# Patient Record
Sex: FEMALE | Race: WHITE | NOT HISPANIC OR LATINO | Employment: OTHER | URBAN - METROPOLITAN AREA
[De-identification: names, ages, dates, MRNs, and addresses within clinical notes are randomized per-mention and may not be internally consistent; named-entity substitution may affect disease eponyms.]

---

## 2023-09-05 ENCOUNTER — HOSPITAL ENCOUNTER (OUTPATIENT)
Dept: DATA CONVERSION | Facility: HOSPITAL | Age: 68
Discharge: HOME | End: 2023-09-05
Payer: MEDICARE

## 2023-09-05 DIAGNOSIS — E05.90 THYROTOXICOSIS, UNSPECIFIED WITHOUT THYROTOXIC CRISIS OR STORM: ICD-10-CM

## 2023-09-05 LAB — TSH SERPL DL<=0.05 MIU/L-ACNC: 3.14 MIU/L (ref 0.27–4.2)

## 2023-09-15 PROBLEM — H81.09 MÉNIÈRE'S DISEASE: Status: ACTIVE | Noted: 2023-09-15

## 2023-09-15 PROBLEM — M19.019 SHOULDER ARTHRITIS: Status: ACTIVE | Noted: 2023-09-15

## 2023-09-15 PROBLEM — I10 BENIGN ESSENTIAL HYPERTENSION: Status: ACTIVE | Noted: 2023-09-15

## 2023-09-15 PROBLEM — K29.60 REFLUX GASTRITIS: Status: ACTIVE | Noted: 2023-09-15

## 2023-09-15 PROBLEM — I48.92 PAROXYSMAL ATRIAL FLUTTER (MULTI): Status: ACTIVE | Noted: 2023-09-15

## 2023-09-15 PROBLEM — H92.09 OTALGIA: Status: ACTIVE | Noted: 2023-09-15

## 2023-09-15 PROBLEM — K57.92 DIVERTICULITIS: Status: ACTIVE | Noted: 2023-09-15

## 2023-09-15 PROBLEM — F32.9 MAJOR DEPRESSIVE DISORDER, SINGLE EPISODE, UNSPECIFIED: Status: ACTIVE | Noted: 2023-09-15

## 2023-09-15 PROBLEM — E78.5 DYSLIPIDEMIA: Status: ACTIVE | Noted: 2023-09-15

## 2023-09-15 PROBLEM — R20.0 NUMBNESS OF RIGHT FOOT: Status: ACTIVE | Noted: 2023-09-15

## 2023-09-15 PROBLEM — F31.9 BIPOLAR AFFECTIVE DISORDER (MULTI): Status: ACTIVE | Noted: 2023-09-15

## 2023-09-15 PROBLEM — H26.9 CATARACT: Status: ACTIVE | Noted: 2023-09-15

## 2023-09-15 PROBLEM — E66.8 OTHER OBESITY: Status: ACTIVE | Noted: 2023-09-15

## 2023-09-15 PROBLEM — I10 HYPERTENSIVE DISORDER: Status: ACTIVE | Noted: 2023-09-15

## 2023-09-15 PROBLEM — I48.0 PAROXYSMAL ATRIAL FIBRILLATION (MULTI): Status: ACTIVE | Noted: 2023-09-15

## 2023-09-15 PROBLEM — R73.01 IMPAIRED FASTING GLUCOSE: Status: ACTIVE | Noted: 2023-09-15

## 2023-09-15 PROBLEM — E66.89 OTHER OBESITY: Status: ACTIVE | Noted: 2023-09-15

## 2023-09-15 PROBLEM — G47.30 SLEEP APNEA: Status: ACTIVE | Noted: 2023-09-15

## 2023-09-15 PROBLEM — J45.909 ASTHMA (HHS-HCC): Status: ACTIVE | Noted: 2023-09-15

## 2023-09-15 PROBLEM — M81.0 AGE-RELATED OSTEOPOROSIS WITHOUT CURRENT PATHOLOGICAL FRACTURE: Status: ACTIVE | Noted: 2023-09-15

## 2023-09-15 PROBLEM — E55.9 VITAMIN D DEFICIENCY: Status: ACTIVE | Noted: 2023-09-15

## 2023-09-15 PROBLEM — N39.44 NOCTURNAL ENURESIS: Status: ACTIVE | Noted: 2023-09-15

## 2023-09-15 PROBLEM — E05.90 HYPERTHYROIDISM: Status: ACTIVE | Noted: 2023-09-15

## 2023-09-15 PROBLEM — M54.9 BACK PAIN WITH RADIATION: Status: ACTIVE | Noted: 2023-09-15

## 2023-09-15 PROBLEM — E78.5 HYPERLIPIDEMIA: Status: ACTIVE | Noted: 2023-09-15

## 2023-09-15 PROBLEM — R10.9 ABDOMINAL PAIN: Status: ACTIVE | Noted: 2023-09-15

## 2023-09-15 RX ORDER — METHIMAZOLE 5 MG/1
1 TABLET ORAL DAILY
COMMUNITY
Start: 2022-02-22 | End: 2023-11-16 | Stop reason: ALTCHOICE

## 2023-09-15 RX ORDER — ATORVASTATIN CALCIUM 10 MG/1
1 TABLET, FILM COATED ORAL DAILY
COMMUNITY
End: 2023-10-06 | Stop reason: DRUGHIGH

## 2023-09-15 RX ORDER — TRIAMTERENE/HYDROCHLOROTHIAZID 37.5-25 MG
1 TABLET ORAL EVERY MORNING
COMMUNITY
End: 2024-01-03 | Stop reason: SDUPTHER

## 2023-09-15 RX ORDER — OXCARBAZEPINE 150 MG/1
75 TABLET, FILM COATED ORAL EVERY OTHER DAY
COMMUNITY

## 2023-09-15 RX ORDER — ASPIRIN 325 MG
TABLET, DELAYED RELEASE (ENTERIC COATED) ORAL
COMMUNITY
End: 2023-10-06 | Stop reason: ALTCHOICE

## 2023-09-15 RX ORDER — HYDROCODONE BITARTRATE AND ACETAMINOPHEN 5; 325 MG/1; MG/1
1 TABLET ORAL
COMMUNITY
Start: 2023-04-18 | End: 2023-10-06 | Stop reason: ALTCHOICE

## 2023-09-15 RX ORDER — CHOLECALCIFEROL (VITAMIN D3) 25 MCG
2000 TABLET ORAL
COMMUNITY

## 2023-09-15 RX ORDER — OXCARBAZEPINE 300 MG/1
1 TABLET, FILM COATED ORAL DAILY
COMMUNITY
Start: 2016-04-28 | End: 2023-10-06 | Stop reason: ALTCHOICE

## 2023-09-15 RX ORDER — DOCUSATE SODIUM 100 MG/1
1 CAPSULE, LIQUID FILLED ORAL DAILY PRN
COMMUNITY

## 2023-09-15 RX ORDER — LOSARTAN POTASSIUM 25 MG/1
1 TABLET ORAL DAILY
COMMUNITY
Start: 2023-03-01 | End: 2023-10-06 | Stop reason: ALTCHOICE

## 2023-09-15 RX ORDER — METHIMAZOLE 10 MG/1
1 TABLET ORAL EVERY OTHER DAY
COMMUNITY
Start: 2021-06-29 | End: 2023-10-06 | Stop reason: ALTCHOICE

## 2023-10-06 ENCOUNTER — LAB (OUTPATIENT)
Dept: LAB | Facility: LAB | Age: 68
End: 2023-10-06
Payer: MEDICARE

## 2023-10-06 ENCOUNTER — OFFICE VISIT (OUTPATIENT)
Dept: PRIMARY CARE | Facility: CLINIC | Age: 68
End: 2023-10-06
Payer: MEDICARE

## 2023-10-06 VITALS
BODY MASS INDEX: 31.23 KG/M2 | WEIGHT: 199 LBS | OXYGEN SATURATION: 95 % | TEMPERATURE: 97.8 F | HEIGHT: 67 IN | DIASTOLIC BLOOD PRESSURE: 69 MMHG | SYSTOLIC BLOOD PRESSURE: 123 MMHG | HEART RATE: 74 BPM

## 2023-10-06 DIAGNOSIS — Z12.31 VISIT FOR SCREENING MAMMOGRAM: ICD-10-CM

## 2023-10-06 DIAGNOSIS — E78.2 MIXED HYPERLIPIDEMIA: ICD-10-CM

## 2023-10-06 DIAGNOSIS — I10 BENIGN ESSENTIAL HYPERTENSION: Primary | ICD-10-CM

## 2023-10-06 DIAGNOSIS — F31.70 BIPOLAR DISORDER IN FULL REMISSION, MOST RECENT EPISODE UNSPECIFIED TYPE (MULTI): ICD-10-CM

## 2023-10-06 DIAGNOSIS — Z11.59 ENCOUNTER FOR HEPATITIS C SCREENING TEST FOR LOW RISK PATIENT: ICD-10-CM

## 2023-10-06 DIAGNOSIS — E05.90 HYPERTHYROIDISM: ICD-10-CM

## 2023-10-06 DIAGNOSIS — Z12.39 BREAST SCREENING: ICD-10-CM

## 2023-10-06 DIAGNOSIS — E28.39 ESTROGEN DEFICIENCY: ICD-10-CM

## 2023-10-06 LAB
ALBUMIN SERPL-MCNC: 4.1 G/DL (ref 3.5–5)
ALP BLD-CCNC: 83 U/L (ref 35–125)
ALT SERPL-CCNC: 21 U/L (ref 5–40)
ANION GAP SERPL CALC-SCNC: 11 MMOL/L
AST SERPL-CCNC: 17 U/L (ref 5–40)
BILIRUB SERPL-MCNC: 0.4 MG/DL (ref 0.1–1.2)
BUN SERPL-MCNC: 15 MG/DL (ref 8–25)
CALCIUM SERPL-MCNC: 9.3 MG/DL (ref 8.5–10.4)
CHLORIDE SERPL-SCNC: 103 MMOL/L (ref 97–107)
CHOLEST SERPL-MCNC: 194 MG/DL (ref 133–200)
CHOLEST/HDLC SERPL: 3.3 {RATIO}
CO2 SERPL-SCNC: 27 MMOL/L (ref 24–31)
CREAT SERPL-MCNC: 0.8 MG/DL (ref 0.4–1.6)
ERYTHROCYTE [DISTWIDTH] IN BLOOD BY AUTOMATED COUNT: 12.6 % (ref 11.5–14.5)
GFR SERPL CREATININE-BSD FRML MDRD: 81 ML/MIN/1.73M*2
GLUCOSE SERPL-MCNC: 93 MG/DL (ref 65–99)
HCT VFR BLD AUTO: 44.2 % (ref 36–46)
HCV AB SER QL: NONREACTIVE
HDLC SERPL-MCNC: 58 MG/DL
HGB BLD-MCNC: 14.6 G/DL (ref 12–16)
LDLC SERPL CALC-MCNC: 88 MG/DL (ref 65–130)
MCH RBC QN AUTO: 30.4 PG (ref 26–34)
MCHC RBC AUTO-ENTMCNC: 33 G/DL (ref 32–36)
MCV RBC AUTO: 92 FL (ref 80–100)
NRBC BLD-RTO: 0 /100 WBCS (ref 0–0)
PLATELET # BLD AUTO: 224 X10*3/UL (ref 150–450)
PMV BLD AUTO: 12 FL (ref 7.5–11.5)
POTASSIUM SERPL-SCNC: 4.1 MMOL/L (ref 3.4–5.1)
PROT SERPL-MCNC: 6.8 G/DL (ref 5.9–7.9)
RBC # BLD AUTO: 4.81 X10*6/UL (ref 4–5.2)
SODIUM SERPL-SCNC: 141 MMOL/L (ref 133–145)
TRIGL SERPL-MCNC: 240 MG/DL (ref 40–150)
WBC # BLD AUTO: 5.5 X10*3/UL (ref 4.4–11.3)

## 2023-10-06 PROCEDURE — 86803 HEPATITIS C AB TEST: CPT

## 2023-10-06 PROCEDURE — 99214 OFFICE O/P EST MOD 30 MIN: CPT | Performed by: INTERNAL MEDICINE

## 2023-10-06 PROCEDURE — 1126F AMNT PAIN NOTED NONE PRSNT: CPT | Performed by: INTERNAL MEDICINE

## 2023-10-06 PROCEDURE — 1036F TOBACCO NON-USER: CPT | Performed by: INTERNAL MEDICINE

## 2023-10-06 PROCEDURE — 80053 COMPREHEN METABOLIC PANEL: CPT

## 2023-10-06 PROCEDURE — 1159F MED LIST DOCD IN RCRD: CPT | Performed by: INTERNAL MEDICINE

## 2023-10-06 PROCEDURE — 3078F DIAST BP <80 MM HG: CPT | Performed by: INTERNAL MEDICINE

## 2023-10-06 PROCEDURE — 3074F SYST BP LT 130 MM HG: CPT | Performed by: INTERNAL MEDICINE

## 2023-10-06 PROCEDURE — 85027 COMPLETE CBC AUTOMATED: CPT

## 2023-10-06 PROCEDURE — 36415 COLL VENOUS BLD VENIPUNCTURE: CPT

## 2023-10-06 PROCEDURE — 80061 LIPID PANEL: CPT

## 2023-10-06 RX ORDER — ATORVASTATIN CALCIUM 20 MG/1
20 TABLET, FILM COATED ORAL DAILY
Qty: 90 TABLET | Refills: 3 | Status: SHIPPED | OUTPATIENT
Start: 2023-10-06 | End: 2024-01-05

## 2023-10-06 RX ORDER — MV-MIN/FA/VIT K/LUTEIN/ZEAXANT 200MCG-5MG
1 CAPSULE ORAL DAILY
COMMUNITY

## 2023-10-06 ASSESSMENT — PATIENT HEALTH QUESTIONNAIRE - PHQ9
1. LITTLE INTEREST OR PLEASURE IN DOING THINGS: NOT AT ALL
2. FEELING DOWN, DEPRESSED OR HOPELESS: NOT AT ALL
SUM OF ALL RESPONSES TO PHQ9 QUESTIONS 1 AND 2: 0

## 2023-10-06 ASSESSMENT — ENCOUNTER SYMPTOMS
LOSS OF SENSATION IN FEET: 0
DEPRESSION: 0
SHORTNESS OF BREATH: 0
OCCASIONAL FEELINGS OF UNSTEADINESS: 0
PALPITATIONS: 0

## 2023-10-06 ASSESSMENT — PAIN SCALES - GENERAL: PAINLEVEL: 0-NO PAIN

## 2023-10-06 NOTE — PROGRESS NOTES
[facility]: [facility]  PROGRESS NOTE      Nevaeh Cuba is a 68 y.o. female that is presenting today for 6 mo Afib.    Assessment/Plan   Diagnoses and all orders for this visit:  Benign essential hypertension  Comments:  BP doing well with  generic Maxide  Mixed hyperlipidemia  Comments:   would recommend increasing to 20 mg Atorvastatin  Bipolar disorder in full remission, most recent episode unspecified type (CMS/HCC)  Comments:  Mood stable.  Breast screening  Hyperthyroidism  Comments:  Ddoing well with meds.  Subjective   HPI  Review of Systems   Respiratory:  Negative for shortness of breath.    Cardiovascular:  Negative for chest pain and palpitations.   All other systems reviewed and are negative.     Objective   There were no vitals filed for this visit.   There is no height or weight on file to calculate BMI.  Physical Exam  Constitutional:       General: She is not in acute distress.  HENT:      Head: Normocephalic and atraumatic.      Right Ear: Tympanic membrane normal.      Left Ear: Tympanic membrane normal.      Mouth/Throat:      Mouth: Mucous membranes are moist.      Pharynx: Oropharynx is clear.   Eyes:      Extraocular Movements: Extraocular movements intact.      Conjunctiva/sclera: Conjunctivae normal.      Pupils: Pupils are equal, round, and reactive to light.   Cardiovascular:      Rate and Rhythm: Normal rate and regular rhythm.   Pulmonary:      Breath sounds: Normal breath sounds.   Abdominal:      General: Bowel sounds are normal.      Palpations: Abdomen is soft. There is no mass.   Musculoskeletal:         General: Normal range of motion.      Cervical back: Neck supple. No tenderness.   Skin:     General: Skin is warm and dry.   Neurological:      General: No focal deficit present.      Mental Status: She is oriented to person, place, and time.     Diagnostic Results   Lab Results   Component Value Date    GLUCOSE 104 (H) 03/14/2023    CALCIUM 9.3  "03/14/2023     03/14/2023    K 4.0 03/14/2023    CO2 25 03/14/2023     03/14/2023    BUN 12 03/14/2023    CREATININE 0.8 03/14/2023     Lab Results   Component Value Date    ALT 25 03/14/2023    AST 19 03/14/2023    ALKPHOS 80 03/14/2023    BILITOT 0.6 03/14/2023     Lab Results   Component Value Date    WBC 6.0 10/31/2022    HGB 15.3 (H) 10/31/2022    HCT 45.0 (H) 10/31/2022    MCV 93.2 10/31/2022     10/31/2022     Lab Results   Component Value Date    CHOL 210 (H) 03/14/2023    CHOL 194 10/31/2022    CHOL 184 02/09/2022     Lab Results   Component Value Date    HDL 61 03/14/2023    HDL 57 10/31/2022    HDL 67 02/09/2022     Lab Results   Component Value Date    LDLCALC 117 03/14/2023    LDLCALC 107 10/31/2022    LDLCALC 96 02/09/2022     Lab Results   Component Value Date    TRIG 158 (H) 03/14/2023    TRIG 151 (H) 10/31/2022    TRIG 105 02/09/2022     No components found for: \"CHOLHDL\"  Lab Results   Component Value Date    HGBA1C 6.0 03/14/2023     Other labs not included in the list above were reviewed either before or during this encounter.    History    Past Medical History:   Diagnosis Date   • Other specified abnormal findings of blood chemistry     High serum cholesterol sulfate   • Personal history of malignant neoplasm, unspecified     History of malignant neoplasm   • Unspecified osteoarthritis, unspecified site 04/28/2016    Arthritis     Past Surgical History:   Procedure Laterality Date   • HYSTERECTOMY  04/28/2016    Hysterectomy   • OTHER SURGICAL HISTORY  04/28/2016    Musculoskeletal Procedures Injection Carpal Tunnel   • OTHER SURGICAL HISTORY  04/28/2016    Arthrodesis MCP Joint   • OTHER SURGICAL HISTORY  01/11/2021    Ankle surgery   • OTHER SURGICAL HISTORY  01/11/2021    Carpal tunnel surgery   • OTHER SURGICAL HISTORY  07/06/2022    Nasal septoplasty     Family History   Problem Relation Name Age of Onset   • Stroke Mother     • Other (cyst on breast) Mother     • Atrial " fibrillation Father     • Stroke Father     • Heart disease Father       Social History     Socioeconomic History   • Marital status:      Spouse name: Not on file   • Number of children: Not on file   • Years of education: Not on file   • Highest education level: Not on file   Occupational History   • Not on file   Tobacco Use   • Smoking status: Not on file   • Smokeless tobacco: Not on file   Substance and Sexual Activity   • Alcohol use: Not on file   • Drug use: Not on file   • Sexual activity: Not on file   Other Topics Concern   • Not on file   Social History Narrative   • Not on file     Social Determinants of Health     Financial Resource Strain: Not on file   Food Insecurity: Not on file   Transportation Needs: Not on file   Physical Activity: Not on file   Stress: Not on file   Social Connections: Not on file   Intimate Partner Violence: Not on file   Housing Stability: Not on file     Allergies   Allergen Reactions   • Latex Itching and Rash   • Diltiazem Hcl Itching     Rash face    • Methotrexate Unknown   • Metoprolol Unknown   • Pollen Extracts Other   • Adhesive Tape-Silicones Rash   • Folic Acid Rash   • Sulfamethoxazole-Trimethoprim Rash     Tinnitus      Current Outpatient Medications on File Prior to Visit   Medication Sig Dispense Refill   • atorvastatin (Lipitor) 10 mg tablet Take 1 tablet (10 mg) by mouth once daily. For 90     • cholecalciferol, vitamin D3, 250 mcg (10,000 unit) capsule Take by mouth. As directed     • docusate sodium (Colace) 100 mg capsule Take 1 capsule (100 mg) by mouth once daily as needed.     • fexofenadine HCl (ALLEGRA ORAL) Allegra     • methIMAzole (Tapazole) 5 mg tablet Take 1 tablet (5 mg) by mouth once daily. For 90 days     • OXcarbazepine (Trileptal) 150 mg tablet Take 1 tablet (150 mg) by mouth once daily. 1 tab even days, 1/2 tab odd days     • triamterene-hydrochlorothiazid (Maxzide-25) 37.5-25 mg tablet Take 1 tablet by mouth once daily in the  morning.     • [DISCONTINUED] cholecalciferol (Vitamin D-3) 1,250 mcg (50,000 unit) capsule Vitamin D3 CAPS   Refills: 0       Active     • [DISCONTINUED] HYDROcodone-acetaminophen (Norco) 5-325 mg tablet Take 1 tablet by mouth. EVERY 4 TO 6 HOURS AS NEEDED     • [DISCONTINUED] losartan (Cozaar) 25 mg tablet Take 1 tablet (25 mg) by mouth once daily. For 30 days     • [DISCONTINUED] methIMAzole (Tapazole) 10 mg tablet Take 1 tablet (10 mg) by mouth every other day.     • [DISCONTINUED] OXcarbazepine (Trileptal) 300 mg tablet Take 1 tablet (300 mg) by mouth once daily.       No current facility-administered medications on file prior to visit.     Immunization History   Administered Date(s) Administered   • Influenza, Unspecified 09/07/2010, 10/17/2011   • Influenza, injectable, MDCK, quadrivalent 10/23/2017, 10/30/2018   • Influenza, injectable, quadrivalent 09/23/2019, 09/11/2020   • Influenza, seasonal, injectable 09/24/2012, 10/17/2013, 10/23/2014, 10/26/2015, 11/17/2016   • Pfizer Gray Cap SARS-CoV-2 03/31/2022   • Pfizer Purple Cap SARS-CoV-2 03/09/2021, 04/06/2021, 10/06/2021   • Pneumococcal conjugate vaccine, 13-valent (PREVNAR 13) 07/11/2013   • Pneumococcal polysaccharide vaccine, 23-valent, age 2 years and older (PNEUMOVAX 23) 11/05/2020   • Tdap vaccine, age 7 year and older (BOOSTRIX) 09/30/2021   • Zoster vaccine, recombinant, adult (SHINGRIX) 10/15/2020, 09/30/2021   • Zoster, live 02/10/2012     Patient's medical history was reviewed and updated either before or during this encounter.    Rajeev Noriega MD

## 2023-10-06 NOTE — PATIENT INSTRUCTIONS
Assessment/Plan   Diagnoses and all orders for this visit:  Benign essential hypertension  Comments:  BP doing well with  generic Maxide  Mixed hyperlipidemia  Comments:   would recommend increasing to 20 mg Atorvastatin  Bipolar disorder in full remission, most recent episode unspecified type (CMS/[facility])  Comments:  Mood stable.  Breast screening  get Mammogram 1/3/2024, get bone density at same time [phone] to schedule call in  early December to schedule.  Hyperthyroidism  Comments:  Ddoing well with meds.

## 2023-11-15 PROBLEM — M81.0 OSTEOPOROSIS: Status: ACTIVE | Noted: 2023-09-15

## 2023-11-15 PROBLEM — Z86.69 HISTORY OF MENIERE'S SYNDROME: Status: ACTIVE | Noted: 2023-11-15

## 2023-11-15 PROBLEM — M54.16 LUMBAR RADICULOPATHY: Status: ACTIVE | Noted: 2018-01-26

## 2023-11-15 PROBLEM — I10 ESSENTIAL HYPERTENSION: Status: ACTIVE | Noted: 2023-11-15

## 2023-11-15 PROBLEM — H60.509 ACUTE OTITIS EXTERNA: Status: ACTIVE | Noted: 2023-11-15

## 2023-11-15 PROBLEM — J35.1 ENLARGED TONSILS: Status: ACTIVE | Noted: 2023-11-15

## 2023-11-15 PROBLEM — J01.90 ACUTE SINUSITIS: Status: ACTIVE | Noted: 2023-11-15

## 2023-11-15 PROBLEM — M19.019 INFLAMMATION OF JOINT OF SHOULDER REGION: Status: ACTIVE | Noted: 2023-11-15

## 2023-11-15 PROBLEM — J30.9 ALLERGIC RHINITIS: Status: ACTIVE | Noted: 2023-11-15
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
## 2023-11-15 PROBLEM — G47.33 OBSTRUCTIVE SLEEP APNEA SYNDROME IN ADULT: Status: ACTIVE | Noted: 2023-11-15

## 2023-11-15 PROBLEM — R21 RASH: Status: ACTIVE | Noted: 2023-11-15

## 2023-11-15 PROBLEM — H90.3 ASYMMETRICAL SENSORINEURAL HEARING LOSS: Status: ACTIVE | Noted: 2023-11-15

## 2023-11-15 PROBLEM — E28.39 DECREASED ESTROGEN LEVEL: Status: ACTIVE | Noted: 2023-11-15

## 2023-11-15 PROBLEM — J30.2 SEASONAL ALLERGIES: Status: ACTIVE | Noted: 2023-11-15

## 2023-11-15 PROBLEM — L29.9 ITCHING: Status: ACTIVE | Noted: 2023-11-15

## 2023-11-15 PROBLEM — Z51.89 ENCOUNTER FOR OTHER SPECIFIED AFTERCARE: Status: ACTIVE | Noted: 2023-04-26

## 2023-11-15 PROBLEM — Z85.9 HISTORY OF MALIGNANT NEOPLASM: Status: ACTIVE | Noted: 2023-11-15

## 2023-11-15 PROBLEM — S61.315A: Status: ACTIVE | Noted: 2023-02-07

## 2023-11-15 PROBLEM — J20.9 ACUTE BRONCHITIS: Status: ACTIVE | Noted: 2023-11-15

## 2023-11-15 PROBLEM — E78.00 PURE HYPERCHOLESTEROLEMIA: Status: ACTIVE | Noted: 2023-11-15

## 2023-11-16 ENCOUNTER — OFFICE VISIT (OUTPATIENT)
Dept: VASCULAR SURGERY | Facility: CLINIC | Age: 68
End: 2023-11-16
Payer: MEDICARE

## 2023-11-16 VITALS — DIASTOLIC BLOOD PRESSURE: 91 MMHG | HEART RATE: 75 BPM | SYSTOLIC BLOOD PRESSURE: 140 MMHG

## 2023-11-16 DIAGNOSIS — I83.12 VARICOSE VEINS OF BOTH LOWER EXTREMITIES WITH INFLAMMATION: ICD-10-CM

## 2023-11-16 DIAGNOSIS — I87.2 VENOUS INSUFFICIENCY: Primary | ICD-10-CM

## 2023-11-16 DIAGNOSIS — I83.11 VARICOSE VEINS OF BOTH LOWER EXTREMITIES WITH INFLAMMATION: ICD-10-CM

## 2023-11-16 DIAGNOSIS — R60.0 EDEMA OF BOTH LEGS: ICD-10-CM

## 2023-11-16 PROCEDURE — 99213 OFFICE O/P EST LOW 20 MIN: CPT | Performed by: NURSE PRACTITIONER

## 2023-11-16 PROCEDURE — 1036F TOBACCO NON-USER: CPT | Performed by: NURSE PRACTITIONER

## 2023-11-16 PROCEDURE — 3080F DIAST BP >= 90 MM HG: CPT | Performed by: NURSE PRACTITIONER

## 2023-11-16 PROCEDURE — 99213 OFFICE O/P EST LOW 20 MIN: CPT | Mod: 27,25 | Performed by: NURSE PRACTITIONER

## 2023-11-16 PROCEDURE — 1159F MED LIST DOCD IN RCRD: CPT | Performed by: NURSE PRACTITIONER

## 2023-11-16 PROCEDURE — 3077F SYST BP >= 140 MM HG: CPT | Performed by: NURSE PRACTITIONER

## 2023-11-16 PROCEDURE — 1126F AMNT PAIN NOTED NONE PRSNT: CPT | Performed by: NURSE PRACTITIONER

## 2023-11-16 RX ORDER — METHIMAZOLE 5 MG/1
5 TABLET ORAL 3 TIMES DAILY
COMMUNITY
End: 2024-01-05

## 2023-11-16 ASSESSMENT — PATIENT HEALTH QUESTIONNAIRE - PHQ9
2. FEELING DOWN, DEPRESSED OR HOPELESS: NOT AT ALL
1. LITTLE INTEREST OR PLEASURE IN DOING THINGS: NOT AT ALL
SUM OF ALL RESPONSES TO PHQ9 QUESTIONS 1 AND 2: 0

## 2023-11-16 ASSESSMENT — ENCOUNTER SYMPTOMS
OCCASIONAL FEELINGS OF UNSTEADINESS: 0
LOSS OF SENSATION IN FEET: 0
DEPRESSION: 0

## 2023-11-16 ASSESSMENT — PAIN SCALES - GENERAL: PAINLEVEL: 0-NO PAIN

## 2023-11-16 ASSESSMENT — LIFESTYLE VARIABLES
HOW OFTEN DO YOU HAVE A DRINK CONTAINING ALCOHOL: NEVER
HOW MANY STANDARD DRINKS CONTAINING ALCOHOL DO YOU HAVE ON A TYPICAL DAY: PATIENT DOES NOT DRINK
HOW OFTEN DO YOU HAVE SIX OR MORE DRINKS ON ONE OCCASION: NEVER
SKIP TO QUESTIONS 9-10: 1
AUDIT-C TOTAL SCORE: 0

## 2023-11-16 ASSESSMENT — COLUMBIA-SUICIDE SEVERITY RATING SCALE - C-SSRS
2. HAVE YOU ACTUALLY HAD ANY THOUGHTS OF KILLING YOURSELF?: NO
1. IN THE PAST MONTH, HAVE YOU WISHED YOU WERE DEAD OR WISHED YOU COULD GO TO SLEEP AND NOT WAKE UP?: NO
6. HAVE YOU EVER DONE ANYTHING, STARTED TO DO ANYTHING, OR PREPARED TO DO ANYTHING TO END YOUR LIFE?: NO

## 2023-11-16 NOTE — PROGRESS NOTES
Subjective   Patient ID: Nevaeh Cuba is a 68 y.o. female who presents for Varicose Veins (Veins become become red while walking. ).    HPI  This is a 68-year old female presents to our office for further evaluation of bilateral lower extremity edema, tenderness and varicosities.  Patient states she underwent work-up by Dr. Watson in September for her veins.  She underwent ultrasound bilateral lower extremities.  She has a history of tendon tear in her right lateral ankle 2018 which she had edema after.  She also was injected for spider veins right medial calf by Dr. Ross, she is unsure of date.  She is active and walks daily.  Endorses leg discomfort with long walks.  Denies claudication.  Denies ischemic rest pain.    Review of Systems  As noted in the HPI    Objective   Physical Exam  Constitutional:  Alert and oriented to person, place, date/time in no acute distress.  HEENT:  Atraumatic, normocephalic. PERRL. EOMI.  Nares patent.  Mucous membranes moist.  .   Neck:  Trachea midline.  Respiratory:  Clear to auscultation.  Cardiac:  Regular rate and rhythm.  No murmurs.  Cardiovascular: +2 nonpitting edema of the right leg, +1 nonpitting edema of the left leg  Pulse exam: Radial and femoral pulses palpable bilateral. Pedal pulses: Palpable patient.  Diffuse varicosities noted bilateral legs.  Spider veins present to ankles and behind the knees.,  Mildly tender upon palpation  Abdominal:  Soft, nontender, nondistended, bowel sounds present.  Musculoskeletal:  Moves extremities freely.  Dermatological: Clean and dry   Neurological: Alert and oriented to person, place, date/time  Psych:  Calm, cooperative    Assessment/Plan     Bilateral lower extremity edema  Venous insufficiency  Varicose veins    Patient with previous work-up by Dr. Watson, our office will request her records, will review further with Dr. Najera.  Plan to call the patient with results next week.  Compression stocking RX dispensed, wear during the day,  remove at night before bed  Elevation throughout the day  Walking program - 25 minutes, 3 times per week  NSAIDs as needed

## 2023-11-27 DIAGNOSIS — I87.2 VENOUS INSUFFICIENCY (CHRONIC) (PERIPHERAL): Primary | ICD-10-CM

## 2023-11-27 DIAGNOSIS — I83.813 VARICOSE VEINS OF BOTH LOWER EXTREMITIES WITH PAIN: ICD-10-CM

## 2023-12-07 ENCOUNTER — CLINICAL SUPPORT (OUTPATIENT)
Dept: VASCULAR MEDICINE | Facility: CLINIC | Age: 68
End: 2023-12-07
Payer: MEDICARE

## 2023-12-07 DIAGNOSIS — I87.2 VENOUS INSUFFICIENCY (CHRONIC) (PERIPHERAL): ICD-10-CM

## 2023-12-07 DIAGNOSIS — I83.813 VARICOSE VEINS OF BOTH LOWER EXTREMITIES WITH PAIN: ICD-10-CM

## 2023-12-07 PROCEDURE — 93970 EXTREMITY STUDY: CPT

## 2023-12-07 PROCEDURE — 93970 EXTREMITY STUDY: CPT | Performed by: INTERNAL MEDICINE

## 2024-01-03 ENCOUNTER — TELEPHONE (OUTPATIENT)
Dept: OTOLARYNGOLOGY | Facility: CLINIC | Age: 69
End: 2024-01-03
Payer: MEDICARE

## 2024-01-03 DIAGNOSIS — H81.09 MENIERE'S DISEASE, UNSPECIFIED LATERALITY: Primary | ICD-10-CM

## 2024-01-03 RX ORDER — TRIAMTERENE/HYDROCHLOROTHIAZID 37.5-25 MG
1 TABLET ORAL EVERY MORNING
Qty: 90 TABLET | Refills: 3 | Status: SHIPPED | OUTPATIENT
Start: 2024-01-03 | End: 2024-01-09 | Stop reason: SDUPTHER

## 2024-01-03 NOTE — TELEPHONE ENCOUNTER
Patient called requesting a refill on Triamterene hydrochlorothiazide to [facility] in her chart.

## 2024-01-04 ENCOUNTER — ANCILLARY PROCEDURE (OUTPATIENT)
Dept: RADIOLOGY | Facility: CLINIC | Age: 69
End: 2024-01-04
Payer: MEDICARE

## 2024-01-04 VITALS — BODY MASS INDEX: 30.61 KG/M2 | WEIGHT: 195 LBS | HEIGHT: 67 IN

## 2024-01-04 DIAGNOSIS — Z12.31 VISIT FOR SCREENING MAMMOGRAM: ICD-10-CM

## 2024-01-04 DIAGNOSIS — Z12.39 BREAST SCREENING: ICD-10-CM

## 2024-01-04 DIAGNOSIS — E28.39 ESTROGEN DEFICIENCY: ICD-10-CM

## 2024-01-04 PROCEDURE — 77067 SCR MAMMO BI INCL CAD: CPT

## 2024-01-04 PROCEDURE — 77085 DXA BONE DENSITY AXL VRT FX: CPT

## 2024-01-05 DIAGNOSIS — E78.2 MIXED HYPERLIPIDEMIA: ICD-10-CM

## 2024-01-05 DIAGNOSIS — E05.90 HYPERTHYROIDISM: Primary | ICD-10-CM

## 2024-01-05 RX ORDER — ATORVASTATIN CALCIUM 20 MG/1
20 TABLET, FILM COATED ORAL DAILY
Qty: 90 TABLET | Refills: 2 | Status: SHIPPED | OUTPATIENT
Start: 2024-01-05

## 2024-01-05 RX ORDER — METHIMAZOLE 5 MG/1
5 TABLET ORAL DAILY
Qty: 90 TABLET | Refills: 3 | Status: SHIPPED | OUTPATIENT
Start: 2024-01-05 | End: 2024-03-14 | Stop reason: SDUPTHER

## 2024-01-09 ENCOUNTER — TELEPHONE (OUTPATIENT)
Dept: OTOLARYNGOLOGY | Facility: CLINIC | Age: 69
End: 2024-01-09
Payer: MEDICARE

## 2024-01-09 DIAGNOSIS — H81.09 MENIERE'S DISEASE, UNSPECIFIED LATERALITY: ICD-10-CM

## 2024-01-09 RX ORDER — TRIAMTERENE/HYDROCHLOROTHIAZID 37.5-25 MG
1 TABLET ORAL EVERY MORNING
Qty: 90 TABLET | Refills: 3 | Status: SHIPPED | OUTPATIENT
Start: 2024-01-09 | End: 2025-01-03

## 2024-01-09 NOTE — TELEPHONE ENCOUNTER
Pt gave the wrong pharmacy for her triamterene-hydrochlorothiazide 37.5-25.  Can it be resent ? I put in the correct pharmacy.

## 2024-01-31 ENCOUNTER — OFFICE VISIT (OUTPATIENT)
Dept: VASCULAR SURGERY | Facility: CLINIC | Age: 69
End: 2024-01-31
Payer: MEDICARE

## 2024-01-31 VITALS
DIASTOLIC BLOOD PRESSURE: 77 MMHG | HEIGHT: 66 IN | SYSTOLIC BLOOD PRESSURE: 127 MMHG | WEIGHT: 200 LBS | HEART RATE: 83 BPM | OXYGEN SATURATION: 95 % | BODY MASS INDEX: 32.14 KG/M2

## 2024-01-31 DIAGNOSIS — R60.0 LEG EDEMA: ICD-10-CM

## 2024-01-31 DIAGNOSIS — I87.2 PERIPHERAL VENOUS INSUFFICIENCY: ICD-10-CM

## 2024-01-31 DIAGNOSIS — I83.893 SYMPTOMATIC VARICOSE VEINS OF BOTH LOWER EXTREMITIES: Primary | ICD-10-CM

## 2024-01-31 PROCEDURE — 99213 OFFICE O/P EST LOW 20 MIN: CPT | Performed by: NURSE PRACTITIONER

## 2024-01-31 PROCEDURE — 1159F MED LIST DOCD IN RCRD: CPT | Performed by: NURSE PRACTITIONER

## 2024-01-31 PROCEDURE — 3078F DIAST BP <80 MM HG: CPT | Performed by: NURSE PRACTITIONER

## 2024-01-31 PROCEDURE — 1126F AMNT PAIN NOTED NONE PRSNT: CPT | Performed by: NURSE PRACTITIONER

## 2024-01-31 PROCEDURE — 3074F SYST BP LT 130 MM HG: CPT | Performed by: NURSE PRACTITIONER

## 2024-01-31 PROCEDURE — 1160F RVW MEDS BY RX/DR IN RCRD: CPT | Performed by: NURSE PRACTITIONER

## 2024-01-31 PROCEDURE — 1036F TOBACCO NON-USER: CPT | Performed by: NURSE PRACTITIONER

## 2024-01-31 ASSESSMENT — PAIN SCALES - GENERAL: PAINLEVEL: 0-NO PAIN

## 2024-01-31 NOTE — PATIENT INSTRUCTIONS
Nevaeh,    It was really good to see you again!  Thank you for allowing me to participate in your care.    We discussed your symptomatic varicose veins, leg swelling.  At this point, I think it is wise to continue conservative management including leg elevation, stockings, daily moisturization of the legs.  Please follow-up in 6 months for a recheck.  Should your symptoms worsen, please call us back and schedule an appointment.

## 2024-01-31 NOTE — PROGRESS NOTES
"History Of Present Illness  Nevaeh Cuba is a 68 y.o. female presenting with symptomatic varicose veins bilateral lower extremities.  She notes that after walking her lower legs get \"bright red\" all the way to the ankles.  She also experiences occasional throbbing discomfort.  She has known lower extremity edema as well.  She was seen by Maria Luz schrader CNP in November 2023 and has been wearing gradient compression stockings since that time.  Patient has a previous history of right greater saphenous vein ablation and subsequent sclerotherapy remotely.  No history of DVT or SVT.     Past Medical History  She has a past medical history of History of atrial fibrillation, Hyperthyroidism (09/15/2023), Other specified abnormal findings of blood chemistry, Personal history of malignant neoplasm, unspecified, and Unspecified osteoarthritis, unspecified site (04/28/2016).    Surgical History  She has a past surgical history that includes Hysterectomy (04/28/2016); Other surgical history (04/28/2016); Other surgical history (04/28/2016); Other surgical history (01/11/2021); Other surgical history (01/11/2021); Other surgical history (07/06/2022); and Joint replacement.     Social History  She reports that she has never smoked. She has never used smokeless tobacco. She reports that she does not drink alcohol and does not use drugs.    Family History  Family History   Problem Relation Name Age of Onset    Stroke Mother      Other (cyst on breast) Mother      Atrial fibrillation Father      Stroke Father      Heart disease Father          Allergies  Latex; Diltiazem hcl; Latex, natural rubber; Methotrexate; Metoprolol; Pollen extracts; Adhesive tape-silicones; Folic acid; and Sulfamethoxazole-trimethoprim    Review of Systems    Negative except for what is listed above     Physical Exam      General: Pt is alert and oriented x 3. Pleasant, conversive  HEENT: Head is atraumatic, normocephalic. PERRL. No cervical bruits  Cardiac: " Normal S1-S2.  Regular rate and rhythm.  No murmurs.  Respiratory: Lungs clear to auscultation.  No adventitious sounds.  Abdomen: Soft, nondistended, nontender.  Bowel sounds x4 quadrants.  Pulse exam: Palpable pedal pulses bilaterally.  Extremities: 1+ pitting edema bilateral lower extremities.  3 to 4 mm moderate pressure varices scattered to bilateral lower extremities.  Right leg slightly worse than left leg.  Neuro: Moves all extremities spontaneously.  No focal deficits.  Psych: Appropriate affect.  Answers questions appropriately.        Last Recorded Vitals  /77 (BP Location: Left arm, Patient Position: Sitting, BP Cuff Size: Large adult)   Pulse 83   Wt 90.7 kg (200 lb)   SpO2 95%     Relevant Results    Current Outpatient Medications   Medication Instructions    atorvastatin (LIPITOR) 20 mg, oral, Daily    cholecalciferol, vitamin D3, 250 mcg (10,000 unit) capsule oral, As directed     docusate sodium (Colace) 100 mg capsule 1 capsule, oral, Daily PRN    fexofenadine HCl (ALLEGRA ORAL) Allegra    methIMAzole (TAPAZOLE) 5 mg, oral, Daily    mv-min-FA-vit K-lutein-zeaxant (PreserVision AREDS 2 Plus MV) 200 mcg-15 mcg- 5 mg-1 mg capsule oral    OXcarbazepine (TRILEPTAL) 75 mg, oral, Daily, 1/2 tab daily, weaning off of it    triamterene-hydrochlorothiazid (Maxzide-25) 37.5-25 mg tablet 1 tablet, oral, Every morning             Assessment/Plan   Symptomatic varicose veins bilateral lower extremities  Venous insufficiency  Leg edema    #1 Symptomatic varicose veins bilateral lower extremities-with a history of right greater saphenous vein ablation and follow-up sclerotherapy remotely with [facility].  She has recurrent varicose veins.  I did review the venous insufficiency duplex which showed some mild left greater saphenous vein reflux beginning in the proximal thigh.  It did not involve the saphenofemoral junction and therefore is not amenable to ablation.  I did offer the  patient injections with sclerotherapy.  However, she would like to continue with conservative management for the time being.  I advised her to continue leg elevation, wearing gradient compression stockings, leg moisturization on a daily basis.  Should her symptoms worsen or not improve, she should follow-up.  Follow-up in 6 months for repeat office visit with no testing.       Marlon Martins, APRN-CNP

## 2024-02-29 NOTE — PROGRESS NOTES
History of present illness:    Nevaeh Cuba is a 68 y.o. female is referred by by Dr Marino for possible cochlear implant for rehabilitation of progressive hearing loss. The patient is accompanied by her suster.   Reports history of Meniere's disease diagnosed at the age of 41 years of age. Notes her antihypertensive medication takes care of her Meniere's disease.    The approximate duration of the patient's hearing loss is since her forties when she was diagnosed with Meniere's disease. The patient currently is aided in both with a Bicros divide with limited benefit and appears to be motivated with realistic expectations.  They  deny ear pain, discharge from ear, tinnitus, dizziness and vertigo. They also deny a history of prior ear surgery, noise exposure, exposure to ototoxic drugs or agents, and family history of hearing loss.    The patient's current medications, active allergies and list of medical problems were reviewed in the EHR and confirmed electronically there are as follows;    Allergies:   Allergies   Allergen Reactions    Latex Itching and Rash    Diltiazem Hcl Itching     Rash face     Latex, Natural Rubber Itching    Methotrexate Unknown    Metoprolol Unknown    Pollen Extracts Other    Adhesive Tape-Silicones Rash    Folic Acid Rash    Sulfamethoxazole-Trimethoprim Rash     Tinnitus      Current list of medications:   Current Outpatient Medications   Medication Sig Dispense Refill    atorvastatin (Lipitor) 20 mg tablet Take 1 tablet (20 mg) by mouth once daily. 90 tablet 2    cholecalciferol, vitamin D3, 250 mcg (10,000 unit) capsule Take by mouth. As directed      docusate sodium (Colace) 100 mg capsule Take 1 capsule (100 mg) by mouth once daily as needed.      fexofenadine HCl (ALLEGRA ORAL) Allegra      methIMAzole (Tapazole) 5 mg tablet Take 1 tablet (5 mg) by mouth once daily. 90 tablet 3    mv-min-FA-vit K-lutein-zeaxant (PreserVision AREDS 2 Plus MV) 200 mcg-15 mcg- 5 mg-1 mg capsule Take by  mouth.      OXcarbazepine (Trileptal) 150 mg tablet Take 0.5 tablets (75 mg) by mouth once daily. 1/2 tab daily, weaning off of it      triamterene-hydrochlorothiazid (Maxzide-25) 37.5-25 mg tablet Take 1 tablet by mouth once daily in the morning. 90 tablet 3     No current facility-administered medications for this visit.     Problem list:  Patient Active Problem List   Diagnosis    Abdominal pain    Age-related osteoporosis without current pathological fracture    Shoulder arthritis    Sleep apnea    Asthma    Back pain with radiation    Benign essential hypertension    Bipolar affective disorder (CMS/HCC)    Cataract    Diverticulitis    Dyslipidemia    Hyperlipidemia    Hypertensive disorder    Hyperthyroidism    Impaired fasting glucose    Major depressive disorder, single episode, unspecified    Ménière's disease    Nocturnal enuresis    Numbness of right foot    Otalgia    Other obesity    Paroxysmal atrial fibrillation (CMS/HCC)    Paroxysmal atrial flutter (CMS/HCC)    Reflux gastritis    Vitamin D deficiency    Acute bronchitis    Acute otitis externa    Acute sinusitis    Allergic rhinitis    Asymmetrical sensorineural hearing loss    Decreased estrogen level    Diverticulitis of large intestine    Enlarged tonsils    History of malignant neoplasm    History of Meniere's syndrome    Itching    Lumbar radiculopathy    Obstructive sleep apnea syndrome in adult    Pure hypercholesterolemia    Rash    Seasonal allergies    Essential hypertension    Inflammation of joint of shoulder region    Contact with other sharp object(s), not elsewhere classified, initial encounter    Encounter for other specified aftercare    Osteoporosis    Laceration without foreign body of left ring finger with damage to nail, initial encounter     Medical history:  Past Medical History:   Diagnosis Date    History of atrial fibrillation     Hyperactive thyroid Dr.Burtch Rand [place]ham NSR no AC    Hyperthyroidism 09/15/2023     Other specified abnormal findings of blood chemistry     High serum cholesterol sulfate    Personal history of malignant neoplasm, unspecified     History of malignant neoplasm    Unspecified osteoarthritis, unspecified site 04/28/2016    Arthritis     Surgical history:  Past Surgical History:   Procedure Laterality Date    HYSTERECTOMY  04/28/2016    Hysterectomy    JOINT REPLACEMENT      rt ring finger    OTHER SURGICAL HISTORY  04/28/2016    Musculoskeletal Procedures Injection Carpal Tunnel    OTHER SURGICAL HISTORY  04/28/2016    Arthrodesis MCP Joint    OTHER SURGICAL HISTORY  01/11/2021    Ankle surgery    OTHER SURGICAL HISTORY  01/11/2021    Carpal tunnel surgery    OTHER SURGICAL HISTORY  07/06/2022    Nasal septoplasty     Family history:  Family History   Problem Relation Name Age of Onset    Stroke Mother      Other (cyst on breast) Mother      Atrial fibrillation Father      Stroke Father      Heart disease Father       Social history:  Social History     Tobacco Use    Smoking status: Never    Smokeless tobacco: Never   Substance Use Topics    Alcohol use: Never    Drug use: Never       Physical Examination:  CONSTITUTIONAL:  No acute distress  VOICE:  No hoarseness or other abnormality  RESPIRATION:  Breathing comfortably, no stridor  CV:  No clubbing/cyanosis/edema in hands  EYES:  EOM intact, sclera clear  NEURO:  Alert and oriented times 3, Cranial nerves II-XII grossly intact and symmetric bilaterally  HEAD AND FACE:  Symmetric facial features, no masses or lesions  RIGHT EAR:  Normal external ear and post auricular area, no visible lesions, external auditory canal patent, tympanic membrane intact, no retraction, no signs of mass, effusion, or infection within the middle ear  LEFT EAR: Normal external ear and post auricular area, no visible lesions, external auditory canal patent, tympanic membrane intact, no retraction, no signs of mass, effusion, or infection within the middle ear  NOSE:   Deferred due to Covid-19 pandemic.  ORAL CAVITY/OROPHARYNX/LIPS:  Deferred due to Covid-19 pandemic.  PHARYNGEAL WALLS: Deferred due to Covid-19 pandemic.  NECK/LYMPH:  No LAD, no thyroid masses, trachea midline  SKIN:  Neck and facial skin is without scar or injury  PSYCH:  Alert and oriented with appropriate mood and affect    Diagnostic testing:    Audiometry:  Audiometry testing done on 03/26/2023 reveals:  On the right: severe to profound Sensorineural hearing loss (SNHL) with poor Speech discrimination of 12%  On the left: normal hearing sensitivity. Speech discrimination is excellent.      I personally reviewed the available patient's external record and independently reviewed their audiometric testing and radiographic imaging through the appropriate viewing software as detailed in my note and agree with the detailed report.      Impression:  Asymmetric  Sensorineural hearing loss  Right sided Meniere's Disease, Inactive    Recommendation:  She s here mostly to discuss her hearing restoration options.. For her asymmetric hearing loss, she is currently using Bi-Cros hearing aids but seems not to be benefiting much from it. She will like to  explore the option of cochlear implantation. We discussed that as an option for single sided deafness. Pros and cons, risk and benefits were discussed. She needs a CI evaluation which we will schedule. Given the fact that she has asymmetric hearing loss and it has been years since she had an MRI, I will like to also obtain an MRI of the IAC with and without contrast for pre-surgical planning. She is currently managed medically for Meniere's with Diuretics. A virtual visit will be scheduled to review the testing. But we will like to proceed with a right sided cochlear implantation if she proves to be an audiometric candidate for it.          Scribe Attestation  By signing my name below, Susan VENTURA Scribe   attest that this documentation has been prepared under the  direction and in the presence of Conner Dwyer MD.

## 2024-03-01 ENCOUNTER — OFFICE VISIT (OUTPATIENT)
Dept: OTOLARYNGOLOGY | Facility: CLINIC | Age: 69
End: 2024-03-01
Payer: MEDICARE

## 2024-03-01 VITALS — HEIGHT: 66 IN | BODY MASS INDEX: 31.34 KG/M2 | WEIGHT: 195 LBS

## 2024-03-01 DIAGNOSIS — H90.3 ASNHL (ASYMMETRICAL SENSORINEURAL HEARING LOSS): ICD-10-CM

## 2024-03-01 DIAGNOSIS — H90.3 BILATERAL SENSORINEURAL HEARING LOSS: ICD-10-CM

## 2024-03-01 PROCEDURE — 1036F TOBACCO NON-USER: CPT | Performed by: OTOLARYNGOLOGY

## 2024-03-01 PROCEDURE — 1159F MED LIST DOCD IN RCRD: CPT | Performed by: OTOLARYNGOLOGY

## 2024-03-01 PROCEDURE — 1160F RVW MEDS BY RX/DR IN RCRD: CPT | Performed by: OTOLARYNGOLOGY

## 2024-03-01 PROCEDURE — 99204 OFFICE O/P NEW MOD 45 MIN: CPT | Performed by: OTOLARYNGOLOGY

## 2024-03-01 PROCEDURE — 1126F AMNT PAIN NOTED NONE PRSNT: CPT | Performed by: OTOLARYNGOLOGY

## 2024-03-01 ASSESSMENT — ENCOUNTER SYMPTOMS: DEPRESSION: 0

## 2024-03-05 ENCOUNTER — LAB (OUTPATIENT)
Dept: LAB | Facility: LAB | Age: 69
End: 2024-03-05
Payer: MEDICARE

## 2024-03-05 DIAGNOSIS — E05.90 THYROTOXICOSIS, UNSPECIFIED WITHOUT THYROTOXIC CRISIS OR STORM: Primary | ICD-10-CM

## 2024-03-05 DIAGNOSIS — H90.3 ASYMMETRICAL SENSORINEURAL HEARING LOSS: ICD-10-CM

## 2024-03-05 LAB — TSH SERPL DL<=0.05 MIU/L-ACNC: 3.48 MIU/L (ref 0.27–4.2)

## 2024-03-05 PROCEDURE — 84443 ASSAY THYROID STIM HORMONE: CPT

## 2024-03-05 PROCEDURE — 36415 COLL VENOUS BLD VENIPUNCTURE: CPT

## 2024-03-08 ENCOUNTER — HOSPITAL ENCOUNTER (OUTPATIENT)
Dept: RADIOLOGY | Facility: CLINIC | Age: 69
Discharge: HOME | End: 2024-03-08
Payer: MEDICARE

## 2024-03-08 DIAGNOSIS — H90.3 ASNHL (ASYMMETRICAL SENSORINEURAL HEARING LOSS): ICD-10-CM

## 2024-03-08 PROCEDURE — 2500000004 HC RX 250 GENERAL PHARMACY W/ HCPCS (ALT 636 FOR OP/ED): Performed by: OTOLARYNGOLOGY

## 2024-03-08 PROCEDURE — 70553 MRI BRAIN STEM W/O & W/DYE: CPT | Performed by: RADIOLOGY

## 2024-03-08 PROCEDURE — 70553 MRI BRAIN STEM W/O & W/DYE: CPT

## 2024-03-08 PROCEDURE — A9575 INJ GADOTERATE MEGLUMI 0.1ML: HCPCS | Performed by: OTOLARYNGOLOGY

## 2024-03-08 RX ORDER — GADOTERATE MEGLUMINE 376.9 MG/ML
18 INJECTION INTRAVENOUS
Status: COMPLETED | OUTPATIENT
Start: 2024-03-08 | End: 2024-03-08

## 2024-03-08 RX ADMIN — GADOTERATE MEGLUMINE 18 ML: 376.9 INJECTION INTRAVENOUS at 11:51

## 2024-03-14 ENCOUNTER — OFFICE VISIT (OUTPATIENT)
Dept: ENDOCRINOLOGY | Facility: CLINIC | Age: 69
End: 2024-03-14
Payer: MEDICARE

## 2024-03-14 VITALS
SYSTOLIC BLOOD PRESSURE: 133 MMHG | BODY MASS INDEX: 32.73 KG/M2 | DIASTOLIC BLOOD PRESSURE: 82 MMHG | HEART RATE: 75 BPM | WEIGHT: 202.8 LBS

## 2024-03-14 DIAGNOSIS — E05.90 HYPERTHYROIDISM: Primary | ICD-10-CM

## 2024-03-14 PROCEDURE — 1160F RVW MEDS BY RX/DR IN RCRD: CPT | Performed by: INTERNAL MEDICINE

## 2024-03-14 PROCEDURE — 3075F SYST BP GE 130 - 139MM HG: CPT | Performed by: INTERNAL MEDICINE

## 2024-03-14 PROCEDURE — 3079F DIAST BP 80-89 MM HG: CPT | Performed by: INTERNAL MEDICINE

## 2024-03-14 PROCEDURE — 99213 OFFICE O/P EST LOW 20 MIN: CPT | Performed by: INTERNAL MEDICINE

## 2024-03-14 PROCEDURE — 1126F AMNT PAIN NOTED NONE PRSNT: CPT | Performed by: INTERNAL MEDICINE

## 2024-03-14 PROCEDURE — 1159F MED LIST DOCD IN RCRD: CPT | Performed by: INTERNAL MEDICINE

## 2024-03-14 PROCEDURE — 1036F TOBACCO NON-USER: CPT | Performed by: INTERNAL MEDICINE

## 2024-03-14 RX ORDER — METHIMAZOLE 5 MG/1
5 TABLET ORAL DAILY
Qty: 90 TABLET | Refills: 3 | Status: SHIPPED | OUTPATIENT
Start: 2024-03-14 | End: 2024-03-29

## 2024-03-14 ASSESSMENT — ENCOUNTER SYMPTOMS
LOSS OF SENSATION IN FEET: 0
OCCASIONAL FEELINGS OF UNSTEADINESS: 0
DEPRESSION: 0

## 2024-03-14 ASSESSMENT — PATIENT HEALTH QUESTIONNAIRE - PHQ9
2. FEELING DOWN, DEPRESSED OR HOPELESS: NOT AT ALL
1. LITTLE INTEREST OR PLEASURE IN DOING THINGS: NOT AT ALL
SUM OF ALL RESPONSES TO PHQ9 QUESTIONS 1 & 2: 0

## 2024-03-14 ASSESSMENT — PAIN SCALES - GENERAL: PAINLEVEL: 0-NO PAIN

## 2024-03-14 NOTE — PROGRESS NOTES
HPI     69yo with asthma, OA, MIKAYLA, dyslipidemia, osteopenia, new onset aflutter (June 2021) presents in f/u. Pt had cardiac ablation and has been in sinus rhythm since july 2021.         Had labs june 24, 2021 with TSH-0.01, Ft4-3.4. + FH of hashimoto's disease in her mom and sister.            Dx'ed with Graves disease based on june 2021 labs and started methimazole 10mg the end of June 2021, came off sept 2022 and tsh was lowering 0.17 oct 2022 with sx. Back on methimazole 5mg and euthyroid, see labs below.          Since last visit:   Taking 5mg methimazole daily, TSH is normal, see below. Pt prefers to stay on this long term.   Pt is euthyroid, no obstructive sx. Some dry eye/gritty sensation at times still.          Current Outpatient Medications:     atorvastatin (Lipitor) 20 mg tablet, Take 1 tablet (20 mg) by mouth once daily., Disp: 90 tablet, Rfl: 2    cholecalciferol, vitamin D3, 250 mcg (10,000 unit) capsule, Take by mouth. As directed, Disp: , Rfl:     docusate sodium (Colace) 100 mg capsule, Take 1 capsule (100 mg) by mouth once daily as needed., Disp: , Rfl:     fexofenadine HCl (ALLEGRA ORAL), Allegra, Disp: , Rfl:     methIMAzole (Tapazole) 5 mg tablet, Take 1 tablet (5 mg) by mouth once daily., Disp: 90 tablet, Rfl: 3    mv-min-FA-vit K-lutein-zeaxant (PreserVision AREDS 2 Plus MV) 200 mcg-15 mcg- 5 mg-1 mg capsule, Take by mouth., Disp: , Rfl:     OXcarbazepine (Trileptal) 150 mg tablet, Take 0.5 tablets (75 mg) by mouth once daily. 1/2 tab daily, weaning off of it, Disp: , Rfl:     triamterene-hydrochlorothiazid (Maxzide-25) 37.5-25 mg tablet, Take 1 tablet by mouth once daily in the morning., Disp: 90 tablet, Rfl: 3      Allergies as of 03/14/2024 - Reviewed 03/14/2024   Allergen Reaction Noted    Latex Itching and Rash 09/15/2023    Diltiazem hcl Itching 09/15/2023    Latex, natural rubber Itching 11/16/2023    Methotrexate Unknown 09/15/2023    Metoprolol Unknown 09/15/2023    Pollen extracts  "Other 10/06/2023    Adhesive tape-silicones Rash 09/15/2023    Folic acid Rash 09/15/2023    Sulfamethoxazole-trimethoprim Rash 09/15/2023         Review of Systems   Cardiology: Lightheadedness-denies.  Chest pain-denies.  Leg edema-denies.  Palpitations-denies.  Respiratory: Cough-denies. Shortness of breath-denies.  Wheezing-denies.  Gastroenterology: Constipation-denies.  Diarrhea-denies.  Heartburn +.  Endocrinology: Cold intolerance-denies.  Heat intolerance-denies.  Sweats-denies.  Neurology: Headache-denies.  Tremor-denies.  Neuropathy in extremities-denies.  Psychology: Low energy-denies.  Irritability-denies.  Sleep disturbances-denies.      /82 (BP Location: Right arm)   Pulse 75   Wt 92 kg (202 lb 12.8 oz)   BMI 32.73 kg/m²       Labs:  Lab Results   Component Value Date    WBC 5.5 10/06/2023    NRBC 0.0 10/06/2023    RBC 4.81 10/06/2023    HGB 14.6 10/06/2023    HCT 44.2 10/06/2023     10/06/2023     Lab Results   Component Value Date    CALCIUM 9.3 10/06/2023    AST 17 10/06/2023    ALKPHOS 83 10/06/2023    BILITOT 0.4 10/06/2023    PROT 6.8 10/06/2023    ALBUMIN 4.1 10/06/2023    GLOB 2.9 03/14/2023    AGR 1.5 03/14/2023     10/06/2023    K 4.1 10/06/2023     10/06/2023    CO2 27 10/06/2023    ANIONGAP 11 10/06/2023    BUN 15 10/06/2023    CREATININE 0.80 10/06/2023    UREACREAUR 15.0 03/14/2023    GLUCOSE 93 10/06/2023    ALT 21 10/06/2023    EGFR 81 10/06/2023     Lab Results   Component Value Date    CHOL 194 10/06/2023    TRIG 240 (H) 10/06/2023    HDL 58.0 10/06/2023    LDLCALC 88 10/06/2023     No results found for: \"MICROALBCREA\"  Lab Results   Component Value Date    TSH 3.48 03/05/2024     No results found for: \"[id number]\"  Lab Results   Component Value Date    HGBA1C 6.0 03/14/2023         Physical Exam   General Appearance: pleasant, cooperative, no acute distress  HEENT: no chemosis, no proptosis, no lid lag, no lid retraction  Neck: no lymphadenopathy, no " thyromegaly, no dominant thyroid nodules  Heart: no murmurs, regular rate and rhythm, S1 and S2  Lungs: no wheezes, no rhonci, no rales  Extremities: no lower extremity swelling      Assessment/Plan   1. Hyperthyroidism  -gave 1 years refills  -labs in 6 months, labs in 1 year prior to visit (orders in system)    Follow Up:  [place] 1 year    -labs/tests/notes reviewed  -reviewed and counseled patient on medication monitoring and side effects

## 2024-03-28 ENCOUNTER — CLINICAL SUPPORT (OUTPATIENT)
Dept: AUDIOLOGY | Facility: CLINIC | Age: 69
End: 2024-03-28
Payer: MEDICARE

## 2024-03-28 DIAGNOSIS — H90.3 ASYMMETRICAL SENSORINEURAL HEARING LOSS: ICD-10-CM

## 2024-03-28 DIAGNOSIS — E05.90 HYPERTHYROIDISM: ICD-10-CM

## 2024-03-28 PROCEDURE — 92557 COMPREHENSIVE HEARING TEST: CPT | Performed by: SOCIAL WORKER

## 2024-03-28 PROCEDURE — 92626 EVAL AUD FUNCJ 1ST HOUR: CPT | Mod: 59 | Performed by: SOCIAL WORKER

## 2024-03-28 PROCEDURE — 92550 TYMPANOMETRY & REFLEX THRESH: CPT | Performed by: SOCIAL WORKER

## 2024-03-28 PROCEDURE — 92627 EVAL AUD FUNCJ EA ADDL 15: CPT | Mod: 59 | Performed by: SOCIAL WORKER

## 2024-03-28 NOTE — PROGRESS NOTES
Name:  Nevaeh Cuba  :  1955  Age:  68 y.o.  Date of Evaluation: 3/28/24    HISTORY  Nevaeh Cuba is seen today at the request of Dr. Dwyer for a bone anchored implant (LIZETH) evaluation.  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   Nevaeh Cuba denies ear pain, ear pressure, ear drainage, ear infections, ear surgeries, tinnitus, noise exposure, family history of hearing loss and dizziness/vertigo.    Has tried CROS II 13 with Audeo V30 13 device in the past but discontinued use due to lack of perceived benefit  Most recent audiogram 24    UNAIDED EVALUATION 3/28/24    OTOSCOPY  Otoscopic inspection revealed mild cerumen and visualization of the eardrum bilaterally.    IMMITTANCE  Normal tympanograms were obtained bilaterally, consistent with a normal moving eardrums and the likely absence of fluid.    Ipsilateral acoustic reflexes were tested and present  at 500Hz, 1000Hz, 2000Hz, and 4000Hz in the left ear and absent in the right ear.    AUDIOMETRIC TESTING  Pure tone audiometry conducted via insert headphones from 125 Hz - 8000 Hz with good reliability was consistent with:  Right ear: profound sensorineural hearing loss  Left ear: normal peripheral hearing at 125-8000 Hz  4 Frequency PTA for RIGHT ear: Air conduction = 86 dBHL, Bone conduction = 60 dBHL    SPEECH RECOGNITION TESTING (SRT)  SRT was in agreement with pure tone averages bilaterally ( Right: SAT at 80 dB HL, Left: 15 dB HL).    WORD RECOGNITION SCORE (WRS)  WRS was (0 %) in the right ear and (96 %) in the left ear using recorded ordered by difficulty NU6 word list.    AIDED TESTING   Functional gain testing was completed with the SharewaveA 6 Max sound processor on a headband programmed to Nevaeh Cuba hearing thresholds. Aided thresholds were obtained from 25 dB HL - 50 dB HL for the frequencies of 250-6000 Hz.     Testing completed at 45 degrees azimuth to the speaker and speech noise was present to  the left ear / better hearing ear at 45 degrees azimuth. Word recognition completed with recorded material at 50dB HL. Left ear was plugged and muffed during all aided testing.    BAHA aided = Baha6 Max on soundarc  Hearing aid aided = Julissa P90 NM    BAHA AIDED: CNC words at 50 dB HL= 56 %    BAHA AIDED: AzBIO sentences at 50 dB HL = 98 % quiet  BAHA AIDED: AzBIO sentences at 50 dB HL = 83 +10 SNR    Hearing aid AIDED: CNC words at 50 dB HL= 52 %    Hearing aid AIDED: AzBIO sentences at 50 dB HL =  94 %   Hearing aid AIDED: AzBIO sentences at 50 dB HL = 59 +10 SNR    IMPRESSION   Functional testing revealed excellent word and sentences recognition when noise is introduced to the better hearing ear. Testing completed with the BAHA coupled to the ear. This is a significant improvement as compared to unaided word and sentence recognition.   Realistic expectations of LIZETH were reviewed and signed  Discussed the advantages and limitations of the LIZETH system including the percutaneous, transcutaneous, softband/SoundArc, ADHear and provided the patient with  information from [employer] and [employer]. Discussed general care and maintenance of having a LIZETH system and the exhibits the ability to appropriately care for an abutment and implant location.  Device preferences were not finalized at this time    TREATMENT PLAN:  1. Continue medical follow-up with Dr. Dwyer for further consideration of a LIZETH.  2. Return after ENT surgical consultation for a LIZETH consultation/device selection.   3. Annual hearing assessments.  4. Contact clinic with questions or concerns at [phone].    Time: 1520-2616

## 2024-03-29 RX ORDER — METHIMAZOLE 5 MG/1
5 TABLET ORAL DAILY
Qty: 90 TABLET | Refills: 2 | Status: SHIPPED | OUTPATIENT
Start: 2024-03-29

## 2024-04-05 PROBLEM — R60.0 EDEMA OF BOTH LOWER EXTREMITIES: Status: ACTIVE | Noted: 2024-01-31

## 2024-04-05 PROBLEM — T75.89XA INJURY DUE TO EXPOSURE TO EXTERNAL CAUSE: Status: ACTIVE | Noted: 2023-02-07

## 2024-04-05 PROBLEM — I83.10 VARICOSE VEINS OF LOWER EXTREMITY WITH INFLAMMATION: Status: ACTIVE | Noted: 2024-01-31

## 2024-04-05 PROBLEM — S61.215A LACERATION OF LEFT RING FINGER: Status: ACTIVE | Noted: 2023-02-07

## 2024-04-05 PROBLEM — I87.2 PERIPHERAL VENOUS INSUFFICIENCY: Status: ACTIVE | Noted: 2024-01-31

## 2024-04-12 ENCOUNTER — OFFICE VISIT (OUTPATIENT)
Dept: PRIMARY CARE | Facility: CLINIC | Age: 69
End: 2024-04-12
Payer: MEDICARE

## 2024-04-12 ENCOUNTER — LAB (OUTPATIENT)
Dept: LAB | Facility: LAB | Age: 69
End: 2024-04-12
Payer: MEDICARE

## 2024-04-12 VITALS
TEMPERATURE: 97.8 F | DIASTOLIC BLOOD PRESSURE: 76 MMHG | BODY MASS INDEX: 31.98 KG/M2 | HEART RATE: 83 BPM | WEIGHT: 199 LBS | SYSTOLIC BLOOD PRESSURE: 131 MMHG | HEIGHT: 66 IN | OXYGEN SATURATION: 94 %

## 2024-04-12 DIAGNOSIS — Z00.00 ROUTINE GENERAL MEDICAL EXAMINATION AT HEALTH CARE FACILITY: Primary | ICD-10-CM

## 2024-04-12 DIAGNOSIS — R73.01 IMPAIRED FASTING GLUCOSE: ICD-10-CM

## 2024-04-12 DIAGNOSIS — I10 BENIGN ESSENTIAL HYPERTENSION: ICD-10-CM

## 2024-04-12 DIAGNOSIS — E05.90 HYPERTHYROIDISM: ICD-10-CM

## 2024-04-12 DIAGNOSIS — J45.20 MILD INTERMITTENT ASTHMA WITHOUT COMPLICATION (HHS-HCC): ICD-10-CM

## 2024-04-12 DIAGNOSIS — E55.9 VITAMIN D DEFICIENCY: ICD-10-CM

## 2024-04-12 DIAGNOSIS — E78.5 DYSLIPIDEMIA: ICD-10-CM

## 2024-04-12 DIAGNOSIS — F31.70 BIPOLAR DISORDER IN FULL REMISSION, MOST RECENT EPISODE UNSPECIFIED TYPE (MULTI): ICD-10-CM

## 2024-04-12 PROBLEM — I48.92 PAROXYSMAL ATRIAL FLUTTER (MULTI): Status: RESOLVED | Noted: 2023-09-15 | Resolved: 2024-04-12

## 2024-04-12 PROBLEM — I48.0 PAROXYSMAL ATRIAL FIBRILLATION (MULTI): Status: RESOLVED | Noted: 2023-09-15 | Resolved: 2024-04-12

## 2024-04-12 PROBLEM — F31.9 BIPOLAR AFFECTIVE DISORDER (MULTI): Status: RESOLVED | Noted: 2023-09-15 | Resolved: 2024-04-12

## 2024-04-12 LAB
25(OH)D3 SERPL-MCNC: 25 NG/ML (ref 31–100)
ALBUMIN SERPL-MCNC: 4.4 G/DL (ref 3.5–5)
ALP BLD-CCNC: 80 U/L (ref 35–125)
ALT SERPL-CCNC: 20 U/L (ref 5–40)
ANION GAP SERPL CALC-SCNC: 13 MMOL/L
AST SERPL-CCNC: 17 U/L (ref 5–40)
BASOPHILS # BLD AUTO: 0.11 X10*3/UL (ref 0–0.1)
BASOPHILS NFR BLD AUTO: 1.6 %
BILIRUB SERPL-MCNC: 0.5 MG/DL (ref 0.1–1.2)
BUN SERPL-MCNC: 15 MG/DL (ref 8–25)
CALCIUM SERPL-MCNC: 9.1 MG/DL (ref 8.5–10.4)
CHLORIDE SERPL-SCNC: 102 MMOL/L (ref 97–107)
CHOLEST SERPL-MCNC: 224 MG/DL (ref 133–200)
CHOLEST/HDLC SERPL: 3.9 {RATIO}
CO2 SERPL-SCNC: 26 MMOL/L (ref 24–31)
CREAT SERPL-MCNC: 1 MG/DL (ref 0.4–1.6)
EGFRCR SERPLBLD CKD-EPI 2021: 62 ML/MIN/1.73M*2
EOSINOPHIL # BLD AUTO: 0.34 X10*3/UL (ref 0–0.7)
EOSINOPHIL NFR BLD AUTO: 4.9 %
ERYTHROCYTE [DISTWIDTH] IN BLOOD BY AUTOMATED COUNT: 12.9 % (ref 11.5–14.5)
EST. AVERAGE GLUCOSE BLD GHB EST-MCNC: 120 MG/DL
GLUCOSE SERPL-MCNC: 95 MG/DL (ref 65–99)
HBA1C MFR BLD: 5.8 %
HCT VFR BLD AUTO: 45.7 % (ref 36–46)
HDLC SERPL-MCNC: 58 MG/DL
HGB BLD-MCNC: 15.3 G/DL (ref 12–16)
IMM GRANULOCYTES # BLD AUTO: 0.01 X10*3/UL (ref 0–0.7)
IMM GRANULOCYTES NFR BLD AUTO: 0.1 % (ref 0–0.9)
LDLC SERPL CALC-MCNC: 102 MG/DL (ref 65–130)
LYMPHOCYTES # BLD AUTO: 2.82 X10*3/UL (ref 1.2–4.8)
LYMPHOCYTES NFR BLD AUTO: 40.8 %
MCH RBC QN AUTO: 30.8 PG (ref 26–34)
MCHC RBC AUTO-ENTMCNC: 33.5 G/DL (ref 32–36)
MCV RBC AUTO: 92 FL (ref 80–100)
MONOCYTES # BLD AUTO: 0.77 X10*3/UL (ref 0.1–1)
MONOCYTES NFR BLD AUTO: 11.1 %
NEUTROPHILS # BLD AUTO: 2.86 X10*3/UL (ref 1.2–7.7)
NEUTROPHILS NFR BLD AUTO: 41.5 %
NRBC BLD-RTO: 0 /100 WBCS (ref 0–0)
PLATELET # BLD AUTO: 239 X10*3/UL (ref 150–450)
POTASSIUM SERPL-SCNC: 3.6 MMOL/L (ref 3.4–5.1)
PROT SERPL-MCNC: 6.9 G/DL (ref 5.9–7.9)
RBC # BLD AUTO: 4.97 X10*6/UL (ref 4–5.2)
SODIUM SERPL-SCNC: 141 MMOL/L (ref 133–145)
TRIGL SERPL-MCNC: 318 MG/DL (ref 40–150)
WBC # BLD AUTO: 6.9 X10*3/UL (ref 4.4–11.3)

## 2024-04-12 PROCEDURE — 1036F TOBACCO NON-USER: CPT | Performed by: INTERNAL MEDICINE

## 2024-04-12 PROCEDURE — 1160F RVW MEDS BY RX/DR IN RCRD: CPT | Performed by: INTERNAL MEDICINE

## 2024-04-12 PROCEDURE — 36415 COLL VENOUS BLD VENIPUNCTURE: CPT

## 2024-04-12 PROCEDURE — 99213 OFFICE O/P EST LOW 20 MIN: CPT | Performed by: INTERNAL MEDICINE

## 2024-04-12 PROCEDURE — 3078F DIAST BP <80 MM HG: CPT | Performed by: INTERNAL MEDICINE

## 2024-04-12 PROCEDURE — 1123F ACP DISCUSS/DSCN MKR DOCD: CPT | Performed by: INTERNAL MEDICINE

## 2024-04-12 PROCEDURE — 1126F AMNT PAIN NOTED NONE PRSNT: CPT | Performed by: INTERNAL MEDICINE

## 2024-04-12 PROCEDURE — 82306 VITAMIN D 25 HYDROXY: CPT

## 2024-04-12 PROCEDURE — 1158F ADVNC CARE PLAN TLK DOCD: CPT | Performed by: INTERNAL MEDICINE

## 2024-04-12 PROCEDURE — 80061 LIPID PANEL: CPT

## 2024-04-12 PROCEDURE — 99215 OFFICE O/P EST HI 40 MIN: CPT | Performed by: INTERNAL MEDICINE

## 2024-04-12 PROCEDURE — G0439 PPPS, SUBSEQ VISIT: HCPCS | Performed by: INTERNAL MEDICINE

## 2024-04-12 PROCEDURE — 3075F SYST BP GE 130 - 139MM HG: CPT | Performed by: INTERNAL MEDICINE

## 2024-04-12 PROCEDURE — 83036 HEMOGLOBIN GLYCOSYLATED A1C: CPT

## 2024-04-12 PROCEDURE — 80053 COMPREHEN METABOLIC PANEL: CPT

## 2024-04-12 PROCEDURE — 85025 COMPLETE CBC W/AUTO DIFF WBC: CPT

## 2024-04-12 PROCEDURE — 1159F MED LIST DOCD IN RCRD: CPT | Performed by: INTERNAL MEDICINE

## 2024-04-12 ASSESSMENT — PATIENT HEALTH QUESTIONNAIRE - PHQ9
1. LITTLE INTEREST OR PLEASURE IN DOING THINGS: NOT AT ALL
SUM OF ALL RESPONSES TO PHQ9 QUESTIONS 1 AND 2: 0
SUM OF ALL RESPONSES TO PHQ9 QUESTIONS 1 AND 2: 0
2. FEELING DOWN, DEPRESSED OR HOPELESS: NOT AT ALL
1. LITTLE INTEREST OR PLEASURE IN DOING THINGS: NOT AT ALL
2. FEELING DOWN, DEPRESSED OR HOPELESS: NOT AT ALL

## 2024-04-12 ASSESSMENT — ENCOUNTER SYMPTOMS
TREMORS: 0
PHOTOPHOBIA: 0
WEAKNESS: 0
COLOR CHANGE: 0
CHILLS: 0
FREQUENCY: 0
COUGH: 0
SHORTNESS OF BREATH: 0
ARTHRALGIAS: 0
HEADACHES: 0
FEVER: 0
PALPITATIONS: 0
DYSURIA: 0
ABDOMINAL PAIN: 0
DIARRHEA: 0
BRUISES/BLEEDS EASILY: 0

## 2024-04-12 ASSESSMENT — PAIN SCALES - GENERAL: PAINLEVEL: 0-NO PAIN

## 2024-04-12 NOTE — PATIENT INSTRUCTIONS
Cari Almodovar follow up October, labs 1 week prior      Diagnoses and all orders for this visit:  Routine general medical examination at health care facility  Comments:  Bring in Living Will,[email or web address] papers on follow up to scan in chart.  Bipolar disorder in full remission, most recent episode unspecified type (Multi)  Comments:  Stable.  Benign essential hypertension  Comments:  BP doing OK.  Hyperthyroidism  Comments:   3/24 TSH nl on treatment.  Impaired fasting glucose  Comments:  Low sugar diet.  Orders:  -     CBC and Auto Differential; Future  -     Hemoglobin A1C; Future  -     CBC and Auto Differential; Future  -     Hemoglobin A1C; Future  Mild intermittent asthma without complication ([facility])  Dyslipidemia  Comments:  On atorvastatin 20 mg recheck today.  Orders:  -     Comprehensive Metabolic Panel; Future  -     Lipid Panel; Future  -     Comprehensive Metabolic Panel; Future  -     Lipid Panel; Future  Vitamin D deficiency  -     Vitamin D 25-Hydroxy,Total (for eval of Vitamin D levels); Future  Other orders  -     Follow Up In Primary Care - Medicare Annual

## 2024-04-12 NOTE — PROGRESS NOTES
[facility]: [facility]  MEDICARE WELLNESS EXAM      Nevaeh Cuba is a 68 y.o. female that is presenting today for Annual Exam.    Assessment/Plan    Diagnoses and all orders for this visit:  Routine general medical examination at health care facility  Comments:  Bring in Living Will,[facility] health papers on follow up to scan in chart.  Bipolar disorder in full remission, most recent episode unspecified type (Multi)  Comments:  Stable.  Benign essential hypertension  Comments:  BP doing OK.  Hyperthyroidism  Comments:   3/24 TSH nl on treatment.  Impaired fasting glucose  Comments:  Low sugar diet.  Orders:  -     CBC and Auto Differential; Future  -     Hemoglobin A1C; Future  -     CBC and Auto Differential; Future  -     Hemoglobin A1C; Future  Mild intermittent asthma without complication ([facility]-HCC)  Dyslipidemia  Comments:  On atorvastatin 20 mg recheck today.  Orders:  -     Comprehensive Metabolic Panel; Future  -     Lipid Panel; Future  -     Comprehensive Metabolic Panel; Future  -     Lipid Panel; Future  Vitamin D deficiency  -     Vitamin D 25-Hydroxy,Total (for eval of Vitamin D levels); Future  Other orders  -     Follow Up In Primary Care - Medicare Annual    ADVANCED CARE PLANNING  Advanced Care Planning was discussed with patient:  The patient does not have an advanced care plan on file. The patient does not have an active surrogate decision-maker on file.  Encouraged the patient to confirm that Living Will and Healthcare Power of  (HCPoA) are accurate and up to date.  Encouraged the patient to confirm that our office be provided a copy of any documentation in the event that anything changes.    ACTIVITIES OF DAILY LIVING  Basic ADLs:  Bathing: Independent, Dressing: Independent, Toileting: Independent, Transferring: Independent, Continence: Independent, Feeding: Independent.    Instrumental ADLs:  Ability to use phone: Independent, Shopping: Independent, Cooking:  Independent, House-keeping: Independent, Laundry: Independent, Transportation: Independent, Medication Management: Independent, Finance Management: Independent.    Subjective   Wellness visit. Over all health status doing well. Diet reviewed, Mediterranean, low sugar diet suggested. No  active depression, or little interest in doing activites or hopelessness. Home safety reviewed, well light, no throw rugs,etc. No falls. Advanced directives filled out at home.  ADL, and instrumental ADL no limits doing well. Vision screen eye exam yearly, hearing   right hearing loss not cochlear?.  No cognitive decline observed. No opiod pain meds used.  hyperhtyoid,  PAF resolved after thyroid tx no AC. NSR. BAD . E/M IFBS, chol, labs rechecked.      Review of Systems   Constitutional:  Negative for chills and fever.   HENT:  Negative for congestion.    Eyes:  Negative for photophobia and visual disturbance.   Respiratory:  Negative for cough and shortness of breath.    Cardiovascular:  Negative for chest pain and palpitations.   Gastrointestinal:  Negative for abdominal pain and diarrhea.   Endocrine: Negative for cold intolerance and heat intolerance.   Genitourinary:  Negative for dysuria and frequency.   Musculoskeletal:  Negative for arthralgias.   Skin:  Negative for color change.   Neurological:  Negative for tremors, weakness and headaches.   Hematological:  Does not bruise/bleed easily.     Objective   Vitals:    04/12/24 1136   BP: 131/76   Pulse: 83   Temp: 36.6 °C (97.8 °F)   SpO2: 94%      Body mass index is 32.12 kg/m².  Physical Exam  Constitutional:       General: She is not in acute distress.     Appearance: She is not toxic-appearing.   HENT:      Head: Normocephalic and atraumatic.      Right Ear: Tympanic membrane and ear canal normal.      Left Ear: Tympanic membrane and ear canal normal.      Nose: Nose normal.      Mouth/Throat:      Pharynx: Oropharynx is clear.   Eyes:       "Extraocular Movements: Extraocular movements intact.      Pupils: Pupils are equal, round, and reactive to light.   Cardiovascular:      Rate and Rhythm: Normal rate and regular rhythm.      Heart sounds: Normal heart sounds. No murmur heard.  Pulmonary:      Breath sounds: Normal breath sounds.   Abdominal:      General: Bowel sounds are normal. There is no distension.      Palpations: Abdomen is soft. There is no mass.      Tenderness: There is no abdominal tenderness.   Musculoskeletal:         General: No swelling or tenderness.      Right lower leg: No edema.      Left lower leg: No edema.   Skin:     General: Skin is warm and dry.   Neurological:      General: No focal deficit present.      Mental Status: She is oriented to person, place, and time.      Sensory: No sensory deficit.      Motor: No weakness.      Deep Tendon Reflexes: Reflexes normal.       Diagnostic Results   Lab Results   Component Value Date    GLUCOSE 93 10/06/2023    CALCIUM 9.3 10/06/2023     10/06/2023    K 4.1 10/06/2023    CO2 27 10/06/2023     10/06/2023    BUN 15 10/06/2023    CREATININE 0.80 10/06/2023     Lab Results   Component Value Date    ALT 21 10/06/2023    AST 17 10/06/2023    ALKPHOS 83 10/06/2023    BILITOT 0.4 10/06/2023     Lab Results   Component Value Date    WBC 5.5 10/06/2023    HGB 14.6 10/06/2023    HCT 44.2 10/06/2023    MCV 92 10/06/2023     10/06/2023     Lab Results   Component Value Date    CHOL 194 10/06/2023    CHOL 210 (H) 03/14/2023    CHOL 194 10/31/2022     Lab Results   Component Value Date    HDL 58.0 10/06/2023    HDL 61 03/14/2023    HDL 57 10/31/2022     Lab Results   Component Value Date    LDLCALC 88 10/06/2023    LDLCALC 117 03/14/2023    LDLCALC 107 10/31/2022     Lab Results   Component Value Date    TRIG 240 (H) 10/06/2023    TRIG 158 (H) 03/14/2023    TRIG 151 (H) 10/31/2022     No components found for: \"CHOLHDL\"  Lab Results   Component Value Date    HGBA1C 6.0 03/14/2023 "     Other labs not included in the list above reviewed either before or during this encounter.    History   Past Medical History:   Diagnosis Date    Benign essential hypertension     Estrogen deficiency 11/15/2023    DEXA 1/24 back nl, hip neck T-2.1 recheck 1/26    History of atrial fibrillation     Hyperactive thyroid  Methimazole  NSR no AC    History of Meniere's syndrome 11/15/2023    Hyperthyroidism 09/15/2023    Other specified abnormal findings of blood chemistry     High serum cholesterol sulfate    Personal history of malignant neoplasm, unspecified     History of malignant neoplasm    Unspecified osteoarthritis, unspecified site 04/28/2016    Arthritis     Past Surgical History:   Procedure Laterality Date    HYSTERECTOMY  04/28/2016    Hysterectomy    JOINT REPLACEMENT      rt ring finger    JOINT REPLACEMENT Right 05/2022    ring finger    OTHER SURGICAL HISTORY  04/28/2016    Musculoskeletal Procedures Injection Carpal Tunnel    OTHER SURGICAL HISTORY  04/28/2016    Arthrodesis MCP Joint    OTHER SURGICAL HISTORY  01/11/2021    Ankle surgery    OTHER SURGICAL HISTORY  01/11/2021    Carpal tunnel surgery    OTHER SURGICAL HISTORY  07/06/2022    Nasal septoplasty    OTHER SURGICAL HISTORY  07/2021    heart shock catherization    SHOULDER SURGERY  08/2021    total left shoulder replacement    TRIGGER FINGER RELEASE Right 01/2024    middle finger     Family History   Problem Relation Name Age of Onset    Stroke Mother      Other (cyst on breast) Mother      Atrial fibrillation Father      Stroke Father      Heart disease Father       Social History     Socioeconomic History    Marital status:      Spouse name: Not on file    Number of children: Not on file    Years of education: Not on file    Highest education level: Not on file   Occupational History    Not on file   Tobacco Use    Smoking status: Never     Passive exposure: Never    Smokeless tobacco: Never   Vaping Use    Vaping  status: Never Used   Substance and Sexual Activity    Alcohol use: Never    Drug use: Never    Sexual activity: Defer   Other Topics Concern    Not on file   Social History Narrative    Not on file     Social Determinants of Health     Financial Resource Strain: Not on file   Food Insecurity: Not on file   Transportation Needs: Not on file   Physical Activity: Not on file   Stress: Not on file   Social Connections: Not on file   Intimate Partner Violence: Not on file   Housing Stability: Not on file     Allergies   Allergen Reactions    Latex Itching and Rash    Diltiazem Hcl Itching     Rash face     Latex, Natural Rubber Itching    Methotrexate Unknown    Metoprolol Unknown    Pollen Extracts Other    Adhesive Tape-Silicones Rash    Folic Acid Rash    Sulfamethoxazole-Trimethoprim Rash     Tinnitus      Current Outpatient Medications on File Prior to Visit   Medication Sig Dispense Refill    atorvastatin (Lipitor) 20 mg tablet Take 1 tablet (20 mg) by mouth once daily. 90 tablet 2    cholecalciferol (Vitamin D-3) 25 MCG (1000 UT) tablet Take 2 tablets (2,000 Units) by mouth. As directed      docusate sodium (Colace) 100 mg capsule Take 1 capsule (100 mg) by mouth once daily as needed.      fexofenadine HCl (ALLEGRA ORAL) Allegra      methIMAzole (Tapazole) 5 mg tablet Take 1 tablet (5 mg) by mouth once daily. 90 tablet 2    mv-min-FA-vit K-lutein-zeaxant (PreserVision AREDS 2 Plus MV) 200 mcg-15 mcg- 5 mg-1 mg capsule Take by mouth.      OXcarbazepine (Trileptal) 150 mg tablet Take 0.5 tablets (75 mg) by mouth once daily. 1/2 tab daily, weaning off of it      triamterene-hydrochlorothiazid (Maxzide-25) 37.5-25 mg tablet Take 1 tablet by mouth once daily in the morning. 90 tablet 3     No current facility-administered medications on file prior to visit.     Immunization History   Administered Date(s) Administered    Flu vaccine, quadrivalent, high-dose, preservative free, age 65y+ (FLUZONE) 09/14/2023    Influenza,  Seasonal, Quadrivalent, Adjuvanted 09/30/2021, 09/08/2022    Influenza, Unspecified 09/07/2010, 10/17/2011    Influenza, injectable, MDCK, quadrivalent 10/23/2017, 10/30/2018    Influenza, injectable, quadrivalent 09/23/2019, 09/11/2020    Influenza, seasonal, injectable 09/24/2012, 10/17/2013, 10/23/2014, 10/26/2015, 11/17/2016    Moderna COVID-19 vaccine, Fall 2023, 12 yeasrs and older (50mcg/0.5mL) 09/29/2023    Pfizer COVID-19 vaccine, bivalent, age 12 years and older (30 mcg/0.3 mL) 09/08/2022    Pfizer Gray Cap SARS-CoV-2 03/31/2022    Pfizer Purple Cap SARS-CoV-2 03/09/2021, 04/06/2021, 10/06/2021    Pneumococcal conjugate vaccine, 13-valent (PREVNAR 13) 07/11/2013    Pneumococcal conjugate vaccine, 20-valent (PREVNAR 20) 09/16/2023    Pneumococcal polysaccharide vaccine, 23-valent, age 2 years and older (PNEUMOVAX 23) 11/05/2020    RSV, 60 Years And Older (AREXVY) 09/16/2023    Tdap vaccine, age 7 year and older (BOOSTRIX, ADACEL) 09/30/2021    Zoster vaccine, recombinant, adult (SHINGRIX) 10/15/2020, 09/30/2021    Zoster, live 02/10/2012     Patient's medical history was reviewed and updated either before or during this encounter.     Rajeev Noriega MD

## 2024-04-23 ENCOUNTER — PRE-ADMISSION TESTING (OUTPATIENT)
Dept: PREADMISSION TESTING | Facility: HOSPITAL | Age: 69
End: 2024-04-23
Payer: MEDICARE

## 2024-04-23 ENCOUNTER — OFFICE VISIT (OUTPATIENT)
Dept: OTOLARYNGOLOGY | Facility: CLINIC | Age: 69
End: 2024-04-23
Payer: MEDICARE

## 2024-04-23 VITALS
WEIGHT: 200 LBS | SYSTOLIC BLOOD PRESSURE: 144 MMHG | HEIGHT: 66 IN | OXYGEN SATURATION: 98 % | HEART RATE: 87 BPM | TEMPERATURE: 98.2 F | DIASTOLIC BLOOD PRESSURE: 86 MMHG | RESPIRATION RATE: 16 BRPM | BODY MASS INDEX: 32.14 KG/M2

## 2024-04-23 VITALS — WEIGHT: 200 LBS | BODY MASS INDEX: 32.14 KG/M2 | HEIGHT: 66 IN

## 2024-04-23 DIAGNOSIS — H90.A21 SENSORINEURAL HEARING LOSS (SNHL) OF RIGHT EAR WITH RESTRICTED HEARING OF LEFT EAR: ICD-10-CM

## 2024-04-23 PROCEDURE — 1159F MED LIST DOCD IN RCRD: CPT | Performed by: OTOLARYNGOLOGY

## 2024-04-23 PROCEDURE — 1160F RVW MEDS BY RX/DR IN RCRD: CPT | Performed by: OTOLARYNGOLOGY

## 2024-04-23 PROCEDURE — 99203 OFFICE O/P NEW LOW 30 MIN: CPT | Performed by: NURSE PRACTITIONER

## 2024-04-23 PROCEDURE — 99213 OFFICE O/P EST LOW 20 MIN: CPT | Performed by: OTOLARYNGOLOGY

## 2024-04-23 PROCEDURE — 1036F TOBACCO NON-USER: CPT | Performed by: OTOLARYNGOLOGY

## 2024-04-23 ASSESSMENT — ENCOUNTER SYMPTOMS
MUSCULOSKELETAL NEGATIVE: 1
PSYCHIATRIC NEGATIVE: 1
GASTROINTESTINAL NEGATIVE: 1
CARDIOVASCULAR NEGATIVE: 1
RESPIRATORY NEGATIVE: 1
NEUROLOGICAL NEGATIVE: 1
CONSTITUTIONAL NEGATIVE: 1

## 2024-04-23 ASSESSMENT — DUKE ACTIVITY SCORE INDEX (DASI)
CAN YOU RUN A SHORT DISTANCE: YES
CAN YOU CLIMB A FLIGHT OF STAIRS OR WALK UP A HILL: YES
CAN YOU DO LIGHT WORK AROUND THE HOUSE LIKE DUSTING OR WASHING DISHES: YES
DASI METS SCORE: 9
CAN YOU DO MODERATE WORK AROUND THE HOUSE LIKE VACUUMING, SWEEPING FLOORS OR CARRYING GROCERIES: YES
CAN YOU PARTICIPATE IN MODERATE RECREATIONAL ACTIVITIES LIKE GOLF, BOWLING, DANCING, DOUBLES TENNIS OR THROWING A BASEBALL OR FOOTBALL: YES
CAN YOU PARTICIPATE IN STRENOUS SPORTS LIKE SWIMMING, SINGLES TENNIS, FOOTBALL, BASKETBALL, OR SKIING: NO
CAN YOU DO HEAVY WORK AROUND THE HOUSE LIKE SCRUBBING FLOORS OR LIFTING AND MOVING HEAVY FURNITURE: YES
CAN YOU DO YARD WORK LIKE RAKING LEAVES, WEEDING OR PUSHING A MOWER: YES
TOTAL_SCORE: 50.7
CAN YOU WALK A BLOCK OR TWO ON LEVEL GROUND: YES
CAN YOU TAKE CARE OF YOURSELF (EAT, DRESS, BATHE, OR USE TOILET): YES
CAN YOU WALK INDOORS, SUCH AS AROUND YOUR HOUSE: YES
CAN YOU HAVE SEXUAL RELATIONS: YES

## 2024-04-23 ASSESSMENT — CHADS2 SCORE
HYPERTENSION: YES
AGE GREATER THAN OR EQUAL TO 75: NO
CHADS2 SCORE: 1
PRIOR STROKE OR TIA OR THROMBOEMBOLISM: NO
DIABETES: NO
CHF: NO

## 2024-04-23 ASSESSMENT — PATIENT HEALTH QUESTIONNAIRE - PHQ9
SUM OF ALL RESPONSES TO PHQ9 QUESTIONS 1 AND 2: 0
1. LITTLE INTEREST OR PLEASURE IN DOING THINGS: NOT AT ALL
2. FEELING DOWN, DEPRESSED OR HOPELESS: NOT AT ALL

## 2024-04-23 NOTE — CPM/PAT H&P
CPM/PAT Evaluation       Name: Nevaeh Cuba (Nevaeh Cuba)  /Age: 1955/68 y.o.     In-Person       Chief Complaint: Encounter for cosmetic surgery     HPI  A 68-year-old female for encounter for cosmetic surgery.  Denies fever, chills, chest pain, shortness of breath, syncope, nausea, vomiting.  She is scheduled for bilateral mastopexy with axillary roll resection, panniculectomy, vaser liposuction with renuvion of abdomen, bilateral axilla, mons.    Past Medical History:   Diagnosis Date    Benign essential hypertension     Estrogen deficiency 11/15/2023    DEXA  back nl, hip neck T-2.1 recheck     History of atrial fibrillation     Hyperactive thyroid Dr.Burtch Rand [place]ham NSR no AC    History of Meniere's syndrome 11/15/2023    Hyperthyroidism 09/15/2023    Other specified abnormal findings of blood chemistry     High serum cholesterol sulfate    Personal history of malignant neoplasm, unspecified     History of malignant neoplasm    Unspecified osteoarthritis, unspecified site 2016    Arthritis       Past Surgical History:   Procedure Laterality Date    HYSTERECTOMY  2016    Hysterectomy    JOINT REPLACEMENT      rt ring finger    JOINT REPLACEMENT Right 2022    ring finger    OTHER SURGICAL HISTORY  2016    Musculoskeletal Procedures Injection Carpal Tunnel    OTHER SURGICAL HISTORY  2016    Arthrodesis MCP Joint    OTHER SURGICAL HISTORY  2021    Ankle surgery    OTHER SURGICAL HISTORY  2021    Carpal tunnel surgery    OTHER SURGICAL HISTORY  2022    Nasal septoplasty    OTHER SURGICAL HISTORY  2021    heart shock catherization    SHOULDER SURGERY  2021    total left shoulder replacement    TRIGGER FINGER RELEASE Right 2024    middle finger         Allergies   Allergen Reactions    Latex Itching and Rash    Methotrexate Itching     HEAD BURNING AND ITCHING    Metoprolol Rash    Diltiazem Hcl Itching     Rash face     Latex, Natural  Rubber Itching    Pollen Extracts Other     NASAL CONGESTION    Adhesive Tape-Silicones Rash    Folic Acid Rash    Sulfamethoxazole-Trimethoprim Rash     Tinnitus        Current Outpatient Medications   Medication Sig Dispense Refill    atorvastatin (Lipitor) 20 mg tablet Take 1 tablet (20 mg) by mouth once daily. 90 tablet 2    cholecalciferol (Vitamin D-3) 25 MCG (1000 UT) tablet Take 2 tablets (2,000 Units) by mouth. As directed      docusate sodium (Colace) 100 mg capsule Take 1 capsule (100 mg) by mouth once daily as needed.      fexofenadine HCl (ALLEGRA ORAL) Take 1 tablet by mouth once daily as needed (ALLERGIES).      methIMAzole (Tapazole) 5 mg tablet Take 1 tablet (5 mg) by mouth once daily. 90 tablet 2    mv-min-FA-vit K-lutein-zeaxant (PreserVision AREDS 2 Plus MV) 200 mcg-15 mcg- 5 mg-1 mg capsule Take 1 capsule by mouth once daily.      OXcarbazepine (Trileptal) 150 mg tablet Take 0.5 tablets (75 mg) by mouth every other day. 1/2 tab daily, weaning off of it      triamterene-hydrochlorothiazid (Maxzide-25) 37.5-25 mg tablet Take 1 tablet by mouth once daily in the morning. 90 tablet 3     No current facility-administered medications for this visit.          Review of Systems   Constitutional: Negative.    HENT: Negative.          Bilateral hearing loss   Respiratory: Negative.     Cardiovascular: Negative.    Gastrointestinal: Negative.    Genitourinary: Negative.    Musculoskeletal: Negative.    Skin: Negative.    Neurological: Negative.    Psychiatric/Behavioral: Negative.          Physical Exam  Vitals reviewed.   HENT:      Head: Normocephalic and atraumatic.      Mouth/Throat:      Mouth: Mucous membranes are moist.   Eyes:      Pupils: Pupils are equal, round, and reactive to light.      Comments: Glasses   Cardiovascular:      Rate and Rhythm: Normal rate and regular rhythm.   Pulmonary:      Effort: Pulmonary effort is normal.      Breath sounds: Normal breath sounds.   Abdominal:       "Palpations: Abdomen is soft.   Musculoskeletal:         General: Normal range of motion.      Cervical back: Normal range of motion.   Skin:     General: Skin is warm and dry.   Neurological:      Mental Status: She is alert and oriented to person, place, and time.   Psychiatric:         Mood and Affect: Mood normal.          PAT AIRWAY:   Airway:     Mallampati::  II    Neck ROM::  Full  normal        /86   Pulse 87   Temp 36.8 °C (98.2 °F) (Temporal)   Resp 16   Ht 1.676 m (5' 6\")   Wt 90.7 kg (200 lb)   SpO2 98%   BMI 32.28 kg/m²       Stop Bang Score 4     CHADS 2 score: 2.8%  DASI score: 50.7  METS score: 9  Revised cardiac risk index: 0.9%  ASA III  ARISCAT 1.6%  Labs done on 2024 CBC, CMP, hemoglobin A1c 5.8  TSH 3.48 on 3/5/2024    EK2024 - SCANNED INTO MEDIA TAB    Assessment and Plan:   Encounter for cosmetic surgery Plan: Bilateral mastopexy with axillary roll resection, panniculectomy, vaser liposuction with renuvion of abdomen, bilateral axilla, mons  History of svt/atrial fibrillation-Per record-h/o hyperactive thyroid follows with Dr. Johnston and Dr. Spears  Hyperthyroidism TSH 3.48 on 3/5/2024  Hypertension  Hyperlipidemia  Asthma  Depression   Varicose veins  Ménière's disease/hearing loss  Sleep apnea-no cpap-patient reports she does not have sleep apnea  BMI-32.28            "

## 2024-04-23 NOTE — PROGRESS NOTES
History of present illness:  Nevaeh Cuba is a 68 y.o. female presenting today for discussion of BAHA vs OSIA.  Initially presented for evaluation of asymmetrical sensorineural hearing loss secondary to longstanding Ménière's disease.  Given that she is on Medicare she would not qualify for cochlear implantation for asymmetric hearing loss and a Baha evaluation was conducted.  She was noted to be an excellent audiometric candidate given that her aided performance which is much improved and the aided condition with the processor applied the headband.  She is interested in having the transcutaneous option or ossea.  RECALL 03/01/2024  Nevaeh Cuba is a 68 y.o. female is referred by by Dr Marino for possible cochlear implant for rehabilitation of progressive hearing loss. The patient is accompanied by her suster.   Reports history of Meniere's disease diagnosed at the age of 41 years of age. Notes her antihypertensive medication takes care of her Meniere's disease.     The approximate duration of the patient's hearing loss is since her forties when she was diagnosed with Meniere's disease. The patient currently is aided in both with a Bicros divide with limited benefit and appears to be motivated with realistic expectations.  They  deny ear pain, discharge from ear, tinnitus, dizziness and vertigo. They also deny a history of prior ear surgery, noise exposure, exposure to ototoxic drugs or agents, and family history of hearing loss.     The patient's current medications, active allergies and list of medical problems were reviewed in the EHR and confirmed electronically there are as follows;  Allergies:   Allergies   Allergen Reactions    Latex Itching and Rash    Diltiazem Hcl Itching     Rash face     Latex, Natural Rubber Itching    Methotrexate Unknown    Metoprolol Unknown    Pollen Extracts Other    Adhesive Tape-Silicones Rash    Folic Acid Rash    Sulfamethoxazole-Trimethoprim Rash     Tinnitus      Current list  of medications:   Current Outpatient Medications   Medication Sig Dispense Refill    atorvastatin (Lipitor) 20 mg tablet Take 1 tablet (20 mg) by mouth once daily. 90 tablet 2    cholecalciferol (Vitamin D-3) 25 MCG (1000 UT) tablet Take 2 tablets (2,000 Units) by mouth. As directed      docusate sodium (Colace) 100 mg capsule Take 1 capsule (100 mg) by mouth once daily as needed.      fexofenadine HCl (ALLEGRA ORAL) Allegra      methIMAzole (Tapazole) 5 mg tablet Take 1 tablet (5 mg) by mouth once daily. 90 tablet 2    mv-min-FA-vit K-lutein-zeaxant (PreserVision AREDS 2 Plus MV) 200 mcg-15 mcg- 5 mg-1 mg capsule Take by mouth.      OXcarbazepine (Trileptal) 150 mg tablet Take 0.5 tablets (75 mg) by mouth once daily. 1/2 tab daily, weaning off of it      triamterene-hydrochlorothiazid (Maxzide-25) 37.5-25 mg tablet Take 1 tablet by mouth once daily in the morning. 90 tablet 3     No current facility-administered medications for this visit.     Problem list:  Patient Active Problem List   Diagnosis    Abdominal pain    Shoulder arthritis    Asthma ([facility])    Back pain with radiation    Benign essential hypertension    Cataract    Diverticulitis    Dyslipidemia    Hyperlipidemia    Hypertensive disorder    Hyperthyroidism    Impaired fasting glucose    Major depressive disorder, single episode, unspecified    Ménière's disease    Nocturnal enuresis    Numbness of foot    Pain of ear structure    Other obesity    Bile-induced gastritis    Vitamin D deficiency    Acute bronchitis    Acute otitis externa    Acute sinusitis    Allergic rhinitis    Sensorineural hearing loss (SNHL) of both ears    Estrogen deficiency    Diverticulitis of large intestine    Enlarged tonsils    History of malignant neoplasm    History of Meniere's syndrome    Itching    Lumbar radiculopathy    Pure hypercholesterolemia    Rash    Seasonal allergies    Essential hypertension    Inflammation of joint of shoulder region    Injury due to  exposure to external cause    Encounter for other specified aftercare    Laceration of left ring finger    Edema of both lower extremities    Peripheral venous insufficiency    Varicose veins of lower extremity with inflammation         Physical Examination:  CONSTITUTIONAL:  No acute distress  VOICE:  No hoarseness or other abnormality  RESPIRATION:  Breathing comfortably, no stridor  CV:  No clubbing/cyanosis/edema in hands  EYES:  EOM intact, sclera clear  NEURO:  Alert and oriented times 3, Cranial nerves II-XII grossly intact and symmetric bilaterally  HEAD AND FACE:  Symmetric facial features, no masses or lesions  RIGHT EAR:  Normal external ear and post auricular area, no visible lesions, external auditory canal patent, tympanic membrane intact, no retraction, no signs of mass, effusion, or infection within the middle ear  LEFT EAR: Normal external ear and post auricular area, no visible lesions, external auditory canal patent, tympanic membrane intact, no retraction, no signs of mass, effusion, or infection within the middle ear  NOSE:  Anterior rhinoscopy clear, no significant external deformity.  ORAL CAVITY/OROPHARYNX/LIPS:  normal lining, mobile tongue.  PHARYNGEAL WALLS: Moist mucosal lining, good palatal elevation  NECK/LYMPH:  No LAD, no thyroid masses, trachea midline  SKIN:  Neck and facial skin is without scar or injury  PSYCH:  Alert and oriented with appropriate mood and affect    Diagnostic testing:    Functional gain testing was completed with the Epocrates 6 Max sound processor on a headband programmed to [employer] hearing thresholds. Aided thresholds were obtained from 25 dB HL - 50 dB HL for the frequencies of 250-6000 Hz.      Testing completed at 45 degrees azimuth to the speaker and speech noise was present to the left ear / better hearing ear at 45 degrees azimuth. Word recognition completed with recorded material at 50dB HL. Left ear was plugged and muffed during all aided testing.     BAHA aided = Baha6 Max on soundarc  Hearing aid aided = Julissa P90 MS     BAHA AIDED: CNC words at 50 dB HL= 56 %    BAHA AIDED: AzBIO sentences at 50 dB HL = 98 % quiet  BAHA AIDED: AzBIO sentences at 50 dB HL = 83 +10 SNR      Impression:  Asymmetric  Sensorineural hearing loss  Right sided Meniere's Disease, Inactive    Recommendation:  I discussed the procedure in length with the patient.  We discussed the risk of hypoesthesia bleeding and infection.  Also reviewed the risk that might be encountered with the percutaneous devices such as infection.  She would like to have the OSIA and we will schedule her for a left OSIA implant.    I discussed with the patient the complexity of my medical decision making including the treatment and testing rational, indications of their elective procedure and possible adverse effects and/or complications. Based on the provided documentation and my professional assessment of this patient's [acute condition/acute exacerbation of their chronic condition/newly diagnosed condition/progression of their condition/complicated course of their medical condition], the complexity of evaluation and treatment is [low-moderate-high].    This note was created using speech recognition transcription software. Despite proofreading, several typographical errors might be present that might affect the meaning of the content. Please call with any questions.      Scribe Attestation  By signing my name below, ISusan Scribe   attest that this documentation has been prepared under the direction and in the presence of Conner Dwyer MD.

## 2024-04-23 NOTE — PREPROCEDURE INSTRUCTIONS
Why must I stop eating and drinking near surgery time?  With sedation, food or liquid in your stomach can enter your lungs causing serious complications  Increases nausea and vomiting    When do I need to stop eating and drinking before my surgery?   Do not eat or drink after midnight the night before your surgery/procedure.  You may have small sips of water to take your medication.      PAT DISCHARGE INSTRUCTIONS    Please call the Same Day Surgery (SDS) Department of the hospital where your procedure will be performed after 2:00 PM the day before your surgery. If you are scheduled on a Monday, or a Tuesday following a Monday holiday, you will need to call on the last business day prior to your surgery.      [place] [id number] [place] [id number] [phone]      Please let your surgeon know if:      You develop any open sores, shingles, burning or painful urination as these may increase your risk of an infection.   You no longer wish to have the surgery.   Any other personal circumstances change that may lead to the need to cancel or defer this surgery-such as being sick or getting admitted to any hospital within one week of your planned procedure.    Your contact details change, such as a change of address or phone number.    Starting now:     Please DO NOT drink alcohol or smoke for 24 hours before surgery. It is well known that quitting smoking can make a huge difference to your health and recovery from surgery. The longer you abstain from smoking, the better your chances of a healthy recovery. If you need help with quitting, call 1-176-QUIT-NOW to be connected to a trained counselor who will discuss the best methods to help you quit.     Before your surgery:    Please stop all supplements 7 days prior to surgery. Or as directed by your surgeon.   Please stop taking NSAID pain medicine such as Advil and Motrin 7 days before surgery.    If you develop any  fever, cough, cold, rashes, cuts, scratches, scrapes, urinary symptoms or infection anywhere on your body (including teeth and gums) prior to surgery, please call your surgeon’s office as soon as possible. This may require treatment to reduce the chance of cancellation on the day of surgery.    The day before your surgery:   DIET- Please follow the diet instructions at the top of your packet.   Get a good night’s rest.  Use the special soap for bathing if you have been instructed to use one.    Scheduled surgery times may change and you will be notified if this occurs - please check your personal voicemail for any updates.     On the morning of surgery:   Wear comfortable, loose fitting clothes which open in the front. Please do not wear moisturizers, creams, lotions, makeup or perfume.    Please bring with you to surgery:   Photo ID and insurance card   Current list of medicines and allergies   Pacemaker/ Defibrillator/Heart stent cards   CPAP machine and mask    Slings/ splints/ crutches   A copy of your complete advanced directive/DHPOA.    Please do NOT bring with you to surgery:   All jewelry and valuables should be left at home.   Prosthetic devices such as contact lenses, hearing aids, dentures, eyelash extensions, hairpins and body piercings must be removed prior to going in to the surgical suite.    After outpatient surgery:   A responsible adult MUST accompany you at the time of discharge and stay with you for 24 hours after your surgery. You may NOT drive yourself home after surgery.    Do not drive, operate machinery, make critical decisions or do activities that require co-ordination or balance until after a night’s sleep.   Do not drink alcoholic beverages for 24 hours.   Instructions for resuming your medications will be provided by your surgeon.    CALL YOUR DOCTOR AFTER SURGERY IF YOU HAVE:     Chills and/or a fever of 101° F or higher.    Redness, swelling, pus or drainage from your surgical wound  or a bad smell from the wound.    Lightheadedness, fainting or confusion.    Persistent vomiting (throwing up) and are not able to eat or drink for 12 hours.    Three or more loose, watery bowel movements in 24 hours (diarrhea).   Difficulty or pain while urinating( after non-urological surgery)    Pain and swelling in your legs, especially if it is only on one side.    Difficulty breathing or are breathing faster than normal.    Any new concerning symptoms.     Medication List            Accurate as of April 23, 2024  9:30 AM. Always use your most recent med list.                ALLEGRA ORAL  Medication Adjustments for Surgery: Stop 1 day before surgery     atorvastatin 20 mg tablet  Commonly known as: Lipitor  Take 1 tablet (20 mg) by mouth once daily.  Medication Adjustments for Surgery: Take morning of surgery with sip of water, no other fluids     cholecalciferol 25 MCG (1000 UT) tablet  Commonly known as: Vitamin D-3  Medication Adjustments for Surgery: Stop 7 days before surgery     docusate sodium 100 mg capsule  Commonly known as: Colace  Notes to patient: Take as needed.     methIMAzole 5 mg tablet  Commonly known as: Tapazole  Take 1 tablet (5 mg) by mouth once daily.  Medication Adjustments for Surgery: Take morning of surgery with sip of water, no other fluids     OXcarbazepine 150 mg tablet  Commonly known as: Trileptal  Medication Adjustments for Surgery: Take morning of surgery with sip of water, no other fluids     PreserVision AREDS 2 Plus  mcg-15 mcg- 5 mg-1 mg capsule  Generic drug: mv-min-FA-vit K-lutein-zeaxant  Medication Adjustments for Surgery: Stop 7 days before surgery     triamterene-hydrochlorothiazid 37.5-25 mg tablet  Commonly known as: Maxzide-25  Take 1 tablet by mouth once daily in the morning.  Medication Adjustments for Surgery: Take morning of surgery with sip of water, no other fluids

## 2024-04-24 ENCOUNTER — CLINICAL SUPPORT (OUTPATIENT)
Dept: AUDIOLOGY | Facility: CLINIC | Age: 69
End: 2024-04-24
Payer: MEDICARE

## 2024-04-24 DIAGNOSIS — H90.3 ASYMMETRICAL SENSORINEURAL HEARING LOSS: ICD-10-CM

## 2024-04-24 PROCEDURE — 92700 UNLISTED ORL SERVICE/PX: CPT | Performed by: SOCIAL WORKER

## 2024-04-24 NOTE — PROGRESS NOTES
Nevaeh Cuba was seen on 4/24/24 for a device selection and consultation  Most recent audiogram was 3/28/2024  She is being scheduled for an OSIA implant surgery with Dr. Dwyer for management of single sided deafness.    Consult:  The OSIA2 processor was demonstrated in office today.   The different wireless accessories were reviewed  Due to patient lifestyle, the mini microphone 2+ was selected  Order form was completed in office today  Several questions about batteries and post operative management were discussed today    Treatment Plan:  Return post operatively for programming and dispense appointment  Retest hearing annually to monitor hearing loss

## 2024-04-25 ENCOUNTER — APPOINTMENT (OUTPATIENT)
Dept: AUDIOLOGY | Facility: CLINIC | Age: 69
End: 2024-04-25
Payer: MEDICARE

## 2024-05-06 PROBLEM — Z41.1 ENCOUNTER FOR COSMETIC SURGERY: Status: ACTIVE | Noted: 2024-05-06

## 2024-05-07 ENCOUNTER — ANESTHESIA (OUTPATIENT)
Dept: OPERATING ROOM | Facility: HOSPITAL | Age: 69
End: 2024-05-07

## 2024-05-07 ENCOUNTER — HOSPITAL ENCOUNTER (OUTPATIENT)
Facility: HOSPITAL | Age: 69
LOS: 1 days | Discharge: HOME | End: 2024-05-08
Attending: SPECIALIST | Admitting: SPECIALIST

## 2024-05-07 ENCOUNTER — ANESTHESIA EVENT (OUTPATIENT)
Dept: OPERATING ROOM | Facility: HOSPITAL | Age: 69
End: 2024-05-07

## 2024-05-07 DIAGNOSIS — Z41.1 ENCOUNTER FOR COSMETIC SURGERY: Primary | ICD-10-CM

## 2024-05-07 PROCEDURE — 7100000001 HC RECOVERY ROOM TIME - INITIAL BASE CHARGE: Performed by: SPECIALIST

## 2024-05-07 PROCEDURE — 3700000001 HC GENERAL ANESTHESIA TIME - INITIAL BASE CHARGE: Performed by: SPECIALIST

## 2024-05-07 PROCEDURE — G0378 HOSPITAL OBSERVATION PER HR: HCPCS

## 2024-05-07 PROCEDURE — 7100000011 HC EXTENDED STAY RECOVERY HOURLY - NURSING UNIT

## 2024-05-07 PROCEDURE — 2500000004 HC RX 250 GENERAL PHARMACY W/ HCPCS (ALT 636 FOR OP/ED): Mod: JZ | Performed by: SPECIALIST

## 2024-05-07 PROCEDURE — 2500000001 HC RX 250 WO HCPCS SELF ADMINISTERED DRUGS (ALT 637 FOR MEDICARE OP): Performed by: SPECIALIST

## 2024-05-07 PROCEDURE — 3600000004 HC OR TIME - INITIAL BASE CHARGE - PROCEDURE LEVEL FOUR: Performed by: SPECIALIST

## 2024-05-07 PROCEDURE — 2500000004 HC RX 250 GENERAL PHARMACY W/ HCPCS (ALT 636 FOR OP/ED): Performed by: SPECIALIST

## 2024-05-07 PROCEDURE — 2500000004 HC RX 250 GENERAL PHARMACY W/ HCPCS (ALT 636 FOR OP/ED): Performed by: NURSE ANESTHETIST, CERTIFIED REGISTERED

## 2024-05-07 PROCEDURE — 3600000009 HC OR TIME - EACH INCREMENTAL 1 MINUTE - PROCEDURE LEVEL FOUR: Performed by: SPECIALIST

## 2024-05-07 PROCEDURE — 7100000002 HC RECOVERY ROOM TIME - EACH INCREMENTAL 1 MINUTE: Performed by: SPECIALIST

## 2024-05-07 PROCEDURE — 2500000005 HC RX 250 GENERAL PHARMACY W/O HCPCS: Performed by: NURSE ANESTHETIST, CERTIFIED REGISTERED

## 2024-05-07 PROCEDURE — 2720000007 HC OR 272 NO HCPCS: Performed by: SPECIALIST

## 2024-05-07 PROCEDURE — 3700000002 HC GENERAL ANESTHESIA TIME - EACH INCREMENTAL 1 MINUTE: Performed by: SPECIALIST

## 2024-05-07 PROCEDURE — 96372 THER/PROPH/DIAG INJ SC/IM: CPT | Performed by: SPECIALIST

## 2024-05-07 PROCEDURE — 2500000005 HC RX 250 GENERAL PHARMACY W/O HCPCS: Performed by: SPECIALIST

## 2024-05-07 RX ORDER — LABETALOL HYDROCHLORIDE 5 MG/ML
10 INJECTION, SOLUTION INTRAVENOUS ONCE AS NEEDED
Status: DISCONTINUED | OUTPATIENT
Start: 2024-05-07 | End: 2024-05-07 | Stop reason: HOSPADM

## 2024-05-07 RX ORDER — BUPIVACAINE HCL/EPINEPHRINE 0.25-.0005
VIAL (ML) INJECTION AS NEEDED
Status: DISCONTINUED | OUTPATIENT
Start: 2024-05-07 | End: 2024-05-07 | Stop reason: HOSPADM

## 2024-05-07 RX ORDER — ROCURONIUM BROMIDE 10 MG/ML
INJECTION, SOLUTION INTRAVENOUS AS NEEDED
Status: DISCONTINUED | OUTPATIENT
Start: 2024-05-07 | End: 2024-05-07

## 2024-05-07 RX ORDER — SODIUM CHLORIDE, SODIUM LACTATE, POTASSIUM CHLORIDE, CALCIUM CHLORIDE 600; 310; 30; 20 MG/100ML; MG/100ML; MG/100ML; MG/100ML
INJECTION, SOLUTION INTRAVENOUS CONTINUOUS PRN
Status: DISCONTINUED | OUTPATIENT
Start: 2024-05-07 | End: 2024-05-07

## 2024-05-07 RX ORDER — PHENYLEPHRINE HCL IN 0.9% NACL 0.4MG/10ML
SYRINGE (ML) INTRAVENOUS AS NEEDED
Status: DISCONTINUED | OUTPATIENT
Start: 2024-05-07 | End: 2024-05-07

## 2024-05-07 RX ORDER — PROPOFOL 10 MG/ML
INJECTION, EMULSION INTRAVENOUS AS NEEDED
Status: DISCONTINUED | OUTPATIENT
Start: 2024-05-07 | End: 2024-05-07

## 2024-05-07 RX ORDER — ALBUTEROL SULFATE 0.83 MG/ML
2.5 SOLUTION RESPIRATORY (INHALATION) ONCE AS NEEDED
Status: DISCONTINUED | OUTPATIENT
Start: 2024-05-07 | End: 2024-05-07 | Stop reason: HOSPADM

## 2024-05-07 RX ORDER — DEXTROSE, SODIUM CHLORIDE, SODIUM LACTATE, POTASSIUM CHLORIDE, AND CALCIUM CHLORIDE 5; .6; .31; .03; .02 G/100ML; G/100ML; G/100ML; G/100ML; G/100ML
100 INJECTION, SOLUTION INTRAVENOUS CONTINUOUS
Status: DISCONTINUED | OUTPATIENT
Start: 2024-05-07 | End: 2024-05-07 | Stop reason: ALTCHOICE

## 2024-05-07 RX ORDER — ACETAMINOPHEN 325 MG/1
650 TABLET ORAL ONCE
Status: COMPLETED | OUTPATIENT
Start: 2024-05-07 | End: 2024-05-07

## 2024-05-07 RX ORDER — TRANEXAMIC ACID 100 MG/ML
INJECTION, SOLUTION INTRAVENOUS AS NEEDED
Status: DISCONTINUED | OUTPATIENT
Start: 2024-05-07 | End: 2024-05-07

## 2024-05-07 RX ORDER — ENOXAPARIN SODIUM 100 MG/ML
40 INJECTION SUBCUTANEOUS EVERY 24 HOURS
Status: DISCONTINUED | OUTPATIENT
Start: 2024-05-07 | End: 2024-05-08 | Stop reason: HOSPADM

## 2024-05-07 RX ORDER — HYDROCODONE BITARTRATE AND ACETAMINOPHEN 5; 325 MG/1; MG/1
1 TABLET ORAL EVERY 4 HOURS PRN
Status: DISCONTINUED | OUTPATIENT
Start: 2024-05-07 | End: 2024-05-08 | Stop reason: HOSPADM

## 2024-05-07 RX ORDER — DIPHENHYDRAMINE HYDROCHLORIDE 50 MG/ML
12.5 INJECTION INTRAMUSCULAR; INTRAVENOUS ONCE AS NEEDED
Status: DISCONTINUED | OUTPATIENT
Start: 2024-05-07 | End: 2024-05-07 | Stop reason: HOSPADM

## 2024-05-07 RX ORDER — HYDRALAZINE HYDROCHLORIDE 20 MG/ML
10 INJECTION INTRAMUSCULAR; INTRAVENOUS EVERY 30 MIN PRN
Status: DISCONTINUED | OUTPATIENT
Start: 2024-05-07 | End: 2024-05-07 | Stop reason: HOSPADM

## 2024-05-07 RX ORDER — ONDANSETRON HYDROCHLORIDE 2 MG/ML
4 INJECTION, SOLUTION INTRAVENOUS ONCE AS NEEDED
Status: DISCONTINUED | OUTPATIENT
Start: 2024-05-07 | End: 2024-05-07 | Stop reason: HOSPADM

## 2024-05-07 RX ORDER — FENTANYL CITRATE 50 UG/ML
INJECTION, SOLUTION INTRAMUSCULAR; INTRAVENOUS AS NEEDED
Status: DISCONTINUED | OUTPATIENT
Start: 2024-05-07 | End: 2024-05-07

## 2024-05-07 RX ORDER — ONDANSETRON 4 MG/1
4 TABLET, ORALLY DISINTEGRATING ORAL EVERY 8 HOURS PRN
Status: DISCONTINUED | OUTPATIENT
Start: 2024-05-07 | End: 2024-05-08

## 2024-05-07 RX ORDER — KETOROLAC TROMETHAMINE 30 MG/ML
INJECTION, SOLUTION INTRAMUSCULAR; INTRAVENOUS AS NEEDED
Status: DISCONTINUED | OUTPATIENT
Start: 2024-05-07 | End: 2024-05-07

## 2024-05-07 RX ORDER — ONDANSETRON HYDROCHLORIDE 2 MG/ML
4 INJECTION, SOLUTION INTRAVENOUS EVERY 8 HOURS PRN
Status: DISCONTINUED | OUTPATIENT
Start: 2024-05-07 | End: 2024-05-08

## 2024-05-07 RX ORDER — NALOXONE HYDROCHLORIDE 0.4 MG/ML
0.2 INJECTION, SOLUTION INTRAMUSCULAR; INTRAVENOUS; SUBCUTANEOUS EVERY 5 MIN PRN
Status: DISCONTINUED | OUTPATIENT
Start: 2024-05-07 | End: 2024-05-08 | Stop reason: HOSPADM

## 2024-05-07 RX ORDER — BUPIVACAINE HYDROCHLORIDE 5 MG/ML
INJECTION, SOLUTION PERINEURAL AS NEEDED
Status: DISCONTINUED | OUTPATIENT
Start: 2024-05-07 | End: 2024-05-07 | Stop reason: HOSPADM

## 2024-05-07 RX ORDER — MIDAZOLAM HYDROCHLORIDE 1 MG/ML
INJECTION, SOLUTION INTRAMUSCULAR; INTRAVENOUS AS NEEDED
Status: DISCONTINUED | OUTPATIENT
Start: 2024-05-07 | End: 2024-05-07

## 2024-05-07 RX ORDER — ALBUTEROL SULFATE 0.83 MG/ML
2.5 SOLUTION RESPIRATORY (INHALATION) ONCE
Status: DISCONTINUED | OUTPATIENT
Start: 2024-05-07 | End: 2024-05-07 | Stop reason: HOSPADM

## 2024-05-07 RX ORDER — ACETAMINOPHEN 325 MG/1
650 TABLET ORAL EVERY 6 HOURS SCHEDULED
Status: DISCONTINUED | OUTPATIENT
Start: 2024-05-07 | End: 2024-05-08 | Stop reason: HOSPADM

## 2024-05-07 RX ORDER — LIDOCAINE HYDROCHLORIDE 10 MG/ML
INJECTION INFILTRATION; PERINEURAL AS NEEDED
Status: DISCONTINUED | OUTPATIENT
Start: 2024-05-07 | End: 2024-05-07

## 2024-05-07 RX ORDER — KETOROLAC TROMETHAMINE 30 MG/ML
15 INJECTION, SOLUTION INTRAMUSCULAR; INTRAVENOUS ONCE AS NEEDED
Status: DISCONTINUED | OUTPATIENT
Start: 2024-05-07 | End: 2024-05-07 | Stop reason: HOSPADM

## 2024-05-07 RX ORDER — ONDANSETRON HYDROCHLORIDE 2 MG/ML
INJECTION, SOLUTION INTRAVENOUS AS NEEDED
Status: DISCONTINUED | OUTPATIENT
Start: 2024-05-07 | End: 2024-05-07

## 2024-05-07 RX ORDER — ONDANSETRON 4 MG/1
4 TABLET, ORALLY DISINTEGRATING ORAL ONCE
Status: COMPLETED | OUTPATIENT
Start: 2024-05-07 | End: 2024-05-07

## 2024-05-07 RX ORDER — VANCOMYCIN HYDROCHLORIDE 1 G/20ML
1 INJECTION, POWDER, LYOPHILIZED, FOR SOLUTION INTRAVENOUS ONCE
Qty: 1 G | Refills: 0 | Status: DISCONTINUED | OUTPATIENT
Start: 2024-05-07 | End: 2024-05-08 | Stop reason: HOSPADM

## 2024-05-07 RX ORDER — SODIUM CHLORIDE, SODIUM LACTATE, POTASSIUM CHLORIDE, CALCIUM CHLORIDE 600; 310; 30; 20 MG/100ML; MG/100ML; MG/100ML; MG/100ML
100 INJECTION, SOLUTION INTRAVENOUS CONTINUOUS
Status: DISCONTINUED | OUTPATIENT
Start: 2024-05-07 | End: 2024-05-08 | Stop reason: HOSPADM

## 2024-05-07 RX ORDER — FAMOTIDINE 20 MG/1
20 TABLET, FILM COATED ORAL ONCE
Status: DISCONTINUED | OUTPATIENT
Start: 2024-05-07 | End: 2024-05-07 | Stop reason: HOSPADM

## 2024-05-07 RX ORDER — SENNOSIDES 8.6 MG/1
2 TABLET ORAL 2 TIMES DAILY
Status: DISCONTINUED | OUTPATIENT
Start: 2024-05-07 | End: 2024-05-08 | Stop reason: HOSPADM

## 2024-05-07 RX ORDER — LIDOCAINE HYDROCHLORIDE AND EPINEPHRINE 10; 10 MG/ML; UG/ML
INJECTION, SOLUTION INFILTRATION; PERINEURAL AS NEEDED
Status: DISCONTINUED | OUTPATIENT
Start: 2024-05-07 | End: 2024-05-07 | Stop reason: HOSPADM

## 2024-05-07 RX ORDER — GABAPENTIN 600 MG/1
600 TABLET ORAL ONCE
Status: COMPLETED | OUTPATIENT
Start: 2024-05-07 | End: 2024-05-07

## 2024-05-07 RX ORDER — OXYCODONE HCL 10 MG/1
10 TABLET, FILM COATED, EXTENDED RELEASE ORAL ONCE AS NEEDED
Status: DISCONTINUED | OUTPATIENT
Start: 2024-05-07 | End: 2024-05-08 | Stop reason: HOSPADM

## 2024-05-07 RX ORDER — METOCLOPRAMIDE 10 MG/1
10 TABLET ORAL ONCE
Status: DISCONTINUED | OUTPATIENT
Start: 2024-05-07 | End: 2024-05-07 | Stop reason: HOSPADM

## 2024-05-07 RX ORDER — DIPHENHYDRAMINE HYDROCHLORIDE 50 MG/ML
25 INJECTION INTRAMUSCULAR; INTRAVENOUS EVERY 4 HOURS PRN
Status: DISCONTINUED | OUTPATIENT
Start: 2024-05-07 | End: 2024-05-08 | Stop reason: HOSPADM

## 2024-05-07 RX ORDER — CELECOXIB 200 MG/1
400 CAPSULE ORAL ONCE
Status: COMPLETED | OUTPATIENT
Start: 2024-05-07 | End: 2024-05-07

## 2024-05-07 RX ORDER — CEFAZOLIN SODIUM 2 G/100ML
2 INJECTION, SOLUTION INTRAVENOUS ONCE
Status: COMPLETED | OUTPATIENT
Start: 2024-05-07 | End: 2024-05-07

## 2024-05-07 RX ADMIN — ACETAMINOPHEN 650 MG: 325 TABLET ORAL at 18:36

## 2024-05-07 RX ADMIN — MIDAZOLAM HYDROCHLORIDE 2 MG: 1 INJECTION, SOLUTION INTRAMUSCULAR; INTRAVENOUS at 07:46

## 2024-05-07 RX ADMIN — ENOXAPARIN SODIUM 40 MG: 40 INJECTION SUBCUTANEOUS at 18:36

## 2024-05-07 RX ADMIN — ROCURONIUM BROMIDE 10 MG: 10 INJECTION, SOLUTION INTRAVENOUS at 11:03

## 2024-05-07 RX ADMIN — SODIUM CHLORIDE, POTASSIUM CHLORIDE, SODIUM LACTATE AND CALCIUM CHLORIDE: 600; 310; 30; 20 INJECTION, SOLUTION INTRAVENOUS at 15:00

## 2024-05-07 RX ADMIN — FENTANYL CITRATE 25 MCG: 0.05 INJECTION, SOLUTION INTRAMUSCULAR; INTRAVENOUS at 10:28

## 2024-05-07 RX ADMIN — Medication 100 MCG: at 14:49

## 2024-05-07 RX ADMIN — PROPOFOL 150 MG: 10 INJECTION, EMULSION INTRAVENOUS at 07:51

## 2024-05-07 RX ADMIN — FENTANYL CITRATE 50 MCG: 0.05 INJECTION, SOLUTION INTRAMUSCULAR; INTRAVENOUS at 09:45

## 2024-05-07 RX ADMIN — ACETAMINOPHEN 650 MG: 325 TABLET ORAL at 06:33

## 2024-05-07 RX ADMIN — CELECOXIB 400 MG: 200 CAPSULE ORAL at 06:33

## 2024-05-07 RX ADMIN — LIDOCAINE HYDROCHLORIDE 5 ML: 10 INJECTION, SOLUTION INFILTRATION; PERINEURAL at 07:51

## 2024-05-07 RX ADMIN — ONDANSETRON HYDROCHLORIDE 4 MG: 2 INJECTION INTRAMUSCULAR; INTRAVENOUS at 15:51

## 2024-05-07 RX ADMIN — ROCURONIUM BROMIDE 20 MG: 10 INJECTION, SOLUTION INTRAVENOUS at 08:33

## 2024-05-07 RX ADMIN — KETOROLAC TROMETHAMINE 15 MG: 30 INJECTION, SOLUTION INTRAMUSCULAR at 15:52

## 2024-05-07 RX ADMIN — CEFAZOLIN SODIUM 2 G: 2 INJECTION, SOLUTION INTRAVENOUS at 07:46

## 2024-05-07 RX ADMIN — ACETAMINOPHEN 650 MG: 325 TABLET ORAL at 23:48

## 2024-05-07 RX ADMIN — TRANEXAMIC ACID 1000 MG: 100 INJECTION, SOLUTION INTRAVENOUS at 08:13

## 2024-05-07 RX ADMIN — GABAPENTIN 600 MG: 300 CAPSULE ORAL at 06:33

## 2024-05-07 RX ADMIN — VANCOMYCIN HYDROCHLORIDE 1 G: 1 INJECTION, POWDER, LYOPHILIZED, FOR SOLUTION INTRAVENOUS at 11:04

## 2024-05-07 RX ADMIN — ROCURONIUM BROMIDE 50 MG: 10 INJECTION, SOLUTION INTRAVENOUS at 07:51

## 2024-05-07 RX ADMIN — ROCURONIUM BROMIDE 10 MG: 10 INJECTION, SOLUTION INTRAVENOUS at 13:42

## 2024-05-07 RX ADMIN — FENTANYL CITRATE 25 MCG: 0.05 INJECTION, SOLUTION INTRAMUSCULAR; INTRAVENOUS at 10:21

## 2024-05-07 RX ADMIN — FENTANYL CITRATE 100 MCG: 0.05 INJECTION, SOLUTION INTRAMUSCULAR; INTRAVENOUS at 07:51

## 2024-05-07 RX ADMIN — ROCURONIUM BROMIDE 20 MG: 10 INJECTION, SOLUTION INTRAVENOUS at 12:41

## 2024-05-07 RX ADMIN — PROPOFOL 50 MG: 10 INJECTION, EMULSION INTRAVENOUS at 08:33

## 2024-05-07 RX ADMIN — DEXAMETHASONE SODIUM PHOSPHATE 8 MG: 4 INJECTION, SOLUTION INTRAMUSCULAR; INTRAVENOUS at 08:16

## 2024-05-07 RX ADMIN — SODIUM CHLORIDE, POTASSIUM CHLORIDE, SODIUM LACTATE AND CALCIUM CHLORIDE: 600; 310; 30; 20 INJECTION, SOLUTION INTRAVENOUS at 07:46

## 2024-05-07 RX ADMIN — SODIUM CHLORIDE, SODIUM LACTATE, POTASSIUM CHLORIDE, AND CALCIUM CHLORIDE 100 ML/HR: 600; 310; 30; 20 INJECTION, SOLUTION INTRAVENOUS at 18:32

## 2024-05-07 RX ADMIN — ROCURONIUM BROMIDE 20 MG: 10 INJECTION, SOLUTION INTRAVENOUS at 09:40

## 2024-05-07 RX ADMIN — SENNOSIDES 17.2 MG: 8.6 TABLET, FILM COATED ORAL at 20:37

## 2024-05-07 RX ADMIN — ROCURONIUM BROMIDE 10 MG: 10 INJECTION, SOLUTION INTRAVENOUS at 14:06

## 2024-05-07 RX ADMIN — SODIUM CHLORIDE, POTASSIUM CHLORIDE, SODIUM LACTATE AND CALCIUM CHLORIDE: 600; 310; 30; 20 INJECTION, SOLUTION INTRAVENOUS at 12:28

## 2024-05-07 RX ADMIN — HYDROCODONE BITARTRATE AND ACETAMINOPHEN 1 TABLET: 5; 325 TABLET ORAL at 21:08

## 2024-05-07 RX ADMIN — SUGAMMADEX 200 MG: 100 INJECTION, SOLUTION INTRAVENOUS at 15:50

## 2024-05-07 RX ADMIN — ONDANSETRON 4 MG: 4 TABLET, ORALLY DISINTEGRATING ORAL at 06:33

## 2024-05-07 SDOH — SOCIAL STABILITY: SOCIAL INSECURITY: ARE THERE ANY APPARENT SIGNS OF INJURIES/BEHAVIORS THAT COULD BE RELATED TO ABUSE/NEGLECT?: NO

## 2024-05-07 SDOH — SOCIAL STABILITY: SOCIAL INSECURITY: WERE YOU ABLE TO COMPLETE ALL THE BEHAVIORAL HEALTH SCREENINGS?: YES

## 2024-05-07 SDOH — HEALTH STABILITY: MENTAL HEALTH: CURRENT SMOKER: 0

## 2024-05-07 SDOH — SOCIAL STABILITY: SOCIAL INSECURITY: HAVE YOU HAD ANY THOUGHTS OF HARMING ANYONE ELSE?: NO

## 2024-05-07 SDOH — SOCIAL STABILITY: SOCIAL INSECURITY: DOES ANYONE TRY TO KEEP YOU FROM HAVING/CONTACTING OTHER FRIENDS OR DOING THINGS OUTSIDE YOUR HOME?: NO

## 2024-05-07 SDOH — SOCIAL STABILITY: SOCIAL INSECURITY: HAS ANYONE EVER THREATENED TO HURT YOUR FAMILY OR YOUR PETS?: NO

## 2024-05-07 SDOH — SOCIAL STABILITY: SOCIAL INSECURITY: DO YOU FEEL UNSAFE GOING BACK TO THE PLACE WHERE YOU ARE LIVING?: NO

## 2024-05-07 SDOH — SOCIAL STABILITY: SOCIAL INSECURITY: ABUSE: ADULT

## 2024-05-07 SDOH — SOCIAL STABILITY: SOCIAL INSECURITY: ARE YOU OR HAVE YOU BEEN THREATENED OR ABUSED PHYSICALLY, EMOTIONALLY, OR SEXUALLY BY ANYONE?: NO

## 2024-05-07 SDOH — SOCIAL STABILITY: SOCIAL INSECURITY: HAVE YOU HAD THOUGHTS OF HARMING ANYONE ELSE?: NO

## 2024-05-07 SDOH — SOCIAL STABILITY: SOCIAL INSECURITY: DO YOU FEEL ANYONE HAS EXPLOITED OR TAKEN ADVANTAGE OF YOU FINANCIALLY OR OF YOUR PERSONAL PROPERTY?: NO

## 2024-05-07 ASSESSMENT — COGNITIVE AND FUNCTIONAL STATUS - GENERAL
TOILETING: A LITTLE
CLIMB 3 TO 5 STEPS WITH RAILING: A LOT
DRESSING REGULAR UPPER BODY CLOTHING: A LITTLE
STANDING UP FROM CHAIR USING ARMS: A LITTLE
TURNING FROM BACK TO SIDE WHILE IN FLAT BAD: A LITTLE
MOBILITY SCORE: 17
DAILY ACTIVITIY SCORE: 18
WALKING IN HOSPITAL ROOM: A LITTLE
TOILETING: A LITTLE
TURNING FROM BACK TO SIDE WHILE IN FLAT BAD: A LITTLE
HELP NEEDED FOR BATHING: A LITTLE
DAILY ACTIVITIY SCORE: 18
MOVING FROM LYING ON BACK TO SITTING ON SIDE OF FLAT BED WITH BEDRAILS: A LITTLE
MOVING TO AND FROM BED TO CHAIR: A LITTLE
DRESSING REGULAR LOWER BODY CLOTHING: A LOT
MOVING TO AND FROM BED TO CHAIR: A LITTLE
MOVING FROM LYING ON BACK TO SITTING ON SIDE OF FLAT BED WITH BEDRAILS: A LITTLE
PERSONAL GROOMING: A LITTLE
PERSONAL GROOMING: A LITTLE
MOBILITY SCORE: 17
CLIMB 3 TO 5 STEPS WITH RAILING: A LOT
DRESSING REGULAR UPPER BODY CLOTHING: A LITTLE
WALKING IN HOSPITAL ROOM: A LITTLE
DRESSING REGULAR LOWER BODY CLOTHING: A LOT
STANDING UP FROM CHAIR USING ARMS: A LITTLE
HELP NEEDED FOR BATHING: A LITTLE
PATIENT BASELINE BEDBOUND: NO

## 2024-05-07 ASSESSMENT — ACTIVITIES OF DAILY LIVING (ADL)
GROOMING: NEEDS ASSISTANCE
ADEQUATE_TO_COMPLETE_ADL: YES
FEEDING YOURSELF: INDEPENDENT
PATIENT'S MEMORY ADEQUATE TO SAFELY COMPLETE DAILY ACTIVITIES?: YES
BATHING: NEEDS ASSISTANCE
WALKS IN HOME: INDEPENDENT
HEARING - RIGHT EAR: FUNCTIONAL
DRESSING YOURSELF: NEEDS ASSISTANCE
LACK_OF_TRANSPORTATION: NO
ASSISTIVE_DEVICE: EYEGLASSES
JUDGMENT_ADEQUATE_SAFELY_COMPLETE_DAILY_ACTIVITIES: YES
TOILETING: NEEDS ASSISTANCE
HEARING - LEFT EAR: FUNCTIONAL

## 2024-05-07 ASSESSMENT — PAIN SCALES - GENERAL
PAINLEVEL_OUTOF10: 2
PAINLEVEL_OUTOF10: 0 - NO PAIN
PAINLEVEL_OUTOF10: 3
PAINLEVEL_OUTOF10: 4
PAINLEVEL_OUTOF10: 3

## 2024-05-07 ASSESSMENT — LIFESTYLE VARIABLES
AUDIT-C TOTAL SCORE: 0
SKIP TO QUESTIONS 9-10: 1
HOW OFTEN DO YOU HAVE 6 OR MORE DRINKS ON ONE OCCASION: NEVER
AUDIT-C TOTAL SCORE: 0
HOW OFTEN DO YOU HAVE A DRINK CONTAINING ALCOHOL: NEVER
HOW MANY STANDARD DRINKS CONTAINING ALCOHOL DO YOU HAVE ON A TYPICAL DAY: PATIENT DOES NOT DRINK

## 2024-05-07 ASSESSMENT — PAIN DESCRIPTION - LOCATION
LOCATION: ABDOMEN
LOCATION: ABDOMEN

## 2024-05-07 ASSESSMENT — PATIENT HEALTH QUESTIONNAIRE - PHQ9
SUM OF ALL RESPONSES TO PHQ9 QUESTIONS 1 & 2: 0
1. LITTLE INTEREST OR PLEASURE IN DOING THINGS: NOT AT ALL
2. FEELING DOWN, DEPRESSED OR HOPELESS: NOT AT ALL

## 2024-05-07 ASSESSMENT — PAIN - FUNCTIONAL ASSESSMENT
PAIN_FUNCTIONAL_ASSESSMENT: 0-10
PAIN_FUNCTIONAL_ASSESSMENT: FLACC (FACE, LEGS, ACTIVITY, CRY, CONSOLABILITY)
PAIN_FUNCTIONAL_ASSESSMENT: 0-10

## 2024-05-07 ASSESSMENT — PAIN DESCRIPTION - DESCRIPTORS: DESCRIPTORS: TINGLING

## 2024-05-07 NOTE — CARE PLAN
The patient's goals for the shift include  manage pain, ambulate, rest.     The clinical goals for the shift include manage pain, ERAS protocol, ambulate, maintain safety, monitor labs and VS, promote rest    No barriers to meeting these goals.       Problem: Pain  Goal: My pain/discomfort is manageable  Outcome: Progressing     Problem: Safety  Goal: Patient will be injury free during hospitalization  Outcome: Progressing  Goal: I will remain free of falls  Outcome: Progressing     Problem: Daily Care  Goal: Daily care needs are met  Outcome: Progressing     Problem: Psychosocial Needs  Goal: Demonstrates ability to cope with hospitalization/illness  Outcome: Progressing  Goal: Collaborate with me, my family, and caregiver to identify my specific goals  Outcome: Progressing  Flowsheets (Taken 5/7/2024 1901)  Cultural Requests During Hospitalization: none  Spiritual Requests During Hospitalization: none     Problem: Discharge Barriers  Goal: My discharge needs are met  Outcome: Progressing     Problem: Fall/Injury  Goal: Not fall by end of shift  Outcome: Progressing  Goal: Be free from injury by end of the shift  Outcome: Progressing  Goal: Verbalize understanding of personal risk factors for fall in the hospital  Outcome: Progressing  Goal: Verbalize understanding of risk factor reduction measures to prevent injury from fall in the home  Outcome: Progressing  Goal: Use assistive devices by end of the shift  Outcome: Progressing  Goal: Pace activities to prevent fatigue by end of the shift  Outcome: Progressing     Problem: Pain  Goal: Takes deep breaths with improved pain control throughout the shift  Outcome: Progressing  Goal: Turns in bed with improved pain control throughout the shift  Outcome: Progressing  Goal: Walks with improved pain control throughout the shift  Outcome: Progressing  Goal: Performs ADL's with improved pain control throughout shift  Outcome: Progressing  Goal: Participates in PT with  improved pain control throughout the shift  Outcome: Progressing  Goal: Free from opioid side effects throughout the shift  Outcome: Progressing  Goal: Free from acute confusion related to pain meds throughout the shift  Outcome: Progressing

## 2024-05-07 NOTE — ANESTHESIA PREPROCEDURE EVALUATION
Patient: Nevaeh Cuba    Procedure Information       Date/Time: 05/07/24 0730    Procedures:       Mastopexy with Axillary Roll Resection (Bilateral)      Panniculectomy (Abdomen)      Vaser Liposuction with Renuvion of Abdomen, Axilla, Mons (Bilateral)    [facility]: [facility]    Surgeons: Dutch Mancera MD            Relevant Problems   Cardiac   (+) Benign essential hypertension   (+) Essential hypertension   (+) Hyperlipidemia   (+) Hypertensive disorder   (+) Pure hypercholesterolemia      Pulmonary   (+) Asthma ([facility])      Neuro   (+) Lumbar radiculopathy   (+) Major depressive disorder, single episode, unspecified      Endocrine   (+) Hyperthyroidism   (+) Other obesity      HEENT   (+) Acute sinusitis   (+) Seasonal allergies   (+) Sensorineural hearing loss (SNHL) of both ears   (+) Sensorineural hearing loss (SNHL) of right ear with restricted hearing of left ear      Skin   (+) Rash       Clinical information reviewed:   Tobacco  Allergies  Meds   Med Hx  Surg Hx  OB Status  Fam Hx  Soc   Hx        NPO Detail:  NPO/Void Status  Carbohydrate Drink Given Prior to Surgery? : N  Date of Last Liquid: 05/07/24  Time of Last Liquid: 0300  Date of Last Solid: 05/06/24  Time of Last Solid: 2000  Last Intake Type: Clear fluids  Time of Last Void: 0610         Physical Exam    Airway  Mallampati: II     Cardiovascular   Rhythm: regular  Rate: normal     Dental - normal exam     Pulmonary   Breath sounds clear to auscultation     Abdominal   Abdomen: soft             Anesthesia Plan    History of general anesthesia?: yes  History of complications of general anesthesia?: no    ASA 3     general     The patient is not a current smoker.    intravenous induction   Postoperative administration of opioids is intended.  Anesthetic plan and risks discussed with patient.    Plan discussed with CRNA, attending and CAA.

## 2024-05-07 NOTE — ANESTHESIA POSTPROCEDURE EVALUATION
Patient: Nevaeh Cuba    Procedure Summary       Date: 05/07/24 Room / Location: [facility] 05 / [facility]    Anesthesia Start: 0746 Anesthesia Stop: 1627    Procedures:       Mastopexy with Axillary Roll Resection (Bilateral)      Panniculectomy (Abdomen)      Vaser Liposuction with Renuvion of Abdomen, Axilla, Mons (Bilateral) Diagnosis:       Encounter for cosmetic surgery      (unacceptable cosmetic appearance)    Surgeons: Dutch Mancera MD Responsible Provider: Paul Ordaz DO    Anesthesia Type: general ASA Status: 3            Anesthesia Type: general    Vitals Value Taken Time   /98 05/07/24 1655   Temp 36.1 °C (97 °F) 05/07/24 1623   Pulse 84 05/07/24 1657   Resp 16 05/07/24 1657   SpO2 99 % 05/07/24 1657   Vitals shown include unfiled device data.    Anesthesia Post Evaluation    Patient location during evaluation: PACU  Patient participation: complete - patient participated  Level of consciousness: awake  Pain management: adequate  Multimodal analgesia pain management approach  Airway patency: patent  Cardiovascular status: acceptable and hemodynamically stable  Respiratory status: acceptable and spontaneous ventilation  Hydration status: euvolemic  Postoperative Nausea and Vomiting: none  Comments: No nausea or vomiting        No notable events documented.

## 2024-05-07 NOTE — OP NOTE
Mastopexy with Axillary Roll Resection (B), Panniculectomy, Vaser Liposuction with Renuvion of Abdomen, Axilla, Mons (B) Operative Note     Date: 2024  OR Location: [facility]    Name: Nevaeh Cuba : 1955, Age: 68 y.o., MRN: [id number], Sex: female    Diagnosis  Pre-op Diagnosis     * Encounter for cosmetic surgery [Z41.1] Post-op Diagnosis     * Encounter for cosmetic surgery [Z41.1]     Procedures  Panniculectomy    Mastopexy with Axillary Roll Resection  69251 - [place] MASTOPEXY    Vaser Liposuction with Renuvion of Abdomen, Axilla, Mons  78944 - [place] SUCTION ASSISTED LIPECTOMY TRUNK    Vaser and Renuvion abdomen flanks and mons axilla bilaterally  Bilateral David pattern mastopexy with cosmetic left reduction 120 g for symmetry  Bilateral axillary roll resection  Bilateral extended panniculectomy    Surgeons      * Dutch Mancera - Primary    Resident/Fellow/Other Assistant:  Surgeons and Role:  * No surgeons found with a matching role *    Procedure Summary  Anesthesia: General  ASA: III  Anesthesia Staff: Anesthesiologist: Paul Ordaz DO; Daniele Hawkins MD  CRNA: KEHINDE Mann-CRNA; SAHIL Simms  Estimated Blood Loss: 300mL  Intra-op Medications:   Administrations occurring from 0730 to 1630 on 24:   Medication Name Total Dose   lidocaine-epinephrine (Xylocaine W/EPI) 1 %-1:100,000 injection 10 mL   BUPivacaine-EPINEPHrine (Marcaine w/EPI) 0.25 %-1:200,000 injection 20 mL   BUPivacaine HCl (Marcaine) 0.5 % (5 mg/mL) injection 10 mL   BUPivacaine HCl (Marcaine) 0.5 % (5 mg/mL) 10 mL, EPINEPHrine (Adrenalin) 1 mg, tranexamic acid (Cyklokapron) 500 mg in lactated Ringer's 1,000 mL irrigation 1,000 mL   BUPivacaine HCl (Marcaine) 0.5 % (5 mg/mL) 10 mL, EPINEPHrine (Adrenalin) 1 mg, tranexamic acid (Cyklokapron) 500 mg in lactated Ringer's 1,000 mL irrigation 1,000 mL   ceFAZolin in dextrose (iso-os) (Ancef) IVPB 2 g 2 g              Anesthesia Record                Intraprocedure I/O Totals          Intake    Tranexamic Acid 0.00 mL    The total shown is the total volume documented since Anesthesia Start was filed.    LR infusion 3216.67 mL    vancomycin (Vancocin) 1,000 mg in sodium chloride 0.9% 250 mL .00 mL    Total Intake 3466.67 mL       Output    Urine 600 mL    Est. Blood Loss 100 mL    Total Output 700 mL       Net    Net Volume 2766.67 mL          Specimen: No specimens collected     Staff:   Circulator: Yrn Sánchez RN  Scrub Person: Bettina Freire RN         Drains and/or Catheters:   Closed/Suction Drain 1 Left LLQ Bulb (Active)       Closed/Suction Drain 3 Left LLQ Bulb (Active)       Closed/Suction Drain 2 Right RLQ Bulb (Active)       Closed/Suction Drain 4 Right RLQ Bulb (Active)       Urethral Catheter Non-latex 16 Fr. (Active)     IV fluid 3200 cc  Urine output 600 cc  Estimated blood loss 300 cc  Height 167.6 cm  Weight 90.7 kg  BMI 32.28  Renuvion 80% power 1.5 L/min all areas  Vaser 2 groove 40 V in zone 1 abdomen  Vaser 5 groove 70 V the all areas except zone 1 abdomen      Findings: Abdomen panniculectomy specimen weight 1326 g  Left breast reduction 120 g  Left axillary roll resection 50 g  Right axillary roll resection 24 g      Indications: Nevaeh Cuba is an 68 y.o. female who is having surgery for unacceptable cosmetic appearance.  The patient understood she has breast fold asymmetry and breast volume asymmetries or small corrective cosmetic left reduction and left would be indicated to harmonize the differences between her left and right breast.    The patient was seen in the preoperative area. The risks, benefits, complications, treatment options, non-operative alternatives, expected recovery and outcomes were discussed with the patient. The possibilities of reaction to medication, pulmonary aspiration, injury to surrounding structures, bleeding, recurrent infection, the need for additional procedures, failure to  diagnose a condition, and creating a complication requiring transfusion or operation were discussed with the patient. The patient concurred with the proposed plan, giving informed consent.  The site of surgery was properly noted/marked if necessary per policy. The patient has been actively warmed in preoperative area. Preoperative antibiotics have been ordered and given within 1 hours of incision. Venous thrombosis prophylaxis have been ordered including bilateral sequential compression devices      Procedure Details: The patient was identified in the holding area with her spouse present.  She was marked in the upright position circumferentially for flank, abdomen, mons, axilla Vaser and Renuvion.  The hip to hip incision was marked.  In the seated position she was marked for a Wise pattern breast lift noting that she does require a small reduction on the left.  The axillary roll resection sites were left for later marking intraoperatively.  Risks were reviewed.  She had a team huddle with Dr. Ordaz and team for procedural details.  She was then transported by Dr. Ordaz to the operating room and check-in was observed.  In the supine position Flowtron's were on and functioning.  General endotracheal anesthesia was established without difficulty.  Her face and eyes were protected.  A Baires catheter was placed from separate prep area.  With 5 assistants the surgeon and anesthesiologist she was logrolled prone onto awaiting chest rolls.  Axillary rolls were placed using 3 L bags of saline and pillowcases.  Her knees were flexed her face was protected her arms were extended and her arms were supported in her shoulders with axillary rolls.  She was prepped and draped in usual fashion in the prone position with Betasept from the gluteal fold to high thorax.  Timeout was observed for the entire procedure.  She was infiltrated with 6 cc of 1% lidocaine with epinephrine 1-100,000 mixed one-to-one with 0.5 Marcaine plain  vasoconstriction was awaited.    Surgical stab wounds were created in the sacral region, the mid thorax, and the lateral trochanteric region to be incorporated in the abdominoplasty extended panniculectomy incisions.  The stab sites were then tumesced identified right and left flank 350 cc per side using tumescent fluid.  Tumescent fluid throughout was 1000 cc LR containing 0.5% Marcaine plain 20 cc, 500 mg TXA, 1 amp 1-1000 adrenaline.  Vasoconstriction was awaited.  Using a 5 grooved probe in the flanks the 3 skin protection ports were placed and then Vaser energy was applied first on the right and then on the left.  The right flank was Vaser and with 5 groove 70 V 8 minutes from the back, 3 minutes from the flank, and 3 minutes from the sacral area skin protection port.  This was then Lipo aspirated and 600 cc was retrieved from the right flank of blood bloody fluid with 4.6 Ventex cannula.  Then Renuvion 80% power flow rate 1.5 L/min had 10 kJ administered.  Between between passes the tip was cleansed with toothbrush visual inspection with loupe magnification and irrigation with 22-gauge catheter.  The left flank was performed in a similar fashion 8 with Vaser first 8 minutes from the back, 3 minutes from the flank, 3 minutes from the sacrum at 70% V with a 5 grooved probe.  400 cc of Lipo aspirate was retrieved with a 4.6 Ventex cannula.  Pinch test, cannula test and visual inspection was utilized for assessing completeness of treatment.  Then Renuvion power 80% flow rate 1.5 L/min was administered a total of 10 kJ on the left flank.  The skin protection ports were removed.  The patient was rolled with sterile towel between passes to evacuate effluent fluid and residual gas.  Finally each site was lightly suctioned to retrieve any residual gas and fluid.  The skin incisions were closed with figure-of-eight 5-0 Monocryl and the sacrum and thorax.  The flank incisions were stapled and dressed with Tegaderm to be  reutilized and incorporated into the supine portion of the procedure.  The patient was logrolled supine onto an awaiting gurney with the same team.  She was placed back onto the operating room table.  Her knees were flexed on 2 pillows her heels were off the bed.  Her Baires and Flowtron were repositioned.  Her arms were extended on arm boards and secured with towels and tape.  Her face was protected.    She was prepped and draped in usual fashion from chin to high thighs with Betasept.  The balance of 20 cc of 1% lidocaine with epinephrine mixed one-to-one with 0.5 Marcaine plain was infiltrated into the lateral Wise pattern sites, the periumbilical region.  The midclavicular line apertures in the abdomen to be incorporated for the panniculectomy for use for Vaser Renuvion on the abdomen, and the mons area for Vaser and Renuvion and reutilization for drain exit sites.  Vasoconstriction was awaited.  Stab wounds were created with 15 blade knife and the mons abdomen and bilateral axilla.  The mons had tumescent 100 cc.  The abdomen had tumescent 1000 cc.  The left axilla had tumescent 100 cc.  The right axilla had tumescent 100 cc.  Vasoconstriction was awaited.  The mons had 70 V5 grooved treatment with Vaser for 1 minute.  This was Lipo aspirated 25 cc with a 4.6 Ventex cannula.Then Renuvion AP [id number] TP handpiece at 80% power flow rate 1.5 head 1 kJ administered to the mons onto the labia majora.  The left and right axilla were treated separately with 70 V energy 5 groove 3 minutes per side and aspirated 150 cc per side with 4.6 Ventex cannula.  Then each side was treated with 3 kJ per side of Renuvion power 80% flow rate 1.5 L/min.  As before care was taken to utilize the pinch test con, cannula test, and visual inspection.  The root Renuvion energy was carefully administered under direct vision and the tip was cleaned and intermittently with each pass with loupe magnification, toothbrush and irrigation 22-gauge  catheter.  Completed the Vaser Renuvion section of the procedure.    42 mm templates were placed on the nipple areolar complex on a slight stretch with the assistant.  The David pattern and nipple areolar complex were incised with 10 blade knife.  The skin domains to be incorporated into the closure were de-epithelialized left and right with curved Palomo scissors fine forceps.  Hemostasis was achieved using bipolar cautery, loupe magnification, and low current Bovie coagulation.  The skin flaps were developed for the Wise pattern on the right and then on the left with care taken to ligate small perforators with 3-0 Vicryl ties and tonsil clamps.  The circumferential breast dissection was carried out at the anatomic capsule of the breast level using Medina facelift retractors.  Once the skin flaps dissections were undertaken the right side was closed with a triple point 2 oh figure-of-eight Vicryl which was tied.  Surgical staples were used to coapt the skin edges.  With the left skin flaps elevated lateral specimen and apical specimen reduction was carried out removing a total of 120 g from the left breast.  The central pedicle was not dissected at all.  This specimen was weighed and discarded.  The triple point closure was with 2-0 Vicryl as before then surgical staples.  The patient was sat upright 80 degrees and paper templates were placed at the height of the breast in symmetric position.  Cookie cutter was used to dent the skin and an aperture was created with 10 blade knife.  A core of tissue was removed to mature the nipple areolar complex with figure-of-eight 3-0 Monocryl sutures at 12, 3, 9, and 8 o'clock positions intervening sutures of Monocryl buried 3 oh were placed to mature the nipple areolar complex.  All staples were sequentially removed as the deep closure of 3-0 Monocryl were placed.  Finally a running subcuticular 3-0 Monocryl completed both sides.  With the patient upright the axillary roll  access redundant skin not responsive to Vaser or Renuvion energy worse excised in a crescent with 10 blade and Bovie and closed with surgical staples then Vicryl's and subcuticular closure.  The small specimens were weighed and discarded.  She was cleansed of Betasept and blood and placed supine.  Care was turned to the abdomen the umbilical stalk was incised with 11 blade knife using single hooks at 12 and 6 o'clock positions.  The skin incisions were joined and then the stalk was dissected to the fascia with Metzenbaum scissors.  A red rubber catheter was sutured at the 12 o'clock position and 6 o'clock position with 3-0 nylon on a red rubber with the pointy portion towards the pubis.  This was later retrieved for the needle umbilical plasty.  The hip to hip incision was created with 10 blade knife and deepened with Bovie coagulation. Perforators were ligated with 3-0 Monocryl ties and tonsil clamps.  Once the umbilical stalk was reached the periumbilical vessels were ligated with Vicryl ties and tonsil clamps and the midline specimen was divided with 10 blade knife reflecting it to expose the fascia widely.  The cephalic dissection was continued to the costal margin in the central xiphoid.  This exposed the substantial diastases recti.  This was marked then plicated with 0 PDS sutures figure-of-eight and running 0 PDS sutures without strangulating the umbilicus stalk.  The 10 cm supraumbilical diastases was closed in this way.  The 13 cm diastases below the umbilicus was closed in this fashion.  A tap block using 20 cc of 0.25 Marcaine and 20-gauge spinal needle was placed without difficulty with good hemodynamics.  4 #10 DARLIN drains were brought out from the suprapubic stab sites and secured at the skin with 3-0 nylon.  The lateral drains were directed posteriorly around the flank region using a tonsil clamp to place the drain in the interstices of the Vaser dissection.  The central 2 drains were directed over the  mid rectus left and right.  The patient was sat upright 60 degrees and the skin amputation decision making was undertaken.  This was marked and incised with 10 blade knife.  The needle umbilical plasty was matured in a V shape with an apical small flap using a long Allis clamp to direct the red rubber catheter in good orientation.  This was retrieved through the aperture using [employer] retractors, headlight, and 2.5 loupe magnification and the long Allis clamp.  The needle umbilical plasty was matured at 12, 6, 3, and 9 o'clock position with 3 oh buried Monocryl sutures and then nylon and red rubber catheter retrieved.  Intervening sutures of 3-0 Monocryl were placed to mature the needle umbilical plasty followed by running subcuticular 3-0 Monocryl.  The hip to hip skin flap was advanced lateral to medial without dogears with staples.  Deep 0 Vicryl sutures were placed in Padmini's fascia from the flap to the inguinal region encompassing anterior bites of abdominal wall fascia without strangulating the drains and the sutures.  Once the deep layer Vicryl's were placed to bulb held suction.  Skin edge coaptation was undertaken with figure-of-eight 3-0 Monocryl and intermittent 3 oh buried Monocryl sutures all staples were removed and additional 3-0 Monocryl's were placed.  The skin was finally closed with running subcuticular 3-0 Monocryl.  She was cleansed of Betasept and blood and Exofin tapes were applied to the inframammary folds as well as hip to hip panniculectomy incisions.  Steritapes were applied to the nipple areolar complex which were pink and viable.  Sponge needle and instrument counts were correct on 2 occasions for each breast, and abdomen domain.  Skin specimens were discarded.  Blood loss was 300 cc.  Hemodynamics are stable.  She was placed onto an awaiting hospital bed in Ish's position with the surgical team.  She was awakened extubated and transported in satisfactory condition to  postanesthesia care unit.  The Baires catheter remain in situ for overnight observation and monitoring.    Complications:  None; patient tolerated the procedure well.    Disposition: PACU - hemodynamically stable.  Condition: stable         Additional Details: All specimens discarded      Dutch Mancera  Phone Number: [phone]

## 2024-05-07 NOTE — ANESTHESIA PROCEDURE NOTES
Airway  Date/Time: 5/7/2024 7:53 AM  Urgency: elective    Airway not difficult    Staffing  Performed: CRNA   Authorized by: Paul Ordaz DO    Performed by: KEHINDE Mann-JULIENNE  Patient location during procedure: OR    Indications and Patient Condition  Indications for airway management: anesthesia and airway protection  Spontaneous Ventilation: absent  Sedation level: deep  Preoxygenated: yes  Patient position: sniffing  MILS maintained throughout  Mask difficulty assessment: 1 - vent by mask  Planned trial extubation    Final Airway Details  Final airway type: endotracheal airway      Successful airway: ETT  Cuffed: yes   Successful intubation technique: direct laryngoscopy  Facilitating devices/methods: intubating stylet  Endotracheal tube insertion site: oral  Blade: Angel  Blade size: #3  ETT size (mm): 7.0  Cormack-Lehane Classification: grade I - full view of glottis  Placement verified by: chest auscultation, capnometry and palpation of cuff   Cuff volume (mL): 10  Measured from: teeth  ETT to teeth (cm): 20  Number of attempts at approach: 1  Ventilation between attempts: none  Number of other approaches attempted: 0    Additional Comments  No change to oral cavity/ dentition.

## 2024-05-08 VITALS
RESPIRATION RATE: 16 BRPM | HEIGHT: 66 IN | SYSTOLIC BLOOD PRESSURE: 136 MMHG | DIASTOLIC BLOOD PRESSURE: 77 MMHG | BODY MASS INDEX: 31.98 KG/M2 | HEART RATE: 88 BPM | TEMPERATURE: 99.1 F | WEIGHT: 199 LBS | OXYGEN SATURATION: 96 %

## 2024-05-08 PROCEDURE — 2500000004 HC RX 250 GENERAL PHARMACY W/ HCPCS (ALT 636 FOR OP/ED): Performed by: SPECIALIST

## 2024-05-08 PROCEDURE — 2500000001 HC RX 250 WO HCPCS SELF ADMINISTERED DRUGS (ALT 637 FOR MEDICARE OP): Performed by: SPECIALIST

## 2024-05-08 PROCEDURE — 7100000011 HC EXTENDED STAY RECOVERY HOURLY - NURSING UNIT

## 2024-05-08 RX ORDER — HYDROCODONE BITARTRATE AND ACETAMINOPHEN 5; 325 MG/1; MG/1
1 TABLET ORAL EVERY 4 HOURS PRN
Qty: 20 TABLET | Refills: 0 | Status: SHIPPED | OUTPATIENT
Start: 2024-05-08 | End: 2024-05-24 | Stop reason: HOSPADM

## 2024-05-08 RX ORDER — METHIMAZOLE 5 MG/1
5 TABLET ORAL DAILY
Status: CANCELLED | OUTPATIENT
Start: 2024-05-08

## 2024-05-08 RX ORDER — CEPHALEXIN 500 MG/1
500 CAPSULE ORAL
Status: DISCONTINUED | OUTPATIENT
Start: 2024-05-08 | End: 2024-05-08 | Stop reason: HOSPADM

## 2024-05-08 RX ORDER — CHOLECALCIFEROL (VITAMIN D3) 50 MCG
2000 TABLET ORAL DAILY
Status: CANCELLED | OUTPATIENT
Start: 2024-05-08

## 2024-05-08 RX ORDER — ONDANSETRON 4 MG/1
4 TABLET, ORALLY DISINTEGRATING ORAL EVERY 6 HOURS PRN
Status: DISCONTINUED | OUTPATIENT
Start: 2024-05-08 | End: 2024-05-08 | Stop reason: HOSPADM

## 2024-05-08 RX ORDER — DOCUSATE SODIUM 100 MG/1
100 CAPSULE, LIQUID FILLED ORAL DAILY PRN
Status: CANCELLED | OUTPATIENT
Start: 2024-05-08

## 2024-05-08 RX ORDER — ATORVASTATIN CALCIUM 20 MG/1
20 TABLET, FILM COATED ORAL DAILY
Status: CANCELLED | OUTPATIENT
Start: 2024-05-08

## 2024-05-08 RX ORDER — OXYCODONE AND ACETAMINOPHEN 5; 325 MG/1; MG/1
1 TABLET ORAL EVERY 6 HOURS PRN
Status: DISCONTINUED | OUTPATIENT
Start: 2024-05-08 | End: 2024-05-08 | Stop reason: HOSPADM

## 2024-05-08 RX ORDER — ENOXAPARIN SODIUM 100 MG/ML
40 INJECTION SUBCUTANEOUS EVERY 24 HOURS
Qty: 10 EACH | Refills: 0 | Status: ON HOLD | OUTPATIENT
Start: 2024-05-08 | End: 2024-05-24 | Stop reason: ALTCHOICE

## 2024-05-08 RX ORDER — OXCARBAZEPINE 150 MG/1
75 TABLET, FILM COATED ORAL EVERY OTHER DAY
Status: CANCELLED | OUTPATIENT
Start: 2024-05-08

## 2024-05-08 RX ORDER — TRIAMTERENE/HYDROCHLOROTHIAZID 37.5-25 MG
1 TABLET ORAL EVERY MORNING
Status: CANCELLED | OUTPATIENT
Start: 2024-05-08

## 2024-05-08 RX ORDER — CEPHALEXIN 500 MG/1
500 CAPSULE ORAL
Qty: 40 CAPSULE | Refills: 0 | Status: ON HOLD | OUTPATIENT
Start: 2024-05-08 | End: 2024-05-24 | Stop reason: ALTCHOICE

## 2024-05-08 RX ADMIN — SENNOSIDES 17.2 MG: 8.6 TABLET, FILM COATED ORAL at 08:29

## 2024-05-08 RX ADMIN — ACETAMINOPHEN 650 MG: 325 TABLET ORAL at 11:27

## 2024-05-08 RX ADMIN — ACETAMINOPHEN 650 MG: 325 TABLET ORAL at 06:29

## 2024-05-08 RX ADMIN — CEPHALEXIN 500 MG: 500 CAPSULE ORAL at 11:27

## 2024-05-08 RX ADMIN — SODIUM CHLORIDE, SODIUM LACTATE, POTASSIUM CHLORIDE, AND CALCIUM CHLORIDE 100 ML/HR: 600; 310; 30; 20 INJECTION, SOLUTION INTRAVENOUS at 03:53

## 2024-05-08 ASSESSMENT — COGNITIVE AND FUNCTIONAL STATUS - GENERAL
CLIMB 3 TO 5 STEPS WITH RAILING: A LOT
DRESSING REGULAR LOWER BODY CLOTHING: A LOT
MOVING TO AND FROM BED TO CHAIR: A LITTLE
STANDING UP FROM CHAIR USING ARMS: A LITTLE
DRESSING REGULAR UPPER BODY CLOTHING: A LITTLE
TURNING FROM BACK TO SIDE WHILE IN FLAT BAD: A LITTLE
DAILY ACTIVITIY SCORE: 18
MOBILITY SCORE: 17
MOVING FROM LYING ON BACK TO SITTING ON SIDE OF FLAT BED WITH BEDRAILS: A LITTLE
HELP NEEDED FOR BATHING: A LITTLE
PERSONAL GROOMING: A LITTLE
WALKING IN HOSPITAL ROOM: A LITTLE
TOILETING: A LITTLE

## 2024-05-08 ASSESSMENT — PAIN - FUNCTIONAL ASSESSMENT
PAIN_FUNCTIONAL_ASSESSMENT: 0-10
PAIN_FUNCTIONAL_ASSESSMENT: 0-10

## 2024-05-08 ASSESSMENT — PAIN SCALES - GENERAL
PAINLEVEL_OUTOF10: 0 - NO PAIN
PAINLEVEL_OUTOF10: 1
PAINLEVEL_OUTOF10: 3

## 2024-05-08 ASSESSMENT — PAIN DESCRIPTION - LOCATION: LOCATION: ABDOMEN

## 2024-05-08 NOTE — CARE PLAN
The patient's goals for the shift include  manage pain, ambulation, work with therapy   Problem: Pain  Goal: My pain/discomfort is manageable  Outcome: Progressing     Problem: Safety  Goal: Patient will be injury free during hospitalization  Outcome: Progressing  Goal: I will remain free of falls  Outcome: Progressing     Problem: Daily Care  Goal: Daily care needs are met  Outcome: Progressing     Problem: Psychosocial Needs  Goal: Demonstrates ability to cope with hospitalization/illness  Outcome: Progressing  Goal: Collaborate with me, my family, and caregiver to identify my specific goals  Outcome: Progressing     Problem: Discharge Barriers  Goal: My discharge needs are met  Outcome: Progressing     Problem: Fall/Injury  Goal: Not fall by end of shift  Outcome: Progressing  Goal: Be free from injury by end of the shift  Outcome: Progressing  Goal: Verbalize understanding of personal risk factors for fall in the hospital  Outcome: Progressing  Goal: Verbalize understanding of risk factor reduction measures to prevent injury from fall in the home  Outcome: Progressing  Goal: Use assistive devices by end of the shift  Outcome: Progressing  Goal: Pace activities to prevent fatigue by end of the shift  Outcome: Progressing       The clinical goals for the shift include manage pain, ambulate, monitor vitals, monitor I&O

## 2024-05-08 NOTE — NURSING NOTE
Patient assisted to a chair with one person assist. Patient tolerated well. Abdominal dressing reinforced with gauze. Small amount of drainage on left abdominal side and small active bleeding from drain 3 and 4, applied drain gauze. Call light within reach.

## 2024-05-08 NOTE — PROGRESS NOTES
05/08/24 1218   Discharge Planning   Living Arrangements Spouse/significant other   Support Systems Spouse/significant other   Assistance Needed independent   Type of Residence Private residence   Do you have animals or pets at home? No   Who is requesting discharge planning? Provider   Home or Post Acute Services None   Patient expects to be discharged to: Home no needs   Does the patient need discharge transport arranged? No   Patient Choice   Patient / Family choosing to utilize agency / facility established prior to hospitalization No     Home no needs.  ADOD:  today

## 2024-05-08 NOTE — DISCHARGE SUMMARY
Discharge Diagnosis  Encounter for cosmetic surgery    Issues Requiring Follow-Up  Patient will have DARLIN drain management as outpatient and understands after instruction by me and her nurse how to manage them.  She has managed her 's drains from his previous surgery with me and understands them.  All incisions were reviewed she will wear a sports bra.  She will refrain from using an abdominal binder until I directed her to use it.  Her discharge medications were reviewed she will resume all medications including Lovenox which she understands how to administer.    Test Results Pending At Discharge  Pending Labs       No current pending labs.            Hospital Course   Patient had uneventful hospital course night of surgery ambulated tolerated p.o.  Her Baires catheter was just charged this morning and she has been up to void and tolerated p.o.  She had instruction on drain management with her nurse aide and me.  Her  will pick her up later today follow-up by Friday with me.  She may have a massage in my practice if she contacts the office.    Pertinent Physical Exam At Time of Discharge  Physical Exam    Home Medications     Medication List      START taking these medications     cephalexin 500 mg capsule; Commonly known as: Keflex; Take 1 capsule   (500 mg) by mouth 4 times a day before meals.   enoxaparin 40 mg/0.4 mL syringe; Commonly known as: Lovenox; Inject 0.4   mL (40 mg) under the skin once every 24 hours.   HYDROcodone-acetaminophen 5-325 mg tablet; Commonly known as: Norco;   Take 1 tablet by mouth every 4 hours if needed for severe pain (7 - 10).     CONTINUE taking these medications     ALLEGRA ORAL   atorvastatin 20 mg tablet; Commonly known as: Lipitor; Take 1 tablet (20   mg) by mouth once daily.   cholecalciferol 25 MCG (1000 UT) tablet; Commonly known as: Vitamin D-3   docusate sodium 100 mg capsule; Commonly known as: Colace   methIMAzole 5 mg tablet; Commonly known as: Tapazole; Take  1 tablet (5   mg) by mouth once daily.   OXcarbazepine 150 mg tablet; Commonly known as: Trileptal   PreserVision AREDS 2 Plus  mcg-15 mcg- 5 mg-1 mg capsule; Generic   drug: mv-min-FA-vit K-lutein-zeaxant   triamterene-hydrochlorothiazid 37.5-25 mg tablet; Commonly known as:   Maxzide-25; Take 1 tablet by mouth once daily in the morning.       Outpatient Follow-Up  Future Appointments   Date Time Provider Department Center   6/21/2024 11:30 AM Conner Dwyer MD [id number] [facility]   7/3/2024 11:00 AM Matthew Marino MD [id number] [facility]   7/31/2024 11:20 AM Marlon Martins APRN-CNP [id number] [facility]   10/16/2024 11:30 AM KEHINDE Salgado-CNP [id number] [facility]   3/14/2025  1:00 PM Sergio Johnston MD [id number] [facility]       Dutch Mancera MD

## 2024-05-08 NOTE — CARE PLAN
The patient's goals for the shift include      The clinical goals for the shift include manage pain, ambulate, monitor vitals, monitor I&O      Problem: Pain  Goal: My pain/discomfort is manageable  Outcome: Progressing     Problem: Safety  Goal: Patient will be injury free during hospitalization  Outcome: Progressing  Goal: I will remain free of falls  Outcome: Progressing     Problem: Daily Care  Goal: Daily care needs are met  Outcome: Progressing     Problem: Psychosocial Needs  Goal: Demonstrates ability to cope with hospitalization/illness  Outcome: Progressing  Goal: Collaborate with me, my family, and caregiver to identify my specific goals  Outcome: Progressing     Problem: Fall/Injury  Goal: Not fall by end of shift  Outcome: Progressing  Goal: Be free from injury by end of the shift  Outcome: Progressing  Goal: Verbalize understanding of personal risk factors for fall in the hospital  Outcome: Progressing  Goal: Verbalize understanding of risk factor reduction measures to prevent injury from fall in the home  Outcome: Progressing  Goal: Use assistive devices by end of the shift  Outcome: Progressing  Goal: Pace activities to prevent fatigue by end of the shift  Outcome: Progressing

## 2024-05-24 ENCOUNTER — PHARMACY VISIT (OUTPATIENT)
Dept: PHARMACY | Facility: CLINIC | Age: 69
End: 2024-05-24
Payer: COMMERCIAL

## 2024-05-24 ENCOUNTER — ANESTHESIA EVENT (OUTPATIENT)
Dept: OPERATING ROOM | Facility: HOSPITAL | Age: 69
End: 2024-05-24
Payer: MEDICARE

## 2024-05-24 ENCOUNTER — ANESTHESIA (OUTPATIENT)
Dept: OPERATING ROOM | Facility: HOSPITAL | Age: 69
End: 2024-05-24
Payer: MEDICARE

## 2024-05-24 ENCOUNTER — HOSPITAL ENCOUNTER (OUTPATIENT)
Facility: HOSPITAL | Age: 69
Setting detail: OUTPATIENT SURGERY
Discharge: HOME | End: 2024-05-24
Attending: SPECIALIST | Admitting: SPECIALIST
Payer: MEDICARE

## 2024-05-24 VITALS
HEART RATE: 82 BPM | WEIGHT: 198 LBS | OXYGEN SATURATION: 99 % | TEMPERATURE: 98.8 F | DIASTOLIC BLOOD PRESSURE: 62 MMHG | RESPIRATION RATE: 14 BRPM | BODY MASS INDEX: 31.82 KG/M2 | SYSTOLIC BLOOD PRESSURE: 121 MMHG | HEIGHT: 66 IN

## 2024-05-24 DIAGNOSIS — T81.509A: Primary | ICD-10-CM

## 2024-05-24 PROCEDURE — 2500000005 HC RX 250 GENERAL PHARMACY W/O HCPCS: Performed by: SPECIALIST

## 2024-05-24 PROCEDURE — 7100000010 HC PHASE TWO TIME - EACH INCREMENTAL 1 MINUTE: Performed by: SPECIALIST

## 2024-05-24 PROCEDURE — 3700000002 HC GENERAL ANESTHESIA TIME - EACH INCREMENTAL 1 MINUTE: Performed by: SPECIALIST

## 2024-05-24 PROCEDURE — 2500000005 HC RX 250 GENERAL PHARMACY W/O HCPCS: Performed by: ANESTHESIOLOGIST ASSISTANT

## 2024-05-24 PROCEDURE — 7100000009 HC PHASE TWO TIME - INITIAL BASE CHARGE: Performed by: SPECIALIST

## 2024-05-24 PROCEDURE — 7100000001 HC RECOVERY ROOM TIME - INITIAL BASE CHARGE: Performed by: SPECIALIST

## 2024-05-24 PROCEDURE — 3600000003 HC OR TIME - INITIAL BASE CHARGE - PROCEDURE LEVEL THREE: Performed by: SPECIALIST

## 2024-05-24 PROCEDURE — 2720000007 HC OR 272 NO HCPCS: Performed by: SPECIALIST

## 2024-05-24 PROCEDURE — RXMED WILLOW AMBULATORY MEDICATION CHARGE

## 2024-05-24 PROCEDURE — 3700000001 HC GENERAL ANESTHESIA TIME - INITIAL BASE CHARGE: Performed by: SPECIALIST

## 2024-05-24 PROCEDURE — 3600000008 HC OR TIME - EACH INCREMENTAL 1 MINUTE - PROCEDURE LEVEL THREE: Performed by: SPECIALIST

## 2024-05-24 PROCEDURE — 7100000002 HC RECOVERY ROOM TIME - EACH INCREMENTAL 1 MINUTE: Performed by: SPECIALIST

## 2024-05-24 PROCEDURE — 2500000004 HC RX 250 GENERAL PHARMACY W/ HCPCS (ALT 636 FOR OP/ED): Performed by: ANESTHESIOLOGIST ASSISTANT

## 2024-05-24 RX ORDER — IBUPROFEN 200 MG
400 TABLET ORAL EVERY 6 HOURS PRN
COMMUNITY

## 2024-05-24 RX ORDER — PROPOFOL 10 MG/ML
INJECTION, EMULSION INTRAVENOUS AS NEEDED
Status: DISCONTINUED | OUTPATIENT
Start: 2024-05-24 | End: 2024-05-24

## 2024-05-24 RX ORDER — ACETAMINOPHEN 500 MG
500 TABLET ORAL EVERY 6 HOURS PRN
COMMUNITY
End: 2024-05-24 | Stop reason: HOSPADM

## 2024-05-24 RX ORDER — OXYCODONE HYDROCHLORIDE 5 MG/1
5 TABLET ORAL EVERY 6 HOURS PRN
Qty: 12 TABLET | Refills: 0 | Status: SHIPPED | OUTPATIENT
Start: 2024-05-24

## 2024-05-24 RX ORDER — BUPIVACAINE HYDROCHLORIDE 5 MG/ML
INJECTION, SOLUTION PERINEURAL AS NEEDED
Status: DISCONTINUED | OUTPATIENT
Start: 2024-05-24 | End: 2024-05-24 | Stop reason: HOSPADM

## 2024-05-24 RX ORDER — SODIUM CHLORIDE, SODIUM LACTATE, POTASSIUM CHLORIDE, CALCIUM CHLORIDE 600; 310; 30; 20 MG/100ML; MG/100ML; MG/100ML; MG/100ML
100 INJECTION, SOLUTION INTRAVENOUS CONTINUOUS
Status: DISCONTINUED | OUTPATIENT
Start: 2024-05-24 | End: 2024-05-24 | Stop reason: HOSPADM

## 2024-05-24 RX ORDER — CEFAZOLIN 1 G/1
INJECTION, POWDER, FOR SOLUTION INTRAVENOUS AS NEEDED
Status: DISCONTINUED | OUTPATIENT
Start: 2024-05-24 | End: 2024-05-24

## 2024-05-24 RX ORDER — ONDANSETRON 4 MG/1
4 TABLET, FILM COATED ORAL EVERY 6 HOURS PRN
Qty: 10 TABLET | Refills: 0 | Status: SHIPPED | OUTPATIENT
Start: 2024-05-24

## 2024-05-24 RX ORDER — MEPERIDINE HYDROCHLORIDE 25 MG/ML
12.5 INJECTION INTRAMUSCULAR; INTRAVENOUS; SUBCUTANEOUS EVERY 10 MIN PRN
Status: DISCONTINUED | OUTPATIENT
Start: 2024-05-24 | End: 2024-05-24 | Stop reason: HOSPADM

## 2024-05-24 RX ORDER — TRANEXAMIC ACID 100 MG/ML
INJECTION, SOLUTION INTRAVENOUS AS NEEDED
Status: DISCONTINUED | OUTPATIENT
Start: 2024-05-24 | End: 2024-05-24

## 2024-05-24 RX ORDER — TRAMADOL HYDROCHLORIDE 50 MG/1
50 TABLET ORAL EVERY 6 HOURS PRN
Qty: 12 TABLET | Refills: 0 | Status: SHIPPED | OUTPATIENT
Start: 2024-05-24

## 2024-05-24 RX ORDER — HYDRALAZINE HYDROCHLORIDE 20 MG/ML
5 INJECTION INTRAMUSCULAR; INTRAVENOUS EVERY 30 MIN PRN
Status: DISCONTINUED | OUTPATIENT
Start: 2024-05-24 | End: 2024-05-24 | Stop reason: HOSPADM

## 2024-05-24 RX ORDER — ONDANSETRON HYDROCHLORIDE 2 MG/ML
4 INJECTION, SOLUTION INTRAVENOUS ONCE AS NEEDED
Status: DISCONTINUED | OUTPATIENT
Start: 2024-05-24 | End: 2024-05-24 | Stop reason: HOSPADM

## 2024-05-24 RX ORDER — SODIUM CHLORIDE, SODIUM LACTATE, POTASSIUM CHLORIDE, CALCIUM CHLORIDE 600; 310; 30; 20 MG/100ML; MG/100ML; MG/100ML; MG/100ML
INJECTION, SOLUTION INTRAVENOUS CONTINUOUS PRN
Status: DISCONTINUED | OUTPATIENT
Start: 2024-05-24 | End: 2024-05-24

## 2024-05-24 RX ORDER — FENTANYL CITRATE 50 UG/ML
INJECTION, SOLUTION INTRAMUSCULAR; INTRAVENOUS AS NEEDED
Status: DISCONTINUED | OUTPATIENT
Start: 2024-05-24 | End: 2024-05-24

## 2024-05-24 RX ORDER — LIDOCAINE HYDROCHLORIDE AND EPINEPHRINE 10; 10 MG/ML; UG/ML
INJECTION, SOLUTION INFILTRATION; PERINEURAL AS NEEDED
Status: DISCONTINUED | OUTPATIENT
Start: 2024-05-24 | End: 2024-05-24 | Stop reason: HOSPADM

## 2024-05-24 RX ORDER — MIDAZOLAM HYDROCHLORIDE 1 MG/ML
INJECTION, SOLUTION INTRAMUSCULAR; INTRAVENOUS AS NEEDED
Status: DISCONTINUED | OUTPATIENT
Start: 2024-05-24 | End: 2024-05-24

## 2024-05-24 RX ORDER — FENTANYL CITRATE 50 UG/ML
50 INJECTION, SOLUTION INTRAMUSCULAR; INTRAVENOUS EVERY 5 MIN PRN
Status: DISCONTINUED | OUTPATIENT
Start: 2024-05-24 | End: 2024-05-24 | Stop reason: HOSPADM

## 2024-05-24 RX ORDER — LIDOCAINE HYDROCHLORIDE 10 MG/ML
INJECTION INFILTRATION; PERINEURAL AS NEEDED
Status: DISCONTINUED | OUTPATIENT
Start: 2024-05-24 | End: 2024-05-24

## 2024-05-24 RX ORDER — ALBUTEROL SULFATE 0.83 MG/ML
2.5 SOLUTION RESPIRATORY (INHALATION) ONCE AS NEEDED
Status: DISCONTINUED | OUTPATIENT
Start: 2024-05-24 | End: 2024-05-24 | Stop reason: HOSPADM

## 2024-05-24 RX ORDER — CEPHALEXIN 500 MG/1
500 CAPSULE ORAL 4 TIMES DAILY
Qty: 40 CAPSULE | Refills: 0 | Status: SHIPPED | OUTPATIENT
Start: 2024-05-24 | End: 2024-06-03

## 2024-05-24 RX ORDER — ACETAMINOPHEN 325 MG/1
650 TABLET ORAL EVERY 6 HOURS PRN
Qty: 20 TABLET | Refills: 0 | Status: SHIPPED | OUTPATIENT
Start: 2024-05-24

## 2024-05-24 RX ORDER — ONDANSETRON HYDROCHLORIDE 2 MG/ML
INJECTION, SOLUTION INTRAVENOUS AS NEEDED
Status: DISCONTINUED | OUTPATIENT
Start: 2024-05-24 | End: 2024-05-24

## 2024-05-24 RX ADMIN — MIDAZOLAM HYDROCHLORIDE 2 MG: 1 INJECTION, SOLUTION INTRAMUSCULAR; INTRAVENOUS at 15:27

## 2024-05-24 RX ADMIN — CEFAZOLIN 2 G: 330 INJECTION, POWDER, FOR SOLUTION INTRAMUSCULAR; INTRAVENOUS at 15:39

## 2024-05-24 RX ADMIN — PROPOFOL 40 MG: 10 INJECTION, EMULSION INTRAVENOUS at 15:55

## 2024-05-24 RX ADMIN — LIDOCAINE HYDROCHLORIDE 5 ML: 10 INJECTION, SOLUTION INFILTRATION; PERINEURAL at 15:34

## 2024-05-24 RX ADMIN — ONDANSETRON HYDROCHLORIDE 4 MG: 2 INJECTION INTRAMUSCULAR; INTRAVENOUS at 15:45

## 2024-05-24 RX ADMIN — PROPOFOL 160 MG: 10 INJECTION, EMULSION INTRAVENOUS at 15:34

## 2024-05-24 RX ADMIN — FENTANYL CITRATE 50 MCG: 0.05 INJECTION, SOLUTION INTRAMUSCULAR; INTRAVENOUS at 15:46

## 2024-05-24 RX ADMIN — SODIUM CHLORIDE, POTASSIUM CHLORIDE, SODIUM LACTATE AND CALCIUM CHLORIDE: 600; 310; 30; 20 INJECTION, SOLUTION INTRAVENOUS at 15:27

## 2024-05-24 RX ADMIN — TRANEXAMIC ACID 1000 MG: 100 INJECTION, SOLUTION INTRAVENOUS at 15:49

## 2024-05-24 RX ADMIN — FENTANYL CITRATE 50 MCG: 0.05 INJECTION, SOLUTION INTRAMUSCULAR; INTRAVENOUS at 15:34

## 2024-05-24 SDOH — HEALTH STABILITY: MENTAL HEALTH: CURRENT SMOKER: 0

## 2024-05-24 ASSESSMENT — PAIN SCALES - GENERAL
PAINLEVEL_OUTOF10: 0 - NO PAIN
PAINLEVEL_OUTOF10: 2
PAINLEVEL_OUTOF10: 2
PAIN_LEVEL: 2
PAINLEVEL_OUTOF10: 0 - NO PAIN
PAINLEVEL_OUTOF10: 1

## 2024-05-24 ASSESSMENT — COLUMBIA-SUICIDE SEVERITY RATING SCALE - C-SSRS
6. HAVE YOU EVER DONE ANYTHING, STARTED TO DO ANYTHING, OR PREPARED TO DO ANYTHING TO END YOUR LIFE?: NO
2. HAVE YOU ACTUALLY HAD ANY THOUGHTS OF KILLING YOURSELF?: NO
1. IN THE PAST MONTH, HAVE YOU WISHED YOU WERE DEAD OR WISHED YOU COULD GO TO SLEEP AND NOT WAKE UP?: NO

## 2024-05-24 ASSESSMENT — PAIN DESCRIPTION - DESCRIPTORS: DESCRIPTORS: DISCOMFORT

## 2024-05-24 NOTE — OP NOTE
Removal of retained drain abdomen (L) Operative Note     Date: 2024  OR Location: [facility]    Name: Nevaeh Cuba : 1955, Age: 69 y.o., MRN: [id number], Sex: female    Diagnosis  Pre-op Diagnosis     * Portion of Bobby Espinal drain retained after removal, initial encounter [T81.509A] Post-op Diagnosis     * Portion of Bobby Espinal drain retained after removal, initial encounter [T81.509A]     Procedures  Removal of retained drain abdomen   RMVL FOREIGN BODY MUSCLE/TENDON SHEATH DEEP/COMP      Surgeons      * Dutch Mancera - Primary    Resident/Fellow/Other Assistant:  Surgeons and Role:  * No surgeons found with a matching role *    Procedure Summary  Anesthesia: Consult  ASA: III  Anesthesia Staff: Anesthesiologist: Srini Ospina MD  C-AA: INDIA Guadarrama  Estimated Blood Loss: 10mL  Intra-op Medications: Administrations occurring from 1617 to 1747 on 24:  * No intraprocedure medications in log *           Anesthesia Record               Intraprocedure I/O Totals          Intake    Tranexamic Acid 0.00 mL    The total shown is the total volume documented since Anesthesia Start was filed.    LR infusion 700.00 mL    Total Intake 700 mL          Specimen: No specimens collected     Staff:   Scrub Person: Halina  Circulator: Ish         Drains and/or Catheters:   Closed/Suction Drain 1 Right LLQ Bulb (Active)       Closed/Suction Drain 3 Left LLQ Bulb (Active)       Closed/Suction Drain 2 Right RLQ Bulb (Active)       Closed/Suction Drain 4 Left RLQ Bulb (Active)       Closed/Suction Drain 1 LLQ (Active)       Findings: Retained Bobby-Espinal drain removed    Indications: Nevaeh Cuba is an 69 y.o. female who is having surgery for retained drain after removal.  Seen yesterday and drain removed partially,    The patient was seen in the preoperative area. The risks, benefits, complications, treatment options, non-operative alternatives, expected recovery and outcomes were  discussed with the patient. The possibilities of reaction to medication, pulmonary aspiration, injury to surrounding structures, bleeding, recurrent infection, the need for additional procedures, failure to diagnose a condition, and creating a complication requiring transfusion or operation were discussed with the patient. The patient concurred with the proposed plan, giving informed consent.  The site of surgery was properly noted/marked if necessary per policy. The patient has been actively warmed in preoperative area. Preoperative antibiotics have been ordered and given within 1 hours of incision. Venous thrombosis prophylaxis have been ordered including bilateral sequential compression devices    Procedure Details: The patient was identified in the holding area and the left inguinal zone marked from yesterday's office encounter in which the drain tubing fractured subcutaneously leaving the Bobby-Espinal drain remnant in situ.  The team huddle was performed.  She was taken to the operating room by Dr. Wilson and team.  Check-in was observed.  In the supine position Flowtron's were on and functioning prior to the induction of anesthesia.  Antibiotic and TXA were administered on-call to the OR.  She was prepped and draped in usual fashion with Betasept.  Timeout was observed.  She was infiltrated in the left inguinal zone with 20 cc of 1% lidocaine with epinephrine mixed one-to-one with 0.5 Marcaine plain.  Vasoconstriction was awaited.  The left inguinal region was incised a few centimeters with 10 blade knife then deepened and the incision extended laterally.  The DARLIN drain was encountered subcutaneously and retrieved without restriction.  There was no tether point per se identified.  The drain was not sewn in by deep suture action.  The space was suctioned and irrigated with inspection of loupe magnification and surgical headlight.  A 10 French drain was placed in the subcutaneous space and directed laterally  with sponge on a stick device.  This is secured from a separate stab site in the incision with 3-0 nylon and held bulb suction at the end the procedure.  Deep subcu figure-of-eight 0 Vicryl sutures were placed without strangulation of the new drain.  The deep dermis was closed with figure-of-eight 3-0 Monocryl and intervening interrupted buried 3-0 Monocryl followed by running subcuticular 3-0 Monocryl.  The patient was cleansed of Betasept and blood and Steri-Strip.  Cut Kerlix gauze pad was placed followed by abdominal binder.  [place]  She was transported in satisfactory condition the postanesthesia care unit.  Blood loss was 20 cc.  No specimens.  Complications:  None; patient tolerated the procedure well.    Disposition: PACU - hemodynamically stable.  Condition: stable             Attending Attestation: I performed the procedure.    Dutch Mancera  Phone Number: [phone]

## 2024-05-24 NOTE — ANESTHESIA POSTPROCEDURE EVALUATION
Patient: Nevaeh Cuba    Procedure Summary       Date: 05/24/24 Room / Location: [facility] / [facility]    Anesthesia Start: 1528 Anesthesia Stop: 1625    Procedure: Removal of retained drain abdomen (Left) Diagnosis:       Portion of Bobby Espinal drain retained after removal, initial encounter      (retained drain after removal)    Surgeons: Dutch Mancera MD Responsible Provider: Srini Ospina MD    Anesthesia Type: general ASA Status: 3            Anesthesia Type: general    Vitals Value Taken Time   /76 05/24/24 1656   Temp 37.1 °C (98.8 °F) 05/24/24 1650   Pulse 78 05/24/24 1656   Resp 29 05/24/24 1656   SpO2 96 % 05/24/24 1656   Vitals shown include unfiled device data.    Anesthesia Post Evaluation    Patient location during evaluation: PACU  Patient participation: complete - patient participated  Level of consciousness: awake and alert  Pain score: 2  Pain management: adequate  Multimodal analgesia pain management approach  Airway patency: patent  Two or more strategies used to mitigate risk of obstructive sleep apnea  Cardiovascular status: acceptable and blood pressure returned to baseline  Respiratory status: acceptable  Hydration status: acceptable  Postoperative Nausea and Vomiting: none        There were no known notable events for this encounter.

## 2024-05-24 NOTE — ANESTHESIA PREPROCEDURE EVALUATION
Patient: Nevaeh Cuba    Procedure Information       Date/Time: 05/24/24 1617    Procedure: Removal of retained drain abdomen (Left)    Location: [facility] / [facility]    Surgeons: Dutch Mancera MD            Relevant Problems   Cardiac   (+) Benign essential hypertension   (+) Essential hypertension   (+) Hyperlipidemia   (+) Hypertensive disorder   (+) Pure hypercholesterolemia      Pulmonary   (+) Asthma (HHS-HCC)      Neuro   (+) Lumbar radiculopathy   (+) Major depressive disorder, single episode, unspecified      Endocrine   (+) Hyperthyroidism   (+) Other obesity      HEENT   (+) Acute sinusitis   (+) Seasonal allergies   (+) Sensorineural hearing loss (SNHL) of both ears   (+) Sensorineural hearing loss (SNHL) of right ear with restricted hearing of left ear      Skin   (+) Rash     Past Surgical History:   Procedure Laterality Date    HYSTERECTOMY  04/28/2016    Hysterectomy    JOINT REPLACEMENT      rt ring finger    JOINT REPLACEMENT Right 05/2022    ring finger    OTHER SURGICAL HISTORY  04/28/2016    Musculoskeletal Procedures Injection Carpal Tunnel    OTHER SURGICAL HISTORY  04/28/2016    Arthrodesis MCP Joint    OTHER SURGICAL HISTORY  01/11/2021    Ankle surgery    OTHER SURGICAL HISTORY  01/11/2021    Carpal tunnel surgery    OTHER SURGICAL HISTORY  07/06/2022    Nasal septoplasty    OTHER SURGICAL HISTORY  07/2021    heart shock catherization    SHOULDER SURGERY  08/2021    total left shoulder replacement    TRIGGER FINGER RELEASE Right 01/2024    middle finger       Clinical information reviewed:   Tobacco  Allergies  Meds   Med Hx  Surg Hx  OB Status  Fam Hx  Soc   Hx        NPO Detail:  NPO/Void Status  Carbohydrate Drink Given Prior to Surgery? : N  Date of Last Liquid: 05/24/24  Time of Last Liquid: 1000 (10 oz of gatorade)  Date of Last Solid: 05/23/24  Time of Last Solid: 1900  Last Intake Type: Clear fluids  Time of Last Void: 1401         Physical Exam    Airway  Mallampati:  II  TM distance: >3 FB  Neck ROM: full     Cardiovascular   Comments: deferred   Dental    Pulmonary   Comments: deferred   Abdominal     Comments: deferred           Anesthesia Plan    History of general anesthesia?: yes  History of complications of general anesthesia?: no    ASA 3     general     The patient is not a current smoker.    intravenous induction   Postoperative administration of opioids is intended.  Anesthetic plan and risks discussed with patient.    Plan discussed with CAA.

## 2024-05-24 NOTE — ANESTHESIA PROCEDURE NOTES
Airway  Date/Time: 5/24/2024 3:36 PM  Urgency: elective    Airway not difficult    Staffing  Performed: CAA   Authorized by: INDIA Guadarrama    Performed by: INDIA Guadarrama  Patient location during procedure: OR    Indications and Patient Condition  Indications for airway management: anesthesia  Spontaneous ventilation: present  Sedation level: deep  Preoxygenated: yes  Patient position: sniffing  MILS not maintained throughout  Mask difficulty assessment: 0 - not attempted    Final Airway Details  Final airway type: supraglottic airway      Successful airway: classic  Size 4     Number of attempts at approach: 1  Number of other approaches attempted: 0

## 2024-05-24 NOTE — POST-PROCEDURE NOTE
1658- pt brought back from pacu,verbal report received. Pt is alert and awake.  at bedside. Snack and drink given. Rx to go called.     1725- vss. Discharge instructions given and explained to pt and .  Both verbally understood. Pt tolerating snack and drink.     1732- pt getting dressed at this time with assistance of .    1743- transport at bedside.

## 2024-06-21 ENCOUNTER — APPOINTMENT (OUTPATIENT)
Dept: OTOLARYNGOLOGY | Facility: CLINIC | Age: 69
End: 2024-06-21
Payer: MEDICARE

## 2024-06-21 RX ORDER — LOSARTAN POTASSIUM 25 MG/1
TABLET ORAL EVERY 24 HOURS
COMMUNITY
Start: 2023-03-01 | End: 2024-06-25 | Stop reason: ALTCHOICE

## 2024-06-25 ENCOUNTER — LAB (OUTPATIENT)
Dept: LAB | Facility: LAB | Age: 69
End: 2024-06-25
Payer: MEDICARE

## 2024-06-25 ENCOUNTER — OFFICE VISIT (OUTPATIENT)
Dept: PRIMARY CARE | Facility: CLINIC | Age: 69
End: 2024-06-25
Payer: MEDICARE

## 2024-06-25 VITALS
DIASTOLIC BLOOD PRESSURE: 86 MMHG | OXYGEN SATURATION: 95 % | HEIGHT: 66 IN | HEART RATE: 72 BPM | SYSTOLIC BLOOD PRESSURE: 136 MMHG | BODY MASS INDEX: 30.22 KG/M2 | TEMPERATURE: 97.2 F | WEIGHT: 188 LBS

## 2024-06-25 DIAGNOSIS — Z01.89 ENCOUNTER FOR ROUTINE LABORATORY TESTING: ICD-10-CM

## 2024-06-25 DIAGNOSIS — R73.01 IMPAIRED FASTING GLUCOSE: ICD-10-CM

## 2024-06-25 DIAGNOSIS — L03.116 CELLULITIS OF LEFT LOWER EXTREMITY: Primary | ICD-10-CM

## 2024-06-25 DIAGNOSIS — L03.116 CELLULITIS OF LEFT LOWER EXTREMITY: ICD-10-CM

## 2024-06-25 LAB
ALBUMIN SERPL-MCNC: 4.1 G/DL (ref 3.5–5)
ALP BLD-CCNC: 85 U/L (ref 35–125)
ALT SERPL-CCNC: 12 U/L (ref 5–40)
ANION GAP SERPL CALC-SCNC: 10 MMOL/L
AST SERPL-CCNC: 13 U/L (ref 5–40)
BASOPHILS # BLD AUTO: 0.08 X10*3/UL (ref 0–0.1)
BASOPHILS NFR BLD AUTO: 1.2 %
BILIRUB SERPL-MCNC: 0.3 MG/DL (ref 0.1–1.2)
BUN SERPL-MCNC: 15 MG/DL (ref 8–25)
CALCIUM SERPL-MCNC: 9.5 MG/DL (ref 8.5–10.4)
CHLORIDE SERPL-SCNC: 102 MMOL/L (ref 97–107)
CO2 SERPL-SCNC: 27 MMOL/L (ref 24–31)
CREAT SERPL-MCNC: 0.8 MG/DL (ref 0.4–1.6)
EGFRCR SERPLBLD CKD-EPI 2021: 80 ML/MIN/1.73M*2
EOSINOPHIL # BLD AUTO: 0.35 X10*3/UL (ref 0–0.7)
EOSINOPHIL NFR BLD AUTO: 5.2 %
ERYTHROCYTE [DISTWIDTH] IN BLOOD BY AUTOMATED COUNT: 13.2 % (ref 11.5–14.5)
EST. AVERAGE GLUCOSE BLD GHB EST-MCNC: 111 MG/DL
GLUCOSE SERPL-MCNC: 99 MG/DL (ref 65–99)
HBA1C MFR BLD: 5.5 %
HCT VFR BLD AUTO: 43 % (ref 36–46)
HGB BLD-MCNC: 13.7 G/DL (ref 12–16)
IMM GRANULOCYTES # BLD AUTO: 0.01 X10*3/UL (ref 0–0.7)
IMM GRANULOCYTES NFR BLD AUTO: 0.1 % (ref 0–0.9)
LYMPHOCYTES # BLD AUTO: 2.22 X10*3/UL (ref 1.2–4.8)
LYMPHOCYTES NFR BLD AUTO: 33 %
MCH RBC QN AUTO: 29.5 PG (ref 26–34)
MCHC RBC AUTO-ENTMCNC: 31.9 G/DL (ref 32–36)
MCV RBC AUTO: 93 FL (ref 80–100)
MONOCYTES # BLD AUTO: 0.63 X10*3/UL (ref 0.1–1)
MONOCYTES NFR BLD AUTO: 9.4 %
NEUTROPHILS # BLD AUTO: 3.44 X10*3/UL (ref 1.2–7.7)
NEUTROPHILS NFR BLD AUTO: 51.1 %
NRBC BLD-RTO: 0 /100 WBCS (ref 0–0)
PLATELET # BLD AUTO: 278 X10*3/UL (ref 150–450)
POTASSIUM SERPL-SCNC: 4.1 MMOL/L (ref 3.4–5.1)
PROT SERPL-MCNC: 7 G/DL (ref 5.9–7.9)
RBC # BLD AUTO: 4.64 X10*6/UL (ref 4–5.2)
SODIUM SERPL-SCNC: 139 MMOL/L (ref 133–145)
WBC # BLD AUTO: 6.7 X10*3/UL (ref 4.4–11.3)

## 2024-06-25 PROCEDURE — 3075F SYST BP GE 130 - 139MM HG: CPT | Performed by: NURSE PRACTITIONER

## 2024-06-25 PROCEDURE — 99214 OFFICE O/P EST MOD 30 MIN: CPT | Performed by: NURSE PRACTITIONER

## 2024-06-25 PROCEDURE — 1036F TOBACCO NON-USER: CPT | Performed by: NURSE PRACTITIONER

## 2024-06-25 PROCEDURE — 85025 COMPLETE CBC W/AUTO DIFF WBC: CPT

## 2024-06-25 PROCEDURE — 83036 HEMOGLOBIN GLYCOSYLATED A1C: CPT

## 2024-06-25 PROCEDURE — 36415 COLL VENOUS BLD VENIPUNCTURE: CPT

## 2024-06-25 PROCEDURE — 1160F RVW MEDS BY RX/DR IN RCRD: CPT | Performed by: NURSE PRACTITIONER

## 2024-06-25 PROCEDURE — 1159F MED LIST DOCD IN RCRD: CPT | Performed by: NURSE PRACTITIONER

## 2024-06-25 PROCEDURE — 1126F AMNT PAIN NOTED NONE PRSNT: CPT | Performed by: NURSE PRACTITIONER

## 2024-06-25 PROCEDURE — 80053 COMPREHEN METABOLIC PANEL: CPT

## 2024-06-25 PROCEDURE — 3079F DIAST BP 80-89 MM HG: CPT | Performed by: NURSE PRACTITIONER

## 2024-06-25 ASSESSMENT — LIFESTYLE VARIABLES
SKIP TO QUESTIONS 9-10: 1
HOW OFTEN DO YOU HAVE A DRINK CONTAINING ALCOHOL: NEVER
HOW OFTEN DURING THE LAST YEAR HAVE YOU HAD A FEELING OF GUILT OR REMORSE AFTER DRINKING: NEVER
HAVE YOU OR SOMEONE ELSE BEEN INJURED AS A RESULT OF YOUR DRINKING: NO
HAS A RELATIVE, FRIEND, DOCTOR, OR ANOTHER HEALTH PROFESSIONAL EXPRESSED CONCERN ABOUT YOUR DRINKING OR SUGGESTED YOU CUT DOWN: NO
HOW OFTEN DURING THE LAST YEAR HAVE YOU NEEDED AN ALCOHOLIC DRINK FIRST THING IN THE MORNING TO GET YOURSELF GOING AFTER A NIGHT OF HEAVY DRINKING: NEVER
AUDIT-C TOTAL SCORE: 0
HOW OFTEN DURING THE LAST YEAR HAVE YOU BEEN UNABLE TO REMEMBER WHAT HAPPENED THE NIGHT BEFORE BECAUSE YOU HAD BEEN DRINKING: NEVER
HOW MANY STANDARD DRINKS CONTAINING ALCOHOL DO YOU HAVE ON A TYPICAL DAY: PATIENT DOES NOT DRINK
HOW OFTEN DO YOU HAVE SIX OR MORE DRINKS ON ONE OCCASION: NEVER
AUDIT TOTAL SCORE: 0
HOW OFTEN DURING THE LAST YEAR HAVE YOU FOUND THAT YOU WERE NOT ABLE TO STOP DRINKING ONCE YOU HAD STARTED: NEVER
HOW OFTEN DURING THE LAST YEAR HAVE YOU FAILED TO DO WHAT WAS NORMALLY EXPECTED FROM YOU BECAUSE OF DRINKING: NEVER

## 2024-06-25 ASSESSMENT — PAIN SCALES - GENERAL: PAINLEVEL: 0-NO PAIN

## 2024-06-25 ASSESSMENT — ENCOUNTER SYMPTOMS
DEPRESSION: 0
FEVER: 0
OCCASIONAL FEELINGS OF UNSTEADINESS: 0
VOMITING: 0
LOSS OF SENSATION IN FEET: 0
FATIGUE: 0
CHILLS: 0
NAUSEA: 0
DIAPHORESIS: 0
PALPITATIONS: 0

## 2024-06-25 ASSESSMENT — PATIENT HEALTH QUESTIONNAIRE - PHQ9
2. FEELING DOWN, DEPRESSED OR HOPELESS: NOT AT ALL
SUM OF ALL RESPONSES TO PHQ9 QUESTIONS 1 AND 2: 0
1. LITTLE INTEREST OR PLEASURE IN DOING THINGS: NOT AT ALL

## 2024-06-25 NOTE — PROGRESS NOTES
[facility]: [facility]  PROGRESS NOTE      Nevaeh Cuba is a 69 y.o. female that is presenting today for cellulitis isolated left leg with ankle and foot edema. This was treated by Plastic Surgeon, Dr. Mancera with Keflex on 06/11/24. He advised patient to follow up with PCP to investigate underlying causes.  She reports she just completed day 14 of Keflex 4x/day.  She was seen every other day by Dr. Mancera and both noted improvement in symptoms. She has been applying topical Cerave to relieve the itching. She has follow up with Dr Mancera 07/8/24.     Ms. Cuba had mastopexy with axillary roll resection, panniculectomy and vaser liposuction with renuvion of abdomen, axilla, mons on 05/07/24.      Assessment/Plan   Diagnoses and all orders for this visit:    Pre-existing skin conditions, skin infection, weak immune system due to DM, leukemia, liver disease, hepatitis. Steroids can also weaken the immune system - patient is not taking them at this time.  These all can contribute to susceptibility to cellulitis.  Obtain labs to evaluate, continue established follow up with Dr. Mancera and refer to Infectious Disease Specialist, Dr. Diamond, if no improvement or worsening.    Cellulitis of left lower extremity  -     CBC and Auto Differential; Future  -     Comprehensive Metabolic Panel; Future  -     Referral to Infectious Disease; Future    Encounter for routine laboratory testing  -     CBC and Auto Differential; Future  -     Comprehensive Metabolic Panel; Future  -     Hemoglobin A1C; Future    Impaired fasting glucose  -     Hemoglobin A1C; Future    Subjective   HPI  Review of Systems   Constitutional:  Negative for chills, diaphoresis, fatigue and fever.   Cardiovascular:  Positive for leg swelling. Negative for chest pain and palpitations.   Gastrointestinal:  Negative for nausea and vomiting.   Skin:  Negative for rash.      Objective   Vitals:    06/25/24 0900   BP: 136/86   Pulse: 72  "  Temp: 36.2 °C (97.2 °F)   SpO2: 95%      Body mass index is 30.34 kg/m².  Physical Exam  Vitals and nursing note reviewed.   Constitutional:       General: She is not in acute distress.  Cardiovascular:      Rate and Rhythm: Normal rate and regular rhythm.      Heart sounds: Normal heart sounds.   Pulmonary:      Effort: Pulmonary effort is normal.      Breath sounds: Normal breath sounds.   Musculoskeletal:      Left lower leg: Edema (1+ pretibial, ankle and pedal) present.   Skin:     General: Skin is warm and dry.      Findings: Erythema (LLE pretibial, ankle and pedal with warmth, mild tenderness and erytheam surrounding extremity - no active drainage or bleeding) present. No rash.   Neurological:      Mental Status: She is alert. Mental status is at baseline.   Psychiatric:         Mood and Affect: Mood normal.       Diagnostic Results   Lab Results   Component Value Date    GLUCOSE 95 04/12/2024    CALCIUM 9.1 04/12/2024     04/12/2024    K 3.6 04/12/2024    CO2 26 04/12/2024     04/12/2024    BUN 15 04/12/2024    CREATININE 1.00 04/12/2024     Lab Results   Component Value Date    ALT 20 04/12/2024    AST 17 04/12/2024    ALKPHOS 80 04/12/2024    BILITOT 0.5 04/12/2024     Lab Results   Component Value Date    WBC 6.9 04/12/2024    HGB 15.3 04/12/2024    HCT 45.7 04/12/2024    MCV 92 04/12/2024     04/12/2024     Lab Results   Component Value Date    CHOL 224 (H) 04/12/2024    CHOL 194 10/06/2023    CHOL 210 (H) 03/14/2023     Lab Results   Component Value Date    HDL 58.0 04/12/2024    HDL 58.0 10/06/2023    HDL 61 03/14/2023     Lab Results   Component Value Date    LDLCALC 102 04/12/2024    LDLCALC 88 10/06/2023    LDLCALC 117 03/14/2023     Lab Results   Component Value Date    TRIG 318 (H) 04/12/2024    TRIG 240 (H) 10/06/2023    TRIG 158 (H) 03/14/2023     No components found for: \"CHOLHDL\"  Lab Results   Component Value Date    HGBA1C 5.8 (H) 04/12/2024     Other labs not included " in the list above were reviewed either before or during this encounter.    History    Past Medical History:   Diagnosis Date    Benign essential hypertension     Estrogen deficiency 11/15/2023    DEXA 1/24 back nl, hip neck T-2.1 recheck 1/26    History of atrial fibrillation     Hyperactive thyroid Dr.Burtch Rand [place] NSR no AC    History of Meniere's syndrome 11/15/2023    Hyperthyroidism 09/15/2023    Other specified abnormal findings of blood chemistry     High serum cholesterol sulfate    Personal history of malignant neoplasm, unspecified     History of malignant neoplasm    Unspecified osteoarthritis, unspecified site 04/28/2016    Arthritis     Past Surgical History:   Procedure Laterality Date    HYSTERECTOMY  04/28/2016    Hysterectomy    JOINT REPLACEMENT      rt ring finger    JOINT REPLACEMENT Right 05/2022    ring finger    OTHER SURGICAL HISTORY  04/28/2016    Musculoskeletal Procedures Injection Carpal Tunnel    OTHER SURGICAL HISTORY  04/28/2016    Arthrodesis MCP Joint    OTHER SURGICAL HISTORY  01/11/2021    Ankle surgery    OTHER SURGICAL HISTORY  01/11/2021    Carpal tunnel surgery    OTHER SURGICAL HISTORY  07/06/2022    Nasal septoplasty    OTHER SURGICAL HISTORY  07/2021    heart shock catherization    SHOULDER SURGERY  08/2021    total left shoulder replacement    TRIGGER FINGER RELEASE Right 01/2024    middle finger     Family History   Problem Relation Name Age of Onset    Stroke Mother      Other (cyst on breast) Mother      Atrial fibrillation Father      Stroke Father      Heart disease Father       Social History     Socioeconomic History    Marital status:      Spouse name: Not on file    Number of children: Not on file    Years of education: Not on file    Highest education level: Not on file   Occupational History    Not on file   Tobacco Use    Smoking status: Never     Passive exposure: Never    Smokeless tobacco: Never   Vaping Use    Vaping status: Never Used    Substance and Sexual Activity    Alcohol use: Never    Drug use: Never    Sexual activity: Defer   Other Topics Concern    Not on file   Social History Narrative    Not on file     Social Determinants of Health     Financial Resource Strain: Low Risk  (5/7/2024)    Overall Financial Resource Strain (CARDIA)     Difficulty of Paying Living Expenses: Not very hard   Food Insecurity: Not on file   Transportation Needs: No Transportation Needs (5/7/2024)    PRAPARE - Transportation     Lack of Transportation (Medical): No     Lack of Transportation (Non-Medical): No   Physical Activity: Not on file   Stress: Not on file   Social Connections: Not on file   Intimate Partner Violence: Not on file   Housing Stability: Low Risk  (5/7/2024)    Housing Stability Vital Sign     Unable to Pay for Housing in the Last Year: No     Number of Places Lived in the Last Year: 1     Unstable Housing in the Last Year: No     Allergies   Allergen Reactions    Latex Itching and Rash    Methotrexate Itching     HEAD BURNING AND ITCHING    Metoprolol Rash    Diltiazem Hcl Itching     Rash face     Latex, Natural Rubber Itching    Pollen Extracts Other     NASAL CONGESTION    Adhesive Tape-Silicones Rash    Folic Acid Rash    Sulfamethoxazole-Trimethoprim Rash     Tinnitus      Current Outpatient Medications on File Prior to Visit   Medication Sig Dispense Refill    acetaminophen (Tylenol) 325 mg tablet Take 2 tablets (650 mg) by mouth every 6 hours if needed for mild pain (1 - 3) 20 tablet 0    atorvastatin (Lipitor) 20 mg tablet Take 1 tablet (20 mg) by mouth once daily. 90 tablet 2    cholecalciferol (Vitamin D-3) 25 MCG (1000 UT) tablet Take 2 tablets (2,000 Units) by mouth. As directed      docusate sodium (Colace) 100 mg capsule Take 1 capsule (100 mg) by mouth once daily as needed.      fexofenadine HCl (ALLEGRA ORAL) Take 1 tablet by mouth once daily as needed (ALLERGIES).      ibuprofen 200 mg tablet Take 2 tablets (400 mg) by  [Normal Sclera/Conjunctiva] : normal sclera/conjunctiva mouth every 6 hours if needed for mild pain (1 - 3).      methIMAzole (Tapazole) 5 mg tablet Take 1 tablet (5 mg) by mouth once daily. 90 tablet 2    mv-min-FA-vit K-lutein-zeaxant (PreserVision AREDS 2 Plus MV) 200 mcg-15 mcg- 5 mg-1 mg capsule Take 1 capsule by mouth once daily.      OXcarbazepine (Trileptal) 150 mg tablet Take 0.5 tablets (75 mg) by mouth every other day. 1/2 tab daily, weaning off of it      triamterene-hydrochlorothiazid (Maxzide-25) 37.5-25 mg tablet Take 1 tablet by mouth once daily in the morning. 90 tablet 3    [DISCONTINUED] losartan (Cozaar) 25 mg tablet Take by mouth once every 24 hours.      ondansetron (Zofran) 4 mg tablet Take 1 tablet (4 mg) by mouth every 6 hours if needed for nausea (Patient not taking: Reported on 6/25/2024) 10 tablet 0    oxyCODONE (Roxicodone) 5 mg immediate release tablet Take 1 tablet (5 mg) by mouth every 6 hours if needed for severe pain (7 - 10) for up to 12 doses. (Patient not taking: Reported on 6/25/2024) 12 tablet 0    traMADol (Ultram) 50 mg tablet Take 1 tablet (50 mg) by mouth every 6 hours if needed for severe pain (7-10) (Patient not taking: Reported on 6/25/2024) 12 tablet 0     No current facility-administered medications on file prior to visit.     Immunization History   Administered Date(s) Administered    Flu vaccine, quadrivalent, high-dose, preservative free, age 65y+ (FLUZONE) 09/14/2023    Influenza, Seasonal, Quadrivalent, Adjuvanted 09/30/2021, 09/08/2022    Influenza, Unspecified 09/07/2010, 10/17/2011    Influenza, injectable, MDCK, quadrivalent 10/23/2017, 10/30/2018    Influenza, injectable, quadrivalent 09/23/2019, 09/11/2020    Influenza, seasonal, injectable 09/24/2012, 10/17/2013, 10/23/2014, 10/26/2015, 11/17/2016    Moderna COVID-19 vaccine, Fall 2023, 12 yeasrs and older (50mcg/0.5mL) 09/29/2023    Pfizer COVID-19 vaccine, bivalent, age 12 years and older (30 mcg/0.3 mL) 09/08/2022    Pfizer Gray Cap SARS-CoV-2 03/31/2022     Pfizer Purple Cap SARS-CoV-2 03/09/2021, 04/06/2021, 10/06/2021    Pneumococcal conjugate vaccine, 13-valent (PREVNAR 13) 07/11/2013    Pneumococcal conjugate vaccine, 20-valent (PREVNAR 20) 09/16/2023    Pneumococcal polysaccharide vaccine, 23-valent, age 2 years and older (PNEUMOVAX 23) 11/05/2020    RSV, 60 Years And Older (AREXVY) 09/16/2023    Tdap vaccine, age 7 year and older (BOOSTRIX, ADACEL) 09/30/2021    Zoster vaccine, recombinant, adult (SHINGRIX) 10/15/2020, 09/30/2021    Zoster, live 02/10/2012     Patient's medical history was reviewed and updated either before or during this encounter.       Cari Almodovar, APRN-CNP   [No Acute Distress] : no acute distress [EOMI] : extraocular movements intact [No Respiratory Distress] : no respiratory distress  [PERRL] : pupils equal round and reactive to light [No Accessory Muscle Use] : no accessory muscle use [Clear to Auscultation] : lungs were clear to auscultation bilaterally [Regular Rhythm] : with a regular rhythm [Normal Rate] : normal rate  [Non Tender] : non-tender [Soft] : abdomen soft [Normal S1, S2] : normal S1 and S2 [Normal Bowel Sounds] : normal bowel sounds [Non-distended] : non-distended [de-identified] : systolic murmur

## 2024-07-01 ENCOUNTER — TELEPHONE (OUTPATIENT)
Dept: PRIMARY CARE | Facility: CLINIC | Age: 69
End: 2024-07-01
Payer: MEDICARE

## 2024-07-01 NOTE — TELEPHONE ENCOUNTER
"Pt seen 6/25/24.  States her left foot/ankle not worse, but still having \"puffiness\" after being on feet all day.  Asking if you still want her to see ID, Dr Diamond.  Please advise.  Ph: [phone]  "

## 2024-07-02 ENCOUNTER — TELEPHONE (OUTPATIENT)
Dept: PRIMARY CARE | Facility: CLINIC | Age: 69
End: 2024-07-02
Payer: MEDICARE

## 2024-07-02 NOTE — TELEPHONE ENCOUNTER
Pt states the soonest she can see Dr Diamond is 7/10/24 at [place].  Does her situation warrant she be seen sooner or will this be OK?  Please advise.  Ph: [phone]

## 2024-07-03 ENCOUNTER — APPOINTMENT (OUTPATIENT)
Dept: OTOLARYNGOLOGY | Facility: CLINIC | Age: 69
End: 2024-07-03
Payer: MEDICARE

## 2024-07-03 VITALS — TEMPERATURE: 96.5 F | BODY MASS INDEX: 29.73 KG/M2 | WEIGHT: 185 LBS | HEIGHT: 66 IN

## 2024-07-03 DIAGNOSIS — H81.09 MENIERE'S DISEASE, UNSPECIFIED LATERALITY: ICD-10-CM

## 2024-07-03 DIAGNOSIS — H90.3 ASNHL (ASYMMETRICAL SENSORINEURAL HEARING LOSS): Primary | ICD-10-CM

## 2024-07-03 PROCEDURE — 1160F RVW MEDS BY RX/DR IN RCRD: CPT | Performed by: OTOLARYNGOLOGY

## 2024-07-03 PROCEDURE — 1159F MED LIST DOCD IN RCRD: CPT | Performed by: OTOLARYNGOLOGY

## 2024-07-03 PROCEDURE — 1036F TOBACCO NON-USER: CPT | Performed by: OTOLARYNGOLOGY

## 2024-07-03 PROCEDURE — 99214 OFFICE O/P EST MOD 30 MIN: CPT | Performed by: OTOLARYNGOLOGY

## 2024-07-03 RX ORDER — TRIAMTERENE/HYDROCHLOROTHIAZID 37.5-25 MG
1 TABLET ORAL EVERY MORNING
Qty: 90 TABLET | Refills: 3 | Status: SHIPPED | OUTPATIENT
Start: 2024-07-03 | End: 2025-06-28

## 2024-07-03 NOTE — PROGRESS NOTES
HPI  Nevaeh Cuba is a 69 y.o. female Follow-up Meniere's on right. Continues to take Dyazide. Doing well with this. Requests refill     She is scheduled to have a bone-anchored hearing aid placed in a few weeks     Prior history: This is a 65-year-old female who has a history of MÃ©niÃ¨re disease with asymmetric sensorineural hearing loss, severe on the right. Left side essentially normal. She uses cross aids. She is happy with benefit. She is using Dyazide regularly. Symptoms of dizziness have been stable. She has not been dizzy since prior to 2014. Last refill of Dyazide in July 2020. She has fluctuating tinnitus and hearing loss which are more of a nuisance than significant to her. She is here today with left-sided ear pain last week. She has been treated for urinary tract infection with Bactrim and then because of tentative side effects she was changed to ciprofloxacin. The ear pain is markedly improved. She has not been on any topical medications recently. Hearing is stable.       Past Medical History:   Diagnosis Date    Benign essential hypertension     Estrogen deficiency 11/15/2023    DEXA 1/24 back nl, hip neck T-2.1 recheck 1/26    History of atrial fibrillation     Hyperactive thyroid Dr.Burtch Petersonle  NSR no AC    History of Meniere's syndrome 11/15/2023    Hyperthyroidism 09/15/2023    Other specified abnormal findings of blood chemistry     High serum cholesterol sulfate    Personal history of malignant neoplasm, unspecified     History of malignant neoplasm    Unspecified osteoarthritis, unspecified site 04/28/2016    Arthritis            Medications:     Current Outpatient Medications:     acetaminophen (Tylenol) 325 mg tablet, Take 2 tablets (650 mg) by mouth every 6 hours if needed for mild pain (1 - 3), Disp: 20 tablet, Rfl: 0    atorvastatin (Lipitor) 20 mg tablet, Take 1 tablet (20 mg) by mouth once daily., Disp: 90 tablet, Rfl: 2    cholecalciferol (Vitamin D-3) 25 MCG (1000 UT)  "tablet, Take 2 tablets (2,000 Units) by mouth. As directed, Disp: , Rfl:     docusate sodium (Colace) 100 mg capsule, Take 1 capsule (100 mg) by mouth once daily as needed., Disp: , Rfl:     fexofenadine HCl (ALLEGRA ORAL), Take 1 tablet by mouth once daily as needed (ALLERGIES)., Disp: , Rfl:     ibuprofen 200 mg tablet, Take 2 tablets (400 mg) by mouth every 6 hours if needed for mild pain (1 - 3)., Disp: , Rfl:     methIMAzole (Tapazole) 5 mg tablet, Take 1 tablet (5 mg) by mouth once daily., Disp: 90 tablet, Rfl: 2    mv-min-FA-vit K-lutein-zeaxant (PreserVision AREDS 2 Plus MV) 200 mcg-15 mcg- 5 mg-1 mg capsule, Take 1 capsule by mouth once daily., Disp: , Rfl:     OXcarbazepine (Trileptal) 150 mg tablet, Take 0.5 tablets (75 mg) by mouth every other day. 1/2 tab daily, weaning off of it, Disp: , Rfl:     triamterene-hydrochlorothiazid (Maxzide-25) 37.5-25 mg tablet, Take 1 tablet by mouth once daily in the morning., Disp: 90 tablet, Rfl: 3     Allergies:  Allergies   Allergen Reactions    Latex Itching and Rash    Methotrexate Itching     HEAD BURNING AND ITCHING    Metoprolol Rash    Diltiazem Hcl Itching     Rash face     Latex, Natural Rubber Itching    Pollen Extracts Other     NASAL CONGESTION    Adhesive Tape-Silicones Rash    Folic Acid Rash    Sulfamethoxazole-Trimethoprim Rash     Tinnitus         Physical Exam:  Last Recorded Vitals  Temperature 35.8 °C (96.5 °F), height 1.676 m (5' 6\"), weight 83.9 kg (185 lb).  General:     General appearance: Well-developed, well-nourished in no acute distress.       Voice:  normal       Head/face: Normal appearance; nontender to palpation     Facial nerve function: Normal and symmetric bilaterally.    Oral/oropharynx:     Oral vestibule: Normal labial and gingival mucosa     Tongue/floor of mouth: Normal without lesion     Oropharynx: Clear.  No lesions present of the hard/soft palate, posterior pharynx    Neck:     Neck: Normal appearance, trachea midline     " Salivary glands: Normal to palpation bilaterally     Lymph nodes: No cervical lymphadenopathy to palpation     Thyroid: No thyromegaly.  No palpable nodules     Range of motion: Normal    Neurological:     Cortical functions: Alert and oriented x3, appropriate affect       Larynx/hypopharynx:     Laryngeal findings: Mirror exam inadequate or limited secondary to enlarged base of tongue and/or excessive gagging    Ear:     Ear canal: Normal bilaterally     Tympanic membrane: Intact and mobile bilaterally     Pinna: Normal bilaterally     Hearing:  Gross hearing assessment normal by voice    Nose:     Visualized using: Anterior rhinoscopy     Nasopharynx: Inadequate mirror exam secondary to gag, anatomy.       Nasal dorsum: Nontraumatic midline appearance     Septum: Midline     Inferior turbinates: Normally sized     Mucosa: Bilateral, pink, normal appearing       ASSESSMENT/PLAN:  Diuretic refilled.  To proceed with bone-anchored hearing aid with Dr. Dwyer.  I will see her back in a year, sooner as needed        Matthew Marino MD

## 2024-07-23 ENCOUNTER — ANESTHESIA EVENT (OUTPATIENT)
Dept: OPERATING ROOM | Facility: CLINIC | Age: 69
End: 2024-07-23
Payer: MEDICARE

## 2024-07-24 ENCOUNTER — ANESTHESIA (OUTPATIENT)
Dept: OPERATING ROOM | Facility: CLINIC | Age: 69
End: 2024-07-24
Payer: MEDICARE

## 2024-07-24 ENCOUNTER — HOSPITAL ENCOUNTER (OUTPATIENT)
Facility: CLINIC | Age: 69
Setting detail: OUTPATIENT SURGERY
Discharge: HOME | End: 2024-07-24
Attending: OTOLARYNGOLOGY | Admitting: OTOLARYNGOLOGY
Payer: MEDICARE

## 2024-07-24 VITALS
RESPIRATION RATE: 18 BRPM | HEART RATE: 72 BPM | HEIGHT: 66 IN | OXYGEN SATURATION: 98 % | DIASTOLIC BLOOD PRESSURE: 65 MMHG | SYSTOLIC BLOOD PRESSURE: 129 MMHG | WEIGHT: 184.08 LBS | TEMPERATURE: 97 F | BODY MASS INDEX: 29.58 KG/M2

## 2024-07-24 DIAGNOSIS — G89.18 POSTOPERATIVE PAIN: ICD-10-CM

## 2024-07-24 DIAGNOSIS — H90.A21 SENSORINEURAL HEARING LOSS (SNHL) OF RIGHT EAR WITH RESTRICTED HEARING OF LEFT EAR: Primary | ICD-10-CM

## 2024-07-24 PROCEDURE — 2720000007 HC OR 272 NO HCPCS: Performed by: OTOLARYNGOLOGY

## 2024-07-24 PROCEDURE — 2780000003 HC OR 278 NO HCPCS: Performed by: OTOLARYNGOLOGY

## 2024-07-24 PROCEDURE — 2500000002 HC RX 250 W HCPCS SELF ADMINISTERED DRUGS (ALT 637 FOR MEDICARE OP, ALT 636 FOR OP/ED): Performed by: ANESTHESIOLOGY

## 2024-07-24 PROCEDURE — 7100000001 HC RECOVERY ROOM TIME - INITIAL BASE CHARGE: Performed by: OTOLARYNGOLOGY

## 2024-07-24 PROCEDURE — 2500000004 HC RX 250 GENERAL PHARMACY W/ HCPCS (ALT 636 FOR OP/ED): Performed by: ANESTHESIOLOGIST ASSISTANT

## 2024-07-24 PROCEDURE — A69714 PR IMPLNT,TEMPORAL BONE,W/EXTRN STIM: Performed by: ANESTHESIOLOGIST ASSISTANT

## 2024-07-24 PROCEDURE — 7100000002 HC RECOVERY ROOM TIME - EACH INCREMENTAL 1 MINUTE: Performed by: OTOLARYNGOLOGY

## 2024-07-24 PROCEDURE — 3700000002 HC GENERAL ANESTHESIA TIME - EACH INCREMENTAL 1 MINUTE: Performed by: OTOLARYNGOLOGY

## 2024-07-24 PROCEDURE — L8690 AUD OSSEO DEV, INT/EXT COMP: HCPCS | Performed by: OTOLARYNGOLOGY

## 2024-07-24 PROCEDURE — 3600000003 HC OR TIME - INITIAL BASE CHARGE - PROCEDURE LEVEL THREE: Performed by: OTOLARYNGOLOGY

## 2024-07-24 PROCEDURE — 3700000001 HC GENERAL ANESTHESIA TIME - INITIAL BASE CHARGE: Performed by: OTOLARYNGOLOGY

## 2024-07-24 PROCEDURE — A69714 PR IMPLNT,TEMPORAL BONE,W/EXTRN STIM: Performed by: ANESTHESIOLOGY

## 2024-07-24 PROCEDURE — 7100000010 HC PHASE TWO TIME - EACH INCREMENTAL 1 MINUTE: Performed by: OTOLARYNGOLOGY

## 2024-07-24 PROCEDURE — 2500000005 HC RX 250 GENERAL PHARMACY W/O HCPCS: Performed by: OTOLARYNGOLOGY

## 2024-07-24 PROCEDURE — 2500000005 HC RX 250 GENERAL PHARMACY W/O HCPCS: Performed by: ANESTHESIOLOGIST ASSISTANT

## 2024-07-24 PROCEDURE — 2500000001 HC RX 250 WO HCPCS SELF ADMINISTERED DRUGS (ALT 637 FOR MEDICARE OP): Performed by: OTOLARYNGOLOGY

## 2024-07-24 PROCEDURE — 7100000009 HC PHASE TWO TIME - INITIAL BASE CHARGE: Performed by: OTOLARYNGOLOGY

## 2024-07-24 PROCEDURE — 3600000008 HC OR TIME - EACH INCREMENTAL 1 MINUTE - PROCEDURE LEVEL THREE: Performed by: OTOLARYNGOLOGY

## 2024-07-24 DEVICE — IMPLANT, BI300 4MM BAHA3: Type: IMPLANTABLE DEVICE | Site: EAR | Status: FUNCTIONAL

## 2024-07-24 DEVICE — IMPLNT, OSIA, OSI300: Type: IMPLANTABLE DEVICE | Site: EAR | Status: FUNCTIONAL

## 2024-07-24 RX ORDER — BACITRACIN 500 [USP'U]/G
OINTMENT TOPICAL AS NEEDED
Status: DISCONTINUED | OUTPATIENT
Start: 2024-07-24 | End: 2024-07-24 | Stop reason: HOSPADM

## 2024-07-24 RX ORDER — LIDOCAINE HYDROCHLORIDE AND EPINEPHRINE 10; 10 MG/ML; UG/ML
INJECTION, SOLUTION INFILTRATION; PERINEURAL AS NEEDED
Status: DISCONTINUED | OUTPATIENT
Start: 2024-07-24 | End: 2024-07-24 | Stop reason: HOSPADM

## 2024-07-24 RX ORDER — SODIUM CHLORIDE, SODIUM LACTATE, POTASSIUM CHLORIDE, CALCIUM CHLORIDE 600; 310; 30; 20 MG/100ML; MG/100ML; MG/100ML; MG/100ML
100 INJECTION, SOLUTION INTRAVENOUS CONTINUOUS
Status: DISCONTINUED | OUTPATIENT
Start: 2024-07-24 | End: 2024-07-24 | Stop reason: HOSPADM

## 2024-07-24 RX ORDER — CEFAZOLIN 1 G/1
INJECTION, POWDER, FOR SOLUTION INTRAVENOUS AS NEEDED
Status: DISCONTINUED | OUTPATIENT
Start: 2024-07-24 | End: 2024-07-24

## 2024-07-24 RX ORDER — MIDAZOLAM HYDROCHLORIDE 1 MG/ML
INJECTION, SOLUTION INTRAMUSCULAR; INTRAVENOUS AS NEEDED
Status: DISCONTINUED | OUTPATIENT
Start: 2024-07-24 | End: 2024-07-24

## 2024-07-24 RX ORDER — APREPITANT 40 MG/1
CAPSULE ORAL AS NEEDED
Status: DISCONTINUED | OUTPATIENT
Start: 2024-07-24 | End: 2024-07-24

## 2024-07-24 RX ORDER — TRAMADOL HYDROCHLORIDE 50 MG/1
50 TABLET ORAL EVERY 6 HOURS PRN
Qty: 12 TABLET | Refills: 0 | Status: SHIPPED | OUTPATIENT
Start: 2024-07-24 | End: 2024-07-27

## 2024-07-24 RX ORDER — PROPOFOL 10 MG/ML
INJECTION, EMULSION INTRAVENOUS AS NEEDED
Status: DISCONTINUED | OUTPATIENT
Start: 2024-07-24 | End: 2024-07-24

## 2024-07-24 RX ORDER — LIDOCAINE IN NACL,ISO-OSMOT/PF 30 MG/3 ML
0.1 SYRINGE (ML) INJECTION ONCE
Status: DISCONTINUED | OUTPATIENT
Start: 2024-07-24 | End: 2024-07-24 | Stop reason: HOSPADM

## 2024-07-24 RX ORDER — ACETAMINOPHEN 325 MG/1
TABLET ORAL AS NEEDED
Status: DISCONTINUED | OUTPATIENT
Start: 2024-07-24 | End: 2024-07-24

## 2024-07-24 RX ORDER — SODIUM CHLORIDE, SODIUM LACTATE, POTASSIUM CHLORIDE, CALCIUM CHLORIDE 600; 310; 30; 20 MG/100ML; MG/100ML; MG/100ML; MG/100ML
INJECTION, SOLUTION INTRAVENOUS CONTINUOUS PRN
Status: DISCONTINUED | OUTPATIENT
Start: 2024-07-24 | End: 2024-07-24

## 2024-07-24 RX ORDER — CEPHALEXIN 500 MG/1
500 CAPSULE ORAL 3 TIMES DAILY
Qty: 15 CAPSULE | Refills: 0 | Status: SHIPPED | OUTPATIENT
Start: 2024-07-24 | End: 2024-07-29

## 2024-07-24 RX ORDER — HYDROMORPHONE HYDROCHLORIDE 0.2 MG/ML
0.2 INJECTION INTRAMUSCULAR; INTRAVENOUS; SUBCUTANEOUS EVERY 5 MIN PRN
Status: DISCONTINUED | OUTPATIENT
Start: 2024-07-24 | End: 2024-07-24 | Stop reason: HOSPADM

## 2024-07-24 RX ORDER — ONDANSETRON HYDROCHLORIDE 2 MG/ML
INJECTION, SOLUTION INTRAVENOUS AS NEEDED
Status: DISCONTINUED | OUTPATIENT
Start: 2024-07-24 | End: 2024-07-24

## 2024-07-24 RX ORDER — ONDANSETRON HYDROCHLORIDE 2 MG/ML
4 INJECTION, SOLUTION INTRAVENOUS ONCE AS NEEDED
Status: DISCONTINUED | OUTPATIENT
Start: 2024-07-24 | End: 2024-07-24 | Stop reason: HOSPADM

## 2024-07-24 RX ORDER — ALBUTEROL SULFATE 0.83 MG/ML
2.5 SOLUTION RESPIRATORY (INHALATION) ONCE AS NEEDED
Status: DISCONTINUED | OUTPATIENT
Start: 2024-07-24 | End: 2024-07-24 | Stop reason: HOSPADM

## 2024-07-24 RX ORDER — HYDROMORPHONE HYDROCHLORIDE 1 MG/ML
0.5 INJECTION, SOLUTION INTRAMUSCULAR; INTRAVENOUS; SUBCUTANEOUS EVERY 5 MIN PRN
Status: DISCONTINUED | OUTPATIENT
Start: 2024-07-24 | End: 2024-07-24 | Stop reason: HOSPADM

## 2024-07-24 RX ORDER — POLYMYXIN B 500000 [USP'U]/1
INJECTION, POWDER, LYOPHILIZED, FOR SOLUTION INTRAMUSCULAR; INTRATHECAL; INTRAVENOUS; OPHTHALMIC AS NEEDED
Status: DISCONTINUED | OUTPATIENT
Start: 2024-07-24 | End: 2024-07-24 | Stop reason: HOSPADM

## 2024-07-24 RX ORDER — FENTANYL CITRATE 50 UG/ML
INJECTION, SOLUTION INTRAMUSCULAR; INTRAVENOUS AS NEEDED
Status: DISCONTINUED | OUTPATIENT
Start: 2024-07-24 | End: 2024-07-24

## 2024-07-24 RX ORDER — SODIUM CHLORIDE 0.9 G/100ML
IRRIGANT IRRIGATION AS NEEDED
Status: DISCONTINUED | OUTPATIENT
Start: 2024-07-24 | End: 2024-07-24 | Stop reason: HOSPADM

## 2024-07-24 RX ORDER — LIDOCAINE HYDROCHLORIDE 20 MG/ML
INJECTION, SOLUTION INFILTRATION; PERINEURAL AS NEEDED
Status: DISCONTINUED | OUTPATIENT
Start: 2024-07-24 | End: 2024-07-24

## 2024-07-24 RX ORDER — OXYCODONE HYDROCHLORIDE 5 MG/1
5 TABLET ORAL EVERY 4 HOURS PRN
Status: DISCONTINUED | OUTPATIENT
Start: 2024-07-24 | End: 2024-07-24 | Stop reason: HOSPADM

## 2024-07-24 SDOH — HEALTH STABILITY: MENTAL HEALTH: CURRENT SMOKER: 0

## 2024-07-24 ASSESSMENT — PAIN SCALES - GENERAL
PAINLEVEL_OUTOF10: 0 - NO PAIN

## 2024-07-24 ASSESSMENT — COLUMBIA-SUICIDE SEVERITY RATING SCALE - C-SSRS

## 2024-07-24 ASSESSMENT — PAIN - FUNCTIONAL ASSESSMENT
PAIN_FUNCTIONAL_ASSESSMENT: 0-10

## 2024-07-24 NOTE — OP NOTE
Insertion Bone Conducting Implant Right Ear (R) Operative Note     Date: 2024  OR Location: [facility] OR    Name: Nevaeh Cuba, : 1955, Age: 69 y.o., MRN: [id number], Sex: female    Diagnosis  Pre-op Diagnosis      * Sensorineural hearing loss (SNHL) of right ear with restricted hearing of left ear [H90.A21] Post-op Diagnosis     * Sensorineural hearing loss (SNHL) of right ear with restricted hearing of left ear [H90.A21]     Procedures  Insertion Bone Conducting Implant Right Ear  11606 - WA IMPL OI IMPLT SKULL PERQ ATTACHMENT REJI    WA AUD OSSEO DEV, INT/EXT COMP []  Surgeons      * Conner Dwyer - Primary    Resident/Fellow/Other Assistant:  Harley Emerson    Procedure Summary  Anesthesia: General  ASA: III  Anesthesia Staff: Anesthesiologist: Geri Akhtar MD  C-AA: INDIA Zamudio  Estimated Blood Loss: 10 mL  Intra-op Medications:   Administrations occurring from 1245 to 1430 on 24:   Medication Name Total Dose   sodium chloride 0.9 % irrigation solution 500 mL   polymyxin B injection 500,000 Units   lidocaine-epinephrine (Xylocaine W/EPI) 1 %-1:100,000 injection 8 mL   bacitracin ointment 1 Application              Anesthesia Record               Intraprocedure I/O Totals          Intake    LR infusion 500.00 mL    Total Intake 500 mL       Output    Est. Blood Loss 10 mL    Total Output 10 mL       Net    Net Volume 490 mL          Specimen: No specimens collected     Staff:   Circulator: Jevon Alexis Person: Priya  Circulator: Rupinder    Tourniquet Times:         Implants:  Implants       Type Name Action Serial No.      Cochlear Implant IMPLANT,  4MM BAHA3 - [id number] Implanted      Implant IMPLNT, OSIA, [id number] - [id number] Implanted               Findings: Successful placement of OSIA device with 4 mm implant screw    Indications: Nevaeh Cuba is an 69 y.o. female who is having surgery for Sensorineural hearing loss (SNHL) of right ear with restricted hearing of left  ear [H90.A21].     The patient was seen in the preoperative area. The risks, benefits, complications, treatment options, non-operative alternatives, expected recovery and outcomes were discussed with the patient. The possibilities of reaction to medication, pulmonary aspiration, injury to surrounding structures, bleeding, recurrent infection, the need for additional procedures, failure to diagnose a condition, and creating a complication requiring transfusion or operation were discussed with the patient. The patient concurred with the proposed plan, giving informed consent.  The site of surgery was properly noted/marked if necessary per policy. The patient has been actively warmed in preoperative area. Preoperative antibiotics have been ordered and given within 1 hours of incision. Venous thrombosis prophylaxis have been ordered including bilateral sequential compression devices      Procedure Performed:  1.  Implantation of right-sided osseointegrated implant into temporal bone, with trancutaneous attachment to external speech processor/cochlear stimulator    Anesthesia: GETA     Clinical Note:  The patient presented with single-sided deafness secondary to Meniere's disease in the right ear. Hearing rehabilitation options were discussed. A BCI trial was conducted and the patient demonstrated satisfaction with the auditory benefit which were documented audiometrically. We plan to do an implantation of osseointegrated implant into temporal bone, with percutaneous attachment to external speech processor/cochlear stimulator (bone anchored implant).  The risks, indications, alternatives and complications of surgery were discussed including, but not limited to pain, bleeding, infection, scarring, skin infection and rarely implant extrusion or failure. Informed consent was obtained and the patient and parents elected to proceed.      Operative Technique:   After informed consent was signed and witnessed, the patient was  taken back to the OR. A huddle was performed according to protocol. The patient was intubated and general anesthesia was administered. ET tube was secured. Intravenous intraoperative antibiotics were administered. The patient was supported to the surgical bed and table was tilted 180 degrees.    Prior to prepping, hair was clipped, the correct side was exposed and the thickness of the skin flap was measured to ensure it was less than 9 mm and no soft tissue thinning was necessary. Next, hair was clipped, the area behind the ear was injected, and prepped in the standard fashion.     A 4-5 cm J incision was made behind the postauricular sulcus. The incision was carried through the epidermis, all the way down to periosteum. Hemostasis was obtained. The subcutaneous tissue lateral to the periosteum and posterior to the incision was then elevated.  An incision was made in the superior periosteum and the periosteum was elevated to create a tight pocket for the magnet. A cruciate incision in the periosteum was made for our abutment. The location to place the fixture was identified.    A 3 mm guide drill was used to make a small hole at 2000 rpm. Bone was palpated deep to this. The 4 mm bit was then used. The 4 mm countersink was used to countersink the lateral skull. The Fixture implant was then placed in the bone with a low-speed setting and 45 N/m of torque. The bone bed indicator was placed and gently hand tightened to the implant. Then it was rotated clockwise to ensure it did not touch the bone. The periosteum was put back over the drilled area and the OSIA was then placed over the fixture and tightened with the Unigrip screwdriver up to 25Ncm.     The surgical site was irrigated. Hemostasis was obtained.  The skin incision was then closed using a multilayered closure with 3.0 Vicryl and the epidermis was re-approximated using a 3.0 prolene. A mastoid dressing was placed over the surgical site. All needle and counts  were correct.    This completed the procedure. The patient was then emerged from anesthesia and extubated without difficulty. The patient was then transported back to PACU. There were no apparent complications.     Dr. Dwyer was present and participated in all critical portions of the procedure.     Complications:  None; patient tolerated the procedure well.      Disposition: PACU - hemodynamically stable.    Condition: stable     Attending Attestation: I was present and scrubbed for the entire procedure.    Conner Dwyer  Phone Number: [phone]

## 2024-07-24 NOTE — H&P
History Of Present Illness  Nevaeh Cuba is a 69 y.o. female presenting with Meniere's disease and profound sensorineural hearing loss on the right side. Given this, decision was made to proceed to the operating room for bone-anchored hearing device to help with sound awareness from her right side. This was after discussing the risks, benefits, and alternatives of proceeding. There have been no major changes to patient's medical status since the outpatient ENT visit. Patient is overall in usual state of health this morning.      Past Medical History  She has a past medical history of Benign essential hypertension, Estrogen deficiency (11/15/2023), History of atrial fibrillation, History of Meniere's syndrome (11/15/2023), Hyperthyroidism (09/15/2023), Other specified abnormal findings of blood chemistry, Personal history of malignant neoplasm, unspecified, Personal history of other mental and behavioral disorders, and Unspecified osteoarthritis, unspecified site (04/28/2016).    Surgical History  She has a past surgical history that includes Hysterectomy (04/28/2016); Other surgical history (04/28/2016); Other surgical history (04/28/2016); Other surgical history (01/11/2021); Other surgical history (01/11/2021); Other surgical history (07/06/2022); Joint replacement; Shoulder surgery (08/2021); Joint replacement (Right, 05/2022); Other surgical history (07/2021); Trigger finger release (Right, 01/2024); Sinus surgery; and Cosmetic surgery.     Social History  She reports that she has never smoked. She has never been exposed to tobacco smoke. She has never used smokeless tobacco. She reports that she does not drink alcohol and does not use drugs.    Family History  Family History   Problem Relation Name Age of Onset    Stroke Mother      Other (cyst on breast) Mother      Atrial fibrillation Father      Stroke Father      Heart disease Father          Allergies  Latex; Methotrexate; Metoprolol; Diltiazem hcl; Latex,  "natural rubber; Pollen extracts; Adhesive tape-silicones; Folic acid; and Sulfamethoxazole-trimethoprim    ROS:  Complete ROS negative other than mentioned in the HPI.     PHYSICAL EXAMINATION:  General Healthy-appearing, well-nourished, well groomed, in no acute distress.   Neuro: Developmentally appropriate for age. Reacts appropriately to commands or stimuli.   Extremities Normal. Good tone.  Respiratory No increased work of breathing. Chest expands symmetrically. No stertor or stridor at rest.  Cardiovascular: No peripheral cyanosis. No jugular venous distension.   Head and Face: Atraumatic with no masses, lesions, or scarring.   Eyes: EOM intact, conjunctiva non-injected, sclera white.   Nose: no external nasal lesions, lacerations, or scars.  Neck: Symmetrical, trachea midline.   Skin: Normal without rashes or lesions.       Last Recorded Vitals  Blood pressure 141/75, pulse 58, temperature 36 °C (96.8 °F), temperature source Temporal, resp. rate 16, height 1.676 m (5' 6\"), weight 83.5 kg (184 lb 1.4 oz), SpO2 98%.    Assessment/Plan   Nevaeh Cuba is a 69 y.o. female presenting with Meniere's disease and profound sensorineural hearing loss on the right side. Given this, decision was made to proceed to the operating room for bone-anchored hearing device to help with sound awareness from her right side.  Risks, benefits, and alternatives were discussed with the patient's legal guardian. All other questions were answered.     Plan for discharge home following surgery.      "

## 2024-07-24 NOTE — ANESTHESIA PROCEDURE NOTES
Airway  Date/Time: 7/24/2024 12:24 PM  Urgency: elective    Airway not difficult    Staffing  Performed: INDIA   Authorized by: Geri Akhtar MD    Performed by: INDIA Zamudio  Patient location during procedure: OR    Indications and Patient Condition  Spontaneous ventilation: present  Sedation level: deep  Preoxygenated: yes  Mask difficulty assessment: 0 - not attempted  Planned trial extubation    Final Airway Details  Final airway type: supraglottic airway      Successful airway: Size 3     Number of attempts at approach: 1  Number of other approaches attempted: 0    Additional Comments  Lips/teeth in pre-anesthetic condition. IGEL LMA

## 2024-07-24 NOTE — ANESTHESIA POSTPROCEDURE EVALUATION
Patient: Nevaeh Cuba    Procedure Summary       Date: 07/24/24 Room / Location: [facility] OR 04 / [facility]    Anesthesia Start: 1214 Anesthesia Stop: 1409    Procedure: Insertion Bone Conducting Implant Right Ear (Right) Diagnosis:       Sensorineural hearing loss (SNHL) of right ear with restricted hearing of left ear      (Sensorineural hearing loss (SNHL) of right ear with restricted hearing of left ear [H90.A21])    Surgeons: Conner Dwyer MD Responsible Provider: Geri Akhtar MD    Anesthesia Type: general ASA Status: 3            Anesthesia Type: general    Vitals Value Taken Time   /72 07/24/24 1435   Temp 36.1 °C (97 °F) 07/24/24 1435   Pulse 71 07/24/24 1435   Resp 18 07/24/24 1435   SpO2 98 % 07/24/24 1435       Anesthesia Post Evaluation    Patient location during evaluation: PACU  Patient participation: complete - patient participated  Level of consciousness: awake and alert  Pain management: adequate  Airway patency: patent  Cardiovascular status: acceptable  Respiratory status: acceptable  Hydration status: acceptable  Postoperative Nausea and Vomiting: none        There were no known notable events for this encounter.

## 2024-07-24 NOTE — ANESTHESIA PREPROCEDURE EVALUATION
Patient: Nevaeh Cuba    Procedure Information       Date/Time: 07/24/24 1245    Procedure: Insertion Bone Conducting Implant Right Ear (Right)    Location: [facility] OR 04 / [facility] OR    Surgeons: Conner Dwyer MD          70yo with asthma, OA, MIKAYLA, dyslipidemia, osteopenia,  aflutter (June 2021) s/p cardiac ablation and has been in sinus rhythm since july 2021. Also dx with Graves disesae in 2021, on Methimazole and euthyroid.   Here today for cochlear implant due to hearing loss from Menieres disease.            Relevant Problems   Cardiac   (+) Benign essential hypertension   (+) Essential hypertension   (+) Hyperlipidemia   (+) Hypertensive disorder   (+) Pure hypercholesterolemia      Pulmonary   (+) Asthma (HHS-HCC)      Neuro   (+) Lumbar radiculopathy   (+) Major depressive disorder, single episode, unspecified      Endocrine   (+) Hyperthyroidism   (+) Other obesity      HEENT   (+) Acute sinusitis   (+) Seasonal allergies   (+) Sensorineural hearing loss (SNHL) of both ears   (+) Sensorineural hearing loss (SNHL) of right ear with restricted hearing of left ear      Skin   (+) Rash       Clinical information reviewed:                   NPO Detail:  No data recorded     There were no vitals filed for this visit.    Past Surgical History:   Procedure Laterality Date    HYSTERECTOMY  04/28/2016    Hysterectomy    JOINT REPLACEMENT      rt ring finger    JOINT REPLACEMENT Right 05/2022    ring finger    OTHER SURGICAL HISTORY  04/28/2016    Musculoskeletal Procedures Injection Carpal Tunnel    OTHER SURGICAL HISTORY  04/28/2016    Arthrodesis MCP Joint    OTHER SURGICAL HISTORY  01/11/2021    Ankle surgery    OTHER SURGICAL HISTORY  01/11/2021    Carpal tunnel surgery    OTHER SURGICAL HISTORY  07/06/2022    Nasal septoplasty    OTHER SURGICAL HISTORY  07/2021    heart shock catherization    SHOULDER SURGERY  08/2021    total left shoulder replacement    SINUS SURGERY      TRIGGER FINGER  RELEASE Right 01/2024    middle finger     Past Medical History:   Diagnosis Date    Benign essential hypertension     Estrogen deficiency 11/15/2023    DEXA 1/24 back nl, hip neck T-2.1 recheck 1/26    History of atrial fibrillation     Hyperactive thyroid  Methimazole  NSR no AC    History of Meniere's syndrome 11/15/2023    Hyperthyroidism 09/15/2023    Other specified abnormal findings of blood chemistry     High serum cholesterol sulfate    Personal history of malignant neoplasm, unspecified     History of malignant neoplasm    Personal history of other mental and behavioral disorders     Unspecified osteoarthritis, unspecified site 04/28/2016    Arthritis     No current facility-administered medications for this encounter.  Allergies   Allergen Reactions    Latex Itching and Rash    Methotrexate Itching     HEAD BURNING AND ITCHING    Metoprolol Rash    Diltiazem Hcl Itching     Rash face     Latex, Natural Rubber Itching    Pollen Extracts Other     NASAL CONGESTION    Adhesive Tape-Silicones Rash    Folic Acid Rash    Sulfamethoxazole-Trimethoprim Rash     Tinnitus      Social History     Tobacco Use    Smoking status: Never     Passive exposure: Never    Smokeless tobacco: Never   Substance Use Topics    Alcohol use: Never         Chemistry    Lab Results   Component Value Date/Time     06/25/2024 0944    K 4.1 06/25/2024 0944     06/25/2024 0944    CO2 27 06/25/2024 0944    BUN 15 06/25/2024 0944    CREATININE 0.80 06/25/2024 0944    Lab Results   Component Value Date/Time    CALCIUM 9.5 06/25/2024 0944    ALKPHOS 85 06/25/2024 0944    AST 13 06/25/2024 0944    ALT 12 06/25/2024 0944    BILITOT 0.3 06/25/2024 0944          Lab Results   Component Value Date/Time    WBC 6.7 06/25/2024 0944    HGB 13.7 06/25/2024 0944    HCT 43.0 06/25/2024 0944     06/25/2024 0944     Lab Results   Component Value Date/Time    PROTIME 11.5 10/05/2021 1120    INR 1.0 10/05/2021 1120              NPO Detail:  No data recorded     Review of Systems    Physical Exam    Airway  Mallampati: III  TM distance: >3 FB     Cardiovascular   Rhythm: regular     Dental - normal exam     Pulmonary   Breath sounds clear to auscultation     Abdominal            Anesthesia Plan    History of general anesthesia?: yes  History of complications of general anesthesia?: no    ASA 3     general     The patient is not a current smoker.    intravenous induction   Anesthetic plan and risks discussed with patient.    Plan discussed with attending and CAA.

## 2024-07-24 NOTE — DISCHARGE INSTRUCTIONS
[facility]--Postoperative Instructions    Precautions & Directions  ? You will go home with a cup over your ear. You may remove this cup and any packing in the ear 24 hours after surgery. You have blue stiches holding your incision closed. Dr. Dwyer will remove these.  ? You may shower 48 hours after the operation. You may gently wash your hair. AVOID swimming, baths, or other activities where the ear is completely submerged under water for two weeks after surgery.  ? Apply a small amount of Bacitracin or Neosporin to the incision after cleansing for two weeks after surgery.  ? Gentle nose blowing for two weeks post surgery is recommended. Do NOT ‘pop’ your ears by holding your nose and building up air.  ? Avoid strenuous activity and heavy lifting (>10lbs) for two weeks after surgery.  ? Avoid flying for two weeks after surgery.  ? Driving is permitted when you no longer experience dizziness or fatigue and you are no longer taking pain medication.    Drainage and Discharge  ? Call your physician if you experience yellow or green discharge, excessive discharge, or discharge with foul odor.    Other Normal Postoperative Symptoms  ? Tingling or numbness around the ear.  ? Appearance that affected ear sticks out or sits higher/lower than the other ear. This is usually due to swelling.  (continued on back)    Other Normal Postoperative Symptoms (cont.)  ? Slight crusting around the stitches.  ? Mild swelling.    Medication  ? Mild, intermittent ear pain is not unusual during the first two weeks after surgery. Pain above or in front of the ear is common when chewing. You may take a regular dose of Tylenol or Advil. Alternate every four hours between Tylenol and Advil, as needed. You may also be given a prescription for post-surgery pain medication. This should be taken only as needed. If you have persistent ear pain not relieved by a regular dose of Tylenol or Advil after the first several days, call your  physician.  ? You may be prescribed an oral antibiotic-take this as directed.      Tylenol was given at 11:15am, may take next dose after 5:15pm today if needed

## 2024-07-29 ENCOUNTER — TELEPHONE (OUTPATIENT)
Dept: OTOLARYNGOLOGY | Facility: CLINIC | Age: 69
End: 2024-07-29
Payer: MEDICARE

## 2024-07-29 NOTE — TELEPHONE ENCOUNTER
Patient called stating her ear is pinkish, red and itchy. Itchy welts on her neck- and yellow crusty around her incision. She has had redness and itchiness since the procedure. Called hydrocortisone cream 2.5% to her pharmacy and relayed information to Dr. Dwyer.

## 2024-07-30 NOTE — PROGRESS NOTES
History of present illness:  Nevaeh Cuba is a 69 y.o. female presenting today for post-op visit. She is status post right ear bone conducting implant insertion. She had developed a pruritic rash surrounding the surgical area. Hydrocortisone cream seemed to help.     RECALL 04/23/2024:  Nevaeh Cuba is a 68 y.o. female presenting today for discussion of BAHA vs OSIA.  Initially presented for evaluation of asymmetrical sensorineural hearing loss secondary to longstanding Ménière's disease.  Given that she is on Medicare she would not qualify for cochlear implantation for asymmetric hearing loss and a Baha evaluation was conducted.  She was noted to be an excellent audiometric candidate given that her aided performance which is much improved and the aided condition with the processor applied the headband.  She is interested in having the transcutaneous option or ossea.  RECALL 03/01/2024  Nevaeh Cuba is a 68 y.o. female is referred by by Dr Marino for possible cochlear implant for rehabilitation of progressive hearing loss. The patient is accompanied by her suster.   Reports history of Meniere's disease diagnosed at the age of 41 years of age. Notes her antihypertensive medication takes care of her Meniere's disease.     The approximate duration of the patient's hearing loss is since her forties when she was diagnosed with Meniere's disease. The patient currently is aided in both with a Bicros divide with limited benefit and appears to be motivated with realistic expectations.  They  deny ear pain, discharge from ear, tinnitus, dizziness and vertigo. They also deny a history of prior ear surgery, noise exposure, exposure to ototoxic drugs or agents, and family history of hearing loss.    The patient's current medications, active allergies and list of medical problems were reviewed in the EHR and confirmed electronically there are as follows;  Allergies:   Allergies   Allergen Reactions    Latex Itching and Rash     Methotrexate Itching     HEAD BURNING AND ITCHING    Metoprolol Rash    Diltiazem Hcl Itching     Rash face     Latex, Natural Rubber Itching    Pollen Extracts Other     NASAL CONGESTION    Adhesive Tape-Silicones Rash    Folic Acid Rash    Sulfamethoxazole-Trimethoprim Rash     Tinnitus      Current list of medications:   Current Outpatient Medications   Medication Sig Dispense Refill    acetaminophen (Tylenol) 325 mg tablet Take 2 tablets (650 mg) by mouth every 6 hours if needed for mild pain (1 - 3) 20 tablet 0    atorvastatin (Lipitor) 20 mg tablet Take 1 tablet (20 mg) by mouth once daily. 90 tablet 2    cholecalciferol (Vitamin D-3) 25 MCG (1000 UT) tablet Take 2 tablets (2,000 Units) by mouth. As directed      docusate sodium (Colace) 100 mg capsule Take 1 capsule (100 mg) by mouth once daily as needed.      fexofenadine HCl (ALLEGRA ORAL) Take 1 tablet by mouth once daily as needed (ALLERGIES).      ibuprofen 200 mg tablet Take 2 tablets (400 mg) by mouth every 6 hours if needed for mild pain (1 - 3).      methIMAzole (Tapazole) 5 mg tablet Take 1 tablet (5 mg) by mouth once daily. 90 tablet 2    mv-min-FA-vit K-lutein-zeaxant (PreserVision AREDS 2 Plus MV) 200 mcg-15 mcg- 5 mg-1 mg capsule Take 1 capsule by mouth once daily.      OXcarbazepine (Trileptal) 150 mg tablet Take 0.5 tablets (75 mg) by mouth every other day. 1/2 tab daily, weaning off of it      triamterene-hydrochlorothiazid (Maxzide-25) 37.5-25 mg tablet Take 1 tablet by mouth once daily in the morning. 90 tablet 3     No current facility-administered medications for this visit.     Problem list:  Patient Active Problem List   Diagnosis    Abdominal pain    Shoulder arthritis    Asthma (HHS-HCC)    Back pain with radiation    Benign essential hypertension    Cataract    Diverticulitis    Dyslipidemia    Hyperlipidemia    Hypertensive disorder    Hyperthyroidism    Impaired fasting glucose    Major depressive disorder, single episode,  unspecified    Ménière's disease    Nocturnal enuresis    Numbness of foot    Pain of ear structure    Other obesity    Bile-induced gastritis    Vitamin D deficiency    Acute bronchitis    Acute otitis externa    Acute sinusitis    Allergic rhinitis    Sensorineural hearing loss (SNHL) of both ears    Estrogen deficiency    Diverticulitis of large intestine    Enlarged tonsils    History of malignant neoplasm    History of Meniere's syndrome    Itching    Lumbar radiculopathy    Pure hypercholesterolemia    Rash    Seasonal allergies    Essential hypertension    Inflammation of joint of shoulder region    Injury due to exposure to external cause    Encounter for other specified aftercare    Laceration of left ring finger    Edema of both lower extremities    Peripheral venous insufficiency    Varicose veins of lower extremity with inflammation    Sensorineural hearing loss (SNHL) of right ear with restricted hearing of left ear    Encounter for cosmetic surgery         Physical Examination:  CONSTITUTIONAL:  No acute distress  VOICE:  No hoarseness or other abnormality  RESPIRATION:  Breathing comfortably, no stridor  CV:  No clubbing/cyanosis/edema in hands  EYES:  EOM intact, sclera clear  NEURO:  Alert and oriented times 3, Cranial nerves II-XII grossly intact and symmetric bilaterally  HEAD AND FACE:  Symmetric facial features, no masses or lesions  RIGHT EAR:  Surgical incision is healing well. There is some resolving erythema, likely caused by irritation. There is also a few more spots around that may be allergic in nature. She had topical dermatitis and she did have significant sensitivity to tapes and I wondered whether the surgical drape has caused some of that as well.   LEFT EAR: Normal external ear and post auricular area, no visible lesions, external auditory canal patent, tympanic membrane intact, no retraction, no signs of mass, effusion, or infection within the middle ear  NOSE:  Anterior rhinoscopy  clear, no significant external deformity.  ORAL CAVITY/OROPHARYNX/LIPS:  normal lining, mobile tongue.  PHARYNGEAL WALLS: Moist mucosal lining, good palatal elevation  NECK/LYMPH:  No LAD, no thyroid masses, trachea midline  SKIN:  Some bruising along the right neck and upper chest area which could be surgical in nature, but no hematoma.   PSYCH:  Alert and oriented with appropriate mood and affect      Impression:  Asymmetric  Sensorineural hearing loss  Right sided Meniere's Disease, Inactive  Status post right bone conducting implant placement     Recommendation:  Patient is doing well with the application of hydrocortisone. She may continue to use CeraVe ointment for topical application. No further topical antibiotics. Her stitches were removed today. BAHA activation in 6 weeks from surgery. Follow-up in 3 months.     I discussed with the patient the complexity of my medical decision making including the treatment and testing rational, indications of their elective procedure and possible adverse effects and/or complications. Based on the provided documentation and my professional assessment of this patient's chronic condition, the complexity of evaluation and treatment is low.    This note was created using speech recognition transcription software. Despite proofreading, several typographical errors might be present that might affect the meaning of the content. Please call with any questions.    Scribe Attestation  By signing my name below, I, Eb Roe   attest that this documentation has been prepared under the direction and in the presence of Conner Dwyer MD.

## 2024-07-31 ENCOUNTER — APPOINTMENT (OUTPATIENT)
Dept: VASCULAR SURGERY | Facility: CLINIC | Age: 69
End: 2024-07-31
Payer: MEDICARE

## 2024-08-02 ENCOUNTER — APPOINTMENT (OUTPATIENT)
Dept: OTOLARYNGOLOGY | Facility: CLINIC | Age: 69
End: 2024-08-02
Payer: MEDICARE

## 2024-08-02 VITALS — TEMPERATURE: 97 F | WEIGHT: 182.2 LBS | HEIGHT: 66 IN | BODY MASS INDEX: 29.28 KG/M2

## 2024-08-02 DIAGNOSIS — H90.A21 SENSORINEURAL HEARING LOSS (SNHL) OF RIGHT EAR WITH RESTRICTED HEARING OF LEFT EAR: ICD-10-CM

## 2024-08-02 PROCEDURE — 1159F MED LIST DOCD IN RCRD: CPT | Performed by: OTOLARYNGOLOGY

## 2024-08-02 PROCEDURE — 1036F TOBACCO NON-USER: CPT | Performed by: OTOLARYNGOLOGY

## 2024-08-02 PROCEDURE — 99024 POSTOP FOLLOW-UP VISIT: CPT | Performed by: OTOLARYNGOLOGY

## 2024-08-02 PROCEDURE — 3008F BODY MASS INDEX DOCD: CPT | Performed by: OTOLARYNGOLOGY

## 2024-08-02 PROCEDURE — 1160F RVW MEDS BY RX/DR IN RCRD: CPT | Performed by: OTOLARYNGOLOGY

## 2024-09-10 ENCOUNTER — LAB (OUTPATIENT)
Dept: LAB | Facility: LAB | Age: 69
End: 2024-09-10
Payer: MEDICARE

## 2024-09-10 DIAGNOSIS — E05.90 HYPERTHYROIDISM: ICD-10-CM

## 2024-09-10 LAB — TSH SERPL DL<=0.05 MIU/L-ACNC: 3.53 MIU/L (ref 0.27–4.2)

## 2024-09-10 PROCEDURE — 36415 COLL VENOUS BLD VENIPUNCTURE: CPT

## 2024-09-10 PROCEDURE — 84443 ASSAY THYROID STIM HORMONE: CPT

## 2024-09-16 ENCOUNTER — CLINICAL SUPPORT (OUTPATIENT)
Dept: AUDIOLOGY | Facility: CLINIC | Age: 69
End: 2024-09-16
Payer: MEDICARE

## 2024-09-16 DIAGNOSIS — H90.3 ASYMMETRICAL SENSORINEURAL HEARING LOSS: ICD-10-CM

## 2024-09-16 PROCEDURE — V5241 DISPENSING FEE, MONAURAL: HCPCS | Mod: AUDSP | Performed by: SOCIAL WORKER

## 2024-09-16 NOTE — PROGRESS NOTES
Name: Nevaeh Cuba    YOB: 1955    Age: 69 y.o.    Date of Evaluation:  09/16/2024    History :    Nevaeh Cuba, 69 y.o., was seen on 9/16/2024 for the activation of her OSIA2 (I) processor at the Right ear. Nevaeh was implanted on by Dr. Dwyer on 7/24/24. Nevaeh reports the post operative course is unremarkable. Nevaeh Cuba has a history of single sided deafness, right ear. She has previously used a CROS aid to attempt to manage her right ear hearing loss. She perceived no benefit from the CROS system.    IMPLANT  : [email or web address]  Implant: [id number]  Surgeon: Dr. Dwyer  Surgery Date: 7/24/24    SOUND PROCESSOR  Model: OSIA2(I)  SN: [id number]    Processor was programming via [employer] via the fitting software. Feedback manager was completed. Additional programming included decreasing low frequency gain, adding a Noise program in slot 2.  Coupled via 3(I) magnet. Ordered a 2(I) magnet from [email or web address] and will switch the magnet strength at next appointment    The wireless mini microphone was paired and use was demonstrated  The [employer] ganga was not downloaded. Patient is not interested in using the [place] at this time  Purchase Agreement was signed and reviewed  Patient requested to be billed for the dispensing fee    Treatment Plan:  1.      Follow-up with Dr. Dwyer as directed  2.      Return in 2-4 weeks for aided testing  3.      Annual hearing tests    Time: 9570-2079

## 2024-09-19 ENCOUNTER — CLINICAL SUPPORT (OUTPATIENT)
Dept: AUDIOLOGY | Facility: CLINIC | Age: 69
End: 2024-09-19
Payer: MEDICARE

## 2024-09-19 DIAGNOSIS — H90.3 ASYMMETRICAL SENSORINEURAL HEARING LOSS: ICD-10-CM

## 2024-09-19 NOTE — PROGRESS NOTES
Nevaeh was seen 9/19/24 for an OSIA2 processor check due to need for weaker magnet. She is experiencing discomfort and a pink spot on her head from the 3(I) magnet.  A 2(I) magnet was ordered from [employer] on 9/16/24    Nevaeh Cuba, 69 y.o., was seen on 9/16/2024 for the activation of her OSIA2 (I) processor at the Right ear. Nevaeh was implanted on by Dr. Dwyer on 7/24/24. Nevaeh reports the post operative course is unremarkable. Nevaeh Cuba has a history of single sided deafness, right ear. She has previously used a CROS aid to attempt to manage her right ear hearing loss. She perceived no benefit from the CROS system.     IMPLANT  : [employer]  Implant: [id number]  Surgeon: Dr. Dwyer  Surgery Date: 7/24/24     SOUND PROCESSOR  Model: OSIA2(I)  SN: [id number]     The 2(I) magnet was placed in the OSIA2 processor today.  Patient was instructed to not wear the processor the rest of today or tonight to allow her skin to calm down.     Treatment Plan:  1.      Follow-up with Dr. Dwyer as directed  2.      Return in 2-4 weeks for aided testing  3.      Annual hearing tests

## 2024-09-20 ENCOUNTER — APPOINTMENT (OUTPATIENT)
Dept: OTOLARYNGOLOGY | Facility: CLINIC | Age: 69
End: 2024-09-20
Payer: MEDICARE

## 2024-09-20 VITALS — BODY MASS INDEX: 29.03 KG/M2 | HEIGHT: 67 IN | WEIGHT: 185 LBS

## 2024-09-20 DIAGNOSIS — H90.A21 SENSORINEURAL HEARING LOSS (SNHL) OF RIGHT EAR WITH RESTRICTED HEARING OF LEFT EAR: Primary | ICD-10-CM

## 2024-09-20 PROCEDURE — 3008F BODY MASS INDEX DOCD: CPT | Performed by: OTOLARYNGOLOGY

## 2024-09-20 PROCEDURE — 99024 POSTOP FOLLOW-UP VISIT: CPT | Performed by: OTOLARYNGOLOGY

## 2024-09-20 PROCEDURE — 1160F RVW MEDS BY RX/DR IN RCRD: CPT | Performed by: OTOLARYNGOLOGY

## 2024-09-20 PROCEDURE — 1036F TOBACCO NON-USER: CPT | Performed by: OTOLARYNGOLOGY

## 2024-09-20 PROCEDURE — 1159F MED LIST DOCD IN RCRD: CPT | Performed by: OTOLARYNGOLOGY

## 2024-09-20 ASSESSMENT — PATIENT HEALTH QUESTIONNAIRE - PHQ9
1. LITTLE INTEREST OR PLEASURE IN DOING THINGS: NOT AT ALL
SUM OF ALL RESPONSES TO PHQ9 QUESTIONS 1 AND 2: 0
2. FEELING DOWN, DEPRESSED OR HOPELESS: NOT AT ALL

## 2024-09-20 NOTE — PROGRESS NOTES
History of present illness:  Nevaeh Cuba 69 y.o. female presenting today for follow-up. She is status post right ear bone conducting implant insertion. She had some soreness at the magnet site, which was replaced by a weaker magnet yesterday by Dr. Groves, which she states is much better. She is getting used to it and able to hear sounds she has not been able to hear before, especially when walking.     RECALL 8/2/24:   Nevaeh Cuba is a 69 y.o. female presenting today for post-op visit. She is status post right ear bone conducting implant insertion. She had developed a pruritic rash surrounding the surgical area. Hydrocortisone cream seemed to help.      RECALL 04/23/2024:  Nevaeh Cuba is a 68 y.o. female presenting today for discussion of BAHA vs OSIA.  Initially presented for evaluation of asymmetrical sensorineural hearing loss secondary to longstanding Ménière's disease.  Given that she is on Medicare she would not qualify for cochlear implantation for asymmetric hearing loss and a Baha evaluation was conducted.  She was noted to be an excellent audiometric candidate given that her aided performance which is much improved and the aided condition with the processor applied the headband.  She is interested in having the transcutaneous option or ossea.  RECALL 03/01/2024  Nevaeh Cuba is a 68 y.o. female is referred by by Dr Marino for possible cochlear implant for rehabilitation of progressive hearing loss. The patient is accompanied by her suster.   Reports history of Meniere's disease diagnosed at the age of 41 years of age. Notes her antihypertensive medication takes care of her Meniere's disease.     The approximate duration of the patient's hearing loss is since her forties when she was diagnosed with Meniere's disease. The patient currently is aided in both with a Bicros divide with limited benefit and appears to be motivated with realistic expectations.  They  deny ear pain, discharge from ear, tinnitus,  dizziness and vertigo. They also deny a history of prior ear surgery, noise exposure, exposure to ototoxic drugs or agents, and family history of hearing loss.  The patient's current medications, active allergies and list of medical problems were reviewed in the EHR and confirmed electronically there are as follows;  Allergies:   Allergies   Allergen Reactions    Latex Itching and Rash    Methotrexate Itching     HEAD BURNING AND ITCHING    Metoprolol Rash    Diltiazem Hcl Itching     Rash face     Latex, Natural Rubber Itching    Pollen Extracts Other     NASAL CONGESTION    Adhesive Tape-Silicones Rash    Folic Acid Rash    Sulfamethoxazole-Trimethoprim Rash     Tinnitus      Current list of medications:   Current Outpatient Medications   Medication Sig Dispense Refill    acetaminophen (Tylenol) 325 mg tablet Take 2 tablets (650 mg) by mouth every 6 hours if needed for mild pain (1 - 3) 20 tablet 0    atorvastatin (Lipitor) 20 mg tablet TAKE 1 TABLET DAILY 90 tablet 0    cholecalciferol (Vitamin D-3) 25 MCG (1000 UT) tablet Take 2 tablets (2,000 Units) by mouth. As directed      docusate sodium (Colace) 100 mg capsule Take 1 capsule (100 mg) by mouth once daily as needed.      fexofenadine HCl (ALLEGRA ORAL) Take 1 tablet by mouth once daily as needed (ALLERGIES).      ibuprofen 200 mg tablet Take 2 tablets (400 mg) by mouth every 6 hours if needed for mild pain (1 - 3).      methIMAzole (Tapazole) 5 mg tablet Take 1 tablet (5 mg) by mouth once daily. 90 tablet 2    mv-min-FA-vit K-lutein-zeaxant (PreserVision AREDS 2 Plus MV) 200 mcg-15 mcg- 5 mg-1 mg capsule Take 1 capsule by mouth once daily.      OXcarbazepine (Trileptal) 150 mg tablet Take 0.5 tablets (75 mg) by mouth every other day. 1/2 tab daily, weaning off of it      triamterene-hydrochlorothiazid (Maxzide-25) 37.5-25 mg tablet Take 1 tablet by mouth once daily in the morning. 90 tablet 3     No current facility-administered medications for this visit.      Problem list:  Patient Active Problem List   Diagnosis    Abdominal pain    Shoulder arthritis    Asthma (HHS-HCC)    Back pain with radiation    Benign essential hypertension    Cataract    Diverticulitis    Dyslipidemia    Hyperlipidemia    Hypertensive disorder    Hyperthyroidism    Impaired fasting glucose    Major depressive disorder, single episode, unspecified    Ménière's disease    Nocturnal enuresis    Numbness of foot    Pain of ear structure    Other obesity    Bile-induced gastritis    Vitamin D deficiency    Acute bronchitis    Acute otitis externa    Acute sinusitis    Allergic rhinitis    Sensorineural hearing loss (SNHL) of both ears    Estrogen deficiency    Diverticulitis of large intestine    Enlarged tonsils    History of malignant neoplasm    History of Meniere's syndrome    Itching    Lumbar radiculopathy    Pure hypercholesterolemia    Rash    Seasonal allergies    Essential hypertension    Inflammation of joint of shoulder region    Injury due to exposure to external cause    Encounter for other specified aftercare    Laceration of left ring finger    Edema of both lower extremities    Peripheral venous insufficiency    Varicose veins of lower extremity with inflammation    Sensorineural hearing loss (SNHL) of right ear with restricted hearing of left ear    Encounter for cosmetic surgery         Physical Examination:  CONSTITUTIONAL:  No acute distress  VOICE:  No hoarseness or other abnormality  RESPIRATION:  Breathing comfortably, no stridor  CV:  No clubbing/cyanosis/edema in hands  EYES:  EOM intact, sclera clear  NEURO:  Alert and oriented times 3, Cranial nerves II-XII grossly intact and symmetric bilaterally  HEAD AND FACE:  Symmetric facial features, no masses or lesions  RIGHT EAR: Incision is well healed. Magnet site with mild erythema, appears to be improving. Ear canals are clear. Normal TM.  Normal external ear and post auricular area, no visible lesions, external auditory  canal patent, tympanic membrane intact, no retraction, no signs of mass, effusion, or infection within the middle ear  LEFT EAR: Normal external ear and post auricular area, no visible lesions, external auditory canal patent, tympanic membrane intact, no retraction, no signs of mass, effusion, or infection within the middle ear  NOSE:  Anterior rhinoscopy clear, no significant external deformity.  ORAL CAVITY/OROPHARYNX/LIPS:  normal lining, mobile tongue.  PHARYNGEAL WALLS: Moist mucosal lining, good palatal elevation  NECK/LYMPH:  No LAD, no thyroid masses, trachea midline  SKIN:  Neck and facial skin is without scar or injury  PSYCH:  Alert and oriented with appropriate mood and affect      Impression:  Asymmetric  Sensorineural hearing loss  Right sided Meniere's Disease, Inactive  Status post right bone conducting implant placement     Recommendation:  Continue using OSIA. Follow-up in 1 year.     I discussed with the patient the complexity of my medical decision making including the treatment and testing rational, indications of their elective procedure and possible adverse effects and/or complications. Based on the provided documentation and my professional assessment of this patient's [acute condition/acute exacerbation of their chronic condition/newly diagnosed condition/progression of their condition/complicated course of their medical condition], the complexity of evaluation and treatment is [low-moderate-high].    This note was created using speech recognition transcription software. Despite proofreading, several typographical errors might be present that might affect the meaning of the content. Please call with any questions.      Scribe Attestation  By signing my name below, IGladys , Scribe attest that this documentation has been prepared under the direction and in the presence of Conner Dwyer MD.

## 2024-10-07 ENCOUNTER — CLINICAL SUPPORT (OUTPATIENT)
Dept: AUDIOLOGY | Facility: CLINIC | Age: 69
End: 2024-10-07
Payer: MEDICARE

## 2024-10-07 DIAGNOSIS — H90.3 ASYMMETRICAL SENSORINEURAL HEARING LOSS: ICD-10-CM

## 2024-10-07 PROCEDURE — 92626 EVAL AUD FUNCJ 1ST HOUR: CPT | Mod: 59 | Performed by: SOCIAL WORKER

## 2024-10-07 NOTE — PROGRESS NOTES
Name: Nevaeh Cuba  YOB: 1955  Age: 69 y.o.    Date of Evaluation:  10/7/24    History:  Reason for visit:  Nevaeh Cuba presents for a BAHA/OSIA Check (LIZETH Evaluation) (Follow up check from OSIA2 dispense 9.16.24).  She reports hearing well in most situations. She reports full time use. Her battery is lasting approximately 5 days    Results:  Datalogging reveals 5.77 hours per day use  No programming changes were made today    Aided testing was completed at user settings and in the soundfield with the left ear plugged and muffed. Aided detection to warble tones was obtained at 20-30 dBHL for 250-6000 Hz  Speech noise was administered via the left speaker and speech audiometry was administered via the right speaker.    Aided word recognition testing was performed with recorded CNC words at 50 dBHL with a +10 SNR .  Right - 100 %  Unaided word recognition in the same condition was 4% correct    Treatment Plan:  Consistent use of the OSIA2 processor  Return in March 2025 for re-evaluation    SAJAN De Paz, CCC-A  Audiologist    Time:

## 2024-10-14 ENCOUNTER — CLINICAL SUPPORT (OUTPATIENT)
Dept: AUDIOLOGY | Facility: CLINIC | Age: 69
End: 2024-10-14
Payer: MEDICARE

## 2024-10-14 DIAGNOSIS — H90.3 ASYMMETRICAL SENSORINEURAL HEARING LOSS: ICD-10-CM

## 2024-10-14 NOTE — PROGRESS NOTES
Nevaeh Cuba was seen 10/14/24 for an OSIA2 check.  OSIA2 was programmed on 9/16/24  She was last seen 10/7/24.     She is concerned for retention. An alligator clip on a safety line was attached 10/7/24  She would like to change to the hair clip retention device.    A clinic hair clip and short double loop safety line was added today    Treatment Plan:  Consistent use of the OSIA2 processor  Annual audiograms to monitor hearing    [id number]

## 2024-10-16 ENCOUNTER — OFFICE VISIT (OUTPATIENT)
Dept: PRIMARY CARE | Facility: CLINIC | Age: 69
End: 2024-10-16
Payer: COMMERCIAL

## 2024-10-16 VITALS
SYSTOLIC BLOOD PRESSURE: 138 MMHG | DIASTOLIC BLOOD PRESSURE: 82 MMHG | OXYGEN SATURATION: 97 % | HEART RATE: 70 BPM | TEMPERATURE: 97.2 F | BODY MASS INDEX: 28.72 KG/M2 | HEIGHT: 67 IN | WEIGHT: 183 LBS

## 2024-10-16 DIAGNOSIS — Z12.31 ENCOUNTER FOR SCREENING MAMMOGRAM FOR BREAST CANCER: ICD-10-CM

## 2024-10-16 DIAGNOSIS — I10 ESSENTIAL HYPERTENSION: Primary | ICD-10-CM

## 2024-10-16 DIAGNOSIS — E55.9 VITAMIN D DEFICIENCY: ICD-10-CM

## 2024-10-16 DIAGNOSIS — E78.2 MIXED HYPERLIPIDEMIA: ICD-10-CM

## 2024-10-16 DIAGNOSIS — R73.9 HYPERGLYCEMIA: ICD-10-CM

## 2024-10-16 DIAGNOSIS — E05.90 HYPERTHYROIDISM: ICD-10-CM

## 2024-10-16 PROBLEM — E78.5 DYSLIPIDEMIA: Status: RESOLVED | Noted: 2023-09-15 | Resolved: 2024-10-16

## 2024-10-16 PROBLEM — E78.00 PURE HYPERCHOLESTEROLEMIA: Status: RESOLVED | Noted: 2023-11-15 | Resolved: 2024-10-16

## 2024-10-16 PROBLEM — H60.509 ACUTE OTITIS EXTERNA: Status: RESOLVED | Noted: 2023-11-15 | Resolved: 2024-10-16

## 2024-10-16 PROBLEM — T75.89XA INJURY DUE TO EXPOSURE TO EXTERNAL CAUSE: Status: RESOLVED | Noted: 2023-02-07 | Resolved: 2024-10-16

## 2024-10-16 PROBLEM — J20.9 ACUTE BRONCHITIS: Status: RESOLVED | Noted: 2023-11-15 | Resolved: 2024-10-16

## 2024-10-16 PROBLEM — Z51.89 ENCOUNTER FOR OTHER SPECIFIED AFTERCARE: Status: RESOLVED | Noted: 2023-04-26 | Resolved: 2024-10-16

## 2024-10-16 PROBLEM — H92.09 PAIN OF EAR STRUCTURE: Status: RESOLVED | Noted: 2023-09-15 | Resolved: 2024-10-16

## 2024-10-16 PROBLEM — L29.9 ITCHING: Status: RESOLVED | Noted: 2023-11-15 | Resolved: 2024-10-16

## 2024-10-16 PROBLEM — R21 RASH: Status: RESOLVED | Noted: 2023-11-15 | Resolved: 2024-10-16

## 2024-10-16 PROBLEM — R10.9 ABDOMINAL PAIN: Status: RESOLVED | Noted: 2023-09-15 | Resolved: 2024-10-16

## 2024-10-16 PROBLEM — J01.90 ACUTE SINUSITIS: Status: RESOLVED | Noted: 2023-11-15 | Resolved: 2024-10-16

## 2024-10-16 PROBLEM — E66.89 OTHER OBESITY: Status: RESOLVED | Noted: 2023-09-15 | Resolved: 2024-10-16

## 2024-10-16 PROBLEM — S61.215A LACERATION OF LEFT RING FINGER: Status: RESOLVED | Noted: 2023-02-07 | Resolved: 2024-10-16

## 2024-10-16 PROBLEM — Z41.1 ENCOUNTER FOR COSMETIC SURGERY: Status: RESOLVED | Noted: 2024-05-06 | Resolved: 2024-10-16

## 2024-10-16 PROBLEM — J30.2 SEASONAL ALLERGIES: Status: RESOLVED | Noted: 2023-11-15 | Resolved: 2024-10-16

## 2024-10-16 PROCEDURE — 3075F SYST BP GE 130 - 139MM HG: CPT | Performed by: NURSE PRACTITIONER

## 2024-10-16 PROCEDURE — 1126F AMNT PAIN NOTED NONE PRSNT: CPT | Performed by: NURSE PRACTITIONER

## 2024-10-16 PROCEDURE — 3079F DIAST BP 80-89 MM HG: CPT | Performed by: NURSE PRACTITIONER

## 2024-10-16 PROCEDURE — 1159F MED LIST DOCD IN RCRD: CPT | Performed by: NURSE PRACTITIONER

## 2024-10-16 PROCEDURE — 1160F RVW MEDS BY RX/DR IN RCRD: CPT | Performed by: NURSE PRACTITIONER

## 2024-10-16 PROCEDURE — 1036F TOBACCO NON-USER: CPT | Performed by: NURSE PRACTITIONER

## 2024-10-16 PROCEDURE — 3008F BODY MASS INDEX DOCD: CPT | Performed by: NURSE PRACTITIONER

## 2024-10-16 PROCEDURE — 99214 OFFICE O/P EST MOD 30 MIN: CPT | Performed by: NURSE PRACTITIONER

## 2024-10-16 RX ORDER — METHIMAZOLE 5 MG/1
5 TABLET ORAL DAILY
Qty: 90 TABLET | Refills: 3 | Status: SHIPPED | OUTPATIENT
Start: 2024-10-16

## 2024-10-16 RX ORDER — ATORVASTATIN CALCIUM 20 MG/1
20 TABLET, FILM COATED ORAL DAILY
Qty: 90 TABLET | Refills: 3 | Status: SHIPPED | OUTPATIENT
Start: 2024-10-16

## 2024-10-16 ASSESSMENT — LIFESTYLE VARIABLES
HOW MANY STANDARD DRINKS CONTAINING ALCOHOL DO YOU HAVE ON A TYPICAL DAY: PATIENT DOES NOT DRINK
HOW OFTEN DURING THE LAST YEAR HAVE YOU NEEDED AN ALCOHOLIC DRINK FIRST THING IN THE MORNING TO GET YOURSELF GOING AFTER A NIGHT OF HEAVY DRINKING: NEVER
SKIP TO QUESTIONS 9-10: 1
HOW OFTEN DURING THE LAST YEAR HAVE YOU BEEN UNABLE TO REMEMBER WHAT HAPPENED THE NIGHT BEFORE BECAUSE YOU HAD BEEN DRINKING: NEVER
HOW OFTEN DURING THE LAST YEAR HAVE YOU HAD A FEELING OF GUILT OR REMORSE AFTER DRINKING: NEVER
HAVE YOU OR SOMEONE ELSE BEEN INJURED AS A RESULT OF YOUR DRINKING: NO
HOW OFTEN DO YOU HAVE SIX OR MORE DRINKS ON ONE OCCASION: NEVER
HOW OFTEN DO YOU HAVE A DRINK CONTAINING ALCOHOL: NEVER
HOW OFTEN DURING THE LAST YEAR HAVE YOU FOUND THAT YOU WERE NOT ABLE TO STOP DRINKING ONCE YOU HAD STARTED: NEVER
HAS A RELATIVE, FRIEND, DOCTOR, OR ANOTHER HEALTH PROFESSIONAL EXPRESSED CONCERN ABOUT YOUR DRINKING OR SUGGESTED YOU CUT DOWN: NO
HOW OFTEN DURING THE LAST YEAR HAVE YOU FAILED TO DO WHAT WAS NORMALLY EXPECTED FROM YOU BECAUSE OF DRINKING: NEVER
AUDIT-C TOTAL SCORE: 0
AUDIT TOTAL SCORE: 0

## 2024-10-16 ASSESSMENT — ENCOUNTER SYMPTOMS
DEPRESSION: 0
FATIGUE: 0
HEADACHES: 0
FEVER: 0
SHORTNESS OF BREATH: 0
DIZZINESS: 0
VOMITING: 0
OCCASIONAL FEELINGS OF UNSTEADINESS: 0
FACIAL ASYMMETRY: 0
PALPITATIONS: 0
DIAPHORESIS: 0
CHILLS: 0
NAUSEA: 0
LOSS OF SENSATION IN FEET: 0
COUGH: 0

## 2024-10-16 ASSESSMENT — PAIN SCALES - GENERAL: PAINLEVEL_OUTOF10: 0-NO PAIN

## 2024-10-16 NOTE — PROGRESS NOTES
[facility]: [facility]  PROGRESS NOTE      Nevaeh Cuba is a 69 y.o. female that is presenting today for Establish Care.    Ms. Cuba had her Medicare Annual wellness Exam with Dr. Noriega 04/12/24. Since then, she has had mastopexy with axillary roll resection with Cosmetic surgeon, Dr. Mancera on 05/07/24.  She has been followed by ENT and Audiology for sensorineural hearing loss of right ear with restricted hearing of left ear. She had surgery for insertion bone conducting implant right ear on 07/24/24.    She reports taking antihypertensives as directed. Trying to follow a low sodium diet, She is not monitoring home BP. Denies CP, SOB, dizziness, syncope or HA.    She reports tolerating statin. Denies statin related abdominal pain, arthralgias or myalgias.    She is followed by endocrinology, Dr. Johnston, for hyperthyroidism.    Assessment/Plan   Diagnoses and all orders for this visit:    Essential hypertension        -     acceptable control        -     maxzide-25  1 tablet daily    Mixed hyperlipidemia  -     tolerating statin  -     atorvastatin (Lipitor) 20 mg tablet; Take 1 tablet (20 mg) by mouth once daily.    Hyperthyroidism  -     Continue established follow up with endocrinology  -     methIMAzole (Tapazole) 5 mg tablet; Take 1 tablet (5 mg) by mouth once daily.    Encounter for screening mammogram for breast cancer  -     BI mammo bilateral screening tomosynthesis; Future    Subjective   HPI  Review of Systems   Constitutional:  Negative for chills, diaphoresis, fatigue and fever.   Respiratory:  Negative for cough and shortness of breath.    Cardiovascular:  Negative for chest pain, palpitations and leg swelling.   Gastrointestinal:  Negative for nausea and vomiting.   Endocrine: Negative for cold intolerance and heat intolerance.   Neurological:  Negative for dizziness, facial asymmetry and headaches.      Objective   Vitals:    10/16/24 1129   BP: 138/82   Pulse: 70   Temp:  "36.2 °C (97.2 °F)   SpO2: 97%      Body mass index is 29.09 kg/m².  Physical Exam  Vitals and nursing note reviewed.   Constitutional:       General: She is not in acute distress.  Neck:      Vascular: No carotid bruit.   Cardiovascular:      Rate and Rhythm: Normal rate and regular rhythm.      Heart sounds: Normal heart sounds.   Pulmonary:      Effort: Pulmonary effort is normal.      Breath sounds: Normal breath sounds.   Musculoskeletal:      Right lower leg: No edema.      Left lower leg: No edema.   Skin:     General: Skin is warm and dry.   Neurological:      Mental Status: She is alert. Mental status is at baseline.   Psychiatric:         Mood and Affect: Mood normal.       Diagnostic Results   Lab Results   Component Value Date    GLUCOSE 99 06/25/2024    CALCIUM 9.5 06/25/2024     06/25/2024    K 4.1 06/25/2024    CO2 27 06/25/2024     06/25/2024    BUN 15 06/25/2024    CREATININE 0.80 06/25/2024     Lab Results   Component Value Date    ALT 12 06/25/2024    AST 13 06/25/2024    ALKPHOS 85 06/25/2024    BILITOT 0.3 06/25/2024     Lab Results   Component Value Date    WBC 6.7 06/25/2024    HGB 13.7 06/25/2024    HCT 43.0 06/25/2024    MCV 93 06/25/2024     06/25/2024     Lab Results   Component Value Date    CHOL 224 (H) 04/12/2024    CHOL 194 10/06/2023    CHOL 210 (H) 03/14/2023     Lab Results   Component Value Date    HDL 58.0 04/12/2024    HDL 58.0 10/06/2023    HDL 61 03/14/2023     Lab Results   Component Value Date    LDLCALC 102 04/12/2024    LDLCALC 88 10/06/2023    LDLCALC 117 03/14/2023     Lab Results   Component Value Date    TRIG 318 (H) 04/12/2024    TRIG 240 (H) 10/06/2023    TRIG 158 (H) 03/14/2023     No components found for: \"CHOLHDL\"  Lab Results   Component Value Date    HGBA1C 5.5 06/25/2024     Other labs not included in the list above were reviewed either before or during this encounter.    History    Past Medical History:   Diagnosis Date    Benign essential " hypertension     Estrogen deficiency 11/15/2023    DEXA 1/24 back nl, hip neck T-2.1 recheck 1/26    History of atrial fibrillation     Hyperactive thyroid Dr.Burtch Rand [place] NSR no AC    History of Meniere's syndrome 11/15/2023    Hyperthyroidism 09/15/2023    Other specified abnormal findings of blood chemistry     High serum cholesterol sulfate    Personal history of malignant neoplasm, unspecified     History of malignant neoplasm    Personal history of other mental and behavioral disorders     Unspecified osteoarthritis, unspecified site 04/28/2016    Arthritis     Past Surgical History:   Procedure Laterality Date    COSMETIC SURGERY      HYSTERECTOMY  04/28/2016    Hysterectomy    JOINT REPLACEMENT      rt ring finger    JOINT REPLACEMENT Right 05/2022    ring finger    OTHER SURGICAL HISTORY  04/28/2016    Musculoskeletal Procedures Injection Carpal Tunnel    OTHER SURGICAL HISTORY  04/28/2016    Arthrodesis MCP Joint    OTHER SURGICAL HISTORY  01/11/2021    Ankle surgery    OTHER SURGICAL HISTORY  01/11/2021    Carpal tunnel surgery    OTHER SURGICAL HISTORY  07/06/2022    Nasal septoplasty    OTHER SURGICAL HISTORY  07/2021    heart shock catherization    SHOULDER SURGERY  08/2021    total left shoulder replacement    SINUS SURGERY      TRIGGER FINGER RELEASE Right 01/2024    middle finger     Family History   Problem Relation Name Age of Onset    Stroke Mother      Other (cyst on breast) Mother      Atrial fibrillation Father      Stroke Father      Heart disease Father       Social History     Socioeconomic History    Marital status:      Spouse name: Not on file    Number of children: Not on file    Years of education: Not on file    Highest education level: Not on file   Occupational History    Not on file   Tobacco Use    Smoking status: Never     Passive exposure: Never    Smokeless tobacco: Never   Vaping Use    Vaping status: Never Used   Substance and Sexual Activity    Alcohol  use: Never    Drug use: Never    Sexual activity: Defer   Other Topics Concern    Not on file   Social History Narrative    Not on file     Social Drivers of Health     Financial Resource Strain: Low Risk  (5/7/2024)    Overall Financial Resource Strain (CARDIA)     Difficulty of Paying Living Expenses: Not very hard   Food Insecurity: Not on file   Transportation Needs: No Transportation Needs (5/7/2024)    PRAPARE - Transportation     Lack of Transportation (Medical): No     Lack of Transportation (Non-Medical): No   Physical Activity: Not on file   Stress: Not on file   Social Connections: Not on file   Intimate Partner Violence: Not on file   Housing Stability: Low Risk  (5/7/2024)    Housing Stability Vital Sign     Unable to Pay for Housing in the Last Year: No     Number of Places Lived in the Last Year: 1     Unstable Housing in the Last Year: No     Allergies   Allergen Reactions    Latex Itching and Rash    Methotrexate Itching     HEAD BURNING AND ITCHING    Metoprolol Rash    Diltiazem Hcl Itching     Rash face     Latex, Natural Rubber Itching    Pollen Extracts Other     NASAL CONGESTION    Adhesive Tape-Silicones Rash    Folic Acid Rash    Sulfamethoxazole-Trimethoprim Rash     Tinnitus      Current Outpatient Medications on File Prior to Visit   Medication Sig Dispense Refill    acetaminophen (Tylenol) 325 mg tablet Take 2 tablets (650 mg) by mouth every 6 hours if needed for mild pain (1 - 3) 20 tablet 0    cholecalciferol (Vitamin D-3) 25 MCG (1000 UT) tablet Take 2 tablets (2,000 Units) by mouth. As directed      docusate sodium (Colace) 100 mg capsule Take 1 capsule (100 mg) by mouth once daily as needed.      fexofenadine HCl (ALLEGRA ORAL) Take 1 tablet by mouth once daily as needed (ALLERGIES).      ibuprofen 200 mg tablet Take 2 tablets (400 mg) by mouth every 6 hours if needed for mild pain (1 - 3).      mv-min-FA-vit K-lutein-zeaxant (PreserVision AREDS 2 Plus MV) 200 mcg-15 mcg- 5 mg-1 mg  capsule Take 1 capsule by mouth once daily.      triamterene-hydrochlorothiazid (Maxzide-25) 37.5-25 mg tablet Take 1 tablet by mouth once daily in the morning. 90 tablet 3    [DISCONTINUED] atorvastatin (Lipitor) 20 mg tablet TAKE 1 TABLET DAILY 90 tablet 0    [DISCONTINUED] methIMAzole (Tapazole) 5 mg tablet Take 1 tablet (5 mg) by mouth once daily. 90 tablet 2    [DISCONTINUED] OXcarbazepine (Trileptal) 150 mg tablet Take 0.5 tablets (75 mg) by mouth every other day. 1/2 tab daily, weaning off of it       No current facility-administered medications on file prior to visit.     Immunization History   Administered Date(s) Administered    COVID-19, subunit, rS-nanoparticle, adjuvanted, PF, 5 mcg/0.5 mL 09/28/2024    Flu vaccine, quadrivalent, high-dose, preservative free, age 65y+ (FLUZONE) 09/14/2023    Influenza, Seasonal, Quadrivalent, Adjuvanted 09/30/2021, 09/08/2022    Influenza, Unspecified 09/07/2010, 10/17/2011    Influenza, injectable, MDCK, quadrivalent 10/23/2017, 10/30/2018    Influenza, injectable, quadrivalent 09/23/2019, 09/11/2020    Influenza, seasonal, injectable 09/24/2012, 10/17/2013, 10/23/2014, 10/26/2015, 11/17/2016    Moderna COVID-19 vaccine, 12 years and older (50mcg/0.5mL)(Spikevax) 09/29/2023    Pfizer COVID-19 vaccine, bivalent, age 12 years and older (30 mcg/0.3 mL) 09/08/2022    Pfizer Gray Cap SARS-CoV-2 03/31/2022    Pfizer Purple Cap SARS-CoV-2 03/09/2021, 04/06/2021, 10/06/2021    Pneumococcal conjugate vaccine, 13-valent (PREVNAR 13) 07/11/2013    Pneumococcal conjugate vaccine, 20-valent (PREVNAR 20) 09/16/2023    Pneumococcal polysaccharide vaccine, 23-valent, age 2 years and older (PNEUMOVAX 23) 11/05/2020    RSV, 60 Years And Older (AREXVY) 09/16/2023    Tdap vaccine, age 7 year and older (BOOSTRIX, ADACEL) 09/30/2021    Zoster vaccine, recombinant, adult (SHINGRIX) 10/15/2020, 09/30/2021    Zoster, live 02/10/2012     Patient's medical history was reviewed and updated either  before or during this encounter.       Cari Almodovar, APRN-CNP

## 2025-01-06 ENCOUNTER — HOSPITAL ENCOUNTER (OUTPATIENT)
Dept: RADIOLOGY | Facility: CLINIC | Age: 70
Discharge: HOME | End: 2025-01-06
Payer: MEDICARE

## 2025-01-06 VITALS — BODY MASS INDEX: 28.72 KG/M2 | HEIGHT: 67 IN | WEIGHT: 183 LBS

## 2025-01-06 DIAGNOSIS — Z12.31 ENCOUNTER FOR SCREENING MAMMOGRAM FOR BREAST CANCER: ICD-10-CM

## 2025-01-06 PROCEDURE — 77063 BREAST TOMOSYNTHESIS BI: CPT | Performed by: RADIOLOGY

## 2025-01-06 PROCEDURE — 77067 SCR MAMMO BI INCL CAD: CPT | Performed by: RADIOLOGY

## 2025-01-06 PROCEDURE — 77067 SCR MAMMO BI INCL CAD: CPT

## 2025-01-06 NOTE — LETTER
January 13, 2025     Nevaeh Cuba  [address] [place] [address]      Dear Ms. Cuba:    Below are the results from your recent visit:    Resulted Orders   BI mammo bilateral screening tomosynthesis    Narrative    Interpreted By:  Dayami Howard,   STUDY:  BI MAMMO BILATERAL SCREENING TOMOSYNTHESIS;  1/6/2025 11:33 am      ACCESSION NUMBER(S):  [id number]      ORDERING CLINICIAN:  RICK VERDE      INDICATION:  Screening.      ,Z12.31 Encounter for screening mammogram for malignant neoplasm of  breast      COMPARISON:  01/04/2024, 01/03/2023      FINDINGS:  2D and tomosynthesis images were reviewed at 1 mm slice thickness.      Density:  The breasts are heterogeneously dense, which may obscure  small masses.      Breast morphology is consistent with interval mastopexy. No  suspicious masses or calcifications are identified.        Impression    No mammographic evidence of malignancy.      BI-RADS CATEGORY:  BI-RADS Category:  2 Benign.  Recommendation:  Annual Screening.  Recommended Date:  1 Year.  Laterality:  Bilateral.              For any future breast imaging appointments, please call [phone]).          MACRO:  None      Signed by: Dayami Howard 1/9/2025 4:05 PM  Dictation workstation:   [id number]   Negative. Annual breast cancer screening is recommended.     If you have any questions or concerns, please don't hesitate to call.         Sincerely,    KARLI Zavala [facility]

## 2025-03-08 LAB — TSH SERPL-ACNC: 3.26 MIU/L (ref 0.4–4.5)

## 2025-03-13 NOTE — PROGRESS NOTES
HPI   70 yo with asthma, OA, MIKAYLA, dyslipidemia, osteopenia, new onset aflutter (June 2021) presents in f/u. Pt had cardiac ablation and has been in sinus rhythm since july 2021.           Had labs june 24, 2021 with TSH-0.01, Ft4-3.4. + FH of hashimoto's disease in her mom and sister.            Dx'ed with Graves disease based on june 2021 labs and started methimazole 10mg the end of June 2021, came off sept 2022 and tsh was lowering 0.17 oct 2022 with sx. Back on methimazole 5mg and euthyroid, see labs below.          Since last visit:   Taking 5mg methimazole daily, TSH is normal, see below. Pt prefers to stay on this long term.   Pt is euthyroid, no obstructive sx. Some itchy/allergy eyes.    9/24 tsh-3.53, 3/25 tsh-3.26     -pt working on lifestyle changes, lost over 30 lbs since last visit        Current Outpatient Medications:     acetaminophen (Tylenol) 325 mg tablet, Take 2 tablets (650 mg) by mouth every 6 hours if needed for mild pain (1 - 3), Disp: 20 tablet, Rfl: 0    atorvastatin (Lipitor) 20 mg tablet, Take 1 tablet (20 mg) by mouth once daily., Disp: 90 tablet, Rfl: 3    cholecalciferol (Vitamin D-3) 25 MCG (1000 UT) tablet, Take 2 tablets (2,000 Units) by mouth. As directed, Disp: , Rfl:     docusate sodium (Colace) 100 mg capsule, Take 1 capsule (100 mg) by mouth once daily as needed., Disp: , Rfl:     ibuprofen 200 mg tablet, Take 2 tablets (400 mg) by mouth every 6 hours if needed for mild pain (1 - 3)., Disp: , Rfl:     methIMAzole (Tapazole) 5 mg tablet, Take 1 tablet (5 mg) by mouth once daily., Disp: 90 tablet, Rfl: 3    mv-min-FA-vit K-lutein-zeaxant (PreserVision AREDS 2 Plus MV) 200 mcg-15 mcg- 5 mg-1 mg capsule, Take 1 capsule by mouth once daily., Disp: , Rfl:     triamterene-hydrochlorothiazid (Maxzide-25) 37.5-25 mg tablet, Take 1 tablet by mouth once daily in the morning., Disp: 90 tablet, Rfl: 3    fexofenadine HCl (ALLEGRA ORAL), Take 1 tablet by mouth once daily as needed  (ALLERGIES)., Disp: , Rfl:       Allergies as of 03/14/2025 - Reviewed 03/14/2025   Allergen Reaction Noted    Latex Itching and Rash 09/15/2023    Methotrexate Itching 09/15/2023    Metoprolol Rash 09/15/2023    Diltiazem hcl Itching 09/15/2023    Latex, natural rubber Itching 11/16/2023    Pollen extracts Other 10/06/2023    Adhesive tape-silicones Rash 09/15/2023    Folic acid Rash 09/15/2023    Sulfamethoxazole-trimethoprim Rash 09/15/2023         Review of Systems   Cardiology: Lightheadedness-denies.  Chest pain-denies.  Leg edema-denies.  Palpitations-denies.  Respiratory: Cough-denies. Shortness of breath-denies.  Wheezing-denies.  Gastroenterology: Constipation-denies.  Diarrhea-denies.  Heartburn-denies.  Endocrinology: Cold intolerance-denies.  Heat intolerance-denies.  Sweats-denies.  Neurology: Headache-denies.  Tremor-denies.  Neuropathy in extremities-denies.  Psychology: Low energy-denies.  Irritability-denies.  Sleep disturbances-denies.      /69 (BP Location: Right arm, Patient Position: Sitting)   Pulse 58   Wt 76.2 kg (168 lb)   BMI 26.71 kg/m²       Labs:  Lab Results   Component Value Date    WBC 6.7 06/25/2024    NRBC 0.0 06/25/2024    RBC 4.64 06/25/2024    HGB 13.7 06/25/2024    HCT 43.0 06/25/2024     06/25/2024     Lab Results   Component Value Date    CALCIUM 9.5 06/25/2024    AST 13 06/25/2024    ALKPHOS 85 06/25/2024    BILITOT 0.3 06/25/2024    PROT 7.0 06/25/2024    ALBUMIN 4.1 06/25/2024    GLOB 2.9 03/14/2023    AGR 1.5 03/14/2023     06/25/2024    K 4.1 06/25/2024     06/25/2024    CO2 27 06/25/2024    ANIONGAP 10 06/25/2024    BUN 15 06/25/2024    CREATININE 0.80 06/25/2024    UREACREAUR 15.0 03/14/2023    GLUCOSE 99 06/25/2024    ALT 12 06/25/2024    EGFR 80 06/25/2024     Lab Results   Component Value Date    CHOL 224 (H) 04/12/2024    TRIG 318 (H) 04/12/2024    HDL 58.0 04/12/2024    LDLCALC 102 04/12/2024     No results found for:  "\"MICROALBCREA\"  Lab Results   Component Value Date    TSH 3.26 03/07/2025     No results found for: \"QJTJGDRH61\"  Lab Results   Component Value Date    HGBA1C 5.5 06/25/2024         Physical Exam   General Appearance: pleasant, cooperative, no acute distress  HEENT: no chemosis, no proptosis, no lid lag, no lid retraction  Neck: no lymphadenopathy, no thyromegaly, no dominant thyroid nodules  Heart: no murmurs, regular rate and rhythm, S1 and S2  Lungs: no wheezes, no rhonci, no rales  Extremities: no lower extremity swelling      Assessment/Plan   1. Hyperthyroidism (Primary)  -repeat labs in 6 months and then in 1 year  -refills sent  -pt wishes to continue this long term      Follow Up:  [place] 1 year    -labs/tests/notes reviewed  -reviewed and counseled patient on medication monitoring and side effects          "

## 2025-03-14 ENCOUNTER — APPOINTMENT (OUTPATIENT)
Dept: ENDOCRINOLOGY | Facility: CLINIC | Age: 70
End: 2025-03-14
Payer: MEDICARE

## 2025-03-14 VITALS
SYSTOLIC BLOOD PRESSURE: 133 MMHG | WEIGHT: 168 LBS | DIASTOLIC BLOOD PRESSURE: 69 MMHG | HEART RATE: 58 BPM | BODY MASS INDEX: 26.71 KG/M2

## 2025-03-14 DIAGNOSIS — E05.90 HYPERTHYROIDISM: Primary | ICD-10-CM

## 2025-03-14 PROCEDURE — 1036F TOBACCO NON-USER: CPT | Performed by: INTERNAL MEDICINE

## 2025-03-14 PROCEDURE — 3075F SYST BP GE 130 - 139MM HG: CPT | Performed by: INTERNAL MEDICINE

## 2025-03-14 PROCEDURE — 3078F DIAST BP <80 MM HG: CPT | Performed by: INTERNAL MEDICINE

## 2025-03-14 PROCEDURE — 1126F AMNT PAIN NOTED NONE PRSNT: CPT | Performed by: INTERNAL MEDICINE

## 2025-03-14 PROCEDURE — 99213 OFFICE O/P EST LOW 20 MIN: CPT | Performed by: INTERNAL MEDICINE

## 2025-03-14 RX ORDER — METHIMAZOLE 5 MG/1
5 TABLET ORAL DAILY
Qty: 90 TABLET | Refills: 3 | Status: SHIPPED | OUTPATIENT
Start: 2025-03-14

## 2025-03-14 ASSESSMENT — PATIENT HEALTH QUESTIONNAIRE - PHQ9
1. LITTLE INTEREST OR PLEASURE IN DOING THINGS: NOT AT ALL
SUM OF ALL RESPONSES TO PHQ9 QUESTIONS 1 & 2: 0
2. FEELING DOWN, DEPRESSED OR HOPELESS: NOT AT ALL

## 2025-03-14 ASSESSMENT — PAIN SCALES - GENERAL: PAINLEVEL_OUTOF10: 0-NO PAIN

## 2025-03-21 ENCOUNTER — HOSPITAL ENCOUNTER (EMERGENCY)
Facility: HOSPITAL | Age: 70
Discharge: HOME | End: 2025-03-21
Attending: STUDENT IN AN ORGANIZED HEALTH CARE EDUCATION/TRAINING PROGRAM
Payer: MEDICARE

## 2025-03-21 ENCOUNTER — APPOINTMENT (OUTPATIENT)
Dept: CARDIOLOGY | Facility: HOSPITAL | Age: 70
End: 2025-03-21
Payer: MEDICARE

## 2025-03-21 VITALS
HEIGHT: 66 IN | HEART RATE: 58 BPM | BODY MASS INDEX: 26.93 KG/M2 | SYSTOLIC BLOOD PRESSURE: 145 MMHG | OXYGEN SATURATION: 98 % | WEIGHT: 167.55 LBS | DIASTOLIC BLOOD PRESSURE: 78 MMHG | TEMPERATURE: 97.5 F | RESPIRATION RATE: 16 BRPM

## 2025-03-21 DIAGNOSIS — F41.9 ANXIETY: Primary | ICD-10-CM

## 2025-03-21 DIAGNOSIS — G47.00 INSOMNIA, UNSPECIFIED TYPE: ICD-10-CM

## 2025-03-21 LAB
ALBUMIN SERPL BCP-MCNC: 4.3 G/DL (ref 3.4–5)
ALP SERPL-CCNC: 57 U/L (ref 33–136)
ALT SERPL W P-5'-P-CCNC: 20 U/L (ref 7–45)
AMPHETAMINES UR QL SCN: NORMAL
ANION GAP SERPL CALCULATED.3IONS-SCNC: 14 MMOL/L (ref 10–20)
APAP SERPL-MCNC: <10 UG/ML
APPEARANCE UR: ABNORMAL
AST SERPL W P-5'-P-CCNC: 21 U/L (ref 9–39)
BARBITURATES UR QL SCN: NORMAL
BASOPHILS # BLD AUTO: 0.06 X10*3/UL (ref 0–0.1)
BASOPHILS NFR BLD AUTO: 0.7 %
BENZODIAZ UR QL SCN: NORMAL
BILIRUB SERPL-MCNC: 0.7 MG/DL (ref 0–1.2)
BILIRUB UR STRIP.AUTO-MCNC: NEGATIVE MG/DL
BUN SERPL-MCNC: 13 MG/DL (ref 6–23)
BZE UR QL SCN: NORMAL
CALCIUM SERPL-MCNC: 9 MG/DL (ref 8.6–10.3)
CANNABINOIDS UR QL SCN: NORMAL
CARDIAC TROPONIN I PNL SERPL HS: <3 NG/L (ref 0–13)
CARDIAC TROPONIN I PNL SERPL HS: <3 NG/L (ref 0–13)
CHLORIDE SERPL-SCNC: 101 MMOL/L (ref 98–107)
CO2 SERPL-SCNC: 23 MMOL/L (ref 21–32)
COLOR UR: YELLOW
CREAT SERPL-MCNC: 0.67 MG/DL (ref 0.5–1.05)
EGFRCR SERPLBLD CKD-EPI 2021: >90 ML/MIN/1.73M*2
EOSINOPHIL # BLD AUTO: 0.08 X10*3/UL (ref 0–0.7)
EOSINOPHIL NFR BLD AUTO: 0.9 %
ERYTHROCYTE [DISTWIDTH] IN BLOOD BY AUTOMATED COUNT: 12.2 % (ref 11.5–14.5)
ETHANOL SERPL-MCNC: <10 MG/DL
FENTANYL+NORFENTANYL UR QL SCN: NORMAL
GLUCOSE SERPL-MCNC: 117 MG/DL (ref 74–99)
GLUCOSE UR STRIP.AUTO-MCNC: NORMAL MG/DL
HCT VFR BLD AUTO: 45.8 % (ref 36–46)
HGB BLD-MCNC: 15.8 G/DL (ref 12–16)
IMM GRANULOCYTES # BLD AUTO: 0.02 X10*3/UL (ref 0–0.7)
IMM GRANULOCYTES NFR BLD AUTO: 0.2 % (ref 0–0.9)
KETONES UR STRIP.AUTO-MCNC: NEGATIVE MG/DL
LEUKOCYTE ESTERASE UR QL STRIP.AUTO: NEGATIVE
LYMPHOCYTES # BLD AUTO: 2.08 X10*3/UL (ref 1.2–4.8)
LYMPHOCYTES NFR BLD AUTO: 23.7 %
MCH RBC QN AUTO: 30.5 PG (ref 26–34)
MCHC RBC AUTO-ENTMCNC: 34.5 G/DL (ref 32–36)
MCV RBC AUTO: 88 FL (ref 80–100)
METHADONE UR QL SCN: NORMAL
MONOCYTES # BLD AUTO: 0.73 X10*3/UL (ref 0.1–1)
MONOCYTES NFR BLD AUTO: 8.3 %
NEUTROPHILS # BLD AUTO: 5.81 X10*3/UL (ref 1.2–7.7)
NEUTROPHILS NFR BLD AUTO: 66.2 %
NITRITE UR QL STRIP.AUTO: NEGATIVE
NRBC BLD-RTO: 0 /100 WBCS (ref 0–0)
OPIATES UR QL SCN: NORMAL
OXYCODONE+OXYMORPHONE UR QL SCN: NORMAL
PCP UR QL SCN: NORMAL
PH UR STRIP.AUTO: 7 [PH]
PLATELET # BLD AUTO: 280 X10*3/UL (ref 150–450)
POTASSIUM SERPL-SCNC: 3.5 MMOL/L (ref 3.5–5.3)
PROT SERPL-MCNC: 7 G/DL (ref 6.4–8.2)
PROT UR STRIP.AUTO-MCNC: NEGATIVE MG/DL
RBC # BLD AUTO: 5.18 X10*6/UL (ref 4–5.2)
RBC # UR STRIP.AUTO: NEGATIVE MG/DL
SALICYLATES SERPL-MCNC: <3 MG/DL
SODIUM SERPL-SCNC: 134 MMOL/L (ref 136–145)
SP GR UR STRIP.AUTO: 1.02
UROBILINOGEN UR STRIP.AUTO-MCNC: NORMAL MG/DL
WBC # BLD AUTO: 8.8 X10*3/UL (ref 4.4–11.3)

## 2025-03-21 PROCEDURE — 84484 ASSAY OF TROPONIN QUANT: CPT | Performed by: CLINICAL NURSE SPECIALIST

## 2025-03-21 PROCEDURE — 90839 PSYTX CRISIS INITIAL 60 MIN: CPT

## 2025-03-21 PROCEDURE — 36415 COLL VENOUS BLD VENIPUNCTURE: CPT | Performed by: CLINICAL NURSE SPECIALIST

## 2025-03-21 PROCEDURE — 99284 EMERGENCY DEPT VISIT MOD MDM: CPT | Mod: 25 | Performed by: STUDENT IN AN ORGANIZED HEALTH CARE EDUCATION/TRAINING PROGRAM

## 2025-03-21 PROCEDURE — 80307 DRUG TEST PRSMV CHEM ANLYZR: CPT | Performed by: CLINICAL NURSE SPECIALIST

## 2025-03-21 PROCEDURE — 81003 URINALYSIS AUTO W/O SCOPE: CPT | Performed by: CLINICAL NURSE SPECIALIST

## 2025-03-21 PROCEDURE — 80053 COMPREHEN METABOLIC PANEL: CPT | Performed by: CLINICAL NURSE SPECIALIST

## 2025-03-21 PROCEDURE — 93005 ELECTROCARDIOGRAM TRACING: CPT

## 2025-03-21 PROCEDURE — 85025 COMPLETE CBC W/AUTO DIFF WBC: CPT | Performed by: CLINICAL NURSE SPECIALIST

## 2025-03-21 PROCEDURE — 80320 DRUG SCREEN QUANTALCOHOLS: CPT | Performed by: CLINICAL NURSE SPECIALIST

## 2025-03-21 RX ORDER — HYDROXYZINE PAMOATE 50 MG/1
50 CAPSULE ORAL ONCE
Status: DISCONTINUED | OUTPATIENT
Start: 2025-03-21 | End: 2025-03-22 | Stop reason: HOSPADM

## 2025-03-21 RX ORDER — PROPRANOLOL HYDROCHLORIDE 10 MG/1
1 TABLET ORAL EVERY 8 HOURS PRN
Status: ON HOLD | COMMUNITY
Start: 2025-03-13

## 2025-03-21 RX ORDER — FLUVOXAMINE MALEATE 50 MG/1
1 TABLET, FILM COATED ORAL
Status: ON HOLD | COMMUNITY
Start: 2025-03-13

## 2025-03-21 RX ORDER — HYDROXYZINE HYDROCHLORIDE 25 MG/1
50 TABLET, FILM COATED ORAL NIGHTLY
Qty: 20 TABLET | Refills: 0 | Status: ON HOLD | OUTPATIENT
Start: 2025-03-21 | End: 2025-03-31

## 2025-03-21 RX ORDER — OXCARBAZEPINE 150 MG/1
150 TABLET, FILM COATED ORAL 2 TIMES DAILY
Status: ON HOLD | COMMUNITY
Start: 2025-03-06

## 2025-03-21 RX ORDER — OXCARBAZEPINE 300 MG/1
1 TABLET, FILM COATED ORAL
Status: ON HOLD | COMMUNITY
Start: 2025-03-18

## 2025-03-21 SDOH — ECONOMIC STABILITY: HOUSING INSECURITY: FEELS SAFE LIVING IN HOME: OTHER (COMMENT)

## 2025-03-21 SDOH — HEALTH STABILITY: MENTAL HEALTH: NON-SPECIFIC ACTIVE SUICIDAL THOUGHTS (PAST 1 MONTH): NO

## 2025-03-21 SDOH — HEALTH STABILITY: MENTAL HEALTH: ANXIETY SYMPTOMS: GENERALIZED

## 2025-03-21 SDOH — HEALTH STABILITY: MENTAL HEALTH: WISH TO BE DEAD (PAST 1 MONTH): NO

## 2025-03-21 SDOH — HEALTH STABILITY: MENTAL HEALTH: DEPRESSION SYMPTOMS: NO PROBLEMS REPORTED OR OBSERVED.

## 2025-03-21 SDOH — HEALTH STABILITY: MENTAL HEALTH: BEHAVIORS/MOOD: FLAT AFFECT

## 2025-03-21 SDOH — HEALTH STABILITY: MENTAL HEALTH: BEHAVIORAL HEALTH(WDL): EXCEPTIONS TO WDL

## 2025-03-21 SDOH — HEALTH STABILITY: MENTAL HEALTH: HAVE YOU EVER TRIED TO KILL YOURSELF?: NO

## 2025-03-21 SDOH — HEALTH STABILITY: MENTAL HEALTH: IN THE PAST FEW WEEKS, HAVE YOU WISHED YOU WERE DEAD?: YES

## 2025-03-21 SDOH — SOCIAL STABILITY: SOCIAL NETWORK: VISITOR BEHAVIORS: CALM;COOPERATIVE

## 2025-03-21 SDOH — HEALTH STABILITY: MENTAL HEALTH: ARE YOU HAVING THOUGHTS OF KILLING YOURSELF RIGHT NOW?: NO

## 2025-03-21 SDOH — SOCIAL STABILITY: SOCIAL INSECURITY: FAMILY BEHAVIORS: CALM;COOPERATIVE

## 2025-03-21 SDOH — HEALTH STABILITY: MENTAL HEALTH: IN THE PAST WEEK, HAVE YOU BEEN HAVING THOUGHTS ABOUT KILLING YOURSELF?: NO

## 2025-03-21 SDOH — HEALTH STABILITY: MENTAL HEALTH: SUICIDAL BEHAVIOR (LIFETIME): NO

## 2025-03-21 SDOH — HEALTH STABILITY: MENTAL HEALTH: IN THE PAST FEW WEEKS, HAVE YOU FELT THAT YOU OR YOUR FAMILY WOULD BE BETTER OFF IF YOU WERE DEAD?: NO

## 2025-03-21 ASSESSMENT — PAIN SCALES - GENERAL: PAINLEVEL_OUTOF10: 0 - NO PAIN

## 2025-03-21 ASSESSMENT — LIFESTYLE VARIABLES
SUBSTANCE_ABUSE_PAST_12_MONTHS: NO
PRESCIPTION_ABUSE_PAST_12_MONTHS: NO

## 2025-03-21 ASSESSMENT — PAIN - FUNCTIONAL ASSESSMENT: PAIN_FUNCTIONAL_ASSESSMENT: 0-10

## 2025-03-21 NOTE — ED PROVIDER NOTES
"Department of Emergency Medicine   ED  Provider Note  Admit Date/RoomTime: 3/21/2025  4:06 PM  ED Room: 15/[facility]        History of Present Illness:  Chief Complaint   Patient presents with    Psychiatric Evaluation     Pt sent in for eval, has hx of psych issues, not hi or si         Nevaeh Cuba is a 69 y.o. female history of personality disorder hyperlipidemia, hypothyroid, major depressive disorder, Ménière's disease, hypertension, hard of hearing presents to the emergency department with complaints of   anxiety to sleep.  Patient states she has been up all night for the past 3 weeks only able to sleep about 2 hours pacing.  She does not sleep during the day.  States she is eating and drinking per her normal.  Saw her psychiatric doctor today and advised her to come to the emergency department for evaluation   .  Patient denies thoughts wanting to harm herself or anyone else states she \"knows it is wrong \"patient denies any fever chills cough congestion runny nose sore throat no abdominal pain nausea vomiting complains of intermittent chest discomfort.  But reports her medications help.  Denies any chest pain at this time.  No fall or injury.   Review of Systems:   Pertinent positives and negatives are stated within HPI, all other systems reviewed and are negative.        --------------------------------------------- PAST HISTORY ---------------------------------------------  Past Medical History:  has a past medical history of Benign essential hypertension, Estrogen deficiency (11/15/2023), History of atrial fibrillation, History of breast lift (05/2024), History of Meniere's syndrome (11/15/2023), reduction mammoplasty (05/2024), Hyperthyroidism (09/15/2023), Other specified abnormal findings of blood chemistry, Personal history of malignant neoplasm, unspecified, Personal history of other mental and behavioral disorders, and Unspecified osteoarthritis, unspecified site (04/28/2016).    She has no past medical " history of Personal history of irradiation.  Past Surgical History:  has a past surgical history that includes Hysterectomy (04/28/2016); Other surgical history (04/28/2016); Other surgical history (04/28/2016); Other surgical history (01/11/2021); Other surgical history (01/11/2021); Other surgical history (07/06/2022); Joint replacement; Shoulder surgery (08/2021); Joint replacement (Right, 05/2022); Other surgical history (07/2021); Trigger finger release (Right, 01/2024); Sinus surgery; Cosmetic surgery; and Reduction mammaplasty (may 7 2024).  Social History:  reports that she has never smoked. She has never been exposed to tobacco smoke. She has never used smokeless tobacco. She reports that she does not drink alcohol and does not use drugs.  Family History: family history includes Atrial fibrillation in her father; Heart disease in her father; Stroke in her father and mother; cyst on breast in her mother.. Unless otherwise noted, family history is non contributory  The patient’s home medications have been reviewed.  Allergies: Latex; Methotrexate; Metoprolol; Diltiazem hcl; Latex, natural rubber; Pollen extracts; Adhesive tape-silicones; Folic acid; and Sulfamethoxazole-trimethoprim        ---------------------------------------------------PHYSICAL EXAM--------------------------------------    GENERAL APPEARANCE: Awake and alert.   Psych: Flat affect  VITAL SIGNS: As per the nurses' triage record.   HEENT: Normocephalic, atraumatic. Extraocular muscles are intact. Pupils equal round and reactive to light. Conjunctiva are pink. Negative scleral icterus. Mucous membranes are moist. Tongue in the midline. Pharynx was without erythema or exudates, uvula midline  NECK: Soft Nontender and supple, full gross ROM, no meningeal signs.  CHEST: Nontender to palpation. Clear to auscultation bilaterally. No rales, rhonchi, or wheezing.   HEART: S1, S2. Regular rate and rhythm. No murmurs, gallops or rubs.  Strong and  "equal pulses in the extremities.   ABDOMEN: Soft, nontender, nondistended, positive bowel sounds, no palpable masses.  MUSCULCSKELETAL: The calves are nontender to palpation. Full gross active range of motion. Ambulating on own with no acute difficulties  NEUROLOGICAL: Awake, alert and oriented x 3. Power intact in the upper and lower extremities. Sensation is intact to light touch in the upper and lower extremities.   IMMUNOLOGICAL: No lymphatic streaking noted   DERM: No petechiae, rashes, or ecchymoses.          ------------------------- NURSING NOTES AND VITALS REVIEWED ---------------------------  The nursing notes within the ED encounter and vital signs as below have been reviewed by myself  /85 (BP Location: Right arm, Patient Position: Sitting)   Pulse 60   Temp 36.4 °C (97.5 °F) (Oral)   Resp 18   Ht 1.676 m (5' 6\")   Wt 76 kg (167 lb 8.8 oz)   SpO2 96%   BMI 27.04 kg/m²     Oxygen Saturation Interpretation: 96% room air      The patient’s available past medical records and past encounters were reviewed.          -----------------------DIAGNOSTIC RESULTS------------------------  LABS:    Labs Reviewed   COMPREHENSIVE METABOLIC PANEL - Abnormal       Result Value    Glucose 117 (*)     Sodium 134 (*)     Potassium 3.5      Chloride 101      Bicarbonate 23      Anion Gap 14      Urea Nitrogen 13      Creatinine 0.67      eGFR >90      Calcium 9.0      Albumin 4.3      Alkaline Phosphatase 57      Total Protein 7.0      AST 21      Bilirubin, Total 0.7      ALT 20     URINALYSIS WITH REFLEX CULTURE AND MICROSCOPIC - Abnormal    Color, Urine Yellow      Appearance, Urine Ex.Turbid (*)     Specific Gravity, Urine 1.019      pH, Urine 7.0      Protein, Urine NEGATIVE      Glucose, Urine Normal      Blood, Urine NEGATIVE      Ketones, Urine NEGATIVE      Bilirubin, Urine NEGATIVE      Urobilinogen, Urine Normal      Nitrite, Urine NEGATIVE      Leukocyte Esterase, Urine NEGATIVE     ACUTE TOXICOLOGY " PANEL, BLOOD - Normal    Acetaminophen <10.0      Salicylate  <3      Alcohol <10     DRUG SCREEN,URINE - Normal    Amphetamine Screen, Urine Presumptive Negative      Barbiturate Screen, Urine Presumptive Negative      Benzodiazepines Screen, Urine Presumptive Negative      Cannabinoid Screen, Urine Presumptive Negative      Cocaine Metabolite Screen, Urine Presumptive Negative      Fentanyl Screen, Urine Presumptive Negative      Opiate Screen, Urine Presumptive Negative      Oxycodone Screen, Urine Presumptive Negative      PCP Screen, Urine Presumptive Negative      Methadone Screen, Urine Presumptive Negative      Narrative:     Drug screen results are presumptive and should not be used to assess   compliance with prescribed medication. Contact the performing [facility] laboratory   to add-on definitive confirmatory testing if clinically indicated.    Toxicology screening results are reported qualitatively. The concentration must   be greater than or equal to the cutoff to be reported as positive. The concentration   at which the screening test can detect an individual drug or metabolite varies.   The absence of expected drug(s) and/or drug metabolite(s) may indicate non-compliance,   inappropriate timing of specimen collection relative to drug administration, poor drug   absorption, diluted/adulterated urine, or limitations of testing. For medical purposes   only; not valid for forensic use.    Interpretive questions should be directed to the laboratory medical directors.   SERIAL TROPONIN-INITIAL - Normal    Troponin I, High Sensitivity <3      Narrative:     Less than 99th percentile of normal range cutoff-  Female and children under 18 years old <14 ng/L; Male <21 ng/L: Negative  Repeat testing should be performed if clinically indicated.     Female and children under 18 years old 14-50 ng/L; Male 21-50 ng/L:  Consistent with possible cardiac damage and possible increased clinical   risk. Serial measurements may  help to assess extent of myocardial damage.     >50 ng/L: Consistent with cardiac damage, increased clinical risk and  myocardial infarction. Serial measurements may help assess extent of   myocardial damage.      NOTE: Children less than 1 year old may have higher baseline troponin   levels and results should be interpreted in conjunction with the overall   clinical context.     NOTE: Troponin I testing is performed using a different   testing methodology at [facility] than at other   [facility]. Direct result comparisons should only   be made within the same method.   SERIAL TROPONIN, 1 HOUR - Normal    Troponin I, High Sensitivity <3      Narrative:     Less than 99th percentile of normal range cutoff-  Female and children under 18 years old <14 ng/L; Male <21 ng/L: Negative  Repeat testing should be performed if clinically indicated.     Female and children under 18 years old 14-50 ng/L; Male 21-50 ng/L:  Consistent with possible cardiac damage and possible increased clinical   risk. Serial measurements may help to assess extent of myocardial damage.     >50 ng/L: Consistent with cardiac damage, increased clinical risk and  myocardial infarction. Serial measurements may help assess extent of   myocardial damage.      NOTE: Children less than 1 year old may have higher baseline troponin   levels and results should be interpreted in conjunction with the overall   clinical context.     NOTE: Troponin I testing is performed using a different   testing methodology at [facility] than at other   [facility]. Direct result comparisons should only   be made within the same method.   CBC WITH AUTO DIFFERENTIAL    WBC 8.8      nRBC 0.0      RBC 5.18      Hemoglobin 15.8      Hematocrit 45.8      MCV 88      MCH 30.5      MCHC 34.5      RDW 12.2      Platelets 280      Neutrophils % 66.2      Immature Granulocytes %, Automated 0.2      Lymphocytes % 23.7      Monocytes % 8.3       Eosinophils % 0.9      Basophils % 0.7      Neutrophils Absolute 5.81      Immature Granulocytes Absolute, Automated 0.02      Lymphocytes Absolute 2.08      Monocytes Absolute 0.73      Eosinophils Absolute 0.08      Basophils Absolute 0.06     TROPONIN SERIES- (INITIAL, 1 HR)    Narrative:     The following orders were created for panel order Troponin I Series, High Sensitivity (0, 1 HR).  Procedure                               Abnormality         Status                     ---------                               -----------         ------                     Troponin I, High Sensiti...[[id number]]  Normal              Final result               Troponin, High Sensitivi...[[id number]]  Normal              Final result                 Please view results for these tests on the individual orders.   URINALYSIS WITH REFLEX CULTURE AND MICROSCOPIC    Narrative:     The following orders were created for panel order Urinalysis with Reflex Culture and Microscopic.  Procedure                               Abnormality         Status                     ---------                               -----------         ------                     Urinalysis with Reflex C...[[id number]]  Abnormal            Final result               Extra Urine Gray Tube[[id number]]                            In process                   Please view results for these tests on the individual orders.   EXTRA URINE GRAY TUBE       As interpreted by me, the above displayed labs are abnormal. All other labs obtained during this visit were within normal range or not returned as of this dictation.      EKG Interpretation    5605 EKG on my interpretation shows sinus bradycardia, rate of 59 bpm.  Normal axis.  QTc 394 ms,.  Of 178.  No ST elevation or depression, no acute ischemic pattern.  No STEMI. [NT]           No orders to display           No orders to display           ------------------------------ ED COURSE/MEDICAL DECISION  MAKING----------------------  Medical Decision Making:   Exam: A medically appropriate exam performed, outlined above, given the known history and presentation.    History obtained from: Review of medical record nursing notes patient      Social Determinants of Health considered during this visit: Takes care of self at home      PAST MEDICAL HISTORY/Chronic Conditions Affecting Care     has a past medical history of Benign essential hypertension, Estrogen deficiency (11/15/2023), History of atrial fibrillation, History of breast lift (05/2024), History of Meniere's syndrome (11/15/2023), reduction mammoplasty (05/2024), Hyperthyroidism (09/15/2023), Other specified abnormal findings of blood chemistry, Personal history of malignant neoplasm, unspecified, Personal history of other mental and behavioral disorders, and Unspecified osteoarthritis, unspecified site (04/28/2016).       CC/HPI Summary, Social Determinants of health, Records Reviewed, DDx, testing done/not done, ED Course, Reassessment, disposition considerations/shared decision making with patient, consults, disposition:   Plan  Safety diet, EKG, tox screen, CBC, CMP, drug screen, urine    Medical Decision Making/Differential Diagnosis:  Differentials include but not limited to depression versus underlying psychiatric disorder versus sleep deprivation  Review troponin less than 3  Sodium 134 otherwise electrolytes unremarkable  Normal renal function  Normal LFTs  White blood cell count 8.4  Hemoglobin 15.8  Urine is not consistent with UTI  Drug screen negative  Patient presented to the emergency department complaints of anxiety and pacing inability to sleep.  Sent here by her psychiatric team for further evaluation and treatment denies thoughts of wanting to harm herself or anyone else.  States it is wrong to think that way  Electrolytes unremarkable.  Normal renal function.  Normal LFTs.  Drug screen negative.  No elevation white blood cell count patient  is not anemic.  Urine is not consistent with UTI.  EKG per attending note normal sinus bradycardia no ST elevation or arrhythmia noted.  Troponin less than 3.  Patient ordered anxiety medication but refused to take it here in the emergency department.  Initially was requesting medication.  Patient presents emergency department anxiety insomnia.  Patient was seen evaluated.  Workup included laboratory data compared to baseline if available.  Patient appears to have no metabolic or physiological evidence to cause psychiatric complaint.  Patient is medically cleared at this time for psychological evaluation.  Patient was seen evaluated by crisis team .  Had reported to them that she was having trouble dealing with things at home taking care of herself since her  was placed in a nursing home.  She is very anxious about her financial restraints and dealing with her normal day of life activities.  Denies thoughts of wanting to harm herself or anyone else.  Patient cleared for psychiatric evaluation and treatment    Plan for admission to geriatric psych  Patient seen evaluated with attending physician Dr. Mackay     PROCEDURES  Unless otherwise noted below, none      CONSULTS:   None      ED Course as of 03/21/25 2051   Fri Mar 21, 2025   1703 EKG on my interpretation shows sinus bradycardia, rate of 59 bpm.  Normal axis.  QTc 394 ms,.  Of 178.  No ST elevation or depression, no acute ischemic pattern.  No STEMI. [NT]   1832 Patient seen and evaluated by crisis team .  Had reported that she is having trouble dealing with things at home her  recently placed in a nursing home facility.  She feels she cannot take care of herself at home she is having trouble with her finances.  Patient does not meet inpatient criteria for psychiatric services at this time. [TB]   2040 Patient expressing concerns about cost.  Awaiting placement to geriatric psych.  Family feels she would benefit from  admission. [TB]      ED Course User Index  [NT] Nael Mackay DO  [TB] KEHINDE Beard-CNP         Diagnoses as of 03/21/25 2051   Anxiety   Insomnia, unspecified type         This patient has remained hemodynamically stable during their ED course.      Critical Care: None      Counseling:  The emergency provider has spoken with the patient and discussed today’s results, in addition to providing specific details for the plan of care and counseling regarding the diagnosis and prognosis.  Questions are answered at this time and they are agreeable with the plan.         --------------------------------- IMPRESSION AND DISPOSITION ---------------------------------    IMPRESSION  1. Anxiety    2. Insomnia, unspecified type        DISPOSITION  Disposition: Placement pending  Patient condition is stable        NOTE: This report was transcribed using voice recognition software. Every effort was made to ensure accuracy; however, inadvertent computerized transcription errors may be present      YEHUDA Beard  03/21/25 2051

## 2025-03-21 NOTE — PROGRESS NOTES
"EPAT - Social Work Psychiatric Assessment    Arrival Details  Mode of Arrival: Ambulatory  Admission Source: Home  Admission Type: Voluntary  EPAT Assessment Start Date: 03/21/25  EPAT Assessment Start Time: 1806  Name of : YAS Dorman    History of Present Illness  Admission Reason: Pt is a 70y/o female presenting to [facility] ED for increased anxiety and difficulty sleeping.  HPI: Pt chart, triage and provider notes reviewed prior to assessment, triage assessment reflects \"no risk indicated\". Pt repots she has had difficulty sleeping and concentrating for the last three weeks. Pt shares that her  went to a SNF a while ago and she's just now processing this. Pt reports passive SI this morning but immediately states she knows this is a sin and would never act on such thoughts. Pt sees a psychiatrist at [facility] and is taking medications prescribed by him.     Readmission Information   Readmission within 30 Days: No    Psychiatric Symptoms  Anxiety Symptoms: Generalized  Depression Symptoms: No problems reported or observed.  Usha Symptoms: No problems reported or observed.    Psychosis Symptoms  Hallucination Type: No problems reported or observed.  Delusion Type: No problems reported or observed.    Additional Symptoms - Adult  Generalized Anxiety Disorder: Difficult to control worry, Difficulity concentrating, Easily fatigued, Excessive anxiety/worry, Restlessness, Sleep disturbance  Obsessive Compulsive Disorder: No problems reported or observed.  Panic Attack: No problems reported or observed.  Post Traumatic Stress Disorder: No problems reported or observed.  Delirium: No problems reported or observed.    Past Psychiatric History/Meds/Treatments  Past Psychiatric History: Pt reports history of anxiety.  Past Psychiatric Meds/Treatments: Pt denies any history of psychiatric admissions. Pt sees Dr Swann at [facility].  Past Violence/Victimization History: Denies    Current Mental " Health Contacts   Name/Phone Number: N/A  Provider Name/Phone Number: Anikeev  Provider Last Appointment Date: sees weekly    Support System: Immediate family (reports sister)    Living Arrangement: Lives alone    Home Safety  Feels Safe Living in Home: Other (Comment) (Pt reports not feeling safe in the home but states this is because she does not feel capable of managing the houeshold alone.)  Potentially Unsafe Housing Conditions: Unable to Assess  Home Safety : No concerns reported.    Income Information  Employment Status for: Patient  Employment Status: Retired  Employment/ Finance Comments: Pt reports financial anxiety and is worried about paying bills.    [employer] Service/Education History  Current or Previous  Service: None    Social/Cultural History  Social History: Pt is a 68y/o female newly living alone due to her  going to a SNF.  Cultural Requests During Hospitalization: None  Spiritual Requests During Hospitalization: None    Legal  Legal Considerations: Patient/ Family Ability to Make Healthcare Decisions  Criminal Activity/ Legal Involvement Pertinent to Current Situation/ Hospitalization: None reported  Legal Concerns: None reported    Drug Screening  Have you used any substances (canabis, cocaine, heroin, hallucinogens, inhalants, etc.) in the past 12 months?: No  Have you used any prescription drugs other than prescribed in the past 12 months?: No  Is a toxicology screen needed?: Yes         Behavioral Health  Behavioral Health(WDL): Exceptions to WDL  Behaviors/Mood: Anxious, Appropriate for situation, Appropriate for age, Calm, Cooperative, Flat affect, Restless  Affect: Appropriate to circumstances  Parent/Guardian/Significant Other Involvement: No involvement  Needs Expressed: Denies    Orientation  Orientation Level: Oriented X4    General Appearance  Motor Activity: Unremarkable  Speech Pattern: Other (Comment) (unremarkable)  General Attitude: Attentive,  Cooperative  Appearance/Hygiene: Unremarkable    Thought Process  Coherency: Disorganized, Blocking  Content: Unremarkable  Delusions: Other (Comment) (None reported)  Perception: Not altered  Hallucination: None  Judgment/Insight: Limited  Confusion: None  Cognition: Appropriate safety awareness, Appropriate attention/concentration, Appropriate for developmental age, Appropriate judgement, Follows commands    Sleep Pattern  Sleep Pattern: Insomnia, Disturbed/interrupted sleep, Restlessness    Risk Factors  Self Harm/Suicidal Ideation Plan: Pt reports SI this morning. Denies a plan or intent.  Previous Self Harm/Suicidal Plans: Pt denies any previous SI, attempts or self harm.  Risk Factors: Lower socioeconomic status  Description of Thoughts/Ideas Leaving Unit Now: Pt denies.    Violence Risk Assessment  Assessment of Violence: None noted  Thoughts of Harm to Others: No    Ability to Assess Risk Screen  Risk Screen - Ability to Assess: Able to be screened  Ask Suicide-Screening Questions  1. In the past few weeks, have you wished you were dead?: Yes  2. In the past few weeks, have you felt that you or your family would be better off if you were dead?: No  3. In the past week, have you been having thoughts about killing yourself?: No  4. Have you ever tried to kill yourself?: No  5. Are you having thoughts of killing yourself right now?: No  Calculated Risk Score: Potential Risk  [place] Suicide Severity Rating Scale (Screener/Recent Self-Report)  1. Wish to be Dead (Past 1 Month): No  2. Non-Specific Active Suicidal Thoughts (Past 1 Month): No  6. Suicidal Behavior (Lifetime): No  Calculated C-SSRS Risk Score (Lifetime/Recent): No Risk Indicated  Step 1: Risk Factors  Current & Past Psychiatric Dx: Mood disorder  Presenting Symptoms: Anxiety and/or panic  Precipitants/Stressors: Triggering events leading to humiliation, shame, and/or despair (e.g. loss of relationship, financial or health status) (real or  anticipated), Social isolation  Access to Lethal Methods : No  Step 2: Protective Factors   Protective Factors Internal: Fear of death or the actual act of killing self, Identifies reasons for living  Protective Factors External: Beloved pets, Cultural, spiritual and/or moral attitudes against suicide, Supportive social network or family or friends  Step 5: Documentation  Risk Level: Low suicide risk    Psychiatric Impression and Plan of Care  Assessment and Plan: Met [facility]TF w/ pt for assessment. Pt restless during assessment with some pacing but eventually able to sit in bed comfortably. Pt is cooperative but appears anxious. Pt shares that her  was admitted to SNF a while ago and she is just now processing his absence from the home. Pt is disorganized at times and shares that she has had difficulty concentrating due to anxiety. According to pt, she is overwhelmed trying to manage the household alone, such as paying bills and caring for her cats. Pt also voices financial stress, indicating she is worried about how much a therapist would cost as well as her visit in the ED today. Pt reports she has not been sleeping for the last three weeks and has been up at night pacing. Pt sees a psychiatrist at [facility] and states she is compliant w/ medications. Pt denies being prescribed anything to help her sleep however. Pt reports she has also been eating less due to the anxiety. According to pt, her sister is a good support and has been helping her. Pt does not meet criteria for inpatient admission and was encouraged to seek therapy but states she feels too confused to speak to anyone. Pt reports not feeling able to care for herself at home and is requesting assistance with long term care options for herself. Discussed case w/ CNP Rena Browne who indicates Dr Mackay would like pt referred for inpatient admission.  Specific Resources Provided to Patient: Peer support not needed. SW advised providers of need for  long term care assistance.  CM Notified: N/A  PHP/IOP Recommended: N/A  Specific Information Provided for PHP/IOP: N/A  Plan Comments: Diagnostic impression: anxiety    Outcome/Disposition  Patient's Perception of Outcome Achieved: in agreement  Assessment, Recommendations and Risk Level Reviewed with: Rena Browne CNP  Contact Name: N/A  Contact Number(s): N/A  EPAT Assessment Completed Date: 03/21/25  EPAT Assessment Completed Time: 1819  Patient Disposition:  Adult Inpatient Psych

## 2025-03-22 LAB — HOLD SPECIMEN: NORMAL

## 2025-03-22 NOTE — DISCHARGE INSTRUCTIONS
Follow-up with your psychiatrist, you can take hydroxyzine as needed for sleep which will also help with anxiety.  You can also take 10 mg of melatonin per night about an hour before you plan on going to sleep, this will help you to fall asleep.  Return to the ED for any new or worsening symptoms including thoughts or plans of hurting yourself or others, inability to function despite treatment for insomnia which may require psychiatric evaluation.   Complex Repair And Double M Plasty Text: The defect edges were debeveled with a #15 scalpel blade.  The primary defect was closed partially with a complex linear closure.  Given the location of the remaining defect, shape of the defect and the proximity to free margins a double M plasty was deemed most appropriate for complete closure of the defect.  Using a sterile surgical marker, an appropriate advancement flap was drawn incorporating the defect and placing the expected incisions within the relaxed skin tension lines where possible.    The area thus outlined was incised deep to adipose tissue with a #15 scalpel blade.  The skin margins were undermined to an appropriate distance in all directions utilizing iris scissors.

## 2025-03-25 LAB
ATRIAL RATE: 59 BPM
P AXIS: 75 DEGREES
P OFFSET: 188 MS
P ONSET: 134 MS
PR INTERVAL: 178 MS
Q ONSET: 223 MS
QRS COUNT: 10 BEATS
QRS DURATION: 70 MS
QT INTERVAL: 398 MS
QTC CALCULATION(BAZETT): 394 MS
QTC FREDERICIA: 396 MS
R AXIS: 19 DEGREES
T AXIS: 56 DEGREES
T OFFSET: 422 MS
VENTRICULAR RATE: 59 BPM

## 2025-03-27 ENCOUNTER — CLINICAL SUPPORT (OUTPATIENT)
Dept: AUDIOLOGY | Facility: CLINIC | Age: 70
End: 2025-03-27
Payer: MEDICARE

## 2025-03-27 DIAGNOSIS — H90.3 ASYMMETRICAL SENSORINEURAL HEARING LOSS: ICD-10-CM

## 2025-03-27 PROCEDURE — 92550 TYMPANOMETRY & REFLEX THRESH: CPT | Mod: 52 | Performed by: SOCIAL WORKER

## 2025-03-27 PROCEDURE — 92557 COMPREHENSIVE HEARING TEST: CPT | Performed by: SOCIAL WORKER

## 2025-03-27 PROCEDURE — 92626 EVAL AUD FUNCJ 1ST HOUR: CPT | Mod: 59 | Performed by: SOCIAL WORKER

## 2025-03-27 NOTE — PROGRESS NOTES
Name: Nevaeh Cuba  YOB: 1955  Age: 69 y.o.    Date of Evaluation:  3/27/25    History:  Reason for visit:  Nevaeh Cuba is seen today at the request of YEHUDA Salgado for a re-evaluation of hearing.  Patient complains of Hearing Loss (Hearing re-evaluation/Previous audiogram 3/28/24).  Nevaeh underwent the [id number] surgery on the right ear on 7/24/24. Her external OSIA2 processor was activated on 9/16/24.  She suspects a decrease in hearing    Evaluation:  Otoscopy revealed clear canals bilaterally  Immittance testing indicated type A normal tympanograms bilaterally  Ipsilateral acoustic reflexes were absent in the right ear and present in the left ear at 500-4000 Hz  The presence of acoustic reflexes within normal intensity limits is consistent with normal middle ear and brainstem function, and suggests that auditory sensitivity is not significantly impaired. An elevated or absent acoustic reflex threshold is consistent with a middle ear disorder, hearing loss in the stimulated ear, and/or interruption of neural innervation of the stapedius muscle.     Behavioral hearing testing indicated normal peripheral hearing sensitivity in the left ear and a severe to profound sensorineural hearing loss at 125-8000 Hz in the right ear  Word recognition testing was completed using recorded speech at the patient's most comfortable level as documented on the audiogram.    Scores were 92% in the left ear and 0% correct in the right ear    Aided testing reveals excellent functional gain at 500-4000 Hz  Aided CNC words at 50 dBHL = 96% correct  Aided AzBio sentences with +10 SNR = 75% correct    Summary:  Today's results are consistent with normal peripheral hearing sensitivity in the left ear and a severe to profound sensorineural hearing loss at 125-8000 Hz in the right ear  Word recognition is excellent at conversational loudness in the left ear and extremely poor even at elevated levels for the right  ear    Treatment Plan:  Follow up with PCP as directed  Consistent use of the OSIA2 processor for the right ear  Retest hearing in 12 months

## 2025-03-28 ENCOUNTER — HOSPITAL ENCOUNTER (INPATIENT)
Facility: HOSPITAL | Age: 70
End: 2025-03-28
Attending: STUDENT IN AN ORGANIZED HEALTH CARE EDUCATION/TRAINING PROGRAM | Admitting: STUDENT IN AN ORGANIZED HEALTH CARE EDUCATION/TRAINING PROGRAM
Payer: MEDICARE

## 2025-03-28 ENCOUNTER — APPOINTMENT (OUTPATIENT)
Dept: CARDIOLOGY | Facility: HOSPITAL | Age: 70
DRG: 056 | End: 2025-03-28
Payer: MEDICARE

## 2025-03-28 ENCOUNTER — HOSPITAL ENCOUNTER (EMERGENCY)
Facility: HOSPITAL | Age: 70
Discharge: OTHER INSTITUTION | DRG: 056 | End: 2025-03-28
Attending: STUDENT IN AN ORGANIZED HEALTH CARE EDUCATION/TRAINING PROGRAM
Payer: MEDICARE

## 2025-03-28 VITALS
TEMPERATURE: 98.2 F | HEIGHT: 66 IN | SYSTOLIC BLOOD PRESSURE: 173 MMHG | WEIGHT: 167 LBS | DIASTOLIC BLOOD PRESSURE: 97 MMHG | RESPIRATION RATE: 19 BRPM | OXYGEN SATURATION: 96 % | HEART RATE: 93 BPM | BODY MASS INDEX: 26.84 KG/M2

## 2025-03-28 DIAGNOSIS — F51.05 INSOMNIA DUE TO OTHER MENTAL DISORDER: ICD-10-CM

## 2025-03-28 DIAGNOSIS — E55.9 VITAMIN D DEFICIENCY: ICD-10-CM

## 2025-03-28 DIAGNOSIS — E05.90 HYPERTHYROIDISM: ICD-10-CM

## 2025-03-28 DIAGNOSIS — E78.2 MIXED HYPERLIPIDEMIA: ICD-10-CM

## 2025-03-28 DIAGNOSIS — R45.851 SUICIDAL IDEATION: Primary | ICD-10-CM

## 2025-03-28 DIAGNOSIS — F42.2 MIXED OBSESSIONAL THOUGHTS AND ACTS: ICD-10-CM

## 2025-03-28 DIAGNOSIS — I10 PRIMARY HYPERTENSION: ICD-10-CM

## 2025-03-28 DIAGNOSIS — F99 INSOMNIA DUE TO OTHER MENTAL DISORDER: ICD-10-CM

## 2025-03-28 DIAGNOSIS — F29 PSYCHOSIS, UNSPECIFIED PSYCHOSIS TYPE (MULTI): ICD-10-CM

## 2025-03-28 LAB
ALBUMIN SERPL BCP-MCNC: 4.8 G/DL (ref 3.4–5)
ALP SERPL-CCNC: 71 U/L (ref 33–136)
ALT SERPL W P-5'-P-CCNC: 24 U/L (ref 7–45)
AMPHETAMINES UR QL SCN: NORMAL
ANION GAP SERPL CALCULATED.3IONS-SCNC: 17 MMOL/L (ref 10–20)
APPEARANCE UR: ABNORMAL
AST SERPL W P-5'-P-CCNC: 18 U/L (ref 9–39)
ATRIAL RATE: 92 BPM
BARBITURATES UR QL SCN: NORMAL
BASOPHILS # BLD AUTO: 0.04 X10*3/UL (ref 0–0.1)
BASOPHILS NFR BLD AUTO: 0.4 %
BENZODIAZ UR QL SCN: NORMAL
BILIRUB SERPL-MCNC: 1 MG/DL (ref 0–1.2)
BILIRUB UR STRIP.AUTO-MCNC: NEGATIVE MG/DL
BUN SERPL-MCNC: 27 MG/DL (ref 6–23)
BZE UR QL SCN: NORMAL
CALCIUM SERPL-MCNC: 9.9 MG/DL (ref 8.6–10.3)
CANNABINOIDS UR QL SCN: NORMAL
CHLORIDE SERPL-SCNC: 99 MMOL/L (ref 98–107)
CO2 SERPL-SCNC: 25 MMOL/L (ref 21–32)
COLOR UR: YELLOW
CREAT SERPL-MCNC: 0.86 MG/DL (ref 0.5–1.05)
EGFRCR SERPLBLD CKD-EPI 2021: 74 ML/MIN/1.73M*2
EOSINOPHIL # BLD AUTO: 0.02 X10*3/UL (ref 0–0.7)
EOSINOPHIL NFR BLD AUTO: 0.2 %
ERYTHROCYTE [DISTWIDTH] IN BLOOD BY AUTOMATED COUNT: 12.8 % (ref 11.5–14.5)
ETHANOL SERPL-MCNC: <10 MG/DL
FENTANYL+NORFENTANYL UR QL SCN: NORMAL
GLUCOSE SERPL-MCNC: 135 MG/DL (ref 74–99)
GLUCOSE UR STRIP.AUTO-MCNC: NORMAL MG/DL
HCT VFR BLD AUTO: 48.7 % (ref 36–46)
HGB BLD-MCNC: 17.5 G/DL (ref 12–16)
HOLD SPECIMEN: NORMAL
IMM GRANULOCYTES # BLD AUTO: 0.02 X10*3/UL (ref 0–0.7)
IMM GRANULOCYTES NFR BLD AUTO: 0.2 % (ref 0–0.9)
KETONES UR STRIP.AUTO-MCNC: ABNORMAL MG/DL
LEUKOCYTE ESTERASE UR QL STRIP.AUTO: ABNORMAL
LYMPHOCYTES # BLD AUTO: 0.98 X10*3/UL (ref 1.2–4.8)
LYMPHOCYTES NFR BLD AUTO: 9.9 %
MCH RBC QN AUTO: 31.6 PG (ref 26–34)
MCHC RBC AUTO-ENTMCNC: 35.9 G/DL (ref 32–36)
MCV RBC AUTO: 88 FL (ref 80–100)
METHADONE UR QL SCN: NORMAL
MONOCYTES # BLD AUTO: 0.74 X10*3/UL (ref 0.1–1)
MONOCYTES NFR BLD AUTO: 7.5 %
MUCOUS THREADS #/AREA URNS AUTO: ABNORMAL /LPF
NEUTROPHILS # BLD AUTO: 8.06 X10*3/UL (ref 1.2–7.7)
NEUTROPHILS NFR BLD AUTO: 81.8 %
NITRITE UR QL STRIP.AUTO: NEGATIVE
NRBC BLD-RTO: 0 /100 WBCS (ref 0–0)
OPIATES UR QL SCN: NORMAL
OXYCODONE+OXYMORPHONE UR QL SCN: NORMAL
P AXIS: 72 DEGREES
P OFFSET: 207 MS
P ONSET: 146 MS
PCP UR QL SCN: NORMAL
PH UR STRIP.AUTO: 6 [PH]
PLATELET # BLD AUTO: 307 X10*3/UL (ref 150–450)
POTASSIUM SERPL-SCNC: 3.6 MMOL/L (ref 3.5–5.3)
PR INTERVAL: 148 MS
PROT SERPL-MCNC: 7.8 G/DL (ref 6.4–8.2)
PROT UR STRIP.AUTO-MCNC: ABNORMAL MG/DL
Q ONSET: 220 MS
QRS COUNT: 15 BEATS
QRS DURATION: 80 MS
QT INTERVAL: 364 MS
QTC CALCULATION(BAZETT): 450 MS
QTC FREDERICIA: 419 MS
R AXIS: 43 DEGREES
RBC # BLD AUTO: 5.54 X10*6/UL (ref 4–5.2)
RBC # UR STRIP.AUTO: NEGATIVE MG/DL
RBC #/AREA URNS AUTO: ABNORMAL /HPF
SODIUM SERPL-SCNC: 137 MMOL/L (ref 136–145)
SP GR UR STRIP.AUTO: 1.03
SQUAMOUS #/AREA URNS AUTO: ABNORMAL /HPF
T AXIS: 51 DEGREES
T OFFSET: 402 MS
UROBILINOGEN UR STRIP.AUTO-MCNC: NORMAL MG/DL
VENTRICULAR RATE: 92 BPM
WBC # BLD AUTO: 9.9 X10*3/UL (ref 4.4–11.3)
WBC #/AREA URNS AUTO: ABNORMAL /HPF

## 2025-03-28 PROCEDURE — 90832 PSYTX W PT 30 MINUTES: CPT

## 2025-03-28 PROCEDURE — 81001 URINALYSIS AUTO W/SCOPE: CPT | Mod: 59 | Performed by: STUDENT IN AN ORGANIZED HEALTH CARE EDUCATION/TRAINING PROGRAM

## 2025-03-28 PROCEDURE — 82077 ASSAY SPEC XCP UR&BREATH IA: CPT | Performed by: STUDENT IN AN ORGANIZED HEALTH CARE EDUCATION/TRAINING PROGRAM

## 2025-03-28 PROCEDURE — 2500000001 HC RX 250 WO HCPCS SELF ADMINISTERED DRUGS (ALT 637 FOR MEDICARE OP): Performed by: STUDENT IN AN ORGANIZED HEALTH CARE EDUCATION/TRAINING PROGRAM

## 2025-03-28 PROCEDURE — 99285 EMERGENCY DEPT VISIT HI MDM: CPT | Mod: 25

## 2025-03-28 PROCEDURE — 93005 ELECTROCARDIOGRAM TRACING: CPT

## 2025-03-28 PROCEDURE — 80307 DRUG TEST PRSMV CHEM ANLYZR: CPT | Performed by: STUDENT IN AN ORGANIZED HEALTH CARE EDUCATION/TRAINING PROGRAM

## 2025-03-28 PROCEDURE — 36415 COLL VENOUS BLD VENIPUNCTURE: CPT | Performed by: STUDENT IN AN ORGANIZED HEALTH CARE EDUCATION/TRAINING PROGRAM

## 2025-03-28 PROCEDURE — 90839 PSYTX CRISIS INITIAL 60 MIN: CPT

## 2025-03-28 PROCEDURE — 99285 EMERGENCY DEPT VISIT HI MDM: CPT | Performed by: STUDENT IN AN ORGANIZED HEALTH CARE EDUCATION/TRAINING PROGRAM

## 2025-03-28 PROCEDURE — 87086 URINE CULTURE/COLONY COUNT: CPT | Mod: WESLAB | Performed by: STUDENT IN AN ORGANIZED HEALTH CARE EDUCATION/TRAINING PROGRAM

## 2025-03-28 PROCEDURE — 85025 COMPLETE CBC W/AUTO DIFF WBC: CPT | Performed by: STUDENT IN AN ORGANIZED HEALTH CARE EDUCATION/TRAINING PROGRAM

## 2025-03-28 PROCEDURE — 84075 ASSAY ALKALINE PHOSPHATASE: CPT | Performed by: STUDENT IN AN ORGANIZED HEALTH CARE EDUCATION/TRAINING PROGRAM

## 2025-03-28 PROCEDURE — 1140000001 HC PRIVATE PSYCH ROOM DAILY

## 2025-03-28 RX ORDER — HYDROXYZINE HYDROCHLORIDE 10 MG/1
10 TABLET, FILM COATED ORAL EVERY 6 HOURS PRN
Status: ACTIVE | OUTPATIENT
Start: 2025-03-28

## 2025-03-28 RX ORDER — OLANZAPINE 10 MG/2ML
5 INJECTION, POWDER, FOR SOLUTION INTRAMUSCULAR EVERY 6 HOURS PRN
Status: ACTIVE | OUTPATIENT
Start: 2025-03-28

## 2025-03-28 RX ORDER — FLUVOXAMINE MALEATE 50 MG/1
50 TABLET, FILM COATED ORAL
Status: DISCONTINUED | OUTPATIENT
Start: 2025-03-29 | End: 2025-04-03

## 2025-03-28 RX ORDER — ACETAMINOPHEN 325 MG/1
650 TABLET ORAL EVERY 4 HOURS PRN
Status: ACTIVE | OUTPATIENT
Start: 2025-03-28

## 2025-03-28 RX ORDER — TALC
3 POWDER (GRAM) TOPICAL
Status: DISPENSED | OUTPATIENT
Start: 2025-03-29

## 2025-03-28 RX ORDER — CHOLECALCIFEROL (VITAMIN D3) 50 MCG
2000 TABLET ORAL DAILY
Status: DISPENSED | OUTPATIENT
Start: 2025-03-29

## 2025-03-28 RX ORDER — METHIMAZOLE 5 MG/1
5 TABLET ORAL DAILY
Status: DISPENSED | OUTPATIENT
Start: 2025-03-29

## 2025-03-28 RX ORDER — PROPRANOLOL HYDROCHLORIDE 20 MG/1
10 TABLET ORAL EVERY 8 HOURS PRN
Status: DISPENSED | OUTPATIENT
Start: 2025-03-28

## 2025-03-28 RX ORDER — POLYETHYLENE GLYCOL 3350 17 G/17G
17 POWDER, FOR SOLUTION ORAL DAILY
Status: DISPENSED | OUTPATIENT
Start: 2025-03-29

## 2025-03-28 RX ORDER — TRIAMTERENE/HYDROCHLOROTHIAZID 37.5-25 MG
1 TABLET ORAL EVERY MORNING
Status: DISCONTINUED | OUTPATIENT
Start: 2025-03-29 | End: 2025-04-03

## 2025-03-28 RX ORDER — OXCARBAZEPINE 300 MG/1
300 TABLET, FILM COATED ORAL
Status: DISPENSED | OUTPATIENT
Start: 2025-03-28

## 2025-03-28 RX ORDER — DOCUSATE SODIUM 100 MG/1
100 CAPSULE, LIQUID FILLED ORAL DAILY PRN
Status: ACTIVE | OUTPATIENT
Start: 2025-03-28

## 2025-03-28 RX ORDER — QUETIAPINE FUMARATE 25 MG/1
12.5 TABLET, FILM COATED ORAL EVERY 4 HOURS PRN
Status: ACTIVE | OUTPATIENT
Start: 2025-03-28

## 2025-03-28 RX ORDER — OXCARBAZEPINE 150 MG/1
150 TABLET, FILM COATED ORAL 2 TIMES DAILY
Status: DISCONTINUED | OUTPATIENT
Start: 2025-03-28 | End: 2025-03-28

## 2025-03-28 RX ORDER — ATORVASTATIN CALCIUM 20 MG/1
20 TABLET, FILM COATED ORAL DAILY
Status: DISPENSED | OUTPATIENT
Start: 2025-03-29

## 2025-03-28 RX ADMIN — OXCARBAZEPINE 300 MG: 300 TABLET, FILM COATED ORAL at 23:03

## 2025-03-28 SDOH — HEALTH STABILITY: MENTAL HEALTH
DO YOU FEEL STRESS - TENSE, RESTLESS, NERVOUS, OR ANXIOUS, OR UNABLE TO SLEEP AT NIGHT BECAUSE YOUR MIND IS TROUBLED ALL THE TIME - THESE DAYS?: ONLY A LITTLE

## 2025-03-28 SDOH — SOCIAL STABILITY: SOCIAL NETWORK: IN A TYPICAL WEEK, HOW MANY TIMES DO YOU TALK ON THE PHONE WITH FAMILY, FRIENDS, OR NEIGHBORS?: TWICE A WEEK

## 2025-03-28 SDOH — HEALTH STABILITY: MENTAL HEALTH: SUICIDAL BEHAVIOR (3 MONTHS): NO

## 2025-03-28 SDOH — SOCIAL STABILITY: SOCIAL NETWORK: HOW OFTEN DO YOU ATTEND CHURCH OR RELIGIOUS SERVICES?: NEVER

## 2025-03-28 SDOH — HEALTH STABILITY: MENTAL HEALTH

## 2025-03-28 SDOH — HEALTH STABILITY: MENTAL HEALTH: HAVE YOU ACTUALLY HAD ANY THOUGHTS OF KILLING YOURSELF?: YES

## 2025-03-28 SDOH — HEALTH STABILITY: MENTAL HEALTH: IN THE PAST FEW WEEKS, HAVE YOU WISHED YOU WERE DEAD?: YES

## 2025-03-28 SDOH — ECONOMIC STABILITY: FOOD INSECURITY: WITHIN THE PAST 12 MONTHS, THE FOOD YOU BOUGHT JUST DIDN'T LAST AND YOU DIDN'T HAVE MONEY TO GET MORE.: NEVER TRUE

## 2025-03-28 SDOH — HEALTH STABILITY: PHYSICAL HEALTH
HOW OFTEN DO YOU NEED TO HAVE SOMEONE HELP YOU WHEN YOU READ INSTRUCTIONS, PAMPHLETS, OR OTHER WRITTEN MATERIAL FROM YOUR DOCTOR OR PHARMACY?: NEVER

## 2025-03-28 SDOH — SOCIAL STABILITY: SOCIAL INSECURITY
WITHIN THE LAST YEAR, HAVE YOU BEEN KICKED, HIT, SLAPPED, OR OTHERWISE PHYSICALLY HURT BY YOUR PARTNER OR EX-PARTNER?: NO

## 2025-03-28 SDOH — HEALTH STABILITY: MENTAL HEALTH: NON-SPECIFIC ACTIVE SUICIDAL THOUGHTS (PAST 1 MONTH): YES

## 2025-03-28 SDOH — ECONOMIC STABILITY: HOUSING INSECURITY: IN THE LAST 12 MONTHS, WAS THERE A TIME WHEN YOU WERE NOT ABLE TO PAY THE MORTGAGE OR RENT ON TIME?: NO

## 2025-03-28 SDOH — HEALTH STABILITY: MENTAL HEALTH: SUICIDAL BEHAVIOR (LIFETIME): YES

## 2025-03-28 SDOH — HEALTH STABILITY: MENTAL HEALTH: HAVE YOU HAD THESE THOUGHTS AND HAD SOME INTENTION OF ACTING ON THEM?: NO

## 2025-03-28 SDOH — HEALTH STABILITY: MENTAL HEALTH: BEHAVIORAL HEALTH(WDL): EXCEPTIONS TO WDL

## 2025-03-28 SDOH — HEALTH STABILITY: MENTAL HEALTH: ARE YOU HAVING THOUGHTS OF KILLING YOURSELF RIGHT NOW?: NO

## 2025-03-28 SDOH — SOCIAL STABILITY: SOCIAL NETWORK: HOW OFTEN DO YOU ATTEND MEETINGS OF THE CLUBS OR ORGANIZATIONS YOU BELONG TO?: NEVER

## 2025-03-28 SDOH — HEALTH STABILITY: MENTAL HEALTH: ANXIETY SYMPTOMS: GENERALIZED

## 2025-03-28 SDOH — SOCIAL STABILITY: SOCIAL INSECURITY: WERE YOU ABLE TO COMPLETE ALL THE BEHAVIORAL HEALTH SCREENINGS?: YES

## 2025-03-28 SDOH — SOCIAL STABILITY: SOCIAL INSECURITY: HAS ANYONE EVER THREATENED TO HURT YOUR FAMILY OR YOUR PETS?: NO

## 2025-03-28 SDOH — SOCIAL STABILITY: SOCIAL INSECURITY: ARE YOU MARRIED, WIDOWED, DIVORCED, SEPARATED, NEVER MARRIED, OR LIVING WITH A PARTNER?: PATIENT DECLINED

## 2025-03-28 SDOH — ECONOMIC STABILITY: TRANSPORTATION INSECURITY: IN THE PAST 12 MONTHS, HAS LACK OF TRANSPORTATION KEPT YOU FROM MEDICAL APPOINTMENTS OR FROM GETTING MEDICATIONS?: NO

## 2025-03-28 SDOH — SOCIAL STABILITY: SOCIAL INSECURITY: DO YOU FEEL ANYONE HAS EXPLOITED OR TAKEN ADVANTAGE OF YOU FINANCIALLY OR OF YOUR PERSONAL PROPERTY?: NO

## 2025-03-28 SDOH — HEALTH STABILITY: MENTAL HEALTH: WISH TO BE DEAD (PAST 1 MONTH): YES

## 2025-03-28 SDOH — ECONOMIC STABILITY: FOOD INSECURITY: HOW HARD IS IT FOR YOU TO PAY FOR THE VERY BASICS LIKE FOOD, HOUSING, MEDICAL CARE, AND HEATING?: NOT VERY HARD

## 2025-03-28 SDOH — SOCIAL STABILITY: SOCIAL INSECURITY: POSSIBLE ABUSE REPORTED TO:: OTHER (COMMENT)

## 2025-03-28 SDOH — HEALTH STABILITY: MENTAL HEALTH
OTHER SUICIDE PRECAUTIONS INCLUDE: PATIENT PLACED IN AN EASILY OBSERVABLE ROOM WITH DOOR/CURTAIN REMAINING OPEN;PATIENT PLACED IN GOWN (SNAPS OR PAPER GOWNS PREFERRED) AND WANDED;REMAINING RISKS IDENTIFIED AND MITIGATED;PATIENT PLACED IN PSYCH SAFE ROOM (IF AVAILABLE);PROVIDER NOTIFIED;ELOPEMENT RISK IDENTIFIED;FAMILY/VISITOR ADVISED TO MAINTAIN CONTROL OF OWN PERSONAL BELONGINGS IN ROOM;FREQUENT ROUNDING WITH IRREGULAR CHECKS AT MINIMUM OF EVERY 15 MINUTES TO ASSESS PSYCH SAFETY PERFORMED;HOME MEDICATION LIST COLLECTED AND SHARED WITH PROVIDER;HOURLY BEHAVIORAL ASSESSMENT PERFORMED;PERSONAL BELONGINGS SECURED;TREATMENT PLAN BASED ON RISK FACTORS DEVELOPED (ED ONLY - IF PATIENT IN ED MORE THAN 8 HOURS);VISITORS LIMITED WHEN NECESSARY AND PERSONAL ITEMS SCREENED

## 2025-03-28 SDOH — ECONOMIC STABILITY: HOUSING INSECURITY: AT ANY TIME IN THE PAST 12 MONTHS, WERE YOU HOMELESS OR LIVING IN A SHELTER (INCLUDING NOW)?: NO

## 2025-03-28 SDOH — SOCIAL STABILITY: SOCIAL NETWORK
DO YOU BELONG TO ANY CLUBS OR ORGANIZATIONS SUCH AS CHURCH GROUPS, UNIONS, FRATERNAL OR ATHLETIC GROUPS, OR SCHOOL GROUPS?: NO

## 2025-03-28 SDOH — SOCIAL STABILITY: SOCIAL INSECURITY: ABUSE: ADULT

## 2025-03-28 SDOH — SOCIAL STABILITY: SOCIAL INSECURITY: DOES ANYONE TRY TO KEEP YOU FROM HAVING/CONTACTING OTHER FRIENDS OR DOING THINGS OUTSIDE YOUR HOME?: NO

## 2025-03-28 SDOH — SOCIAL STABILITY: SOCIAL NETWORK: HOW OFTEN DO YOU GET TOGETHER WITH FRIENDS OR RELATIVES?: NEVER

## 2025-03-28 SDOH — HEALTH STABILITY: PHYSICAL HEALTH: ON AVERAGE, HOW MANY DAYS PER WEEK DO YOU ENGAGE IN MODERATE TO STRENUOUS EXERCISE (LIKE A BRISK WALK)?: 0 DAYS

## 2025-03-28 SDOH — ECONOMIC STABILITY: INCOME INSECURITY: IN THE PAST 12 MONTHS HAS THE ELECTRIC, GAS, OIL, OR WATER COMPANY THREATENED TO SHUT OFF SERVICES IN YOUR HOME?: NO

## 2025-03-28 SDOH — HEALTH STABILITY: PHYSICAL HEALTH: ON AVERAGE, HOW MANY MINUTES DO YOU ENGAGE IN EXERCISE AT THIS LEVEL?: 0 MIN

## 2025-03-28 SDOH — SOCIAL STABILITY: SOCIAL INSECURITY: WITHIN THE LAST YEAR, HAVE YOU BEEN AFRAID OF YOUR PARTNER OR EX-PARTNER?: NO

## 2025-03-28 SDOH — SOCIAL STABILITY: SOCIAL INSECURITY: DO YOU FEEL UNSAFE GOING BACK TO THE PLACE WHERE YOU ARE LIVING?: NO

## 2025-03-28 SDOH — SOCIAL STABILITY: SOCIAL INSECURITY: WITHIN THE LAST YEAR, HAVE YOU BEEN HUMILIATED OR EMOTIONALLY ABUSED IN OTHER WAYS BY YOUR PARTNER OR EX-PARTNER?: NO

## 2025-03-28 SDOH — HEALTH STABILITY: MENTAL HEALTH: HAVE YOU BEEN THINKING ABOUT HOW YOU MIGHT DO THIS?: YES

## 2025-03-28 SDOH — SOCIAL STABILITY: SOCIAL INSECURITY: ARE THERE ANY APPARENT SIGNS OF INJURIES/BEHAVIORS THAT COULD BE RELATED TO ABUSE/NEGLECT?: NO

## 2025-03-28 SDOH — SOCIAL STABILITY: SOCIAL INSECURITY
WITHIN THE LAST YEAR, HAVE YOU BEEN RAPED OR FORCED TO HAVE ANY KIND OF SEXUAL ACTIVITY BY YOUR PARTNER OR EX-PARTNER?: NO

## 2025-03-28 SDOH — ECONOMIC STABILITY: FOOD INSECURITY: WITHIN THE PAST 12 MONTHS, YOU WORRIED THAT YOUR FOOD WOULD RUN OUT BEFORE YOU GOT THE MONEY TO BUY MORE.: NEVER TRUE

## 2025-03-28 SDOH — ECONOMIC STABILITY: HOUSING INSECURITY: IN THE PAST 12 MONTHS, HOW MANY TIMES HAVE YOU MOVED WHERE YOU WERE LIVING?: 1

## 2025-03-28 SDOH — HEALTH STABILITY: MENTAL HEALTH: BEHAVIORS/MOOD: ANXIOUS;CALM

## 2025-03-28 SDOH — HEALTH STABILITY: MENTAL HEALTH: IN THE PAST WEEK, HAVE YOU BEEN HAVING THOUGHTS ABOUT KILLING YOURSELF?: YES

## 2025-03-28 SDOH — SOCIAL STABILITY: SOCIAL INSECURITY: HAVE YOU HAD THOUGHTS OF HARMING ANYONE ELSE?: NO

## 2025-03-28 SDOH — SOCIAL STABILITY: SOCIAL INSECURITY: HAVE YOU HAD ANY THOUGHTS OF HARMING ANYONE ELSE?: NO

## 2025-03-28 SDOH — HEALTH STABILITY: MENTAL HEALTH: DEPRESSION SYMPTOMS: CHANGE IN ENERGY LEVEL;FEELINGS OF HOPELESSESS;SLEEP DISTURBANCE

## 2025-03-28 SDOH — HEALTH STABILITY: MENTAL HEALTH: SUICIDE ASSESSMENT: ADULT (C-SSRS)

## 2025-03-28 SDOH — HEALTH STABILITY: MENTAL HEALTH
HAVE YOU STARTED TO WORK OUT OR WORKED OUT THE DETAILS OF HOW TO KILL YOURSELF? DO YOU INTENT TO CARRY OUT THIS PLAN?: NO

## 2025-03-28 SDOH — HEALTH STABILITY: MENTAL HEALTH: IN THE PAST FEW WEEKS, HAVE YOU FELT THAT YOU OR YOUR FAMILY WOULD BE BETTER OFF IF YOU WERE DEAD?: YES

## 2025-03-28 SDOH — HEALTH STABILITY: MENTAL HEALTH: SUICIDAL BEHAVIOR (DESCRIPTION): CUTTING

## 2025-03-28 SDOH — HEALTH STABILITY: MENTAL HEALTH: FOR HIGH RISK PATIENTS: ALL INTERVENTIONS ABOVE, PLUS:;1:1 PATIENT OBSERVER AT ALL TIMES

## 2025-03-28 SDOH — HEALTH STABILITY: MENTAL HEALTH: HAVE YOU EVER TRIED TO KILL YOURSELF?: NO

## 2025-03-28 SDOH — HEALTH STABILITY: MENTAL HEALTH: HAVE YOU EVER DONE ANYTHING, STARTED TO DO ANYTHING, OR PREPARED TO DO ANYTHING TO END YOUR LIFE?: NO

## 2025-03-28 SDOH — SOCIAL STABILITY: SOCIAL INSECURITY: ARE YOU OR HAVE YOU BEEN THREATENED OR ABUSED PHYSICALLY, EMOTIONALLY, OR SEXUALLY BY ANYONE?: NO

## 2025-03-28 SDOH — HEALTH STABILITY: MENTAL HEALTH: HOW DID YOU TRY TO KILL YOURSELF?: CUTTING

## 2025-03-28 SDOH — ECONOMIC STABILITY: HOUSING INSECURITY: FEELS SAFE LIVING IN HOME: YES

## 2025-03-28 SDOH — HEALTH STABILITY: MENTAL HEALTH: HAVE YOU EVER TRIED TO KILL YOURSELF?: YES

## 2025-03-28 SDOH — HEALTH STABILITY: MENTAL HEALTH: HAVE YOU WISHED YOU WERE DEAD OR WISHED YOU COULD GO TO SLEEP AND NOT WAKE UP?: YES

## 2025-03-28 ASSESSMENT — LIFESTYLE VARIABLES
PAROXYSMAL SWEATS: NO SWEAT VISIBLE
SKIP TO QUESTIONS 9-10: 1
PRESCIPTION_ABUSE_PAST_12_MONTHS: NO
SUBSTANCE_ABUSE_PAST_12_MONTHS: NO
AUDIT-C TOTAL SCORE: 0
NAUSEA AND VOMITING: NO NAUSEA AND NO VOMITING
PRESCIPTION_ABUSE_PAST_12_MONTHS: NO
AGITATION: NORMAL ACTIVITY
TOTAL_SCORE: 1
AUDIT-C TOTAL SCORE: 0
CIWA TOTAL SCORE: 5
HEADACHE, FULLNESS IN HEAD: NOT PRESENT
ORIENTATION AND CLOUDING OF SENSORIUM: ORIENTED AND CAN DO SERIAL ADDITIONS
HOW MANY STANDARD DRINKS CONTAINING ALCOHOL DO YOU HAVE ON A TYPICAL DAY: PATIENT DOES NOT DRINK
TREMOR: NO TREMOR
ANXIETY: 5
HOW OFTEN DO YOU HAVE 6 OR MORE DRINKS ON ONE OCCASION: NEVER
HOW OFTEN DO YOU HAVE A DRINK CONTAINING ALCOHOL: NEVER
SUBSTANCE_ABUSE_PAST_12_MONTHS: NO
AUDITORY DISTURBANCES: NOT PRESENT
VISUAL DISTURBANCES: NOT PRESENT

## 2025-03-28 ASSESSMENT — ACTIVITIES OF DAILY LIVING (ADL)
BATHING: INDEPENDENT
PATIENT'S MEMORY ADEQUATE TO SAFELY COMPLETE DAILY ACTIVITIES?: YES
HEARING - RIGHT EAR: FUNCTIONAL
LACK_OF_TRANSPORTATION: NO
DRESSING YOURSELF: INDEPENDENT
EFFECT OF PAIN ON DAILY ACTIVITIES: MINIMAL
ADEQUATE_TO_COMPLETE_ADL: YES
JUDGMENT_ADEQUATE_SAFELY_COMPLETE_DAILY_ACTIVITIES: YES
FEEDING YOURSELF: INDEPENDENT
WALKS IN HOME: INDEPENDENT
TOILETING: INDEPENDENT
HEARING - LEFT EAR: FUNCTIONAL
GROOMING: INDEPENDENT

## 2025-03-28 ASSESSMENT — PAIN - FUNCTIONAL ASSESSMENT
PAIN_FUNCTIONAL_ASSESSMENT: 0-10
PAIN_FUNCTIONAL_ASSESSMENT: 0-10

## 2025-03-28 ASSESSMENT — COLUMBIA-SUICIDE SEVERITY RATING SCALE - C-SSRS
6. HAVE YOU EVER DONE ANYTHING, STARTED TO DO ANYTHING, OR PREPARED TO DO ANYTHING TO END YOUR LIFE?: NO
5. HAVE YOU STARTED TO WORK OUT OR WORKED OUT THE DETAILS OF HOW TO KILL YOURSELF? DO YOU INTEND TO CARRY OUT THIS PLAN?: NO
2. HAVE YOU ACTUALLY HAD ANY THOUGHTS OF KILLING YOURSELF?: YES
4. HAVE YOU HAD THESE THOUGHTS AND HAD SOME INTENTION OF ACTING ON THEM?: NO
1. IN THE PAST MONTH, HAVE YOU WISHED YOU WERE DEAD OR WISHED YOU COULD GO TO SLEEP AND NOT WAKE UP?: YES

## 2025-03-28 ASSESSMENT — PATIENT HEALTH QUESTIONNAIRE - PHQ9
1. LITTLE INTEREST OR PLEASURE IN DOING THINGS: MORE THAN HALF THE DAYS
2. FEELING DOWN, DEPRESSED OR HOPELESS: MORE THAN HALF THE DAYS
SUM OF ALL RESPONSES TO PHQ9 QUESTIONS 1 & 2: 4

## 2025-03-28 ASSESSMENT — PAIN SCALES - GENERAL
PAINLEVEL_OUTOF10: 0 - NO PAIN

## 2025-03-28 ASSESSMENT — PAIN DESCRIPTION - DESCRIPTORS: DESCRIPTORS: PATIENT UNABLE TO DESCRIBE

## 2025-03-28 NOTE — PROGRESS NOTES
"EPAT - Social Work Psychiatric Assessment    Arrival Details  Mode of Arrival: Ambulatory  Admission Source: Home  Admission Type: Involuntary (Pink slip written by Pearl NGUYEN)  EPAT Assessment Start Date: 03/28/25  EPAT Assessment Start Time: 1148  Name of : John HASTINGS    History of Present Illness  Admission Reason: Depression, passive SI  HPI: Pt is a 69 y.o. female who presents to [facility] ED via police after reporting passive SI and hopelessness with PD. Sw reviewed chart and physician notes. Pt has hx of anxiety and one visit indicated pt has dx of Bipolar disorder. Pt denies hx of mental illness and reports \"the only illness I have is greed.\"  Pt is distraught due to spending all the money that was meant to be for her  and mothers SNF. Pt reports hx of services at [facility] but is no longer seeing them. Pt states \"i cant be fixed\" Pt reports hx of past suicide attemps via \"[facility]\". Pt is a poor historian due to current mental state.  Pt has been seen in the ED most recently one week ago for anxiety and insomnia.     Readmission Information   Readmission within 30 Days: No    Psychiatric Symptoms  Anxiety Symptoms: Generalized  Depression Symptoms: Change in energy level, Feelings of hopelessess, Sleep disturbance  Usha Symptoms: No problems reported or observed.    Psychosis Symptoms  Hallucination Type: Auditory, Visual  Delusion Type: No problems reported or observed.    Additional Symptoms - Adult  Generalized Anxiety Disorder: Difficult to control worry, Difficulity concentrating, Excessive anxiety/worry, Sleep disturbance  Obsessive Compulsive Disorder: No problems reported or observed.  Panic Attack: No problems reported or observed.  Post Traumatic Stress Disorder: No problems reported or observed.  Delirium: No problems reported or observed.    Past Psychiatric History/Meds/Treatments  Past Psychiatric History: Per chart review, pt has hx of anxiety, insomnia and " "Bipolar disorder  Past Psychiatric Meds/Treatments: Pt is a poor historian. Pt was linked with [facility], she can't recall the name of her psychaitrist. Pt states she stopped seeing him because she \"cant be fixed.\"  Past Violence/Victimization History: unknown    Current Mental Health Contacts   Name/Phone Number: DAVION  Provider Name/Phone Number: DAVION    Support System: Other (Comment) (limited, Pt reports 1 sister.  and mother are in a SNF)    Living Arrangement: Lives alone    Home Safety  Feels Safe Living in Home: Yes    Income Information  Employment Status for: Patient    [employer] Service/Education History  Current or Previous  Service: None  History of Learning Problems: No  History of School Behavior Problems: No    Social/Cultural History  Cultural Requests During Hospitalization: None  Spiritual Requests During Hospitalization: None    Legal  Legal Considerations: Patient/ Family Ability to Make Healthcare Decisions  Criminal Activity/ Legal Involvement Pertinent to Current Situation/ Hospitalization: None  Legal Concerns: None  Legal Comments: NA    Drug Screening  Have you used any substances (canabis, cocaine, heroin, hallucinogens, inhalants, etc.) in the past 12 months?: No  Have you used any prescription drugs other than prescribed in the past 12 months?: No         Behavioral Health  Behavioral Health(WDL): Exceptions to WDL  Behaviors/Mood: Flat affect, Hallucinations, Sad, Withdrawn  Affect: Appropriate to circumstances  Parent/Guardian/Significant Other Involvement: No involvement    Orientation  Orientation Level: Oriented X4    General Appearance  Motor Activity: Unremarkable  General Attitude: Withdrawn  Appearance/Hygiene: Unremarkable    Thought Process  Content: Unremarkable  Perception: Hallucinations  Hallucination: Auditory, Visual  Judgment/Insight: Poor  Confusion: None    Sleep Pattern  Sleep Pattern: Unable to assess         Violence Risk Assessment  Assessment of " "Violence: None noted  Thoughts of Harm to Others: No    Ability to Assess Risk Screen  Risk Screen - Ability to Assess: Able to be screened  Ask Suicide-Screening Questions  1. In the past few weeks, have you wished you were dead?: Yes  2. In the past few weeks, have you felt that you or your family would be better off if you were dead?: Yes  3. In the past week, have you been having thoughts about killing yourself?:  (Pt doesnt answer directly, states \"I can't fix this\")  4. Have you ever tried to kill yourself?: Yes  How did you try to kill yourself?: cutting  When did you try to kill yourself?: unknown  5. Are you having thoughts of killing yourself right now?:  (Pt doesnt answer, repeats \"I can't fix this.\")  Calculated Risk Score: Potential Risk  [place] Suicide Severity Rating Scale (Screener/Recent Self-Report)  1. Wish to be Dead (Past 1 Month): Yes  2. Non-Specific Active Suicidal Thoughts (Past 1 Month): Yes  6. Suicidal Behavior (Lifetime): Yes  6. Suicidal Behavior (3 Months): No  6. Suicidal Behavior (Description): cutting  Calculated C-SSRS Risk Score (Lifetime/Recent): Moderate Risk  Step 1: Risk Factors  Current & Past Psychiatric Dx: Mood disorder  Presenting Symptoms: Hopelessness or despair  Precipitants/Stressors: Triggering events leading to humiliation, shame, and/or despair (e.g. loss of relationship, financial or health status) (real or anticipated)  Change in Treatment: Non-compliant or not receiving treatment  Step 3: Suicidal Ideation Intensity  Most Severe Suicidal Ideation Identified: \"I can't fix this\"    Psychiatric Impression and Plan of Care  Assessment and Plan: Pt is a 69 y.o. female who presents to [facility] ED after going to [place] police station to report anxiety, insomnia and passive SI. Pt doesnt answer questions regarding suicide directly.  She states \"I can't fix this\" repeatedly and or \"it doesnt work\" when asked about her thoughts about suicide. Pt appears distraught, " "distracted and disorganized. She stares off or in the corners of the room and doesnt answer all questions appropriately. Pt reports she only sleeps 1-2 hours a night and only eats maybe 1 meal a day.  Pt reports seeing images of shadows and cats as well as hearing her cats when they may not be present.  Pt states she spent money that was to be used for her mother and husbands SNF care. Pt states her punishment is coming and she is going to burn in hell. Pt believes her mother and  are going to die because she didnt pay the bills and reports she didnt pay bills because she never learned how to pay them. Pt appears confused, distracted, is unable to focus, is despondent and hopeless. Pt states she stopped seeing her psychiatrist because \"its not going to work\". Pt states \"I can't be fixed\".   discussed disposition with Dr Benites. Pt would benefit from inpatient placement at this time for safety and stabilization. Dr Benites in agreement.  Plan Comments: inpatient placement    Outcome/Disposition  Patient's Perception of Outcome Achieved: accepting  Assessment, Recommendations and Risk Level Reviewed with: Dr Benites  EPAT Assessment Completed Date: 03/28/25  EPAT Assessment Completed Time: 1200  Patient Disposition:  Adult Inpatient Psych      "

## 2025-03-28 NOTE — ED PROVIDER NOTES
"HPI   Chief Complaint   Patient presents with    Psychiatric Evaluation     Pt arrived by [place] [employer] for psych eval. Pt went to [place] [employer] and states she spent all of her money and states her family will pay the price. Pt states she has not been eating or drinking recently. Pt states her  and mother are both in a rehab facility. Pt asked if she has any suicidal thoughts, pt stated \"it doesn't work\" Pt arrived pink slipped by PD       Patient went to the [place] [employer] today and expressed that she had spent all of her money.  Her mother and her  are at rehab facilities and she states that they are going to die because they will not be able to stay at the rehab facilities because the money is gone.  Patient has not been eating or drinking recently.  She has not been sleeping over the last week.  She reports that she tried to cut herself to kill herself but \"it did not work\".  She denies any homicidal ideation.  She reports that she has been having some hallucinations of shadows and cats.  She denies any regular drug or alcohol use.  She reports history of anxiety.              Patient History   Past Medical History:   Diagnosis Date    Benign essential hypertension     Estrogen deficiency 11/15/2023    DEXA 1/24 back nl, hip neck T-2.1 recheck 1/26    History of atrial fibrillation     Hyperactive thyroid Dr.Burtch Rand Burnham NSR no AC    History of breast lift 05/2024    History of Meniere's syndrome 11/15/2023    Hx of reduction mammoplasty 05/2024    Hyperthyroidism 09/15/2023    Other specified abnormal findings of blood chemistry     High serum cholesterol sulfate    Personal history of malignant neoplasm, unspecified     History of malignant neoplasm    Personal history of other mental and behavioral disorders     Unspecified osteoarthritis, unspecified site 04/28/2016    Arthritis     Past Surgical History:   Procedure Laterality Date    COSMETIC SURGERY      HYSTERECTOMY  " 04/28/2016    Hysterectomy    JOINT REPLACEMENT      rt ring finger    JOINT REPLACEMENT Right 05/2022    ring finger    OTHER SURGICAL HISTORY  04/28/2016    Musculoskeletal Procedures Injection Carpal Tunnel    OTHER SURGICAL HISTORY  04/28/2016    Arthrodesis MCP Joint    OTHER SURGICAL HISTORY  01/11/2021    Ankle surgery    OTHER SURGICAL HISTORY  01/11/2021    Carpal tunnel surgery    OTHER SURGICAL HISTORY  07/06/2022    Nasal septoplasty    OTHER SURGICAL HISTORY  07/2021    heart shock catherization    REDUCTION MAMMAPLASTY  may 7 2024    SHOULDER SURGERY  08/2021    total left shoulder replacement    SINUS SURGERY      TRIGGER FINGER RELEASE Right 01/2024    middle finger     Family History   Problem Relation Name Age of Onset    Stroke Mother      Other (cyst on breast) Mother      Atrial fibrillation Father      Stroke Father      Heart disease Father       Social History     Tobacco Use    Smoking status: Never     Passive exposure: Never    Smokeless tobacco: Never   Vaping Use    Vaping status: Never Used   Substance Use Topics    Alcohol use: Never    Drug use: Never       Physical Exam   ED Triage Vitals [03/28/25 1013]   Temperature Heart Rate Respirations BP   36.8 °C (98.2 °F) 92 16 (!) 154/91      Pulse Ox Temp src Heart Rate Source Patient Position   98 % -- -- Sitting      BP Location FiO2 (%)     Right arm --       Physical Exam  HENT:      Head: Normocephalic.   Eyes:      General: No scleral icterus.  Cardiovascular:      Rate and Rhythm: Normal rate.   Pulmonary:      Effort: Pulmonary effort is normal.   Neurological:      General: No focal deficit present.      Mental Status: She is alert.   Psychiatric:      Comments: Flat affect, cooperative.  Suicidal ideation.           ED Course & MDM   ED Course as of 03/28/25 1043   Fri Mar 28, 2025   1014 EKG interpreted by me: Normal sinus rhythm, rate 92.  Normal axis.  No ST or T wave abnormalities.  Normal QRS and QTc durations. [ML]      ED  Course User Index  [ML] Lawrence Benites MD         Diagnoses as of 03/28/25 1043   Suicidal ideation                 No data recorded     Nba Coma Scale Score: 15 (03/28/25 1001 : Sindhu Mondragon RN)                           Medical Decision Making  Laboratory results are unremarkable.  Patient is medically cleared and awaiting placement at this time.    Patient was accepted at [facility].  Parts of this chart were completed with dictation software, please excuse any errors in transcription.        Procedure  Procedures     Lawrence Benites MD  03/28/25 7608

## 2025-03-28 NOTE — ED NOTES
"Pt arrives to ED for SI. Pt states that her  and mother are in a skilled nursing facility and she can no longer pay for it. Pt states that she has stopped eating and drinking in an attempt to end her life however \"it is not working\". Pt denies any other plans. Denies HI. Currently calm and cooperative.      Aniya Morris RN  03/28/25 9573    "

## 2025-03-29 LAB
BACTERIA UR CULT: NORMAL
CHOLEST SERPL-MCNC: 142 MG/DL (ref 0–199)
CHOLESTEROL/HDL RATIO: 2.4
GLUCOSE P FAST SERPL-MCNC: 134 MG/DL (ref 74–99)
HDLC SERPL-MCNC: 59.7 MG/DL
LDLC SERPL CALC-MCNC: 67 MG/DL
NON HDL CHOLESTEROL: 82 MG/DL (ref 0–149)
TRIGL SERPL-MCNC: 77 MG/DL (ref 0–149)
VLDL: 15 MG/DL (ref 0–40)

## 2025-03-29 PROCEDURE — 36415 COLL VENOUS BLD VENIPUNCTURE: CPT | Performed by: STUDENT IN AN ORGANIZED HEALTH CARE EDUCATION/TRAINING PROGRAM

## 2025-03-29 PROCEDURE — 99222 1ST HOSP IP/OBS MODERATE 55: CPT | Performed by: REGISTERED NURSE

## 2025-03-29 PROCEDURE — 99221 1ST HOSP IP/OBS SF/LOW 40: CPT | Performed by: INTERNAL MEDICINE

## 2025-03-29 PROCEDURE — 2500000002 HC RX 250 W HCPCS SELF ADMINISTERED DRUGS (ALT 637 FOR MEDICARE OP, ALT 636 FOR OP/ED): Performed by: REGISTERED NURSE

## 2025-03-29 PROCEDURE — 2500000002 HC RX 250 W HCPCS SELF ADMINISTERED DRUGS (ALT 637 FOR MEDICARE OP, ALT 636 FOR OP/ED): Performed by: STUDENT IN AN ORGANIZED HEALTH CARE EDUCATION/TRAINING PROGRAM

## 2025-03-29 PROCEDURE — 2500000001 HC RX 250 WO HCPCS SELF ADMINISTERED DRUGS (ALT 637 FOR MEDICARE OP): Performed by: STUDENT IN AN ORGANIZED HEALTH CARE EDUCATION/TRAINING PROGRAM

## 2025-03-29 PROCEDURE — 80061 LIPID PANEL: CPT | Performed by: STUDENT IN AN ORGANIZED HEALTH CARE EDUCATION/TRAINING PROGRAM

## 2025-03-29 PROCEDURE — 2500000001 HC RX 250 WO HCPCS SELF ADMINISTERED DRUGS (ALT 637 FOR MEDICARE OP): Performed by: INTERNAL MEDICINE

## 2025-03-29 PROCEDURE — 82947 ASSAY GLUCOSE BLOOD QUANT: CPT | Performed by: STUDENT IN AN ORGANIZED HEALTH CARE EDUCATION/TRAINING PROGRAM

## 2025-03-29 PROCEDURE — 2500000005 HC RX 250 GENERAL PHARMACY W/O HCPCS: Performed by: STUDENT IN AN ORGANIZED HEALTH CARE EDUCATION/TRAINING PROGRAM

## 2025-03-29 PROCEDURE — 2500000004 HC RX 250 GENERAL PHARMACY W/ HCPCS (ALT 636 FOR OP/ED): Performed by: STUDENT IN AN ORGANIZED HEALTH CARE EDUCATION/TRAINING PROGRAM

## 2025-03-29 PROCEDURE — 1140000001 HC PRIVATE PSYCH ROOM DAILY

## 2025-03-29 RX ORDER — OLANZAPINE 5 MG/1
5 TABLET, FILM COATED ORAL NIGHTLY
Status: DISCONTINUED | OUTPATIENT
Start: 2025-03-29 | End: 2025-03-31

## 2025-03-29 RX ORDER — LOSARTAN POTASSIUM 50 MG/1
50 TABLET ORAL DAILY
Status: DISCONTINUED | OUTPATIENT
Start: 2025-03-29 | End: 2025-03-30

## 2025-03-29 RX ADMIN — OLANZAPINE 5 MG: 5 TABLET, FILM COATED ORAL at 20:38

## 2025-03-29 RX ADMIN — Medication 2000 UNITS: at 08:08

## 2025-03-29 RX ADMIN — METHIMAZOLE 5 MG: 5 TABLET ORAL at 08:07

## 2025-03-29 RX ADMIN — FLUVOXAMINE MALEATE 50 MG: 50 TABLET, COATED ORAL at 06:15

## 2025-03-29 RX ADMIN — MELATONIN TAB 3 MG 3 MG: 3 TAB at 17:24

## 2025-03-29 RX ADMIN — OXCARBAZEPINE 300 MG: 300 TABLET, FILM COATED ORAL at 06:15

## 2025-03-29 RX ADMIN — LOSARTAN POTASSIUM 50 MG: 50 TABLET, FILM COATED ORAL at 17:24

## 2025-03-29 RX ADMIN — POLYETHYLENE GLYCOL 3350 17 G: 17 POWDER, FOR SOLUTION ORAL at 08:08

## 2025-03-29 RX ADMIN — OXCARBAZEPINE 300 MG: 300 TABLET, FILM COATED ORAL at 17:30

## 2025-03-29 RX ADMIN — PROPRANOLOL HYDROCHLORIDE 10 MG: 20 TABLET ORAL at 16:15

## 2025-03-29 RX ADMIN — TRIAMTERENE AND HYDROCHLOROTHIAZIDE 1 TABLET: 37.5; 25 TABLET ORAL at 08:07

## 2025-03-29 RX ADMIN — ATORVASTATIN CALCIUM 20 MG: 20 TABLET, FILM COATED ORAL at 08:07

## 2025-03-29 SDOH — HEALTH STABILITY: MENTAL HEALTH: PATIENT PERSONAL STRENGTHS: EXPRESSIVE OF EMOTIONS

## 2025-03-29 SDOH — HEALTH STABILITY: MENTAL HEALTH: MAJOR CHANGE/ LOSS/ STRESSOR: FINANCIAL

## 2025-03-29 SDOH — SOCIAL STABILITY: SOCIAL INSECURITY

## 2025-03-29 SDOH — HEALTH STABILITY: MENTAL HEALTH: MENTAL HEALTH TREATMENT: OUTPATIENT TREATMENT

## 2025-03-29 SDOH — SOCIAL STABILITY: SOCIAL INSECURITY: RELIGIOUS/CULTURAL FACTORS: LUTHERAN

## 2025-03-29 SDOH — HEALTH STABILITY: MENTAL HEALTH: HALLUCINATION TYPE: VISUAL

## 2025-03-29 SDOH — HEALTH STABILITY: MENTAL HEALTH: SUBSTANCE USE: DENIES

## 2025-03-29 SDOH — SOCIAL STABILITY: SOCIAL INSECURITY: FEELS SAFE LIVING IN HOME: YES

## 2025-03-29 SDOH — HEALTH STABILITY: MENTAL HEALTH: BEHAVIOR: WANDERS

## 2025-03-29 ASSESSMENT — COLUMBIA-SUICIDE SEVERITY RATING SCALE - C-SSRS
2. HAVE YOU ACTUALLY HAD ANY THOUGHTS OF KILLING YOURSELF?: YES
1. SINCE LAST CONTACT, HAVE YOU WISHED YOU WERE DEAD OR WISHED YOU COULD GO TO SLEEP AND NOT WAKE UP?: NO
6. HAVE YOU EVER DONE ANYTHING, STARTED TO DO ANYTHING, OR PREPARED TO DO ANYTHING TO END YOUR LIFE?: NO
2. HAVE YOU ACTUALLY HAD ANY THOUGHTS OF KILLING YOURSELF?: NO
6. HAVE YOU EVER DONE ANYTHING, STARTED TO DO ANYTHING, OR PREPARED TO DO ANYTHING TO END YOUR LIFE?: NO
2. HAVE YOU ACTUALLY HAD ANY THOUGHTS OF KILLING YOURSELF?: NO
6. HAVE YOU EVER DONE ANYTHING, STARTED TO DO ANYTHING, OR PREPARED TO DO ANYTHING TO END YOUR LIFE?: NO
2. HAVE YOU ACTUALLY HAD ANY THOUGHTS OF KILLING YOURSELF?: NO
6. HAVE YOU EVER DONE ANYTHING, STARTED TO DO ANYTHING, OR PREPARED TO DO ANYTHING TO END YOUR LIFE?: CUTTING
6. HAVE YOU EVER DONE ANYTHING, STARTED TO DO ANYTHING, OR PREPARED TO DO ANYTHING TO END YOUR LIFE?: YES
1. SINCE LAST CONTACT, HAVE YOU WISHED YOU WERE DEAD OR WISHED YOU COULD GO TO SLEEP AND NOT WAKE UP?: NO
4. HAVE YOU HAD THESE THOUGHTS AND HAD SOME INTENTION OF ACTING ON THEM?: NO
6. HAVE YOU EVER DONE ANYTHING, STARTED TO DO ANYTHING, OR PREPARED TO DO ANYTHING TO END YOUR LIFE?: NO
6. HAVE YOU EVER DONE ANYTHING, STARTED TO DO ANYTHING, OR PREPARED TO DO ANYTHING TO END YOUR LIFE?: YES
1. IN THE PAST MONTH, HAVE YOU WISHED YOU WERE DEAD OR WISHED YOU COULD GO TO SLEEP AND NOT WAKE UP?: YES
1. SINCE LAST CONTACT, HAVE YOU WISHED YOU WERE DEAD OR WISHED YOU COULD GO TO SLEEP AND NOT WAKE UP?: NO
5. HAVE YOU STARTED TO WORK OUT OR WORKED OUT THE DETAILS OF HOW TO KILL YOURSELF? DO YOU INTEND TO CARRY OUT THIS PLAN?: NO
2. HAVE YOU ACTUALLY HAD ANY THOUGHTS OF KILLING YOURSELF?: YES
6. HAVE YOU EVER DONE ANYTHING, STARTED TO DO ANYTHING, OR PREPARED TO DO ANYTHING TO END YOUR LIFE?: YES
2. HAVE YOU ACTUALLY HAD ANY THOUGHTS OF KILLING YOURSELF?: YES
1. IN THE PAST MONTH, HAVE YOU WISHED YOU WERE DEAD OR WISHED YOU COULD GO TO SLEEP AND NOT WAKE UP?: YES
1. IN THE PAST MONTH, HAVE YOU WISHED YOU WERE DEAD OR WISHED YOU COULD GO TO SLEEP AND NOT WAKE UP?: YES

## 2025-03-29 ASSESSMENT — PAIN SCALES - GENERAL
PAINLEVEL_OUTOF10: 0 - NO PAIN

## 2025-03-29 ASSESSMENT — PAIN - FUNCTIONAL ASSESSMENT
PAIN_FUNCTIONAL_ASSESSMENT: 0-10

## 2025-03-29 ASSESSMENT — ACTIVITIES OF DAILY LIVING (ADL): BATHING: NO ASSISTANCE

## 2025-03-29 ASSESSMENT — PATIENT HEALTH QUESTIONNAIRE - PHQ9
2. FEELING DOWN, DEPRESSED OR HOPELESS: MORE THAN HALF THE DAYS
1. LITTLE INTEREST OR PLEASURE IN DOING THINGS: MORE THAN HALF THE DAYS
SUM OF ALL RESPONSES TO PHQ9 QUESTIONS 1 AND 2: 4

## 2025-03-29 NOTE — CONSULTS
[facility]: [facility]  CONSULT NOTE      Nevaeh Cuba is a 69 y.o. female that is being seen  today for medical management at [facility].  Subjective   Patient is a 69-year-old female with a history of hypertension, hyperlipidemia, depression and anxiety, hypothyroidism who is being seen for medical management [facility].  Patient was admitted to the ER with suicidal ideations and having difficulty with sleeping and increased anxiety over the last few days.  Patient was medically cleared for admission to [facility].  Patient's blood pressure was running high most likely due to anxiety.  Patient has been on triamterene hydrochlorothiazide.  Patient is being started on losartan.  Patient has history of hypothyroidism and TSH has been stable.  Patient is on methimazole and follows up with the endocrinologist.      ROS  Negative for fever or chills  Negative for sore throat, ear pain, nasal discharge  Negative for cough, shortness of breath or wheezing  Negative for chest pain, palpitations, swelling of legs  Negative for abdominal pain, constipation, diarrhea, blood in the stools  Negative for urinary complaints  Negative for headache, dizziness, weakness or numbness  Negative for joint pain  Positive for depression and anxiety  All other systems reviewed and were negative   Vitals:    03/29/25 1837   BP: 122/70   Pulse: 77   Resp:    Temp:    SpO2:       Vitals:    03/28/25 1819   Weight: 75.8 kg (167 lb)     Body mass index is 26.95 kg/m².  Physical Exam  Constitutional: Patient does not appear to be in any acute distress  Head and Face: NCAT  Eyes: Normal external exam, EOMI  ENT: Patient has hearing loss in both ears..  Cardiovascular: RRR, S1/S2, no murmurs, rubs, or gallops, radial pulses +2, no edema of extremities  Pulmonary: CTAB, no respiratory distress.  Abdomen: +BS, soft, non-tender, nondistended, no guarding or rebound, no masses noted  MSK: No joint swelling, normal movements of all  extremities. Range of motion- normal.  Skin- No lesions, contusions, or erythema.  Peripheral puslses palpable bilaterally 2+  Neuro: AAO X2, Cranial nerves 2-12 grossly intact,DTR 2+ in all 4 limbs       LABS   [unfilled]  Lab Results   Component Value Date    GLUCOSE 135 (H) 03/28/2025    CALCIUM 9.9 03/28/2025     03/28/2025    K 3.6 03/28/2025    CO2 25 03/28/2025    CL 99 03/28/2025    BUN 27 (H) 03/28/2025    CREATININE 0.86 03/28/2025     Lab Results   Component Value Date    ALT 24 03/28/2025    AST 18 03/28/2025    ALKPHOS 71 03/28/2025    BILITOT 1.0 03/28/2025     Lab Results   Component Value Date    WBC 9.9 03/28/2025    HGB 17.5 (H) 03/28/2025    HCT 48.7 (H) 03/28/2025    MCV 88 03/28/2025     03/28/2025     Lab Results   Component Value Date    CHOL 142 03/29/2025    CHOL 224 (H) 04/12/2024    CHOL 194 10/06/2023     Lab Results   Component Value Date    HDL 59.7 03/29/2025    HDL 58.0 04/12/2024    HDL 58.0 10/06/2023     Lab Results   Component Value Date    LDLCALC 67 03/29/2025    LDLCALC 102 04/12/2024    LDLCALC 88 10/06/2023     Lab Results   Component Value Date    TRIG 77 03/29/2025    TRIG 318 (H) 04/12/2024    TRIG 240 (H) 10/06/2023     Lab Results   Component Value Date    HGBA1C 5.5 06/25/2024     Other labs not included in the list above were reviewed either before or during this encounter.    History    Past Medical History:   Diagnosis Date    Benign essential hypertension     Estrogen deficiency 11/15/2023    DEXA 1/24 back nl, hip neck T-2.1 recheck 1/26    History of atrial fibrillation     Hyperactive thyroid Dr.Burtch Petersonle  NSR no AC    History of breast lift 05/2024    History of Meniere's syndrome 11/15/2023    Hx of reduction mammoplasty 05/2024    Hyperthyroidism 09/15/2023    Other specified abnormal findings of blood chemistry     High serum cholesterol sulfate    Personal history of malignant neoplasm, unspecified     History of malignant  neoplasm    Personal history of other mental and behavioral disorders     Unspecified osteoarthritis, unspecified site 04/28/2016    Arthritis     Past Surgical History:   Procedure Laterality Date    COSMETIC SURGERY      HYSTERECTOMY  04/28/2016    Hysterectomy    JOINT REPLACEMENT      rt ring finger    JOINT REPLACEMENT Right 05/2022    ring finger    OTHER SURGICAL HISTORY  04/28/2016    Musculoskeletal Procedures Injection Carpal Tunnel    OTHER SURGICAL HISTORY  04/28/2016    Arthrodesis MCP Joint    OTHER SURGICAL HISTORY  01/11/2021    Ankle surgery    OTHER SURGICAL HISTORY  01/11/2021    Carpal tunnel surgery    OTHER SURGICAL HISTORY  07/06/2022    Nasal septoplasty    OTHER SURGICAL HISTORY  07/2021    heart shock catherization    REDUCTION MAMMAPLASTY  may 7 2024    SHOULDER SURGERY  08/2021    total left shoulder replacement    SINUS SURGERY      TRIGGER FINGER RELEASE Right 01/2024    middle finger     Family History   Problem Relation Name Age of Onset    Stroke Mother      Other (cyst on breast) Mother      Atrial fibrillation Father      Stroke Father      Heart disease Father       Allergies   Allergen Reactions    Latex Itching and Rash    Methotrexate Itching     HEAD BURNING AND ITCHING    Metoprolol Rash    Diltiazem Hcl Itching     Rash face     Latex, Natural Rubber Itching    Pollen Extracts Other     NASAL CONGESTION    Adhesive Tape-Silicones Rash    Folic Acid Rash    Sulfamethoxazole-Trimethoprim Rash     Tinnitus      No current facility-administered medications on file prior to encounter.     Current Outpatient Medications on File Prior to Encounter   Medication Sig Dispense Refill    acetaminophen (Tylenol) 325 mg tablet Take 2 tablets (650 mg) by mouth every 6 hours if needed for mild pain (1 - 3) 20 tablet 0    atorvastatin (Lipitor) 20 mg tablet Take 1 tablet (20 mg) by mouth once daily. 90 tablet 3    cholecalciferol (Vitamin D-3) 25 MCG (1000 UT) tablet Take 2 tablets (2,000  Units) by mouth. As directed      docusate sodium (Colace) 100 mg capsule Take 1 capsule (100 mg) by mouth once daily as needed.      fexofenadine HCl (ALLEGRA ORAL) Take 1 tablet by mouth once daily as needed (ALLERGIES).      fLuvoxaMINE (Luvox) 50 mg tablet Take 1 tablet (50 mg) by mouth early in the morning..      hydrOXYzine HCL (Atarax) 25 mg tablet Take 2 tablets (50 mg) by mouth once daily at bedtime for 10 days. 20 tablet 0    ibuprofen 200 mg tablet Take 2 tablets (400 mg) by mouth every 6 hours if needed for mild pain (1 - 3).      methIMAzole (Tapazole) 5 mg tablet Take 1 tablet (5 mg) by mouth once daily. 90 tablet 3    mv-min-FA-vit K-lutein-zeaxant (PreserVision AREDS 2 Plus MV) 200 mcg-15 mcg- 5 mg-1 mg capsule Take 1 capsule by mouth once daily.      OXcarbazepine (Trileptal) 150 mg tablet Take 1 tablet (150 mg) by mouth 2 times a day.      OXcarbazepine (Trileptal) 300 mg tablet Take 1 tablet (300 mg) by mouth every 12 hours.      propranolol (Inderal) 10 mg tablet Take 1 tablet (10 mg) by mouth every 8 hours if needed for anxiety.      triamterene-hydrochlorothiazid (Maxzide-25) 37.5-25 mg tablet Take 1 tablet by mouth once daily in the morning. 90 tablet 3     Immunization History   Administered Date(s) Administered    COVID-19, subunit, rS-nanoparticle, adjuvanted, PF, 5 mcg/0.5 mL 09/28/2024    Flu vaccine, quadrivalent, high-dose, preservative free, age 65y+ (FLUZONE) 09/14/2023    Influenza, Seasonal, Quadrivalent, Adjuvanted 09/30/2021, 09/08/2022    Influenza, Unspecified 09/07/2010, 10/17/2011    Influenza, injectable, MDCK, quadrivalent 10/23/2017, 10/30/2018    Influenza, injectable, quadrivalent 09/23/2019, 09/11/2020    Influenza, seasonal, injectable 09/24/2012, 10/17/2013, 10/23/2014, 10/26/2015, 11/17/2016    Moderna COVID-19 vaccine, 12 years and older (50mcg/0.5mL)(Spikevax) 09/29/2023    Pfizer COVID-19 vaccine, bivalent, age 12 years and older (30 mcg/0.3 mL) 09/08/2022     Pfizer Gray Cap SARS-CoV-2 03/31/2022    Pfizer Purple Cap SARS-CoV-2 03/09/2021, 04/06/2021, 10/06/2021    Pneumococcal conjugate vaccine, 13-valent (PREVNAR 13) 07/11/2013    Pneumococcal conjugate vaccine, 20-valent (PREVNAR 20) 09/16/2023    Pneumococcal polysaccharide vaccine, 23-valent, age 2 years and older (PNEUMOVAX 23) 11/05/2020    RSV, 60 Years And Older (AREXVY) 09/16/2023    Tdap vaccine, age 7 year and older (BOOSTRIX, ADACEL) 09/30/2021    Zoster vaccine, recombinant, adult (SHINGRIX) 10/15/2020, 09/30/2021    Zoster, live 02/10/2012     Patient's medical history was reviewed and updated either before or during this encounter.  ASSESSMENT / PLAN:  Active Hospital Problems    *Psychosis, unspecified psychosis type (Multi)      Anxiety      Insomnia      Sensorineural hearing loss (SNHL) of both ears      Essential hypertension      Hyperlipidemia      Hyperthyroidism    Patient is being seen for medical management geropsych.  Admitted for increased anxiety.  Patient has history of hypothyroidism and is on methimazole.  Patient also has history of hypertension and blood pressure was elevated.  Patient has been started on losartan 50 mg.  Patient is already on triamterene hydrochlorothiazide.  Lab work done in the ER reviewed and is stable.      Thom Pascual MD

## 2025-03-29 NOTE — CARE PLAN
"The patient's goals for the shift include \"No goals\"    The clinical goals for the shift include maintain safety    Over the shift, the patient did  make progress toward the following goals. Barriers to progression include pt being disoriented to situation. Recommendations to address these barriers include pt education.    Problem: Risk for Suicide  Goal: Accepts medications as prescribed/needed this shift  Outcome: Progressing  Goal: No self harm this shift  Outcome: Progressing     "

## 2025-03-29 NOTE — H&P
"  The reason for admission includes:  psychosis .  Onset of symptoms was gradual starting several weeks ago with gradually worsening course since that time. Psychosocial Stressors: family, financial, health, and legal.            History Of Present Illness  Nevaeh Cuba is a 69 y.o. female presenting with HPI: Pt is a 69 y.o. female who presents to [facility] ED via police after reporting passive SI and hopelessness with PD. Sw reviewed chart and physician notes. Pt has hx of anxiety and one visit indicated pt has dx of Bipolar disorder. Pt denies hx of mental illness and reports \"the only illness I have is greed.\"  Pt is distraught due to spending all the money that was meant to be for her  and mothers SNF. Pt reports hx of services at [facility] but is no longer seeing them. Pt states \"i cant be fixed\" Pt reports hx of past suicide attemps via \"little marks\". Pt is a poor historian due to current mental state.  Pt has been seen in the ED most recently one week ago for anxiety and insomnia.   .Assessment and Plan: Pt is a 69 y.o. female who presents to [facility] ED after going [place] police [place] to report anxiety, insomnia and passive SI. Pt doesnt answer questions regarding suicide directly.  She states \"I can't fix this\" repeatedly and or \"it doesnt work\" when asked about her thoughts about suicide. Pt appears distraught, distracted and disorganized. She stares off or in the corners of the room and doesnt answer all questions appropriately. Pt reports she only sleeps 1-2 hours a night and only eats maybe 1 meal a day.  Pt reports seeing images of shadows and cats as well as hearing her cats when they may not be present.  Pt states she spent money that was to be used for her mother and husbands SNF care. Pt states her punishment is coming and she is going to burn in hell. Pt believes her mother and  are going to die because she didnt pay the bills and reports she didnt pay bills because she " "never learned how to pay them. Pt appears confused, distracted, is unable to focus, is despondent and hopeless. Pt states she stopped seeing her psychiatrist because \"its not going to work\". Pt states \"I can't be fixed\".  Sw discussed disposition with Dr Benites. Pt would benefit from inpatient placement at this time for safety and stabilization. Dr Benites in agreement.  Plan Comments: inpatient placement    On unit patient is quiet and cooperative. She is willing and able to participate in full interview.  She says she really should not be here- that she called the poce because she was scared but she is ok an dwants togo home.  Provider reviwed ER notes and explained that she appeared psychotic at theat time. Amari brownes AH/VH/tu, denies SI/HI at this time. She does admit t o feeling overwhelmed by guilt because she spent all themoney she was supposed to pay her  and mothers nursing home expenses with \"on stuff lying around the condo\". Her affect an dpresentation are odd and eccentric.She says she slept much better last night after barely sleeping for a long time She says Attender cannot help her \"I have built up all these symptoms\" \" I cause problems everywhere I go\" \"can't live on my own, can't live with anyone\". Patient desribes obsessive thought process and agrees this is why she is on luvox. She gives permission to talk to Laura Coles- sister- but does not have  her number.  Past Medical History  She has a past medical history of Benign essential hypertension, Estrogen deficiency (11/15/2023), History of atrial fibrillation, History of breast lift (05/2024), History of Meniere's syndrome (11/15/2023), reduction mammoplasty (05/2024), Hyperthyroidism (09/15/2023), Other specified abnormal findings of blood chemistry, Personal history of malignant neoplasm, unspecified, Personal history of other mental and behavioral disorders, and Unspecified osteoarthritis, unspecified site " (04/28/2016).    She has no past medical history of Personal history of irradiation.    Past Psychiatric History:   Previous therapy: yes  Previous psychiatric treatment and medication trials: yes - luvox, trileptal  Previous psychiatric hospitalizations: yes - unsure number  Previous diagnoses: yes - MDD, LANDON  Previous suicide attempts: no  History of violence: no  Currently in treatment with [facility].  Depression screening was performed with standardized tool: Yes - Depression    Surgical History  She has a past surgical history that includes Hysterectomy (04/28/2016); Other surgical history (04/28/2016); Other surgical history (04/28/2016); Other surgical history (01/11/2021); Other surgical history (01/11/2021); Other surgical history (07/06/2022); Joint replacement; Shoulder surgery (08/2021); Joint replacement (Right, 05/2022); Other surgical history (07/2021); Trigger finger release (Right, 01/2024); Sinus surgery; Cosmetic surgery; and Reduction mammaplasty (may 7 2024).     Social History  She reports that she has never smoked. She has never been exposed to tobacco smoke. She has never used smokeless tobacco. She reports that she does not drink alcohol and does not use drugs.     Allergies  Latex; Methotrexate; Metoprolol; Diltiazem hcl; Latex, natural rubber; Pollen extracts; Adhesive tape-silicones; Folic acid; and Sulfamethoxazole-trimethoprim    Review of Systems    Psychiatric ROS - Adult  Anxiety: General Anxiety Disorder (LANDON)LANDON Behaviors: difficult to control worry, excessive anxiety/worry, difficulty concentrating, restlessness, and sleep disturbance  Depression: anhedonia, concentration, energy, guilt, helpless, hopeless, interest, persistent thoughts of death, sleep decreased , suicidal thoughts, and withdrawn  Delirium: negative  Psychosis: auditory hallucinations and visual hallucinations  Usha:  none noted or reported but has decreased sleep and increased spending hx  Safety Issues: suicidal  "ideation  Psychiatric ROS Comment: psychotic flavor in presentation    Physical Exam    Involuntary Movement Assessment  Facial and Oral Movements      None noted or reported        Extremity Movements      None noted or reported     Trunk Movements      None noted or reported            Mental Status Exam  General: adequately groomed appears stated age  Appearance: hospital attire  Attitude: eccentric  Behavior: cooperative  Motor Activity: gait steady  Speech: normal rate and volume  Mood: anxious/worried /depressed  Affect: moderate constriction  Thought Process: obsessive  Thought Content: borders on delusional, says it would be ok to not wake up  Thought Perception: recent AH/VH- denies currently  Cognition: alert and oriented x4  Insight: patient knows she needs treatment for psychiatric illness and called 911 for help  Judgement: patient is able to participate in medical decsion making    Psychiatric Risk Assessment  Violence Risk Assessment: major mental illness and other psychosis  Acute Risk of Harm to Others is Considered: low   Suicide Risk Assessment: age > 65 yrs old, , current psychiatric illness, feelings of hopelessness, living alone or lack of social support, suicidal ideations, and other guilt cause spent money was supoosed to pay for mother and  in nursing home \"on stff belkys tis all over the condo\"  Protective Factors against Suicide: adherence to  treatment and sense of responsibility toward family  Acute Risk of Harm to Self is Considered: moderate    Last Recorded Vitals  Blood pressure 154/90, pulse 98, temperature 36.9 °C (98.4 °F), temperature source Temporal, resp. rate 16, height 1.676 m (5' 6\"), weight 75.8 kg (167 lb), SpO2 96%.    Relevant Results    Scheduled medications  atorvastatin, 20 mg, oral, Daily  cholecalciferol, 2,000 Units, oral, Daily  fLuvoxaMINE, 50 mg, oral, Daily  melatonin, 3 mg, oral, Daily  methIMAzole, 5 mg, oral, Daily  OXcarbazepine, 300 mg, oral, " "q12h CAROLINE  polyethylene glycol, 17 g, oral, Daily  triamterene-hydrochlorothiazid, 1 tablet, oral, q AM      Continuous medications     PRN medications  PRN medications: acetaminophen, docusate sodium, hydrOXYzine HCL, QUEtiapine **OR** OLANZapine, propranolol     Malnutrition dx if evaluated by dietician department           Results for orders placed or performed during the hospital encounter of 03/28/25 (from the past 24 hours)   Glucose, Fasting   Result Value Ref Range    Glucose, Fasting 134 (H) 74 - 99 mg/dL   Lipid Panel   Result Value Ref Range    Cholesterol 142 0 - 199 mg/dL    HDL-Cholesterol 59.7 mg/dL    Cholesterol/HDL Ratio 2.4     LDL Calculated 67 <=99 mg/dL    VLDL 15 0 - 40 mg/dL    Triglycerides 77 0 - 149 mg/dL    Non HDL Cholesterol 82 0 - 149 mg/dL     *Note: Due to a large number of results and/or encounters for the requested time period, some results have not been displayed. A complete set of results can be found in Results Review.         Assessment/Plan   Assessment & Plan  Psychosis, unspecified psychosis type (Multi)      Amari tis a 68yo female dx with MDD, LANDON(obsessive features) with psychotic features.  She says she spent all the money she was supposed to pay for her  and mothers nursing home on \"stuff belkys tis all over my condo\". She denies dx of bipolar disorder, does not present as manic ast this time but presents as=wit psychotic flavor, almost schizotypal features.      Biological      - cont luvox 50mg every day for anxiety and depression  -trileptal 300mg bid fo rmood stabilization?  - says she has taken lithium in the past and it makes her dizzy, denies ever taking depkakote  - add zyprexa 5mg at bedtime for psychosis and mood stabilization  -prns avalable    -medical pertinences appreciated    Psychological    Provider encouraged patient to attend groups on unit.    Sociological    Provider encouraged patient to work with social workon discharge plan. She plan to " return to her condo.    Provider encouraged patient to   I spent 65 minutes in the professional and overall care of this patient.      Medication Consent  Medication Consent: risks, benefits, side effects reviewed for all ordered meds and patient expressed understanding and consent obtained    KEHINDE Deleon-CNS

## 2025-03-29 NOTE — GROUP NOTE
Group Topic: Stress Reduction/Relaxation   Group Date: 3/29/2025  Start Time: 1030  End Time: 1130  Facilitators: EVIN GroverS   Department: [facility]    Number of Participants: 8   Group Focus: concentration, coping skills, mindfulness, relaxation, and self-awareness  Treatment Modality: Dialectical Behavioral Therapy, Skills Training, and Other: Recreation Therapy  Interventions utilized were exploration and patient education  Purpose: In this therapeutic group patients engaged in mindfulness and relaxation skills including chair yoga exercise/stretching which is utilized to assist in decreasing stress and improve overall mental health. Pt also participated in deep breathing techniques and guided imagery which aims to promote relaxation, self-awareness and helps to decrease stress and anxiety.      Name: Nevaeh Cuba YOB: 1955   MR: [id number]      Facilitator: Recreational Therapist  Level of Participation: moderate  Quality of Participation: appropriate/pleasant, cooperative, and engaged  Interactions with others: appropriate  Mood/Affect: blunted  Cognition: not focused  Progress: Minimal  Comments: pt problem is depressed mood. Pt joins group with little encouragement. Follows along intermittently with demonstrated techniques. Displays blunted affect with poor eye contact.   Plan: continue with services

## 2025-03-29 NOTE — NURSING NOTE
"BIRP NOTE     Problem:  SI     Behavior:  Pt denies HI, but endorses passive SI statements \"I shouldn't eat, I deserve to choke.\" Endorses guilt r/t spending as well as hopelessness. Doesn't believe things will improve. Reports 2 hours of sleep. Cooperative but slow to respond.     Group Participation: Participate  Appetite/Meals: Fair       Interventions:  Administer medication as ordered and complete shift assessment . Propanolol given at 1615, pt became increasingly worried about her financial future. \"They are going to kick me out once they find out about me.\"    Response:  Compliant with care      Plan:  Adhere to treatment plan and monitor Q15 minute safety checks     "
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
## 2025-03-29 NOTE — NURSING NOTE
"TAZ NOTE     Problem:  Depression/hopelessness     Behavior:  Pt standing in hallway, looking around and watching peers and staff. She is calm and quiet. Minimal interaction. Pt tells this RN, \"There is no fixing me.\" \"What have I done?\" Pt refers to her reportedly spending money intended for her family. After snack, pt takes herself to her room where she is observed lying in bed.   Appetite/Meals: Hs snack provided.       Interventions:  HS medication administered per MAR    Response:  Medication compliant.      Plan:  Continue to monitor and assist. Maintain q15 minute rounds for safety.    "

## 2025-03-29 NOTE — PROGRESS NOTES
" REHAB Therapy Assessment & Treatment    Patient Name: Nevaeh Cuba  MRN: [id number]  Today's Date: 3/29/2025    Recreation Therapy assessment completed on this date: Info gathered via pt interview in quiet room.      Activity Assessment:  Initial Assessment  Attention Span: 15-30 [place]  Cognitive Behavior Status/Orientation: Person, Place, Capable, Other (Comment), Time  Crisis Triggers: Emotions, Education, Finances, Mood  Emotional Concerns/Mood/Affect: Anxious, Bizarre, Depressed, Flat/blunted, Pessimistic  Hearing: Adequate (reports using hearing aids that are not currently with her)  Memory:  (unable to assess as pt is short/guarded with responses)  Motivation Level: Moderate encouragement needed  Negative Coping Skills:  (isolating)  Speech/Communication/Socialization: Verbal, Responds when approached  Vision: Glasses, Adequate    Leisure Survey:  [facility]  Activity Preference: 1:1, Independent  Activity Tolerance: Fair 15-30 minutes  At Home ADL Deficits:  (pt states \"its hard to do any of that now\")  Barriers to Leisure Participation: Emotions, Lack of finances/money, Mood/affect, Lack of motivation, Lack of leisure interests skills, Lack of social skills, No one to participate with, Low self-esteem  Community Resources: Disinterested in becoming aware of commuity resource  Creative Activities:  (\"I used to\")  Education/School: pt reports College  Following Directions: Able to follow simple commands  Leisure Interests: Not active with identified interests, Needs to expand leisure interests  Living Arrangement: Alone (spouse in [place])  Motivators for Recreation/Leisure Involvement: Relaxation, Physical benefits  Outdoor Activities: Gardening (walking at the mall)  Passive Games:  (cards and board games in the past)  Patient/Family Education Needs: safety awarenss, leisure awareness, community safety, falls risk  Patient Strengths: a&ox3, ambulatory  Patient Weakness: anxious, depressed " "mood, finanical instability, limited support, poor insihgt/judgement,hopelessness  Physial Activity:  (walking at the mall)  Social/Group Activities:  (does not report)  Solitary Activities: Word puzzles, Music  Special Hobbies: \"plants\"  Spectator Events:  (does not report)  Transportation: Drives  Work/Volunteer: retired form [employer]-28 years  Additional Comments: Pt is alert and oriented x3, with poor insight/judgement. Pt is a 69-year-old female.  Admitted for anxiety and depressed mood. At this tme pt denies SI. During the interview pt states “I said things that made it sound worse” Pt also states she “feels guilty because I didn't pay the Medicaid”. Pt does not elaborate more. During interview pt make vague statements such as “I need to quit bullshitting, its going to catch up with me” but again does not elaborate more. Pt maintains flat affect with poor eye contact. Pt reports mostly being isolative in home. Able to identify few leisure interests such as “music, word puzzles and plants/gardening”.  Pt educated on Recreation Therapy, groups and unit milieu.  CTRS will encourage pt to attend groups of choice daily.              "

## 2025-03-29 NOTE — GROUP NOTE
Group Topic: Other   Group Date: 3/29/2025  Start Time: 1330  End Time: 1430  Facilitators: EVIN GroverS   Department: [facility] REHABTH VIRTUAL    Number of Participants: 7   Group Focus: leisure skills, reminiscence, self-esteem, and social skills  Treatment Modality: Leisure Development and Other: Recreation Therapy  Interventions utilized were exploration, group exercise, leisure development, reminiscence, story telling, and support  Purpose: In this group patients were prompted to engage together in a therapeutic game which aims to promote increased socialization, motivation, physical activity level, communication and following directions along utilizing balance, ROM and motor skills. CTRS integrated music to this activity to enhance mood and promote memory stimulation.    Name: Nevaeh Cuba YOB: 1955   MR: [id number]      Facilitator: Recreational Therapist  Level of Participation: active  Quality of Participation: appropriate/pleasant, attentive, cooperative, and engaged  Interactions with others: appropriate  Mood/Affect: appropriate and blunted  Cognition: processing slowly  Progress: Moderate  Comments: pt problem is depressed mood. Blunted affect. Appears to be able to follow along.   Plan: continue with services

## 2025-03-29 NOTE — CARE PLAN
"The patient's goals for the shift include \"to sleep\"    The clinical goals for the shift include maintain safety/rest      Problem: Risk for Suicide  Goal: Accepts medications as prescribed/needed this shift  Outcome: Progressing     Problem: Risk for Suicide  Goal: Identifies supports this shift  Outcome: Progressing     Problem: Risk for Suicide  Goal: Makes needs known through verbalization or behaviors this shift  Outcome: Progressing     Problem: Risk for Suicide  Goal: No self harm this shift  Outcome: Progressing       "

## 2025-03-30 VITALS
HEIGHT: 66 IN | OXYGEN SATURATION: 97 % | BODY MASS INDEX: 26.84 KG/M2 | DIASTOLIC BLOOD PRESSURE: 47 MMHG | WEIGHT: 167 LBS | SYSTOLIC BLOOD PRESSURE: 91 MMHG | RESPIRATION RATE: 16 BRPM | HEART RATE: 70 BPM | TEMPERATURE: 97.7 F

## 2025-03-30 PROCEDURE — 2500000002 HC RX 250 W HCPCS SELF ADMINISTERED DRUGS (ALT 637 FOR MEDICARE OP, ALT 636 FOR OP/ED): Performed by: REGISTERED NURSE

## 2025-03-30 PROCEDURE — 99232 SBSQ HOSP IP/OBS MODERATE 35: CPT | Performed by: REGISTERED NURSE

## 2025-03-30 PROCEDURE — 2500000004 HC RX 250 GENERAL PHARMACY W/ HCPCS (ALT 636 FOR OP/ED): Performed by: STUDENT IN AN ORGANIZED HEALTH CARE EDUCATION/TRAINING PROGRAM

## 2025-03-30 PROCEDURE — 1140000001 HC PRIVATE PSYCH ROOM DAILY

## 2025-03-30 PROCEDURE — 2500000001 HC RX 250 WO HCPCS SELF ADMINISTERED DRUGS (ALT 637 FOR MEDICARE OP): Performed by: STUDENT IN AN ORGANIZED HEALTH CARE EDUCATION/TRAINING PROGRAM

## 2025-03-30 RX ADMIN — OLANZAPINE 5 MG: 5 TABLET, FILM COATED ORAL at 20:15

## 2025-03-30 RX ADMIN — ATORVASTATIN CALCIUM 20 MG: 20 TABLET, FILM COATED ORAL at 08:20

## 2025-03-30 RX ADMIN — OXCARBAZEPINE 300 MG: 300 TABLET, FILM COATED ORAL at 06:15

## 2025-03-30 RX ADMIN — OXCARBAZEPINE 300 MG: 300 TABLET, FILM COATED ORAL at 17:37

## 2025-03-30 RX ADMIN — FLUVOXAMINE MALEATE 50 MG: 50 TABLET, COATED ORAL at 06:15

## 2025-03-30 RX ADMIN — POLYETHYLENE GLYCOL 3350 17 G: 17 POWDER, FOR SOLUTION ORAL at 08:18

## 2025-03-30 RX ADMIN — METHIMAZOLE 5 MG: 5 TABLET ORAL at 08:19

## 2025-03-30 RX ADMIN — TRIAMTERENE AND HYDROCHLOROTHIAZIDE 1 TABLET: 37.5; 25 TABLET ORAL at 08:19

## 2025-03-30 RX ADMIN — Medication 2000 UNITS: at 08:19

## 2025-03-30 ASSESSMENT — COLUMBIA-SUICIDE SEVERITY RATING SCALE - C-SSRS
1. SINCE LAST CONTACT, HAVE YOU WISHED YOU WERE DEAD OR WISHED YOU COULD GO TO SLEEP AND NOT WAKE UP?: NO
2. HAVE YOU ACTUALLY HAD ANY THOUGHTS OF KILLING YOURSELF?: NO
6. HAVE YOU EVER DONE ANYTHING, STARTED TO DO ANYTHING, OR PREPARED TO DO ANYTHING TO END YOUR LIFE?: YES
2. HAVE YOU ACTUALLY HAD ANY THOUGHTS OF KILLING YOURSELF?: YES
6. HAVE YOU EVER DONE ANYTHING, STARTED TO DO ANYTHING, OR PREPARED TO DO ANYTHING TO END YOUR LIFE?: YES
2. HAVE YOU ACTUALLY HAD ANY THOUGHTS OF KILLING YOURSELF?: YES
6. HAVE YOU EVER DONE ANYTHING, STARTED TO DO ANYTHING, OR PREPARED TO DO ANYTHING TO END YOUR LIFE?: NO
1. IN THE PAST MONTH, HAVE YOU WISHED YOU WERE DEAD OR WISHED YOU COULD GO TO SLEEP AND NOT WAKE UP?: YES
1. IN THE PAST MONTH, HAVE YOU WISHED YOU WERE DEAD OR WISHED YOU COULD GO TO SLEEP AND NOT WAKE UP?: YES

## 2025-03-30 ASSESSMENT — PAIN - FUNCTIONAL ASSESSMENT
PAIN_FUNCTIONAL_ASSESSMENT: 0-10

## 2025-03-30 ASSESSMENT — PAIN SCALES - GENERAL
PAINLEVEL_OUTOF10: 0 - NO PAIN

## 2025-03-30 NOTE — CARE PLAN
The patient's goals for the shift include shower    The clinical goals for the shift include maintain safety    Over the shift, the patient did skyla progress toward the following goals. Barriers to progression include LANDON. Recommendations to address these barriers include identify effective coping skills.

## 2025-03-30 NOTE — GROUP NOTE
"Group Topic: Other   Group Date: 3/30/2025  Start Time: 1330  End Time: 1430  Facilitators: Bang ASCENCIO CTRS   Department: [facility] REHABTH VIRTUAL    Number of Participants: 7   Group Focus: leisure skills, self-esteem, and social skills  Treatment Modality: Leisure Development and Other: Recreation Therapy  Interventions utilized were group exercise and leisure development  Purpose: This session involved a leisure activity game “apples to apples\".  This is a perspective-based game which involves cognitive tasks, social interactions, leisure awareness, and following directions.      Name: Nevaeh Cuba YOB: 1955   MR: [id number]      Facilitator: Recreational Therapist  Level of Participation: active  Quality of Participation: appropriate/pleasant, attentive, cooperative, and engaged  Interactions with others: appropriate and supportive  Mood/Affect: appropriate, bright, and positive  Cognition: coherent/clear  Progress: Moderate  Comments: pt problem is depressed mood. Engages well with peers. Able to follow directions. Often smiling and laughing. Displays good attention to tasks.   Plan: continue with services      "

## 2025-03-30 NOTE — GROUP NOTE
"Group Topic: Reminiscence   Group Date: 3/30/2025  Start Time: 1030  End Time: 1130  Facilitators: Bang ASCENCIO CTRS   Department: [facility] REHABTH VIRTUAL    Number of Participants: 6   Group Focus: communication, feeling awareness/expression, reminiscence, self-esteem, and social skills  Treatment Modality: Other: Reminiscence and Recreation Therapy  Interventions utilized were exploration, group exercise, reminiscence, story telling, and support  Purpose: Pt engaged in Dicebreakers\"group. In this group patients roll a regular dice to randomly select prompts or questions for group members to answer, acting as an icebreaker activity to engage in conversation and build connection within the group; each number on the dice corresponds to a different question or topic to discuss, allowing for a fun and engaging way discuss and explore past experiences and memories to promote well-being, improve mood, and enhance social interaction      Name: Nevaeh Cuba YOB: 1955   MR: [id number]      Facilitator: Recreational Therapist  Level of Participation: active  Quality of Participation: appropriate/pleasant, attentive, cooperative, engaged, and supportive  Interactions with others: appropriate, gave feedback, supportive, and asked thoughtful questions  Mood/Affect: appropriate, brightens with interaction, and positive  Cognition: coherent/clear  Progress: Moderate  Comments: pt problem is depressed mood. Engages easily with staff and peers. Appropriate and cooperative interactions. Displays good attention to tasks. Often smiling and laughing  Plan: continue with services      "

## 2025-03-30 NOTE — NURSING NOTE
"BIRP NOTE     Problem:  SI     Behavior:  Pt pleasant and compliant. Appears more engaged. Verbalizing needs clearly. Reports restful sleep. \"I got hours of sleep, the most sleep I got in a long time.\" Denies any new concerns. Reports improvement in symptoms. Requested and completed daily hygiene which included  a shower. 0950 Pt experienced unwitnessed fall in room (see note)    Group Participation: Active  Appetite/Meals: Adequate       Interventions:  Administer medication as ordered and complete shift assessment     Response:  Compliant with care      Plan:  Adhere to treatment plan and monitor Q15 minute safety checks     "

## 2025-03-30 NOTE — GROUP NOTE
Group Topic: Goals   Group Date: 3/29/2025  Start Time: 2002  End Time: 2013  Facilitators: Tanika Quinn   Department: [facility] Geriatric     Number of Participants: 6   Group Focus: goals  Treatment Modality: Other: PCA  Interventions utilized were reminiscence  Purpose: feelings and communication skills    Name: Nevaeh Cuba YOB: 1955   MR: [id number]      Facilitator: Mental Health PCNA  Level of Participation: minimal  Quality of Participation: appropriate/pleasant  Interactions with others: appropriate and gave feedback  Mood/Affect: positive  Triggers (if applicable): N/A  Cognition: coherent/clear  Progress: Minimal  Comments: Pt problem is psychosis.   Plan: continue with services

## 2025-03-30 NOTE — NURSING NOTE
Brown purse given to pt sister to take home. Pt witnessed exchange. Sister brought in 3 pills bottles, bottle are in med-room and are to be returned.

## 2025-03-30 NOTE — CARE PLAN
"The patient's goals for the shift include \"No goals\"    The clinical goals for the shift include maintain safety/rest      Problem: Risk for Suicide  Goal: Accepts medications as prescribed/needed this shift  Outcome: Progressing     Problem: Risk for Suicide  Goal: Identifies supports this shift  Outcome: Progressing     Problem: Risk for Suicide  Goal: Makes needs known through verbalization or behaviors this shift  Outcome: Progressing     Problem: Risk for Suicide  Goal: No self harm this shift  Outcome: Progressing     Problem: Discharge Planning - Care Management  Goal: Discharge to post-acute care or home with appropriate resources  Outcome: Progressing       "

## 2025-03-30 NOTE — NURSING NOTE
"TAZ NOTE     Problem:  Depression/SI     Behavior:  Pt found sitting on the edge of her bed in her room, looking at the floor. Pt reports \"feeling horrible for what I've done.\" Pt continues to tell staff that she isn't worth fixing and that there is no hope for her. Pt maintains a flat/blunted affect. She is calm and cooperative with care.    Group Participation: Attends  Appetite/Meals: Hs snack provided       Interventions:  HS medications administered per MAR    Response:  Medication compliant.      Plan:  Continue to monitor and assist. Maintain q15 minute rounds for safety.    "

## 2025-03-30 NOTE — PROGRESS NOTES
"Nevaeh Cuba is a 69 y.o. female on day 2 of admission presenting with Psychosis, unspecified psychosis type (Multi).      Subjective   Case discussed with nursing and chart reviewed.  Patient took zyprexa 5mg at bedtime and slept through the night. Staff reports she appears much more organized today and when she saw  provider she said \"You  hit on just the right medicine\".  She denies SI/HI, denies AH/VH/paranoia. Thoughts do appear more ordered today- cont current meds at this time. She had fall this morning after what appears to be orthostatic hypotension after initiation of losartan(Dr Pascual informed and losartan was disconinued- patient is currently in wheel chair for safety.)       Objective     Last Recorded Vitals  Blood pressure (!) 85/46, pulse 68, temperature 37.2 °C (99 °F), temperature source Temporal, resp. rate 18, height 1.676 m (5' 6\"), weight 75.8 kg (167 lb), SpO2 98%.    Review of Systems    Psychiatric ROS - Adult  Anxiety: General Anxiety Disorder (LANDON)LANDON Behaviors: difficult to control worry, excessive anxiety/worry, difficulty concentrating, restlessness, and sleep disturbance  Depression: anhedonia, concentration, energy, guilt, helpless, hopeless, interest, persistent thoughts of death, sleep decreased , suicidal thoughts, and withdrawn  Delirium: negative  Psychosis: auditory hallucinations and visual hallucinations  Usha:  none noted or reported but has decreased sleep and increased spending hx  Safety Issues: suicidal ideation(denies today)  Psychiatric ROS Comment: less psychotic flavor in presentation  Physical Exam      Mental Status Exam  General: adequately groomed appears stated age  Appearance: hospital attire  Attitude: pleasant  Behavior: cooperative  Motor Activity: in wheel chair for safety  Speech: normal rate and volume  Mood: less anxious/worried /depressed  Affect: less constriction  Thought Process: more organized  Thought Content: no delusions elicited today, denies " "SI/HI denies paraoia  Thought Perception: recent AH/VH- denies currently  Cognition: alert and oriented x4  Insight: patient knows she needs treatment for psychiatric illness and called 911 for help  Judgement: patient is able to participate in medical decsion making  Psychiatric Risk Assessment  Violence Risk Assessment: major mental illness and other psychosis  Acute Risk of Harm to Others is Considered: low   Suicide Risk Assessment: age > 65 yrs old, , current psychiatric illness, feelings of hopelessness, living alone or lack of social support, suicidal ideations, and other guilt cause spent money was supoosed to pay for mother and  in nursing home \"on stuff belkys tis all over the condo\"  Protective Factors against Suicide: adherence to  treatment and sense of responsibility toward family  Acute Risk of Harm to Self is Considered: low  Relevant Results           Scheduled medications  atorvastatin, 20 mg, oral, Daily  cholecalciferol, 2,000 Units, oral, Daily  fLuvoxaMINE, 50 mg, oral, Daily  melatonin, 3 mg, oral, Daily  methIMAzole, 5 mg, oral, Daily  OLANZapine, 5 mg, oral, Nightly  OXcarbazepine, 300 mg, oral, q12h CAROLINE  polyethylene glycol, 17 g, oral, Daily  triamterene-hydrochlorothiazid, 1 tablet, oral, q AM      Continuous medications     PRN medications  PRN medications: acetaminophen, docusate sodium, hydrOXYzine HCL, QUEtiapine **OR** OLANZapine, propranolol     Assessment/Plan   Assessment & Plan  Psychosis, unspecified psychosis type (Multi)    Hyperlipidemia    Hyperthyroidism    Sensorineural hearing loss (SNHL) of both ears    Essential hypertension    Anxiety    Insomnia    Malnutrition dx if evaluated by dietician department         No results found. However, due to the size of the patient record, not all encounters were searched. Please check Results Review for a complete set of results.     Katelinprakash afua a 70yo female dx with MDD, LANDON(obsessive features) with psychotic features.  " "She says she spent all the money she was supposed to pay for her  and mothers nursing home on \"stuff belkys tis all over my condo\". She denies dx of bipolar disorder, does not present as manic ast this time but presents as=wit psychotic flavor, almost schizotypal features.       Biological   - cont luvox 50mg every day for anxiety and depression  -trileptal 300mg bid fo rmood stabilization?  - says she has taken lithium in the past and it makes her dizzy, denies ever taking depkakote  - add zyprexa 5mg at bedtime for psychosis and mood stabilization  -prns avalable     -medical pertinences appreciated     Psychological     Provider encouraged patient to attend groups on unit.     Sociological     Provider encouraged patient to work with social workon discharge plan. She plan to return to her condo.                   I spent 20 minutes in the professional and overall care of this patient.      Elva Murcia, APRN-CNS      "

## 2025-03-30 NOTE — NURSING NOTE
"Pt comes OOR into hallway at approx 0955 notifying this nurse that she expereinced a fall. Assisted pt into chair. Pt is A&O3, she stated \"I fell in my bathroom, my legs got weak, and I hit my head on the toilet.\" Physical assessment completed, abrasion to R elbow. See VS flowsheet. ROM WNL. Yellow in socks in place prior to fall, education provided for pt call for assistance prior to transferring. Demonstrated call light use, pt completed return demonstration. Dr Pascual notified, Provider discontinue Losartan  @1034 Left  requesting callback from sister  "

## 2025-03-31 LAB
ATRIAL RATE: 92 BPM
P AXIS: 72 DEGREES
P OFFSET: 207 MS
P ONSET: 146 MS
PR INTERVAL: 148 MS
Q ONSET: 220 MS
QRS COUNT: 15 BEATS
QRS DURATION: 80 MS
QT INTERVAL: 364 MS
QTC CALCULATION(BAZETT): 450 MS
QTC FREDERICIA: 419 MS
R AXIS: 43 DEGREES
T AXIS: 51 DEGREES
T OFFSET: 402 MS
VENTRICULAR RATE: 92 BPM

## 2025-03-31 PROCEDURE — 2500000005 HC RX 250 GENERAL PHARMACY W/O HCPCS: Performed by: STUDENT IN AN ORGANIZED HEALTH CARE EDUCATION/TRAINING PROGRAM

## 2025-03-31 PROCEDURE — 2500000004 HC RX 250 GENERAL PHARMACY W/ HCPCS (ALT 636 FOR OP/ED): Performed by: STUDENT IN AN ORGANIZED HEALTH CARE EDUCATION/TRAINING PROGRAM

## 2025-03-31 PROCEDURE — 2500000002 HC RX 250 W HCPCS SELF ADMINISTERED DRUGS (ALT 637 FOR MEDICARE OP, ALT 636 FOR OP/ED): Performed by: REGISTERED NURSE

## 2025-03-31 PROCEDURE — 1140000001 HC PRIVATE PSYCH ROOM DAILY

## 2025-03-31 PROCEDURE — 2500000001 HC RX 250 WO HCPCS SELF ADMINISTERED DRUGS (ALT 637 FOR MEDICARE OP): Performed by: INTERNAL MEDICINE

## 2025-03-31 PROCEDURE — 99232 SBSQ HOSP IP/OBS MODERATE 35: CPT | Performed by: REGISTERED NURSE

## 2025-03-31 PROCEDURE — 2500000001 HC RX 250 WO HCPCS SELF ADMINISTERED DRUGS (ALT 637 FOR MEDICARE OP): Performed by: STUDENT IN AN ORGANIZED HEALTH CARE EDUCATION/TRAINING PROGRAM

## 2025-03-31 RX ADMIN — FLUVOXAMINE MALEATE 50 MG: 50 TABLET, COATED ORAL at 06:22

## 2025-03-31 RX ADMIN — OXCARBAZEPINE 300 MG: 300 TABLET, FILM COATED ORAL at 18:10

## 2025-03-31 RX ADMIN — OXCARBAZEPINE 300 MG: 300 TABLET, FILM COATED ORAL at 06:22

## 2025-03-31 RX ADMIN — MELATONIN TAB 3 MG 3 MG: 3 TAB at 18:10

## 2025-03-31 RX ADMIN — OLANZAPINE 7.5 MG: 5 TABLET, FILM COATED ORAL at 20:39

## 2025-03-31 RX ADMIN — ATORVASTATIN CALCIUM 20 MG: 20 TABLET, FILM COATED ORAL at 20:39

## 2025-03-31 RX ADMIN — Medication 2000 UNITS: at 08:26

## 2025-03-31 RX ADMIN — METHIMAZOLE 5 MG: 5 TABLET ORAL at 08:26

## 2025-03-31 RX ADMIN — TRIAMTERENE AND HYDROCHLOROTHIAZIDE 1 TABLET: 37.5; 25 TABLET ORAL at 08:26

## 2025-03-31 RX ADMIN — POLYETHYLENE GLYCOL 3350 17 G: 17 POWDER, FOR SOLUTION ORAL at 08:26

## 2025-03-31 ASSESSMENT — PAIN - FUNCTIONAL ASSESSMENT
PAIN_FUNCTIONAL_ASSESSMENT: 0-10
PAIN_FUNCTIONAL_ASSESSMENT: 0-10

## 2025-03-31 ASSESSMENT — COLUMBIA-SUICIDE SEVERITY RATING SCALE - C-SSRS
2. HAVE YOU ACTUALLY HAD ANY THOUGHTS OF KILLING YOURSELF?: YES
6. HAVE YOU EVER DONE ANYTHING, STARTED TO DO ANYTHING, OR PREPARED TO DO ANYTHING TO END YOUR LIFE?: YES
1. IN THE PAST MONTH, HAVE YOU WISHED YOU WERE DEAD OR WISHED YOU COULD GO TO SLEEP AND NOT WAKE UP?: YES
1. IN THE PAST MONTH, HAVE YOU WISHED YOU WERE DEAD OR WISHED YOU COULD GO TO SLEEP AND NOT WAKE UP?: YES
2. HAVE YOU ACTUALLY HAD ANY THOUGHTS OF KILLING YOURSELF?: YES
6. HAVE YOU EVER DONE ANYTHING, STARTED TO DO ANYTHING, OR PREPARED TO DO ANYTHING TO END YOUR LIFE?: YES

## 2025-03-31 ASSESSMENT — PAIN SCALES - GENERAL
PAINLEVEL_OUTOF10: 0 - NO PAIN
PAINLEVEL_OUTOF10: 0 - NO PAIN

## 2025-03-31 NOTE — NURSING NOTE
"TAZ NOTE     Problem:  SI     Behavior:  Pt is pleasant. She is sitting in the group room, eating snack and watching television with peers. Pt is pleasant, calm and cooperative. Reports to this RN that she is starting to feel a huge improvement in the way she feels- \"whatever medication combination the docs got me on,  kiss!\" Pt has a brighter affect. No longer slow in movement. No longer verbalizing that she did something wrong. Pt is speaking more clear, less disorganized. Denies SI.   Group Participation: Attends  Appetite/Meals: HS snack provided.       Interventions:  1:1 time provided. HS medications administered per MAR    Response:  Medication compliant.      Plan:  Continue to monitor and assist. Maintain q15 minute rounds for safety.  "

## 2025-03-31 NOTE — GROUP NOTE
Group Topic: Goals   Group Date: 3/30/2025  Start Time: 2003  End Time: 2014  Facilitators: Tanika Quinn   Department: [facility] Geriatric     Number of Participants: 7   Group Focus: goals  Treatment Modality: Other: PCA  Interventions utilized were reminiscence  Purpose: feelings and communication skills    Name: Nevaeh Cuba YOB: 1955   MR: [id number]      Facilitator: Mental Health PCNA  Level of Participation: active  Quality of Participation: appropriate/pleasant, attentive, and cooperative  Interactions with others: appropriate, gave feedback, and supportive  Mood/Affect: positive  Triggers (if applicable): N/A  Cognition: coherent/clear  Progress: Moderate  Comments: Pt problem is psychosis.  Plan: continue with services

## 2025-03-31 NOTE — GROUP NOTE
Group Topic: Art Creative   Group Date: 3/31/2025  Start Time: 1330  End Time: 1430  Facilitators: SUSAN Oreilly   Department: [facility] REHABTH VIRTUAL    Number of Participants: 6   Group Focus: other Art Group  Treatment Modality: Other: Recreation Therapy  Interventions utilized were exploration, reminiscence, and story telling  Purpose: other: fun, elevate mood, increase socialization, enhance self esteem    Name: Nevaeh Cuba YOB: 1955   MR: [id number]      Facilitator: Recreational Therapist  Level of Participation: active  Quality of Participation: appropriate/pleasant  Interactions with others: appropriate  Mood/Affect: appropriate  Triggers (if applicable): n/a  Cognition:  pt displays good focus on art project.  Not very talkative while she paints.    Progress: Moderate  Comments: pt problem is depressed mood.    Plan: continue with services

## 2025-03-31 NOTE — CARE PLAN
"The patient's goals for the shift include \"to sleep good again tonight\"    The clinical goals for the shift include maintain safety/rest      Problem: Risk for Suicide  Goal: Accepts medications as prescribed/needed this shift  Outcome: Progressing     Problem: Risk for Suicide  Goal: Identifies supports this shift  Outcome: Progressing     Problem: Risk for Suicide  Goal: Makes needs known through verbalization or behaviors this shift  Outcome: Progressing     Problem: Risk for Suicide  Goal: No self harm this shift  Outcome: Progressing       "

## 2025-03-31 NOTE — PROGRESS NOTES
"Nevaeh Cuba is a 69 y.o. female on day 3 of admission presenting with Psychosis, unspecified psychosis type (Multi).      Subjective   Case discussed with nursing and chart reviewed.  Patient took zyprexa 5mg at bedtime and slept through the night. Staff reports she cont was appearing more disorganized again last evening and this morning. She is is wheel chair due to .falls risk and was changed to a hospital bed.   Appetite is good.       Objective     Last Recorded Vitals  Blood pressure 151/80, pulse 79, temperature 37 °C (98.6 °F), temperature source Temporal, resp. rate 17, height 1.676 m (5' 6\"), weight 75.8 kg (167 lb), SpO2 98%.    Review of Systems    Psychiatric ROS - Adult  Anxiety: General Anxiety Disorder (LANDON)LANDON Behaviors: difficult to control worry, excessive anxiety/worry, difficulty concentrating, restlessness, and sleep disturbance  Depression: anhedonia, concentration, energy, guilt, helpless, hopeless, interest, persistent thoughts of death, sleep decreased , suicidal thoughts, and withdrawn  Delirium: negative  Psychosis: auditory hallucinations and visual hallucinations  Usha:  none noted or reported but has decreased sleep and increased spending hx  Safety Issues: suicidal ideation(denies today)  Psychiatric ROS Comment: less psychotic flavor in presentation  Physical Exam      Mental Status Exam  General: adequately groomed appears stated age  Appearance: hospital attire  Attitude: pleasant  Behavior: cooperative  Motor Activity: in wheel chair for safety  Speech: normal rate and volume  Mood: less anxious/worried /depressed  Affect: less constriction  Thought Process: less organized- she is obsessing again about bills she did not pay for nursing home where  and mother stay.  Thought Content: no delusions elicited today, denies SI/HI denies paraoia  Thought Perception: recent AH/VH- denies currently  Cognition: alert and oriented x4  Insight: patient knows she needs treatment for " "psychiatric illness and called 911 for help  Judgement: patient is able to participate in medical decsion making  Psychiatric Risk Assessment  Violence Risk Assessment: major mental illness and other psychosis  Acute Risk of Harm to Others is Considered: low   Suicide Risk Assessment: age > 65 yrs old, , current psychiatric illness, feelings of hopelessness, living alone or lack of social support, suicidal ideations, and other guilt cause spent money was supoosed to pay for mother and  in nursing home \"on stuff belkys tis all over the condo\"  Protective Factors against Suicide: adherence to  treatment and sense of responsibility toward family  Acute Risk of Harm to Self is Considered: low  Relevant Results           Scheduled medications  atorvastatin, 20 mg, oral, Daily  cholecalciferol, 2,000 Units, oral, Daily  fLuvoxaMINE, 50 mg, oral, Daily  melatonin, 3 mg, oral, Daily  methIMAzole, 5 mg, oral, Daily  OLANZapine, 7.5 mg, oral, Nightly  OXcarbazepine, 300 mg, oral, q12h CAROLINE  polyethylene glycol, 17 g, oral, Daily  triamterene-hydrochlorothiazid, 1 tablet, oral, q AM      Continuous medications     PRN medications  PRN medications: acetaminophen, docusate sodium, hydrOXYzine HCL, QUEtiapine **OR** OLANZapine, propranolol     Assessment/Plan   Assessment & Plan  Psychosis, unspecified psychosis type (Multi)    Hyperlipidemia    Hyperthyroidism    Sensorineural hearing loss (SNHL) of both ears    Essential hypertension    Anxiety    Insomnia    Malnutrition dx if evaluated by dietician department         No results found. However, due to the size of the patient record, not all encounters were searched. Please check Results Review for a complete set of results.     Katelinprakash tis a 70yo female dx with MDD, LANDON(obsessive features) with psychotic features.  She says she spent all the money she was supposed to pay for her  and mothers nursing home on \"stuff belkys tis all over my condo\". She denies dx of " bipolar disorder, does not present as manic ast this time but presents as=wit psychotic flavor, almost schizotypal features.       Biological   - cont luvox 50mg every day for anxiety and depression  -trileptal 300mg bid fo rmood stabilization?  - says she has taken lithium in the past and it makes her dizzy, denies ever taking depkakote  - increase zyprexa 7.5mg at bedtime for psychosis and mood stabilization  -prns avalable     -medical pertinences appreciated     Psychological     Provider encouraged patient to attend groups on unit.     Sociological     Provider encouraged patient to work with social workon discharge plan. She plan to return to her condo.                   I spent 20 minutes in the professional and overall care of this patient.      Elva Murcia, KEHINDE-CNS

## 2025-03-31 NOTE — CARE PLAN
The patient's goals for the shift include wanting to make sure her spouse and mom are ok    The clinical goals for the shift include maintain safety    Over the shift, the patient did not make progress toward the following goals. Barriers to progression include paranoid about spouse and mother for spending money. Recommendations to address these barriers include continue current treatment plan, redirect pt at times when needed.

## 2025-03-31 NOTE — GROUP NOTE
Group Topic: Gratitude   Group Date: 3/31/2025  Start Time: 0730  End Time: 0800  Facilitators: Hiram Mckinney   Department: [facility]     Number of Participants: 4   Group Focus: affirmation, coping skills, and other gratitude  Treatment Modality: Patient-Centered Therapy  Interventions utilized were assignment and group exercise  Purpose: coping skills    Name: Nevaeh Cuba YOB: 1955   MR: [id number]      Facilitator: Mental [facility]  Level of Participation: did not attend  Quality of Participation:  did not attend  Interactions with others:  did not attend  Mood/Affect:  did not attend  Triggers (if applicable): did not attend  Cognition:  did not attend  Progress: None  Comments: did not attend  Plan: continue with services

## 2025-04-01 PROCEDURE — 97161 PT EVAL LOW COMPLEX 20 MIN: CPT | Mod: GP

## 2025-04-01 PROCEDURE — 97165 OT EVAL LOW COMPLEX 30 MIN: CPT | Mod: GO

## 2025-04-01 PROCEDURE — 99232 SBSQ HOSP IP/OBS MODERATE 35: CPT | Performed by: REGISTERED NURSE

## 2025-04-01 PROCEDURE — 2500000001 HC RX 250 WO HCPCS SELF ADMINISTERED DRUGS (ALT 637 FOR MEDICARE OP): Performed by: INTERNAL MEDICINE

## 2025-04-01 PROCEDURE — 2500000001 HC RX 250 WO HCPCS SELF ADMINISTERED DRUGS (ALT 637 FOR MEDICARE OP): Performed by: STUDENT IN AN ORGANIZED HEALTH CARE EDUCATION/TRAINING PROGRAM

## 2025-04-01 PROCEDURE — 1140000001 HC PRIVATE PSYCH ROOM DAILY

## 2025-04-01 PROCEDURE — 2500000004 HC RX 250 GENERAL PHARMACY W/ HCPCS (ALT 636 FOR OP/ED): Performed by: STUDENT IN AN ORGANIZED HEALTH CARE EDUCATION/TRAINING PROGRAM

## 2025-04-01 PROCEDURE — 2500000002 HC RX 250 W HCPCS SELF ADMINISTERED DRUGS (ALT 637 FOR MEDICARE OP, ALT 636 FOR OP/ED): Performed by: REGISTERED NURSE

## 2025-04-01 PROCEDURE — 2500000005 HC RX 250 GENERAL PHARMACY W/O HCPCS: Performed by: STUDENT IN AN ORGANIZED HEALTH CARE EDUCATION/TRAINING PROGRAM

## 2025-04-01 PROCEDURE — 99233 SBSQ HOSP IP/OBS HIGH 50: CPT | Performed by: INTERNAL MEDICINE

## 2025-04-01 RX ORDER — OLANZAPINE 10 MG/1
10 TABLET, FILM COATED ORAL NIGHTLY
Status: DISCONTINUED | OUTPATIENT
Start: 2025-04-01 | End: 2025-04-09

## 2025-04-01 RX ADMIN — FLUVOXAMINE MALEATE 50 MG: 50 TABLET, COATED ORAL at 06:49

## 2025-04-01 RX ADMIN — TRIAMTERENE AND HYDROCHLOROTHIAZIDE 1 TABLET: 37.5; 25 TABLET ORAL at 08:12

## 2025-04-01 RX ADMIN — MELATONIN TAB 3 MG 3 MG: 3 TAB at 18:02

## 2025-04-01 RX ADMIN — OXCARBAZEPINE 300 MG: 300 TABLET, FILM COATED ORAL at 18:02

## 2025-04-01 RX ADMIN — Medication 2000 UNITS: at 08:12

## 2025-04-01 RX ADMIN — ATORVASTATIN CALCIUM 20 MG: 20 TABLET, FILM COATED ORAL at 21:38

## 2025-04-01 RX ADMIN — METHIMAZOLE 5 MG: 5 TABLET ORAL at 08:12

## 2025-04-01 RX ADMIN — POLYETHYLENE GLYCOL 3350 17 G: 17 POWDER, FOR SOLUTION ORAL at 08:12

## 2025-04-01 RX ADMIN — OLANZAPINE 10 MG: 10 TABLET ORAL at 21:38

## 2025-04-01 RX ADMIN — OXCARBAZEPINE 300 MG: 300 TABLET, FILM COATED ORAL at 06:49

## 2025-04-01 ASSESSMENT — PAIN - FUNCTIONAL ASSESSMENT
PAIN_FUNCTIONAL_ASSESSMENT: 0-10

## 2025-04-01 ASSESSMENT — COLUMBIA-SUICIDE SEVERITY RATING SCALE - C-SSRS
2. HAVE YOU ACTUALLY HAD ANY THOUGHTS OF KILLING YOURSELF?: YES
6. HAVE YOU EVER DONE ANYTHING, STARTED TO DO ANYTHING, OR PREPARED TO DO ANYTHING TO END YOUR LIFE?: YES
1. IN THE PAST MONTH, HAVE YOU WISHED YOU WERE DEAD OR WISHED YOU COULD GO TO SLEEP AND NOT WAKE UP?: YES

## 2025-04-01 ASSESSMENT — COGNITIVE AND FUNCTIONAL STATUS - GENERAL
PERSONAL GROOMING: A LITTLE
WALKING IN HOSPITAL ROOM: A LITTLE
MOBILITY SCORE: 19
EATING MEALS: A LITTLE
CLIMB 3 TO 5 STEPS WITH RAILING: A LITTLE
DRESSING REGULAR UPPER BODY CLOTHING: A LITTLE
HELP NEEDED FOR BATHING: A LITTLE
MOVING TO AND FROM BED TO CHAIR: A LITTLE
TOILETING: A LITTLE
DAILY ACTIVITIY SCORE: 18
TURNING FROM BACK TO SIDE WHILE IN FLAT BAD: A LITTLE
STANDING UP FROM CHAIR USING ARMS: A LITTLE
DRESSING REGULAR LOWER BODY CLOTHING: A LITTLE

## 2025-04-01 ASSESSMENT — ACTIVITIES OF DAILY LIVING (ADL)
ADL_ASSISTANCE: INDEPENDENT
ADL_ASSISTANCE: INDEPENDENT
BATHING_ASSISTANCE: MINIMAL

## 2025-04-01 ASSESSMENT — PAIN DESCRIPTION - DESCRIPTORS: DESCRIPTORS: ACHING;SORE

## 2025-04-01 ASSESSMENT — PAIN SCALES - GENERAL
PAINLEVEL_OUTOF10: 0 - NO PAIN
PAINLEVEL_OUTOF10: 3
PAINLEVEL_OUTOF10: 3
PAINLEVEL_OUTOF10: 2

## 2025-04-01 NOTE — NURSING NOTE
"TAZ NOTE     Problem:  Depression     Behavior:  Pt OOR for snack, sitting in hallway watching tv from outside group room. Flat affect noted. When asked to rate her depression from 1 to 10, pt describes it as \"high.\" Pt compliant with meds, cooperative with care. Pt describes her WC pad alarm as her \"whoopie cushion.\"   Group Participation: n/a  Appetite/Meals: HS snack provided     Interventions:  1:1 provided with reassurance. Evening meds administered per orders. Encouraged pt to make needs known.    Response:  Pt calm, resting in bed at this time. No s/s of distress noted.     Plan:  Will continue to monitor behaviors and effectiveness of interventions while maintaining pt safety.    "

## 2025-04-01 NOTE — CARE PLAN
Problem: PT  Problem  Goal: Patient will transfer sit to stand and stand to sit with independent assist to facilitate mobility.  Outcome: Progressing  Goal: Patient will amb 150' feet no device including two turns on even surface with independent assist to facilitate safe mobility.    Outcome: Progressing  Goal: Patient will negotiate 1 stairs with no rail(s) and independent} assist with no device for in home and community.  Outcome: Progressing  Goal: Patient will tolerate 15 minutes of standing to improve ability to perform MRADLs.  Outcome: Progressing

## 2025-04-01 NOTE — GROUP NOTE
"Group Topic: Decision Making   Group Date: 4/1/2025  Start Time: 1115  End Time: 1150  Facilitators: SUSAN Oreilly   Department: [facility]    Number of Participants: 8   Group Focus: other [facility]  Treatment Modality: Other: Recreation Therapy  Interventions utilized were exploration, group exercise, patient education, problem solving, and support  Purpose: coping skills, maladaptive thinking, feelings, irrational fears, communication skills, insight or knowledge, self-worth, self-care, relapse prevention strategies, and trigger / craving management    Name: Nevaeh Cuba YOB: 1955   MR: [id number]      Facilitator: Recreational Therapist  Level of Participation: minimal  Quality of Participation: passive and quiet  Interactions with others:  pt joins group with little encouragement.  Is not verbally engaged but does appear to be listening intently.  After  group, pt engages briefly about group topic and expresses frustration about \"being in a loop inn my head\".  Mood/Affect: flat  Triggers (if applicable): n/a  Cognition:  passive during group  Progress: Moderate  Comments: pt problem is delusional thought.    Plan: continue with services      "

## 2025-04-01 NOTE — PROGRESS NOTES
Occupational Therapy    Evaluation    Patient Name: Nevaeh Cuba  MRN: [id number]  Department: [facility]  Room: 400/400-A  Today's Date: 4/1/2025  Time Calculation  Start Time: 1435  Stop Time: 1455  Time Calculation (min): 20 min    Assessment  IP OT Assessment  OT Assessment: Pt is 68 y/o female admitted for psychosis. Pt presents w/ decreased ADL skills/ functional mobility, decreased safety awareness/ judgement and decreased activity tolerance. REcommend OT services to address above deficits.  Prognosis: Good  Barriers to Discharge Home: Caregiver assistance  Caregiver Assistance: Patient lives alone and/or does not have reliable caregiver assistance  Evaluation/Treatment Tolerance: Patient tolerated treatment well  End of Session Communication: Bedside nurse  End of Session Patient Position: Up in chair, Alarm on  Plan:  Treatment Interventions: ADL retraining, Functional transfer training, Endurance training, Patient/family training  OT Frequency: 2 times per week  OT Discharge Recommendations: Low intensity level of continued care  Equipment Recommended upon Discharge:  (TBD)  OT Recommended Transfer Status: Assist of 1  OT - OK to Discharge: Yes    Subjective   Current Problem:  No diagnosis found.  General:  General  Reason for Referral: decreased ADL's  Referred By: Deandre Marin DO  Past Medical History Relevant to Rehab: HTN, afib, Meniere's, hyperthyroid, OA, anxiety, sensorineural hearing loss, h/p mental and behavioral d/o  Family/Caregiver Present: No  Prior to Session Communication: Bedside nurse  Patient Position Received: Up in chair, Alarm on  Preferred Learning Style: verbal, visual  General Comment: Pt cleard to be seenf ro virgil, pt agreeable to therapy  Precautions:  Hearing/Visual Limitations: glasses, hearing loss  Medical Precautions: Fall precautions           Pain:  Pain Assessment  Pain Assessment: 0-10  0-10 (Numeric) Pain Score: 3  Pain Type: Other (Comment) (Pt reports in  hurts w/ movement)  Pain Location: Back    Objective   Cognition:  Orientation Level: Oriented X4  Safety/Judgement:  (decreased safety awareness, judgement)           Home Living:  Type of Home: Condo  Lives With: Alone (Pt reports spouse and mother in NH)  Home Adaptive Equipment: None  Home Layout: One level  Home Access: Stairs to enter without rails  Entrance Stairs-Rails: None  Entrance Stairs-Number of Steps: 1  Bathroom Shower/Tub: Tub/shower unit, Walk-in shower (Pt reports using walk in shower, unable to get into tub)  Bathroom Toilet: Standard  Bathroom Equipment: None   Prior Function:  Level of [place]: Independent with ADLs and functional transfers, Independent with homemaking with ambulation  Receives Help From: Family (sister?)  ADL Assistance: Independent  Homemaking Assistance: Independent  Ambulatory Assistance: Independent  Hand Dominance: Right  Prior Function Comments: Pt reports driving  IADL History:  IADL Comments: Pt reports indep w/ IADL's  ADL:  Eating Assistance: Stand by  Eating Deficit: Setup (per clinical judgement)  Grooming Assistance: Stand by  Grooming Deficit: Setup (per clinical judgement)  Bathing Assistance: Minimal  Bathing Deficit:  (per clinical judgement)  UE Dressing Assistance: Stand by  UE Dressing Deficit: Setup (per clinical judgement)  LE Dressing Assistance: Minimal  LE Dressing Deficit:  (per clinical judgement)  Toileting Assistance with Device: Not performed  Functional Assistance: Not performed  Activity Tolerance:     Bed Mobility/Transfers: Transfers  Transfer: Yes  Transfer 1  Transfer From 1: Wheelchair to  Transfer to 1: Stand  Technique 1: Sit to stand, Stand to sit  Transfer Device 1:  (no AD)  Transfer Level of Assistance 1: Minimum assistance      Functional Mobility:  Functional Mobility  Functional Mobility Performed: Yes  Functional Mobility 1  Surface 1: Level tile  Device 1: No device  Assistance 1: Hand held assistance, Minimum  assistance  Comments 1: Pt mildly unsteady on feet.Ambulated 4-5 steps forward/ back w/ min. assist and no device  Modalities:     IADL's:   IADL Comments: Pt reports indep w/ IADL's  Vision: Vision - Basic Assessment  Current Vision: Wears glasses all the time  Sensation:  Light Touch: No apparent deficits  Strength:  Strength Comments: OLEG's 4/5  Perception:     Coordination:  Movements are Fluid and Coordinated: Yes   Hand Function:  Hand Function  Gross Grasp: Functional  Coordination: Functional  Extremities: RUE   RUE : Within Functional Limits and LUE   LUE: Within Functional Limits      Outcome Measures: [facility] Daily Activity  Putting on and taking off regular lower body clothing: A little  Bathing (including washing, rinsing, drying): A little  Putting on and taking off regular upper body clothing: A little  Toileting, which includes using toilet, bedpan or urinal: A little  Taking care of personal grooming such as brushing teeth: A little  Eating Meals: A little  Daily Activity - Total Score: 18      Education Documentation  ADL Training, taught by Melvina Toledo OT at 4/1/2025  4:02 PM.  Learner: Patient  Readiness: Acceptance  Method: Explanation  Response: Needs Reinforcement  Comment: Pt educated in purpose of OT, POC    Education Comments  No comments found.      Goals:   Encounter Problems       Encounter Problems (Active)       OT Goals       ADL's (Progressing)       Start:  04/01/25    Expected End:  04/15/25       Patient will complete ADL tasks, with independent using AE need in order to increase patient's safety and independence with self-care tasks.           Functional transfers (Progressing)       Start:  04/01/25    Expected End:  04/15/25       Patient will complete functional transfers with independent using least restrictive device, in order to increase patient's safety and independence with daily tasks.           Activity tolerance (Progressing)       Start:  04/01/25    Expected End:   04/15/25       Patient will demonstrate the ability to participate in functional activity at least >/= 25 minutes in order to increase patient's safety and independence with daily tasks.

## 2025-04-01 NOTE — PROGRESS NOTES
Physical Therapy    Physical Therapy Evaluation    Patient Name: Nevaeh Cuba  MRN: [id number]  Department: [facility]  Room: 400/400-A  Today's Date: 4/1/2025   Time Calculation  Start Time: 1600  Stop Time: 1620  Time Calculation (min): 20 min    Assessment/Plan   PT Assessment  PT Assessment Results: Decreased strength, Decreased endurance, Decreased mobility, Decreased safety awareness, Pain  Rehab Prognosis: Good  Barriers to Discharge Home: Caregiver assistance, Cognition needs  Caregiver Assistance: Patient lives alone and/or does not have reliable caregiver assistance  Cognition Needs: Insight of patient limited regarding functional ability/needs  Evaluation/Treatment Tolerance: Patient tolerated treatment well  Medical Staff Made Aware: Yes  Strengths: Premorbid level of function  Barriers to Participation: Insight into problems  End of Session Communication:  (Activitiy/Rec Therapist)  Assessment Comment: Pt is a 70 yo female admitted to [facility] d/t declining mental health status/suicidial ideation.  She will benefit from skilled therapy intervention to improve current functional mobility/return to baseline.  End of Session Patient Position: Up in chair, Alarm on  IP OR SWING BED PT PLAN  Inpatient or Swing Bed: Inpatient  PT Plan  Treatment/Interventions: Transfer training, Gait training, Stair training, Balance training, Strengthening, Endurance training, Therapeutic exercise, Therapeutic activity  PT Plan: Ongoing PT  PT Frequency: 3 times per week  PT Discharge Recommendations: Low intensity level of continued care  Equipment Recommended upon Discharge: Other (comment) (TBD - may require LRD pending response to interventions)  PT Recommended Transfer Status: Assist x1  PT - OK to Discharge: Yes    Subjective   General Visit Information:  General  Reason for Referral: Impaired mobility/balance associated w/escalating mental health issues  Referred By: Deandre Marin DO  Past Medical History  Relevant to Rehab: HTN, afib, Meniere's, hyperthyroid, OA, anxiety, sensorineural hearing loss, h/p mental and behavioral d/o  Family/Caregiver Present: Yes  Caregiver Feedback: Sister arrived at end of session to visit - no specific comments shared with therapist  Prior to Session Communication: Bedside nurse  Patient Position Received: Up in chair, Alarm on  Preferred Learning Style: verbal, visual  General Comment: Spoke w/RN prior to assessment  Home Living:  Home Living  Type of Home: Condo  Lives With: Alone  Home Adaptive Equipment: None  Home Layout: One level  Home Access: Stairs to enter without rails  Entrance Stairs-Rails: None  Entrance Stairs-Number of Steps: 1  Bathroom Shower/Tub: Tub/shower unit, Walk-in shower  Bathroom Toilet: Standard  Bathroom Equipment: None  Home Living Comments: spouse & mother currently in SNF per pt report  Prior Level of Function:  Prior Function Per Pt/Caregiver Report  Level of [place]: Independent with ADLs and functional transfers  Receives Help From: Family (sister?)  ADL Assistance: Independent  Homemaking Assistance: Independent  Ambulatory Assistance: Independent  Prior Function Comments: Pt reports driving  Precautions:  Precautions  Hearing/Visual Limitations: glasses, hearing loss  Medical Precautions: Fall precautions      Date/Time Vitals Session Patient Position Pulse Resp SpO2 BP MAP (mmHg)    04/01/25 1700 --  --  80  16  95 %  143/84  --           Objective   Pain:  Pain Assessment  Pain Assessment: 0-10  0-10 (Numeric) Pain Score: 3  Pain Type:  (back pain)  Pain Location: Back  Pain Orientation: Lower (LS spine)  Pain Descriptors: Aching, Sore  Pain Interventions: Repositioned, Ambulation/increased activity  Response to Interventions: No change in pain  Cognition:  Cognition  Orientation Level: Oriented X4  Safety/Judgement: Exceptions to WFL  Other (Comment): decreased safety awareness and decreased ability to respond to changing  situations  Insight: Moderate  Flexibility of Thought: Reduced flexibility (very focused on current financial worries/circular thinking/hopelessness)  Planning: Reduced planning skills (feeling hopeless and not in control)    General Assessments:  Activity Tolerance  Endurance: Tolerates less than 10 min exercise, no significant change in vital signs  Activity Tolerance Comments: pt very labile throughout session but cooperative with suggested activities    Strength  Strength Comments: B LE grossly 3+/5 or > by observed functional movements  Coordination  Movements are Fluid and Coordinated: Yes    Dynamic Sitting Balance  Dynamic Sitting-Balance Support: No upper extremity supported  Dynamic Sitting-Level of Assistance: Independent    Static Standing Balance  Static Standing-Balance Support: No upper extremity supported  Static Standing-Level of Assistance: Contact guard  Static Standing-Comment/Number of Minutes: mild sway & rigid posture observed  Dynamic Standing Balance  Dynamic Standing-Balance Support: Right upper extremity supported  Dynamic Standing-Level of Assistance: Minimum assistance  Dynamic Standing-Balance: Turning (walking through hallway and navigating environmental barriers)  Functional Assessments:  Transfer 1  Transfer From 1: Wheelchair to  Transfer to 1: Stand  Technique 1: Sit to stand, Stand to sit  Transfer Level of Assistance 1: Minimum assistance    Ambulation/Gait Training  Ambulation/Gait Training Performed: Yes  Ambulation/Gait Training 1  Surface 1: Level tile  Device 1: No device  Gait Support Devices: Gait belt  Assistance 1: Contact guard  Quality of Gait 1: Diminished heel strike, Inconsistent stride length, Decreased step length, Shuffling gait  Comments/Distance (ft) 1: minimized weight shift & absent arm swing  Ambulation/Gait Training 2  Surface 2: Level tile  Device 2: No device  Gait Support Devices: Gait belt  Assistance 2: Hand held assistance (arm assit from Rt  side)  Quality of Gait 2: Diminished heel strike, Decreased step length, Inconsistent stride length  Comments/Distance (ft) 2: less ridig posture ansd smoother gait pattern    Stairs  Stairs: No    Outcome Measures:  [facility] Basic Mobility  Turning from your back to your side while in a flat bed without using bedrails: None  Moving from lying on your back to sitting on the side of a flat bed without using bedrails: A little  Moving to and from bed to chair (including a wheelchair): A little  Standing up from a chair using your arms (e.g. wheelchair or bedside chair): A little  To walk in hospital room: A little  Climbing 3-5 steps with railing: A little  Basic Mobility - Total Score: 19    Encounter Problems       Encounter Problems (Active)       PT  Problem       Patient will transfer sit to stand and stand to sit with independent assist to facilitate mobility. (Progressing)       Start:  04/01/25    Expected End:  04/17/25            Patient will amb 150' feet no device including two turns on even surface with independent assist to facilitate safe mobility.   (Progressing)       Start:  04/01/25    Expected End:  04/17/25            Patient will negotiate 1 stairs with no rail(s) and independent} assist with no device for in home and community. (Progressing)       Start:  04/01/25    Expected End:  04/17/25            Patient will tolerate 15 minutes of standing to improve ability to perform MRADLs. (Progressing)       Start:  04/01/25    Expected End:  04/17/25                   Education Documentation  Body Mechanics, taught by Madeline Mancera PT at 4/1/2025  5:52 PM.  Learner: Patient  Readiness: Acceptance  Method: Explanation  Response: Needs Reinforcement  Comment: Discussed purpose of PT POC, benefits of mobility    Mobility Training, taught by Madeline Mancera PT at 4/1/2025  5:52 PM.  Learner: Patient  Readiness: Acceptance  Method: Explanation  Response: Needs Reinforcement  Comment: Discussed purpose of  PT POC, benefits of mobility    Education Comments  No comments found.

## 2025-04-01 NOTE — CARE PLAN
The patient's goals for the shift include wants to get kicked out of here and believes she doesn't deserve anything    The clinical goals for the shift include maintain safety    Over the shift, the patient did not make progress toward the following goals. Barriers to progression include hopeless about everything and believes she does not deserve anything because she believes she messed up and it cannot be fixed. Recommendations to address these barriers include encourage pt to come out for groups and make needs known, continue current care plan.

## 2025-04-01 NOTE — GROUP NOTE
Group Topic: Medication Education   Group Date: 4/1/2025  Start Time: 1010  End Time: 1110  Facilitators: Jayne Powell PharmD   Department: [facility] How do you roll?    Number of Participants: 7   Group Focus: daily focus and other General Medication Educaiton  Treatment Modality: Patient-Centered Therapy  Interventions utilized were group exercise, other Jeopardy Game, and patient education  Purpose: insight or knowledge and other: improved medication compliance    Name: Nevaeh Cuba YOB: 1955   MR: [id number]      Facilitator: Pharmacist  Level of Participation: moderate  Quality of Participation: appropriate/pleasant and cooperative  Interactions with others: appropriate  Mood/Affect: appropriate  Triggers (if applicable): n/a   Cognition: loose and processing slowly  Progress: Gaining insight or knowledge  Comments: Nevaeh Cuba attended the general medication education group today. We played Jeopardy.  We went around the the room choosing categories and each time that Nevaeh Cuba was called upon (it was her turn) she participated.  Nevaeh Cuba was quiet but seemed engaged in group.     Plan: continue with services

## 2025-04-01 NOTE — GROUP NOTE
Group Topic: Art Creative   Group Date: 4/1/2025  Start Time: 1330  End Time: 1430  Facilitators: SUSAN Oreilly   Department: [facility]    Number of Participants: 9   Group Focus: other Art Group  Treatment Modality: Other: Recreation Therapy  Interventions utilized were exploration, reminiscence, and story telling  Purpose: other: fun, elevate mood, increase socialization, enhance self esteem, self expression     Name: Nevaeh Cuba YOB: 1955   MR: [id number]      Facilitator: Recreational Therapist  Level of Participation: active  Quality of Participation: appropriate/pleasant and cooperative  Interactions with others: appropriate  Mood/Affect: appropriate and focused on art project  Triggers (if applicable): n/a  Cognition: coherent/clear  Progress: Moderate  Comments: pt problem is delusional thought.  Pt continues to easily focused on art project.  Does not verbally engage much as concentrating on painting.    Plan: continue with services

## 2025-04-01 NOTE — PROGRESS NOTES
"Nevaeh Cuba is a 69 y.o. female on day 4 of admission presenting with Psychosis, unspecified psychosis type (Multi).      Subjective   Case discussed with nursing and chart reviewed.  Patient took zyprexa7.5mg at bedtime and slept through the night. Staff reports she cont was appearing more disorganized again last evening and this morning. She is in wheel chair due to .falls risk(bp 148/81, hr 81)- will  10MG at bedtime for delusional psychosis-- very focused on delusional content.   Appetite is good. She is attending groups and appetite is ok.       Objective     Last Recorded Vitals  Blood pressure 148/81, pulse 81, temperature 36.5 °C (97.7 °F), temperature source Tympanic, resp. rate 16, height 1.676 m (5' 6\"), weight 75.8 kg (167 lb), SpO2 97%.    Review of Systems    Psychiatric ROS - Adult  Anxiety: General Anxiety Disorder (LANDON)LANDON Behaviors: difficult to control worry, excessive anxiety/worry, difficulty concentrating, restlessness, and sleep disturbance  Depression: anhedonia, concentration, energy, guilt, helpless, hopeless, interest, persistent thoughts of death, sleep decreased , suicidal thoughts, and withdrawn  Delirium: negative  Psychosis: auditory hallucinations and visual hallucinations  Usha:  none noted or reported but has decreased sleep and increased spending hx  Safety Issues: suicidal ideation(denies today)  Psychiatric ROS Comment: less psychotic flavor in presentation  Physical Exam      Mental Status Exam  General: adequately groomed appears stated age  Appearance: hospital attire  Attitude: pleasant  Behavior: cooperative  Motor Activity: in wheel chair for safety  Speech: normal rate and volume  Mood: less anxious/worried /depressed  Affect: less constriction  Thought Process: less organized- she is obsessing again about bills she did not pay for nursing home where  and mother stay.  Thought Content: no delusions elicited today, denies SI/HI denies paraoia  Thought Perception: " "recent AH/VH- denies currently  Cognition: alert and oriented x4  Insight: patient knows she needs treatment for psychiatric illness and called 911 for help  Judgement: patient is able to participate in medical decsion making  Psychiatric Risk Assessment  Violence Risk Assessment: major mental illness and other psychosis  Acute Risk of Harm to Others is Considered: low   Suicide Risk Assessment: age > 65 yrs old, , current psychiatric illness, feelings of hopelessness, living alone or lack of social support, suicidal ideations, and other guilt cause spent money was supoosed to pay for mother and  in nursing home \"on stuff belkys tis all over the condo\"  Protective Factors against Suicide: adherence to  treatment and sense of responsibility toward family  Acute Risk of Harm to Self is Considered: low  Relevant Results           Scheduled medications  atorvastatin, 20 mg, oral, Daily  cholecalciferol, 2,000 Units, oral, Daily  fLuvoxaMINE, 50 mg, oral, Daily  melatonin, 3 mg, oral, Daily  methIMAzole, 5 mg, oral, Daily  OLANZapine, 10 mg, oral, Nightly  OXcarbazepine, 300 mg, oral, q12h CAROLINE  polyethylene glycol, 17 g, oral, Daily  triamterene-hydrochlorothiazid, 1 tablet, oral, q AM      Continuous medications     PRN medications  PRN medications: acetaminophen, docusate sodium, hydrOXYzine HCL, QUEtiapine **OR** OLANZapine, propranolol     Assessment/Plan   Assessment & Plan  Psychosis, unspecified psychosis type (Multi)    Hyperlipidemia    Hyperthyroidism    Sensorineural hearing loss (SNHL) of both ears    Essential hypertension    Anxiety    Insomnia    Malnutrition dx if evaluated by dietician department         No results found. However, due to the size of the patient record, not all encounters were searched. Please check Results Review for a complete set of results.     Amari afua a 68yo female dx with MDD, LANDON(obsessive features) with psychotic features.  She says she spent all the money she was " "supposed to pay for her  and mothers nursing home on \"stuff belkys tis all over my condo\". She denies dx of bipolar disorder, does not present as manic ast this time but presents as=wit psychotic flavor, almost schizotypal features.       Biological   - cont luvox 50mg every day for anxiety and depression  -trileptal 300mg bid fo rmood stabilization?  - says she has taken lithium in the past and it makes her dizzy, denies ever taking depkakote  - increase zyprexa 10mg at bedtime for psychosis and mood stabilization(monitor for somnolence renal values are a little elevated)  -prns avalable     -medical pertinences appreciated     Psychological     Provider encouraged patient to attend groups on unit.     Sociological     Provider encouraged patient to work with social workon discharge plan. She plan to return to her condo.                   I spent 20 minutes in the professional and overall care of this patient.      Elva Murcia, APRN-CNS      "

## 2025-04-01 NOTE — CARE PLAN
The clinical goals for the shift include maintain safety, promote rest.    Over the shift, the patient did make progress toward the following goals.       Problem: Risk for Suicide  Goal: Accepts medications as prescribed/needed this shift  Outcome: Progressing

## 2025-04-01 NOTE — PROGRESS NOTES
[facility]: [facility]  PROGRESS NOTE      Nevaeh Cuba is a 69 y.o. female that is being seen  today for follow-up at [facility]..  Subjective   Patient is being seen for follow-up at [facility].  Patient has mild elevation of the renal functions and will have repeat labs done.  Patient is on triamterene hydrochlorothiazide blood pressure has been better controlled no episodes of low blood pressure.      ROS  Negative for fever or chills  Negative for sore throat, ear pain, nasal discharge  Negative for cough, shortness of breath or wheezing  Negative for chest pain, palpitations, swelling of legs  Negative for abdominal pain, constipation, diarrhea, blood in the stools  Negative for urinary complaints  Negative for headache, dizziness, weakness or numbness  Negative for joint pain  Positive for depression or anxiety  All other systems reviewed and were negative   Vitals:    04/01/25 0757   BP: 148/81   Pulse: 81   Resp: 16   Temp: 36.5 °C (97.7 °F)   SpO2: 97%      Vitals:    03/28/25 1819   Weight: 75.8 kg (167 lb)     Body mass index is 26.95 kg/m².  Physical Exam  Constitutional: Patient does not appear to be in any acute distress  Head and Face: NCAT  Eyes: Normal external exam, EOMI  ENT: Normal external inspection of ears and nose. Oropharynx normal.  Cardiovascular: RRR, S1/S2, no murmurs, rubs, or gallops, radial pulses +2, no edema of extremities  Pulmonary: CTAB, no respiratory distress.  Abdomen: +BS, soft, non-tender, nondistended, no guarding or rebound, no masses noted  MSK: No joint swelling, normal movements of all extremities. Range of motion- normal.  Skin- No lesions, contusions, or erythema.  Peripheral puslses palpable bilaterally 2+  Neuro: AAO X3, Cranial nerves 2-12 grossly intact,DTR 2+ in all 4 limbs       LABS   [unfilled]  Lab Results   Component Value Date    GLUCOSE 135 (H) 03/28/2025    CALCIUM 9.9 03/28/2025     03/28/2025    K 3.6 03/28/2025    CO2 25  03/28/2025    CL 99 03/28/2025    BUN 27 (H) 03/28/2025    CREATININE 0.86 03/28/2025     Lab Results   Component Value Date    ALT 24 03/28/2025    AST 18 03/28/2025    ALKPHOS 71 03/28/2025    BILITOT 1.0 03/28/2025     Lab Results   Component Value Date    WBC 9.9 03/28/2025    HGB 17.5 (H) 03/28/2025    HCT 48.7 (H) 03/28/2025    MCV 88 03/28/2025     03/28/2025     Lab Results   Component Value Date    CHOL 142 03/29/2025    CHOL 224 (H) 04/12/2024    CHOL 194 10/06/2023     Lab Results   Component Value Date    HDL 59.7 03/29/2025    HDL 58.0 04/12/2024    HDL 58.0 10/06/2023     Lab Results   Component Value Date    LDLCALC 67 03/29/2025    LDLCALC 102 04/12/2024    LDLCALC 88 10/06/2023     Lab Results   Component Value Date    TRIG 77 03/29/2025    TRIG 318 (H) 04/12/2024    TRIG 240 (H) 10/06/2023     Lab Results   Component Value Date    HGBA1C 5.5 06/25/2024     Other labs not included in the list above were reviewed either before or during this encounter.    History    Past Medical History:   Diagnosis Date    Benign essential hypertension     Estrogen deficiency 11/15/2023    DEXA 1/24 back nl, hip neck T-2.1 recheck 1/26    History of atrial fibrillation     Hyperactive thyroid Dr.Burtch Rand Burnham NSR no AC    History of breast lift 05/2024    History of Meniere's syndrome 11/15/2023    Hx of reduction mammoplasty 05/2024    Hyperthyroidism 09/15/2023    Other specified abnormal findings of blood chemistry     High serum cholesterol sulfate    Personal history of malignant neoplasm, unspecified     History of malignant neoplasm    Personal history of other mental and behavioral disorders     Unspecified osteoarthritis, unspecified site 04/28/2016    Arthritis     Past Surgical History:   Procedure Laterality Date    COSMETIC SURGERY      HYSTERECTOMY  04/28/2016    Hysterectomy    JOINT REPLACEMENT      rt ring finger    JOINT REPLACEMENT Right 05/2022    ring finger    OTHER SURGICAL  HISTORY  04/28/2016    Musculoskeletal Procedures Injection Carpal Tunnel    OTHER SURGICAL HISTORY  04/28/2016    Arthrodesis MCP Joint    OTHER SURGICAL HISTORY  01/11/2021    Ankle surgery    OTHER SURGICAL HISTORY  01/11/2021    Carpal tunnel surgery    OTHER SURGICAL HISTORY  07/06/2022    Nasal septoplasty    OTHER SURGICAL HISTORY  07/2021    heart shock catherization    REDUCTION MAMMAPLASTY  may 7 2024    SHOULDER SURGERY  08/2021    total left shoulder replacement    SINUS SURGERY      TRIGGER FINGER RELEASE Right 01/2024    middle finger     Family History   Problem Relation Name Age of Onset    Stroke Mother      Other (cyst on breast) Mother      Atrial fibrillation Father      Stroke Father      Heart disease Father       Allergies   Allergen Reactions    Latex Itching and Rash    Methotrexate Itching     HEAD BURNING AND ITCHING    Metoprolol Rash    Diltiazem Hcl Itching     Rash face     Latex, Natural Rubber Itching    Pollen Extracts Other     NASAL CONGESTION    Adhesive Tape-Silicones Rash    Folic Acid Rash    Sulfamethoxazole-Trimethoprim Rash     Tinnitus      No current facility-administered medications on file prior to encounter.     Current Outpatient Medications on File Prior to Encounter   Medication Sig Dispense Refill    acetaminophen (Tylenol) 325 mg tablet Take 2 tablets (650 mg) by mouth every 6 hours if needed for mild pain (1 - 3) 20 tablet 0    atorvastatin (Lipitor) 20 mg tablet Take 1 tablet (20 mg) by mouth once daily. 90 tablet 3    cholecalciferol (Vitamin D-3) 25 MCG (1000 UT) tablet Take 2 tablets (2,000 Units) by mouth. As directed      docusate sodium (Colace) 100 mg capsule Take 1 capsule (100 mg) by mouth once daily as needed.      fexofenadine HCl (ALLEGRA ORAL) Take 1 tablet by mouth once daily as needed (ALLERGIES).      fLuvoxaMINE (Luvox) 50 mg tablet Take 1 tablet (50 mg) by mouth early in the morning..      hydrOXYzine HCL (Atarax) 25 mg tablet Take 2 tablets  (50 mg) by mouth once daily at bedtime for 10 days. 20 tablet 0    ibuprofen 200 mg tablet Take 2 tablets (400 mg) by mouth every 6 hours if needed for mild pain (1 - 3).      methIMAzole (Tapazole) 5 mg tablet Take 1 tablet (5 mg) by mouth once daily. 90 tablet 3    mv-min-FA-vit K-lutein-zeaxant (PreserVision AREDS 2 Plus MV) 200 mcg-15 mcg- 5 mg-1 mg capsule Take 1 capsule by mouth once daily.      OXcarbazepine (Trileptal) 150 mg tablet Take 1 tablet (150 mg) by mouth 2 times a day.      OXcarbazepine (Trileptal) 300 mg tablet Take 1 tablet (300 mg) by mouth every 12 hours.      propranolol (Inderal) 10 mg tablet Take 1 tablet (10 mg) by mouth every 8 hours if needed for anxiety.      triamterene-hydrochlorothiazid (Maxzide-25) 37.5-25 mg tablet Take 1 tablet by mouth once daily in the morning. 90 tablet 3     Immunization History   Administered Date(s) Administered    COVID-19, subunit, rS-nanoparticle, adjuvanted, PF, 5 mcg/0.5 mL 09/28/2024    Flu vaccine, quadrivalent, high-dose, preservative free, age 65y+ (FLUZONE) 09/14/2023    Influenza, Seasonal, Quadrivalent, Adjuvanted 09/30/2021, 09/08/2022    Influenza, Unspecified 09/07/2010, 10/17/2011    Influenza, injectable, MDCK, quadrivalent 10/23/2017, 10/30/2018    Influenza, injectable, quadrivalent 09/23/2019, 09/11/2020    Influenza, seasonal, injectable 09/24/2012, 10/17/2013, 10/23/2014, 10/26/2015, 11/17/2016    Moderna COVID-19 vaccine, 12 years and older (50mcg/0.5mL)(Spikevax) 09/29/2023    Pfizer COVID-19 vaccine, bivalent, age 12 years and older (30 mcg/0.3 mL) 09/08/2022    Pfizer Gray Cap SARS-CoV-2 03/31/2022    Pfizer Purple Cap SARS-CoV-2 03/09/2021, 04/06/2021, 10/06/2021    Pneumococcal conjugate vaccine, 13-valent (PREVNAR 13) 07/11/2013    Pneumococcal conjugate vaccine, 20-valent (PREVNAR 20) 09/16/2023    Pneumococcal polysaccharide vaccine, 23-valent, age 2 years and older (PNEUMOVAX 23) 11/05/2020    RSV, 60 Years And Older (AREXVY)  09/16/2023    Tdap vaccine, age 7 year and older (BOOSTRIX, ADACEL) 09/30/2021    Zoster vaccine, recombinant, adult (SHINGRIX) 10/15/2020, 09/30/2021    Zoster, live 02/10/2012     Patient's medical history was reviewed and updated either before or during this encounter.  ASSESSMENT / PLAN:  Active Hospital Problems    *Psychosis, unspecified psychosis type (Multi)      Anxiety      Insomnia      Sensorineural hearing loss (SNHL) of both ears      Essential hypertension      Hyperlipidemia      Hyperthyroidism    Patient is being seen for [facility] at [facility].  Anxiety has been stable.  Patient's blood pressure is stable.  Patient is on triamterene hydrochlorothiazide.  Mild elevation of the kidney functions with repeat lab work tomorrow.        Thom Pascual MD

## 2025-04-02 LAB
ALBUMIN SERPL BCP-MCNC: 4.4 G/DL (ref 3.4–5)
ALP SERPL-CCNC: 70 U/L (ref 33–136)
ALT SERPL W P-5'-P-CCNC: 27 U/L (ref 7–45)
ANION GAP SERPL CALCULATED.3IONS-SCNC: 15 MMOL/L (ref 10–20)
AST SERPL W P-5'-P-CCNC: 17 U/L (ref 9–39)
BILIRUB SERPL-MCNC: 0.6 MG/DL (ref 0–1.2)
BUN SERPL-MCNC: 38 MG/DL (ref 6–23)
CALCIUM SERPL-MCNC: 10.1 MG/DL (ref 8.6–10.3)
CHLORIDE SERPL-SCNC: 99 MMOL/L (ref 98–107)
CO2 SERPL-SCNC: 28 MMOL/L (ref 21–32)
CREAT SERPL-MCNC: 0.96 MG/DL (ref 0.5–1.05)
EGFRCR SERPLBLD CKD-EPI 2021: 65 ML/MIN/1.73M*2
GLUCOSE SERPL-MCNC: 115 MG/DL (ref 74–99)
POTASSIUM SERPL-SCNC: 3.5 MMOL/L (ref 3.5–5.3)
PROT SERPL-MCNC: 7.7 G/DL (ref 6.4–8.2)
SODIUM SERPL-SCNC: 138 MMOL/L (ref 136–145)

## 2025-04-02 PROCEDURE — 2500000004 HC RX 250 GENERAL PHARMACY W/ HCPCS (ALT 636 FOR OP/ED): Performed by: STUDENT IN AN ORGANIZED HEALTH CARE EDUCATION/TRAINING PROGRAM

## 2025-04-02 PROCEDURE — 1140000001 HC PRIVATE PSYCH ROOM DAILY

## 2025-04-02 PROCEDURE — 80053 COMPREHEN METABOLIC PANEL: CPT | Performed by: INTERNAL MEDICINE

## 2025-04-02 PROCEDURE — 2500000002 HC RX 250 W HCPCS SELF ADMINISTERED DRUGS (ALT 637 FOR MEDICARE OP, ALT 636 FOR OP/ED): Performed by: REGISTERED NURSE

## 2025-04-02 PROCEDURE — 2500000001 HC RX 250 WO HCPCS SELF ADMINISTERED DRUGS (ALT 637 FOR MEDICARE OP): Performed by: INTERNAL MEDICINE

## 2025-04-02 PROCEDURE — 99232 SBSQ HOSP IP/OBS MODERATE 35: CPT | Performed by: REGISTERED NURSE

## 2025-04-02 PROCEDURE — 2500000005 HC RX 250 GENERAL PHARMACY W/O HCPCS: Performed by: STUDENT IN AN ORGANIZED HEALTH CARE EDUCATION/TRAINING PROGRAM

## 2025-04-02 PROCEDURE — 36415 COLL VENOUS BLD VENIPUNCTURE: CPT | Performed by: INTERNAL MEDICINE

## 2025-04-02 PROCEDURE — 2500000001 HC RX 250 WO HCPCS SELF ADMINISTERED DRUGS (ALT 637 FOR MEDICARE OP): Performed by: STUDENT IN AN ORGANIZED HEALTH CARE EDUCATION/TRAINING PROGRAM

## 2025-04-02 RX ADMIN — METHIMAZOLE 5 MG: 5 TABLET ORAL at 08:15

## 2025-04-02 RX ADMIN — MELATONIN TAB 3 MG 3 MG: 3 TAB at 17:55

## 2025-04-02 RX ADMIN — OLANZAPINE 10 MG: 10 TABLET ORAL at 21:45

## 2025-04-02 RX ADMIN — POLYETHYLENE GLYCOL 3350 17 G: 17 POWDER, FOR SOLUTION ORAL at 08:15

## 2025-04-02 RX ADMIN — OXCARBAZEPINE 300 MG: 300 TABLET, FILM COATED ORAL at 17:55

## 2025-04-02 RX ADMIN — Medication 2000 UNITS: at 08:15

## 2025-04-02 RX ADMIN — FLUVOXAMINE MALEATE 50 MG: 50 TABLET, COATED ORAL at 06:25

## 2025-04-02 RX ADMIN — TRIAMTERENE AND HYDROCHLOROTHIAZIDE 1 TABLET: 37.5; 25 TABLET ORAL at 08:15

## 2025-04-02 RX ADMIN — ATORVASTATIN CALCIUM 20 MG: 20 TABLET, FILM COATED ORAL at 21:45

## 2025-04-02 RX ADMIN — OXCARBAZEPINE 300 MG: 300 TABLET, FILM COATED ORAL at 06:25

## 2025-04-02 ASSESSMENT — PAIN SCALES - GENERAL: PAINLEVEL_OUTOF10: 0 - NO PAIN

## 2025-04-02 ASSESSMENT — COLUMBIA-SUICIDE SEVERITY RATING SCALE - C-SSRS
2. HAVE YOU ACTUALLY HAD ANY THOUGHTS OF KILLING YOURSELF?: YES
1. IN THE PAST MONTH, HAVE YOU WISHED YOU WERE DEAD OR WISHED YOU COULD GO TO SLEEP AND NOT WAKE UP?: YES
1. IN THE PAST MONTH, HAVE YOU WISHED YOU WERE DEAD OR WISHED YOU COULD GO TO SLEEP AND NOT WAKE UP?: YES
6. HAVE YOU EVER DONE ANYTHING, STARTED TO DO ANYTHING, OR PREPARED TO DO ANYTHING TO END YOUR LIFE?: YES
2. HAVE YOU ACTUALLY HAD ANY THOUGHTS OF KILLING YOURSELF?: YES
6. HAVE YOU EVER DONE ANYTHING, STARTED TO DO ANYTHING, OR PREPARED TO DO ANYTHING TO END YOUR LIFE?: YES

## 2025-04-02 ASSESSMENT — PAIN - FUNCTIONAL ASSESSMENT: PAIN_FUNCTIONAL_ASSESSMENT: 0-10

## 2025-04-02 NOTE — NURSING NOTE
Pt gave verbal consent for sister Laura to remove her wallet (containing $106) and cell phone from safe. Pt sister to  today.

## 2025-04-02 NOTE — PROGRESS NOTES
"MSW called pt sister Laura [phone] to obtain collateral information.     Laura returned the call. Pt grew up with her parents and sister. Laura denied abuse, and described their mom as \"domineering\" and then she \" someone similar to mom\". She described Curtis's  as also being \"domineering\", and \"pt allowed it\". Pt graduated high school and went to [employer].  She denied any history of abuse, trauma, drug abuse, or alcohol problems. Laura reported that pt had an inpatient admission \"in her 30's at [facility]\". Pt was employeed at [employer] for roughly 30-35 years and per Laura retired a few years ago. She stated pt has always had anxiety and depression, she feels she also has OCD undiagnosed. She describes her as being \"overly meticulous\".  She has historically seen a provider and take medication.     Pt does not have a history of memory loss per Laura. This past Fall Oct 2024 her  had to [facility] a [facility] home [facility]. Laura feels that was the trigger to this decline. She described her as unraveling since her spouse went into the NH. She was on mental health medication but stopped taking it when her  was admitted to. He used to handle all of the bills and taking care of the home. Pt has never had to do these things for herself. Her mom is in the same facility as him and she has resided there for 10+ years. She is on Medicaid and there is no bill to pay. Sister Laura paid April 2025 bill for her spouse. This excessive worry and paranoia is new. Pt has stable finances per Laura. She owns her Condo, car, has [employer], and savings.     Pricila Ruggiero [facility], LSW  "

## 2025-04-02 NOTE — GROUP NOTE
Group Topic: Other   Group Date: 4/2/2025  Start Time: 1330  End Time: 1430  Facilitators: SUSAN Oreilly   Department: [facility]IRT[facility]    Number of Participants: 6   Group Focus: other Mind Joggers/Art  Treatment Modality: Other: Recreation Therapy  Interventions utilized were mental fitness, reminiscence, and story telling  Purpose: other: fun, elevate mood, increase socialization, enhance self esteem, stimulate memory and self expression    Name: Nevaeh Cuba YOB: 1955   MR: [id number]      Facilitator: Recreational Therapist  Level of Participation: active  Quality of Participation: appropriate/pleasant, attentive, and cooperative  Interactions with others: appropriate and gave feedback  Mood/Affect: appropriate and flat  Triggers (if applicable): n/a  Cognition: coherent/clear  Progress: Moderate  Comments: pt problem is delusional thought.  Pt is able to focus on art project during group.  Occasionally, follows trivia questions with peers.   Cooperative and calm with mood and behaviors.  Plan: continue with services

## 2025-04-02 NOTE — CARE PLAN
The clinical goals for the shift include maintain safety, promote rest    Over the shift, the patient did make progress toward the following goals.      Problem: Risk for Suicide  Goal: Accepts medications as prescribed/needed this shift  Outcome: Progressing     Problem: Risk for Suicide  Goal: Makes needs known through verbalization or behaviors this shift  Outcome: Progressing

## 2025-04-02 NOTE — GROUP NOTE
Group Topic: Decision Making   Group Date: 4/2/2025  Start Time: 1030  End Time: 1130  Facilitators: SUSAN Oreilly   Department: [facility]    Number of Participants: 6   Group Focus: other Dealing with Change...  Treatment Modality: Other: Recreation Therapy  Interventions utilized were exploration, group exercise, patient education, problem solving, and support  Purpose: coping skills, maladaptive thinking, feelings, irrational fears, communication skills, insight or knowledge, self-worth, self-care, relapse prevention strategies, and trigger / craving management    Name: Nevaeh Cuba YOB: 1955   MR: [id number]      Facilitator: Recreational Therapist  Level of Participation: minimal  Quality of Participation: passive and quiet  Interactions with others:  mostly passive  Mood/Affect: flat  Triggers (if applicable): n/a  Cognition:  pt mostly passive with verbal participation, does make good eye contact and nod her head along with staff and peers.  Does offer a few one word responses with prompting.  Progress: Moderate  Comments: pt problem is delusional thought.  Plan: continue with services

## 2025-04-02 NOTE — CARE PLAN
The patient's goals for the shift include unsure on what to do, feels cannot make any decisions    The clinical goals for the shift include maintain safety    Over the shift, the patient did not make progress toward the following goals. Barriers to progression include pt fearful of making the wrong decision, worrisome about everything. Recommendations to address these barriers include continue current care plan, encourage pt to start with small decisions and gain that confidence back to be able to make some decisions.

## 2025-04-02 NOTE — GROUP NOTE
Group Topic: Goals   Group Date: 4/1/2025  Start Time: 2000  End Time: 2030  Facilitators: Roseann Nguyen RN   Department: [facility]    Number of Participants: 6   Group Focus: goals  Treatment Modality: Patient-Centered Therapy  Interventions utilized were clarification and patient education  Purpose: insight or knowledge    Name: Nevaeh Cuba YOB: 1955   MR: [id number]      Facilitator: Registered Nurse  Level of Participation: moderate  Quality of Participation: appropriate/pleasant  Interactions with others: appropriate  Mood/Affect: blunted and flat  Triggers (if applicable): n/a  Cognition: concrete  Progress: Gaining insight or knowledge  Comments: Admitted with Dx of Psychosis, unspecified type  Plan: continue with services

## 2025-04-02 NOTE — PROGRESS NOTES
"Nevaeh Cuba is a 69 y.o. female on day 5 of admission presenting with Psychosis, unspecified psychosis type (Multi).      Subjective   Case discussed with nursing and chart reviewed.  Patient took zyprexa 10mg at bedtime and slept through the night, no somnolence noted today.. Staff reports she cont was appearing more disorganized again last evening and this morning.  When provider, nurse and patient met with patient and sister, sister reassured patient that she was taking care of all bills and she could concentrate on getting better.  She assured patient that she had no bills at all that she was responsible for coordinating for her mother and that sister was paying all bills for her as was their agreement for her  in nursing home.  Patient acknowledged this information yesterday but today says she is wracked with guilt about not caring for her  and mother and spending all their money on itiems that are just sitting in her condo.  Sister refuted that she had spent the money and said there were no items she had bought sitting around condo.  This appears to be a delusional process. She is attending groups and appetite is ok.       Objective     Last Recorded Vitals  Blood pressure (!) 143/92, pulse 94, temperature 36 °C (96.8 °F), temperature source Temporal, resp. rate 16, height 1.676 m (5' 6\"), weight 75.8 kg (167 lb), SpO2 96%.    Review of Systems    Psychiatric ROS - Adult  Anxiety: General Anxiety Disorder (LANDON)LANDON Behaviors: difficult to control worry, excessive anxiety/worry, difficulty concentrating, restlessness, and sleep disturbance  Depression: anhedonia, concentration, energy, guilt, helpless, hopeless, interest, persistent thoughts of death, sleep decreased , suicidal thoughts, and withdrawn  Delirium: negative  Psychosis: auditory hallucinations and visual hallucinations  Usha:  none noted or reported but has decreased sleep and increased spending hx  Safety Issues: suicidal " "ideation(denies today)  Psychiatric ROS Comment: less psychotic flavor in presentation  Physical Exam      Mental Status Exam  General: adequately groomed appears stated age  Appearance: hospital attire  Attitude: pleasant  Behavior: cooperative  Motor Activity: in wheel chair for safety  Speech: normal rate and volume  Mood: anxious/worried /depressed  Affect: less constriction  Thought Process: less organized- she is obsessing again about bills she did not pay for nursing home where  and mother stay.  Thought Content: apparent  delusional [process evident after talking to sister , denies SI/HI denies paranoia  Thought Perception: recent AH/VH- denies currently  Cognition: alert and oriented x4  Insight: patient knows she needs treatment for psychiatric illness and called 911 for help  Judgement: patient is able to participate in medical decsion making  Psychiatric Risk Assessment  Violence Risk Assessment: major mental illness and other psychosis  Acute Risk of Harm to Others is Considered: low   Suicide Risk Assessment: age > 65 yrs old, , current psychiatric illness, feelings of hopelessness, living alone or lack of social support, suicidal ideations, and other guilt cause spent money was supoosed to pay for mother and  in nursing home \"on stuff that is all over the condo\"  Protective Factors against Suicide: adherence to  treatment and sense of responsibility toward family  Acute Risk of Harm to Self is Considered: low  Relevant Results           Scheduled medications  atorvastatin, 20 mg, oral, Daily  cholecalciferol, 2,000 Units, oral, Daily  fLuvoxaMINE, 50 mg, oral, Daily  melatonin, 3 mg, oral, Daily  methIMAzole, 5 mg, oral, Daily  OLANZapine, 10 mg, oral, Nightly  OXcarbazepine, 300 mg, oral, q12h CAROLINE  polyethylene glycol, 17 g, oral, Daily  triamterene-hydrochlorothiazid, 1 tablet, oral, q AM      Continuous medications     PRN medications  PRN medications: acetaminophen, docusate " "sodium, hydrOXYzine HCL, QUEtiapine **OR** OLANZapine, propranolol     Assessment/Plan   Assessment & Plan  Psychosis, unspecified psychosis type (Multi)    Hyperlipidemia    Hyperthyroidism    Sensorineural hearing loss (SNHL) of both ears    Essential hypertension    Anxiety    Insomnia    Malnutrition dx if evaluated by dietician department           Results for orders placed or performed during the hospital encounter of 03/28/25 (from the past 24 hours)   Comprehensive metabolic panel   Result Value Ref Range    Glucose 115 (H) 74 - 99 mg/dL    Sodium 138 136 - 145 mmol/L    Potassium 3.5 3.5 - 5.3 mmol/L    Chloride 99 98 - 107 mmol/L    Bicarbonate 28 21 - 32 mmol/L    Anion Gap 15 10 - 20 mmol/L    Urea Nitrogen 38 (H) 6 - 23 mg/dL    Creatinine 0.96 0.50 - 1.05 mg/dL    eGFR 65 >60 mL/min/1.73m*2    Calcium 10.1 8.6 - 10.3 mg/dL    Albumin 4.4 3.4 - 5.0 g/dL    Alkaline Phosphatase 70 33 - 136 U/L    Total Protein 7.7 6.4 - 8.2 g/dL    AST 17 9 - 39 U/L    Bilirubin, Total 0.6 0.0 - 1.2 mg/dL    ALT 27 7 - 45 U/L     *Note: Due to a large number of results and/or encounters for the requested time period, some results have not been displayed. A complete set of results can be found in Results Review.        Amari caal a 68yo female dx with MDD, LANDON(obsessive features) with psychotic features.  She says she spent all the money she was supposed to pay for her  and mothers nursing home on \"stuff belkys tis all over my condo\". She denies dx of bipolar disorder, does not present as manic ast this time but presents as=wit psychotic flavor, almost schizotypal features.       Biological   - cont luvox 50mg every day for anxiety and depression  -trileptal 300mg bid fo rmood stabilization?  - says she has taken lithium in the past and it makes her dizzy, denies ever taking depkakote  - cont zyprexa 10mg at bedtime for psychosis and mood stabilization(monitor for somnolence renal values are a little elevated)  -prns " avalable     -medical pertinences appreciated     Psychological     Provider encouraged patient to attend groups on unit.     Sociological     Provider encouraged patient to work with social workon discharge plan. She plan to return to her condo.                   I spent 20 minutes in the professional and overall care of this patient.      Elva Murcia, APRN-CNS

## 2025-04-02 NOTE — NURSING NOTE
TAZ NOTE     Problem:  Depression     Behavior:  Pt ambulating in WC in hallway this evening. Minimal interaction with peers noted. Makes needs known. Compliant with meds, cooperative with care. Pt expressed hopelessness re her financial situation.    Group Participation: yes  Appetite/Meals: HS snack provided     Interventions:  1:1 provided with reassurance and active listening. Evening meds given per orders. Bed alarm engaged. Encouraged to use call light for needs.    Response:  Pt calm, resting in bed. Respirations even, unlabored.      Plan:  Will continue to monitor behaviors and effectiveness of interventions while maintaining pt safety.

## 2025-04-03 ENCOUNTER — APPOINTMENT (OUTPATIENT)
Dept: PRIMARY CARE | Facility: CLINIC | Age: 70
End: 2025-04-03
Payer: MEDICARE

## 2025-04-03 PROCEDURE — 2500000001 HC RX 250 WO HCPCS SELF ADMINISTERED DRUGS (ALT 637 FOR MEDICARE OP): Performed by: INTERNAL MEDICINE

## 2025-04-03 PROCEDURE — 1140000001 HC PRIVATE PSYCH ROOM DAILY

## 2025-04-03 PROCEDURE — 2500000005 HC RX 250 GENERAL PHARMACY W/O HCPCS: Performed by: STUDENT IN AN ORGANIZED HEALTH CARE EDUCATION/TRAINING PROGRAM

## 2025-04-03 PROCEDURE — 2500000002 HC RX 250 W HCPCS SELF ADMINISTERED DRUGS (ALT 637 FOR MEDICARE OP, ALT 636 FOR OP/ED): Performed by: REGISTERED NURSE

## 2025-04-03 PROCEDURE — 99232 SBSQ HOSP IP/OBS MODERATE 35: CPT | Performed by: REGISTERED NURSE

## 2025-04-03 PROCEDURE — 99233 SBSQ HOSP IP/OBS HIGH 50: CPT | Performed by: INTERNAL MEDICINE

## 2025-04-03 PROCEDURE — 2500000001 HC RX 250 WO HCPCS SELF ADMINISTERED DRUGS (ALT 637 FOR MEDICARE OP): Performed by: STUDENT IN AN ORGANIZED HEALTH CARE EDUCATION/TRAINING PROGRAM

## 2025-04-03 PROCEDURE — 2500000004 HC RX 250 GENERAL PHARMACY W/ HCPCS (ALT 636 FOR OP/ED): Performed by: STUDENT IN AN ORGANIZED HEALTH CARE EDUCATION/TRAINING PROGRAM

## 2025-04-03 RX ORDER — FLUVOXAMINE MALEATE 50 MG/1
75 TABLET, FILM COATED ORAL NIGHTLY
Status: DISCONTINUED | OUTPATIENT
Start: 2025-04-04 | End: 2025-04-12

## 2025-04-03 RX ORDER — FLUVOXAMINE MALEATE 50 MG/1
75 TABLET, FILM COATED ORAL
Status: DISCONTINUED | OUTPATIENT
Start: 2025-04-04 | End: 2025-04-03

## 2025-04-03 RX ADMIN — OLANZAPINE 10 MG: 10 TABLET ORAL at 21:19

## 2025-04-03 RX ADMIN — OXCARBAZEPINE 300 MG: 300 TABLET, FILM COATED ORAL at 06:15

## 2025-04-03 RX ADMIN — Medication 2000 UNITS: at 08:08

## 2025-04-03 RX ADMIN — METHIMAZOLE 5 MG: 5 TABLET ORAL at 08:08

## 2025-04-03 RX ADMIN — ATORVASTATIN CALCIUM 20 MG: 20 TABLET, FILM COATED ORAL at 21:18

## 2025-04-03 RX ADMIN — OXCARBAZEPINE 300 MG: 300 TABLET, FILM COATED ORAL at 17:30

## 2025-04-03 RX ADMIN — TRIAMTERENE AND HYDROCHLOROTHIAZIDE 1 TABLET: 37.5; 25 TABLET ORAL at 08:09

## 2025-04-03 RX ADMIN — POLYETHYLENE GLYCOL 3350 17 G: 17 POWDER, FOR SOLUTION ORAL at 08:11

## 2025-04-03 RX ADMIN — MELATONIN TAB 3 MG 3 MG: 3 TAB at 17:30

## 2025-04-03 RX ADMIN — FLUVOXAMINE MALEATE 50 MG: 50 TABLET, COATED ORAL at 06:15

## 2025-04-03 ASSESSMENT — PAIN - FUNCTIONAL ASSESSMENT
PAIN_FUNCTIONAL_ASSESSMENT: 0-10

## 2025-04-03 ASSESSMENT — PAIN SCALES - GENERAL
PAINLEVEL_OUTOF10: 0 - NO PAIN

## 2025-04-03 ASSESSMENT — COLUMBIA-SUICIDE SEVERITY RATING SCALE - C-SSRS
6. HAVE YOU EVER DONE ANYTHING, STARTED TO DO ANYTHING, OR PREPARED TO DO ANYTHING TO END YOUR LIFE?: YES
6. HAVE YOU EVER DONE ANYTHING, STARTED TO DO ANYTHING, OR PREPARED TO DO ANYTHING TO END YOUR LIFE?: YES
1. IN THE PAST MONTH, HAVE YOU WISHED YOU WERE DEAD OR WISHED YOU COULD GO TO SLEEP AND NOT WAKE UP?: YES
5. HAVE YOU STARTED TO WORK OUT OR WORKED OUT THE DETAILS OF HOW TO KILL YOURSELF? DO YOU INTEND TO CARRY OUT THIS PLAN?: NO
2. HAVE YOU ACTUALLY HAD ANY THOUGHTS OF KILLING YOURSELF?: YES
1. IN THE PAST MONTH, HAVE YOU WISHED YOU WERE DEAD OR WISHED YOU COULD GO TO SLEEP AND NOT WAKE UP?: YES
2. HAVE YOU ACTUALLY HAD ANY THOUGHTS OF KILLING YOURSELF?: YES
4. HAVE YOU HAD THESE THOUGHTS AND HAD SOME INTENTION OF ACTING ON THEM?: NO
2. HAVE YOU ACTUALLY HAD ANY THOUGHTS OF KILLING YOURSELF?: YES
1. IN THE PAST MONTH, HAVE YOU WISHED YOU WERE DEAD OR WISHED YOU COULD GO TO SLEEP AND NOT WAKE UP?: YES
6. HAVE YOU EVER DONE ANYTHING, STARTED TO DO ANYTHING, OR PREPARED TO DO ANYTHING TO END YOUR LIFE?: YES

## 2025-04-03 NOTE — NURSING NOTE
"TAZ NOTE     Problem:  Hopelessness  Paranoid     Behavior:  Patient is cooperative with care and medication administration. Flat affect. Patient states she continues to feel \"hopeless and guilty\"  Group Participation: Attends groups  Appetite/Meals: 50-50-25       Interventions:  Medications administered per MAR.     Response:  Medication compliant.      Plan:  Continue current plan of care.     "

## 2025-04-03 NOTE — GROUP NOTE
Group Topic: Goals   Group Date: 4/2/2025  Start Time: 1955  End Time: 2005  Facilitators: Tanika Quinn   Department: [facility]    Number of Participants: 4   Group Focus: goals  Treatment Modality: Other: PCA  Interventions utilized were reminiscence  Purpose: feelings and communication skills    Name: Nevaeh Cuba YOB: 1955   MR: [id number]      Facilitator: Mental Health [facility]  Level of Participation: did not attend  Quality of Participation:  did not attend  Interactions with others:  did not attend  Mood/Affect:  did not attend  Triggers (if applicable): N/A  Cognition:  did not attend  Progress: Other  Comments: Pt problem is psychosis. Pt declined the invitation to attend group.  Plan: continue with services

## 2025-04-03 NOTE — GROUP NOTE
Group Topic: Cognitive Focus   Group Date: 4/3/2025  Start Time: 1500  End Time: 1545  Facilitators: KEHINDE Deleon-CNS   Department: [facility]    Number of Participants: 4   Group Focus: clarity of thought, coping skills, and daily focus  Treatment Modality: Cognitive Behavioral Therapy  Interventions utilized were mental fitness  Purpose: coping skills, insight or knowledge, self-care, and relapse prevention strategies    Name: Nevaeh Cuba YOB: 1955   MR: [id number]      Facilitator: Nurse Practitioner  Level of Participation: minimal  Quality of Participation: cooperative  Interactions with others: appropriate  Mood/Affect:  quiet  Triggers (if applicable): none  Cognition:  alert  Progress: Moderate  Comments: MDD with psychotic features  Plan: continue with services

## 2025-04-03 NOTE — GROUP NOTE
Group Topic: Symptom Management   Group Date: 4/3/2025  Start Time: 1000  End Time: 1100  Facilitators: EVIN GroverS   Department: [facility]    Number of Participants: 7   Group Focus: coping skills, personal responsibility, and self-awareness  Treatment Modality: Psychoeducation and Other: Recreation Therapy  Interventions utilized were clarification, exploration, group exercise, patient education, and support  Purpose: Patients engaged in a group session aimed to increase knowledge focused on stress, triggers, warning signs highlighted by personal responsibilities. Pts also encouraged to create a coping skills toolbox for those times when in a distressed situation/s.      Name: Nevaeh Cuba YOB: 1955   MR: [id number]      Facilitator: Recreational Therapist  Level of Participation: minimal  Quality of Participation:  not focused  Interactions with others:  passive  Mood/Affect: blunted  Cognition: not focused and processing slowly  Progress: Moderate  Comments: pt problem is delusional thought. Appears having difficulty focusing. Needs prompting which pt only adds short responses to. Nods head every now and then.   Plan: continue with services

## 2025-04-03 NOTE — GROUP NOTE
Group Topic: Other   Group Date: 4/3/2025  Start Time: 1330  End Time: 1430  Facilitators: EVIN GroverS   Department: [facility]    Number of Participants: 6   Group Focus: LRC w/ questions: increase attention span, leisure skills, self-esteem, and social skills  Treatment Modality: Leisure Development and Other: Recreation Therapy  Interventions utilized were group exercise and leisure development  Purpose: Patients engaged in a group session with game of “[employer]” with questions which aims to stimulate cognition, leisure development and social interaction. Group game also aims to increase memory by way of reminiscing, direction following, fine motor skills and eye-hand coordination.      Name: Nevaeh Cuba YOB: 1955   MR: [id number]      Facilitator: Recreational Therapist  Level of Participation: active  Quality of Participation: appropriate/pleasant, cooperative, engaged, and quiet  Interactions with others: appropriate  Mood/Affect: appropriate and blunted  Cognition: not focused and processing slowly  Progress: Moderate  Comments: pt problem is delusional thought. Able to follow along needing some cues and reminders. Appears to have some difficulty focusing.   Plan: continue with services

## 2025-04-03 NOTE — PROGRESS NOTES
[facility]: [facility]  PROGRESS NOTE      Nevaeh Cuba is a 69 y.o. female that is being seen  today for follow-up at [facility]..  Subjective   Patient is being seen for follow-up at [facility].  Patient repeat labs shows elevation of the kidney functions.  Patient is on triamterene with hydrochlorothiazide.  Will discontinue the medication and recommended patient to drink more fluids.  Will repeat kidney functions in the next 1 or 2 days.      ROS  Negative for fever or chills  Negative for sore throat, ear pain, nasal discharge  Negative for cough, shortness of breath or wheezing  Negative for chest pain, palpitations, swelling of legs  Negative for abdominal pain, constipation, diarrhea, blood in the stools  Negative for urinary complaints  Negative for headache, dizziness, weakness or numbness  Negative for joint pain  Positive for depression or anxiety  All other systems reviewed and were negative   Vitals:    04/03/25 0757   BP: 129/84   Pulse: 92   Resp:    Temp:    SpO2:       Vitals:    03/28/25 1819   Weight: 75.8 kg (167 lb)     Body mass index is 26.95 kg/m².  Physical Exam  Constitutional: Patient does not appear to be in any acute distress  Head and Face: NCAT  Eyes: Normal external exam, EOMI  ENT: Normal external inspection of ears and nose. Oropharynx normal.  Cardiovascular: RRR, S1/S2, no murmurs, rubs, or gallops, radial pulses +2, no edema of extremities  Pulmonary: CTAB, no respiratory distress.  Abdomen: +BS, soft, non-tender, nondistended, no guarding or rebound, no masses noted  MSK: No joint swelling, normal movements of all extremities. Range of motion- normal.  Skin- No lesions, contusions, or erythema.  Peripheral puslses palpable bilaterally 2+  Neuro: AAO X3, Cranial nerves 2-12 grossly intact,DTR 2+ in all 4 limbs       LABS   [unfilled]  Lab Results   Component Value Date    GLUCOSE 115 (H) 04/02/2025    CALCIUM 10.1 04/02/2025     04/02/2025    K 3.5  04/02/2025    CO2 28 04/02/2025    CL 99 04/02/2025    BUN 38 (H) 04/02/2025    CREATININE 0.96 04/02/2025     Lab Results   Component Value Date    ALT 27 04/02/2025    AST 17 04/02/2025    ALKPHOS 70 04/02/2025    BILITOT 0.6 04/02/2025     Lab Results   Component Value Date    WBC 9.9 03/28/2025    HGB 17.5 (H) 03/28/2025    HCT 48.7 (H) 03/28/2025    MCV 88 03/28/2025     03/28/2025     Lab Results   Component Value Date    CHOL 142 03/29/2025    CHOL 224 (H) 04/12/2024    CHOL 194 10/06/2023     Lab Results   Component Value Date    HDL 59.7 03/29/2025    HDL 58.0 04/12/2024    HDL 58.0 10/06/2023     Lab Results   Component Value Date    LDLCALC 67 03/29/2025    LDLCALC 102 04/12/2024    LDLCALC 88 10/06/2023     Lab Results   Component Value Date    TRIG 77 03/29/2025    TRIG 318 (H) 04/12/2024    TRIG 240 (H) 10/06/2023     Lab Results   Component Value Date    HGBA1C 5.5 06/25/2024     Other labs not included in the list above were reviewed either before or during this encounter.    History    Past Medical History:   Diagnosis Date    Benign essential hypertension     Estrogen deficiency 11/15/2023    DEXA 1/24 back nl, hip neck T-2.1 recheck 1/26    History of atrial fibrillation     Hyperactive thyroid Dr.Burtch Faulknerimazole  NSR no AC    History of breast lift 05/2024    History of Meniere's syndrome 11/15/2023    Hx of reduction mammoplasty 05/2024    Hyperthyroidism 09/15/2023    Other specified abnormal findings of blood chemistry     High serum cholesterol sulfate    Personal history of malignant neoplasm, unspecified     History of malignant neoplasm    Personal history of other mental and behavioral disorders     Unspecified osteoarthritis, unspecified site 04/28/2016    Arthritis     Past Surgical History:   Procedure Laterality Date    COSMETIC SURGERY      HYSTERECTOMY  04/28/2016    Hysterectomy    JOINT REPLACEMENT      rt ring finger    JOINT REPLACEMENT Right 05/2022    ring  finger    OTHER SURGICAL HISTORY  04/28/2016    Musculoskeletal Procedures Injection Carpal Tunnel    OTHER SURGICAL HISTORY  04/28/2016    Arthrodesis MCP Joint    OTHER SURGICAL HISTORY  01/11/2021    Ankle surgery    OTHER SURGICAL HISTORY  01/11/2021    Carpal tunnel surgery    OTHER SURGICAL HISTORY  07/06/2022    Nasal septoplasty    OTHER SURGICAL HISTORY  07/2021    heart shock catherization    REDUCTION MAMMAPLASTY  may 7 2024    SHOULDER SURGERY  08/2021    total left shoulder replacement    SINUS SURGERY      TRIGGER FINGER RELEASE Right 01/2024    middle finger     Family History   Problem Relation Name Age of Onset    Stroke Mother      Other (cyst on breast) Mother      Atrial fibrillation Father      Stroke Father      Heart disease Father       Allergies   Allergen Reactions    Latex Itching and Rash    Methotrexate Itching     HEAD BURNING AND ITCHING    Metoprolol Rash    Diltiazem Hcl Itching     Rash face     Latex, Natural Rubber Itching    Pollen Extracts Other     NASAL CONGESTION    Adhesive Tape-Silicones Rash    Folic Acid Rash    Sulfamethoxazole-Trimethoprim Rash     Tinnitus      No current facility-administered medications on file prior to encounter.     Current Outpatient Medications on File Prior to Encounter   Medication Sig Dispense Refill    acetaminophen (Tylenol) 325 mg tablet Take 2 tablets (650 mg) by mouth every 6 hours if needed for mild pain (1 - 3) 20 tablet 0    atorvastatin (Lipitor) 20 mg tablet Take 1 tablet (20 mg) by mouth once daily. 90 tablet 3    cholecalciferol (Vitamin D-3) 25 MCG (1000 UT) tablet Take 2 tablets (2,000 Units) by mouth. As directed      docusate sodium (Colace) 100 mg capsule Take 1 capsule (100 mg) by mouth once daily as needed.      fexofenadine HCl (ALLEGRA ORAL) Take 1 tablet by mouth once daily as needed (ALLERGIES).      fLuvoxaMINE (Luvox) 50 mg tablet Take 1 tablet (50 mg) by mouth early in the morning..      hydrOXYzine HCL (Atarax) 25 mg  tablet Take 2 tablets (50 mg) by mouth once daily at bedtime for 10 days. 20 tablet 0    ibuprofen 200 mg tablet Take 2 tablets (400 mg) by mouth every 6 hours if needed for mild pain (1 - 3).      methIMAzole (Tapazole) 5 mg tablet Take 1 tablet (5 mg) by mouth once daily. 90 tablet 3    mv-min-FA-vit K-lutein-zeaxant (PreserVision AREDS 2 Plus MV) 200 mcg-15 mcg- 5 mg-1 mg capsule Take 1 capsule by mouth once daily.      OXcarbazepine (Trileptal) 150 mg tablet Take 1 tablet (150 mg) by mouth 2 times a day.      OXcarbazepine (Trileptal) 300 mg tablet Take 1 tablet (300 mg) by mouth every 12 hours.      propranolol (Inderal) 10 mg tablet Take 1 tablet (10 mg) by mouth every 8 hours if needed for anxiety.      triamterene-hydrochlorothiazid (Maxzide-25) 37.5-25 mg tablet Take 1 tablet by mouth once daily in the morning. 90 tablet 3     Immunization History   Administered Date(s) Administered    COVID-19, subunit, rS-nanoparticle, adjuvanted, PF, 5 mcg/0.5 mL 09/28/2024    Flu vaccine, quadrivalent, high-dose, preservative free, age 65y+ (FLUZONE) 09/14/2023    Influenza, Seasonal, Quadrivalent, Adjuvanted 09/30/2021, 09/08/2022    Influenza, Unspecified 09/07/2010, 10/17/2011    Influenza, injectable, MDCK, quadrivalent 10/23/2017, 10/30/2018    Influenza, injectable, quadrivalent 09/23/2019, 09/11/2020    Influenza, seasonal, injectable 09/24/2012, 10/17/2013, 10/23/2014, 10/26/2015, 11/17/2016    Moderna COVID-19 vaccine, 12 years and older (50mcg/0.5mL)(Spikevax) 09/29/2023    Pfizer COVID-19 vaccine, bivalent, age 12 years and older (30 mcg/0.3 mL) 09/08/2022    Pfizer Gray Cap SARS-CoV-2 03/31/2022    Pfizer Purple Cap SARS-CoV-2 03/09/2021, 04/06/2021, 10/06/2021    Pneumococcal conjugate vaccine, 13-valent (PREVNAR 13) 07/11/2013    Pneumococcal conjugate vaccine, 20-valent (PREVNAR 20) 09/16/2023    Pneumococcal polysaccharide vaccine, 23-valent, age 2 years and older (PNEUMOVAX 23) 11/05/2020    RSV, 60  Years And Older (AREXVY) 09/16/2023    Tdap vaccine, age 7 year and older (BOOSTRIX, ADACEL) 09/30/2021    Zoster vaccine, recombinant, adult (SHINGRIX) 10/15/2020, 09/30/2021    Zoster, live 02/10/2012     Patient's medical history was reviewed and updated either before or during this encounter.  ASSESSMENT / PLAN:  Active Hospital Problems    *Psychosis, unspecified psychosis type (Multi)      Anxiety      Insomnia      Sensorineural hearing loss (SNHL) of both ears      Essential hypertension      Hyperlipidemia      Hyperthyroidism    Patient is being seen for follow[facility]up at [facility].  Anxiety has been stable.  Patient's blood pressure is stable.  Patient is on triamterene hydrochlorothiazide.  Patient has elevation in the kidney functions from before.  Will discontinue the diuretic and repeat labs in next 1 to 2 days.      Thom Pascual MD

## 2025-04-03 NOTE — CARE PLAN
The patient's goals for the shift include sleep    The clinical goals for the shift include promote rest    Over the shift, the patient did not make progress toward the following goals. Barriers to progression include Dx of depression. Recommendations to address these barriers include continue with treatment.      Problem: Risk for Suicide  Goal: Identifies supports this shift  Outcome: Progressing     Problem: Risk for Suicide  Goal: Makes needs known through verbalization or behaviors this shift  Outcome: Progressing     Problem: Risk for Suicide  Goal: Read Safety Guidelines this shift  Outcome: Progressing

## 2025-04-03 NOTE — NURSING NOTE
"TAZ NOTE     Problem:  depression     Behavior:  Pt is cam and isolative, sitting in the hallway by her room in  the wheelchair, not engaged with peers, do not address her needs unless is asked, ambivalent with her answers, says  \"I don't know\" and \"I'm not sure\", needs to be encouraged to use the bathroom or eat the snack. During encounter Pt is cooperative, delayed responses are noted, short answers are provided by the Pt, not able to engage into conversation. Pt went to bed at around 10 pm when offered, fell asleep shortly.    Group Participation: n/a  Appetite/Meals: hs snack provided       Interventions:  Scheduled medication administered, positive reinforcement implemented    Response:  Pt is compliant with medication, cooperative with care     Plan:  Continue monitoring, adhere to care plan  "

## 2025-04-03 NOTE — PROGRESS NOTES
"Nevaeh Cuba is a 69 y.o. female on day 6 of admission presenting with Psychosis, unspecified psychosis type (Multi).      Subjective   Case discussed with nursing and chart reviewed.  Patient took zyprexa 10mg at bedtime and slept through the night, no somnolence noted today. She continues in wheelchair and says she is feeling weaker due to not walking- will reorder PT/OT eval and treatment. Will increase luvox 75 mg and move to bedtime for depression and OCD. Patient says she does not feel she can care for herself at home after discharge- talked with social work and will schedule family meeting to discuss discharge options.  Objective     Last Recorded Vitals  Blood pressure 129/84, pulse 92, temperature 37.1 °C (98.8 °F), temperature source Temporal, resp. rate 18, height 1.676 m (5' 6\"), weight 75.8 kg (167 lb), SpO2 98%.    Review of Systems    Psychiatric ROS - Adult  Anxiety: General Anxiety Disorder (LANDON)LANDON Behaviors: difficult to control worry, excessive anxiety/worry, difficulty concentrating, restlessness, and sleep disturbance  Depression: anhedonia, concentration, energy, guilt, helpless, hopeless, interest, persistent thoughts of death, sleep decreased , suicidal thoughts, and withdrawn  Delirium: negative  Psychosis: auditory hallucinations and visual hallucinations(none currently noted)  Usha:  none noted or reported but has decreased sleep and increased spending hx  Safety Issues: suicidal ideation(denies today)  Psychiatric ROS Comment: less psychotic flavor in presentation  Physical Exam      Mental Status Exam  General: adequately groomed appears stated age  Appearance: hospital attire  Attitude: pleasant  Behavior: cooperative  Motor Activity: in wheel chair for safety  Speech: normal rate and volume  Mood: anxious/worried /depressed  Affect: less constriction  Thought Process: less organized- vague,she is obsessing again about bills she did not pay for nursing home where  and mother " "stay.  Thought Content: apparent  delusional [process evident after talking to sister , denies SI/HI denies paranoia  Thought Perception: recent AH/VH- denies currently  Cognition: alert and oriented x4  Insight: patient knows she needs treatment for psychiatric illness and called 911 for help  Judgement: patient is able to participate in medical decsion making  Psychiatric Risk Assessment  Violence Risk Assessment: major mental illness and other psychosis  Acute Risk of Harm to Others is Considered: low   Suicide Risk Assessment: age > 65 yrs old, , current psychiatric illness, feelings of hopelessness, living alone or lack of social support, suicidal ideations, and other guilt cause spent money was supoosed to pay for mother and  in nursing home \"on stuff that is all over the condo\"  Protective Factors against Suicide: adherence to  treatment and sense of responsibility toward family  Acute Risk of Harm to Self is Considered: low  Relevant Results           Scheduled medications  atorvastatin, 20 mg, oral, Daily  cholecalciferol, 2,000 Units, oral, Daily  [START ON 4/4/2025] fLuvoxaMINE, 75 mg, oral, Nightly  melatonin, 3 mg, oral, Daily  methIMAzole, 5 mg, oral, Daily  OLANZapine, 10 mg, oral, Nightly  OXcarbazepine, 300 mg, oral, q12h CAROLINE  polyethylene glycol, 17 g, oral, Daily      Continuous medications     PRN medications  PRN medications: acetaminophen, docusate sodium, hydrOXYzine HCL, QUEtiapine **OR** OLANZapine, propranolol     Assessment/Plan   Assessment & Plan  Psychosis, unspecified psychosis type (Multi)    Hyperlipidemia    Hyperthyroidism    Sensorineural hearing loss (SNHL) of both ears    Essential hypertension    Anxiety    Insomnia    Malnutrition dx if evaluated by dietician department           No results found. However, due to the size of the patient record, not all encounters were searched. Please check Results Review for a complete set of results.       Amari caal a 70yo " "female dx with MDD, LANDON(obsessive features) with psychotic features.  She says she spent all the money she was supposed to pay for her  and mothers nursing home on \"stuff belkys tis all over my condo\". She denies dx of bipolar disorder, does not present as manic ast this time but presents as=wit psychotic flavor, almost schizotypal features.       Biological   - increase luvox 75mg every day for anxiety and depression  -trileptal 300mg bid for mood stabilization?  - says she has taken lithium in the past and it makes her dizzy, denies ever taking depkakote  - cont zyprexa 10mg at bedtime for psychosis and mood stabilization(monitor for somnolence renal values are a little elevated)  -prns avalable     -medical pertinences appreciated     Psychological     Provider encouraged patient to attend groups on unit.     Sociological     Provider encouraged patient to work with social workon discharge plan. She plan to return to her condo.                   I spent 20 minutes in the professional and overall care of this patient.      Elva Murcia, APRN-CNS      "

## 2025-04-03 NOTE — PROGRESS NOTES
"LSW met with pt to discuss disposition and discharge options. LSW presented Assisted Living to pt as she had previously discussed with provider and expressed interest. Pt states that she does not know what she wants to do. Pt was vague and delayed in her responses. Pt presented with negative statements such as \"I ruined peoples lives\". This referring to mismanaging the check book which impacts her  and mother's financial payments to the nursing home. Pt also feels that her sister is angry with her for having to step in and help. LSW validated pt's feelings and offered support and encouragement. LSW offered to have a family meeting to further discuss pt's discharge plan to which pt did not give a clear response as to whether she would be interested. Pt reported that she has no motivation.     MARCELA Corley   "

## 2025-04-03 NOTE — CARE PLAN
The patient's goals for the shift include no falls    The clinical goals for the shift include Maintain patient safety      Problem: Risk for Suicide  Goal: Accepts medications as prescribed/needed this shift  Outcome: Progressing  Goal: Identifies supports this shift  Outcome: Progressing  Goal: Makes needs known through verbalization or behaviors this shift  Outcome: Progressing  Goal: No self harm this shift  Outcome: Progressing  Goal: Read Safety Guidelines this shift  Outcome: Progressing  Goal: Complete Mental Health Safety Plan (psychiatry only) this shift  Outcome: Progressing     Problem: Discharge Planning - Care Management  Goal: Discharge to post-acute care or home with appropriate resources  Outcome: Progressing

## 2025-04-03 NOTE — PROGRESS NOTES
" REHAB Therapy Assessment & Treatment    Patient Name: Nevaeh Cuba  MRN: [id number]  Today's Date: 4/3/2025      Recreation Therapy note: pt is alert and oriented x2-3 with some poor insight/judgement. Pt attends most groups of choice daily with intermittent and passive participation needing some prompting to engage. PT getting around unit using w/c after fall over recent weekend.  Pt continues to endorse feelings of guilt for financial hardship with  and mothers NH payments, although these appear to be delusional thought. Pt reports she is eating and sleeping \"OK\". CTRS will continue to encourage pt to attend group of choice daily.           "

## 2025-04-04 ENCOUNTER — APPOINTMENT (OUTPATIENT)
Dept: RADIOLOGY | Facility: HOSPITAL | Age: 70
End: 2025-04-04
Payer: MEDICARE

## 2025-04-04 PROCEDURE — 2500000005 HC RX 250 GENERAL PHARMACY W/O HCPCS: Performed by: STUDENT IN AN ORGANIZED HEALTH CARE EDUCATION/TRAINING PROGRAM

## 2025-04-04 PROCEDURE — 97116 GAIT TRAINING THERAPY: CPT | Mod: GP

## 2025-04-04 PROCEDURE — 2500000001 HC RX 250 WO HCPCS SELF ADMINISTERED DRUGS (ALT 637 FOR MEDICARE OP): Performed by: STUDENT IN AN ORGANIZED HEALTH CARE EDUCATION/TRAINING PROGRAM

## 2025-04-04 PROCEDURE — 1140000001 HC PRIVATE PSYCH ROOM DAILY

## 2025-04-04 PROCEDURE — 99232 SBSQ HOSP IP/OBS MODERATE 35: CPT | Performed by: REGISTERED NURSE

## 2025-04-04 PROCEDURE — 2500000004 HC RX 250 GENERAL PHARMACY W/ HCPCS (ALT 636 FOR OP/ED): Performed by: STUDENT IN AN ORGANIZED HEALTH CARE EDUCATION/TRAINING PROGRAM

## 2025-04-04 PROCEDURE — 2500000001 HC RX 250 WO HCPCS SELF ADMINISTERED DRUGS (ALT 637 FOR MEDICARE OP): Performed by: REGISTERED NURSE

## 2025-04-04 PROCEDURE — 70450 CT HEAD/BRAIN W/O DYE: CPT | Performed by: RADIOLOGY

## 2025-04-04 PROCEDURE — 97110 THERAPEUTIC EXERCISES: CPT | Mod: GP

## 2025-04-04 PROCEDURE — 70450 CT HEAD/BRAIN W/O DYE: CPT

## 2025-04-04 PROCEDURE — 2500000002 HC RX 250 W HCPCS SELF ADMINISTERED DRUGS (ALT 637 FOR MEDICARE OP, ALT 636 FOR OP/ED): Performed by: REGISTERED NURSE

## 2025-04-04 PROCEDURE — 2500000001 HC RX 250 WO HCPCS SELF ADMINISTERED DRUGS (ALT 637 FOR MEDICARE OP): Performed by: INTERNAL MEDICINE

## 2025-04-04 RX ORDER — AMLODIPINE BESYLATE 5 MG/1
5 TABLET ORAL DAILY
Status: DISPENSED | OUTPATIENT
Start: 2025-04-04

## 2025-04-04 RX ADMIN — MELATONIN TAB 3 MG 3 MG: 3 TAB at 17:23

## 2025-04-04 RX ADMIN — POLYETHYLENE GLYCOL 3350 17 G: 17 POWDER, FOR SOLUTION ORAL at 08:13

## 2025-04-04 RX ADMIN — Medication 2000 UNITS: at 08:12

## 2025-04-04 RX ADMIN — OLANZAPINE 10 MG: 10 TABLET ORAL at 20:39

## 2025-04-04 RX ADMIN — ATORVASTATIN CALCIUM 20 MG: 20 TABLET, FILM COATED ORAL at 20:39

## 2025-04-04 RX ADMIN — OXCARBAZEPINE 300 MG: 300 TABLET, FILM COATED ORAL at 06:08

## 2025-04-04 RX ADMIN — FLUVOXAMINE MALEATE 75 MG: 50 TABLET, COATED ORAL at 20:40

## 2025-04-04 RX ADMIN — AMLODIPINE BESYLATE 5 MG: 5 TABLET ORAL at 17:23

## 2025-04-04 RX ADMIN — OXCARBAZEPINE 300 MG: 300 TABLET, FILM COATED ORAL at 19:06

## 2025-04-04 RX ADMIN — METHIMAZOLE 5 MG: 5 TABLET ORAL at 08:12

## 2025-04-04 ASSESSMENT — COLUMBIA-SUICIDE SEVERITY RATING SCALE - C-SSRS
6. HAVE YOU EVER DONE ANYTHING, STARTED TO DO ANYTHING, OR PREPARED TO DO ANYTHING TO END YOUR LIFE?: YES
2. HAVE YOU ACTUALLY HAD ANY THOUGHTS OF KILLING YOURSELF?: YES
1. IN THE PAST MONTH, HAVE YOU WISHED YOU WERE DEAD OR WISHED YOU COULD GO TO SLEEP AND NOT WAKE UP?: YES
1. IN THE PAST MONTH, HAVE YOU WISHED YOU WERE DEAD OR WISHED YOU COULD GO TO SLEEP AND NOT WAKE UP?: YES
6. HAVE YOU EVER DONE ANYTHING, STARTED TO DO ANYTHING, OR PREPARED TO DO ANYTHING TO END YOUR LIFE?: YES
2. HAVE YOU ACTUALLY HAD ANY THOUGHTS OF KILLING YOURSELF?: YES

## 2025-04-04 ASSESSMENT — COGNITIVE AND FUNCTIONAL STATUS - GENERAL
MOBILITY SCORE: 19
MOVING TO AND FROM BED TO CHAIR: A LITTLE
CLIMB 3 TO 5 STEPS WITH RAILING: A LITTLE
WALKING IN HOSPITAL ROOM: A LITTLE
STANDING UP FROM CHAIR USING ARMS: A LITTLE
TURNING FROM BACK TO SIDE WHILE IN FLAT BAD: A LITTLE

## 2025-04-04 ASSESSMENT — PAIN SCALES - GENERAL
PAINLEVEL_OUTOF10: 0 - NO PAIN
PAINLEVEL_OUTOF10: 0 - NO PAIN

## 2025-04-04 ASSESSMENT — PAIN - FUNCTIONAL ASSESSMENT
PAIN_FUNCTIONAL_ASSESSMENT: 0-10
PAIN_FUNCTIONAL_ASSESSMENT: 0-10

## 2025-04-04 NOTE — GROUP NOTE
Group Topic: Self-Care/Wellness   Group Date: 4/4/2025  Start Time: 1030  End Time: 1130  Facilitators: EVIN GroverS   Department: [facility]    Number of Participants: 6   Group Focus: other [facility]   Treatment Modality: Other: Recreation Therapy, Education  Interventions utilized were clarification, exploration, group exercise, leisure development, patient education, and support  Purpose: In this group session patients participated in a healthy-living [employer] game. This session was focused on?promoting a healthy lifestyle aimed to help increase control over their health, wellness and improve quality of life. This group also focused on engaging patients to enhance cognition and stimulate socialization.?Topics included physical health, healthy sleep, stress relief, nutrition, and leisure development.           Name: Nevaeh Cuba YOB: 1955   MR: [id number]      Facilitator: Recreational Therapist  Level of Participation: active  Quality of Participation: appropriate/pleasant, cooperative, and engaged  Interactions with others: appropriate  Mood/Affect: appropriate  Cognition: coherent/clear  Progress: Moderate  Comments: pt problem is delusional thought. Pt able to follow along, displaying increased attention to tasks. Appropriate mood and behaviors.   Plan: continue with services

## 2025-04-04 NOTE — PROGRESS NOTES
Occupational Therapy                 Therapy Communication Note    Patient Name: Nevaeh Cuba  MRN: [id number]  Department: Room: [facility]  Today's Date: 4/4/2025     Discipline: Occupational Therapy          Missed Visit Reason:  Attempted to perform OT session. Pt currently just stated group therapy. Will continue POC when pt is able willing and appropriate for therapy.    Missed Time: 1400- Attempt    Comment:

## 2025-04-04 NOTE — PROGRESS NOTES
"LSW spoke with pt's sister Laura regarding pt's disposition and discharge planning. Laura came to visit yesterday and pt was confused and worried about being kicked out of the hospital and having to go to a group home. LSW explained to Laura that pt will not be kicked out and that she may have overheard a conversation with another pt about group home placement. LSW did speak to pt about Assisted Living options. Laura states that pt may not have her Cochlear ear piece with her and this makes it difficult to hear and therefore leads to misunderstanding of information presented. Pt did not appear to have a difficult time understanding the information presented. Laura reports that pt will \"fear the worst\" and \"not think through the process\". As well as pt is not \"self assured\". Pt has hx of depression and anxiety. However recently she has been told that she had PTSD. Pt had a previous hospitalization in her late 30's.  Per Laura, pt has presented fine up until 6 weeks ago. Laura is not sure what seemed to change at that time. Pt's  went into a nursing home in October which is the same time that she came off a medication that she had been taking for several years. Laura is unsure of what medication. Laura feels that pt likely brayan need AL placement as well. Laura states that pt does have savings and investments as reported to her by pt but pt states that she does not have access. Laura plans to work with pt's  to sort out the finances. However pt would likely have to sell her home to afford the assisted living. JACKI and Laura scheduled family meeting for Monday at 3pm.     MARCELA Corley   "

## 2025-04-04 NOTE — PROGRESS NOTES
"Nevaeh Cuba is a 69 y.o. female on day 7 of admission presenting with Psychosis, unspecified psychosis type (Multi).      Subjective   Case discussed with nursing and chart reviewed.  Patient took zyprexa 10mg at bedtime and slept through the night, no somnolence noted today though sister said she noted tiredness at her visit last evening- will monitor. She continues in wheelchair and says she is feeling weaker due to not walking- ordered PT/OT eval and treatment. Appears to be tolerating increase in luvox 75 mg and move to bedtime for depression and OCD. Patient says she does not feel she can care for herself at home after discharge- talked with social work and will schedule family meeting to discuss discharge options(family meeting scheduled for Monday at 3pm Provider performed SLUMS with patient who scored 19/30 which indicates probable ongoing dementia process though she performed well on the clock and comprehension. She had difficulty with immediate and delayed recall of items(provider made sure she could hear instructions as she has some hearing difficulty).  Since this degree of cognitive change appeared within the last few weeks according to sister will order CT of head to rule out CVA in past- patient does not recall having any symptoms of stroke.    Provider asked if she has ever had ECT as she appears to be caught in a ruminative cycle which she endorses- she says she does not think she has- sister has not mentioned(sister reported a stay in [facility] years ago but then she was \"ok\" after that). Patient persists in saying she has harmed her  and mother financially and when provider asked further it seems she is feeling guilty for regular life expenses like decorating and furnishing her condo for she and her  thinking she should not have spend money in past and they would have it now to take care of  and mother.  Provider assured her belkys there sister had told patient and provider that " "the finances were stable and her  and mother were being well care for and bills were paid(patient has been told this repetitively but continues to obsess on the same ruminative thinking which has delusional flavor. She says she does not eat much and feels like she does not deserve to eat sometimes. She says that she does not want to hurt herself and has never attempted suicide but does not think she has a reason for being alive.  She does miss her  and at first was relieved that he was somewhere being kate cared for \"but then I messed everything up\"- she says tanja can't afford to be in the hospital- provider reassured her about insurance etc which has been expalined by sister and provider before.  Objective     Last Recorded Vitals  Blood pressure 141/86, pulse 91, temperature 36.2 °C (97.2 °F), temperature source Temporal, resp. rate 16, height 1.676 m (5' 6\"), weight 75.8 kg (167 lb), SpO2 95%.    Review of Systems    Psychiatric ROS - Adult  Anxiety: General Anxiety Disorder (LANDON)LANDON Behaviors: difficult to control worry, excessive anxiety/worry, difficulty concentrating, restlessness, and sleep disturbance  Depression: anhedonia, concentration, energy, guilt, helpless, hopeless, interest, persistent thoughts of death, sleep decreased , suicidal thoughts, and withdrawn  Delirium: negative  Psychosis: auditory hallucinations and visual hallucinations(none currently noted)  Usha:  none noted or reported but has decreased sleep and increased spending hx  Safety Issues: suicidal ideation(denies today)  Psychiatric ROS Comment: less psychotic flavor in presentation- more settled but still ruminative  Physical Exam      Mental Status Exam  General: adequately groomed appears stated age  Appearance: hospital attire  Attitude: pleasant  Behavior: cooperative  Motor Activity: in wheel chair for safety  Speech: normal rate and volume  Mood: anxious/worried /depressed/ruminative  Affect: less " "constriction  Thought Process: less organized- vague,she is obsessing again about bills she did not pay for nursing home where  and mother stay.  Thought Content: apparent  delusional edge to rumination  evident after talking to sister who refutes patients concerns, denies SI/HI denies paranoia  Thought Perception: recent AH/VH- denies currently  Cognition: alert and oriented x4  Insight: patient knows she needs treatment for psychiatric illness and called 911 for help  Judgement: patient is able to participate in medical decsion making  Psychiatric Risk Assessment  Violence Risk Assessment: major mental illness and other psychosis  Acute Risk of Harm to Others is Considered: low   Suicide Risk Assessment: age > 65 yrs old, , current psychiatric illness, feelings of hopelessness, living alone or lack of social support, suicidal ideations, and other guilt cause spent money was supoosed to pay for mother and  in nursing home \"on stuff that is all over the condo\"  Protective Factors against Suicide: adherence to  treatment and sense of responsibility toward family  Acute Risk of Harm to Self is Considered: low  Relevant Results           Scheduled medications  atorvastatin, 20 mg, oral, Daily  cholecalciferol, 2,000 Units, oral, Daily  fLuvoxaMINE, 75 mg, oral, Nightly  melatonin, 3 mg, oral, Daily  methIMAzole, 5 mg, oral, Daily  OLANZapine, 10 mg, oral, Nightly  OXcarbazepine, 300 mg, oral, q12h CAROLINE  polyethylene glycol, 17 g, oral, Daily      Continuous medications     PRN medications  PRN medications: acetaminophen, docusate sodium, hydrOXYzine HCL, QUEtiapine **OR** OLANZapine, propranolol     Assessment/Plan   Assessment & Plan  Psychosis, unspecified psychosis type (Multi)    Hyperlipidemia    Hyperthyroidism    Sensorineural hearing loss (SNHL) of both ears    Essential hypertension    Anxiety    Insomnia    Malnutrition dx if evaluated by dietician department           No results found. " "However, due to the size of the patient record, not all encounters were searched. Please check Results Review for a complete set of results.       Amari caal a 68yo female dx with MDD, LANDON(obsessive features) with psychotic features.  She says she spent all the money she was supposed to pay for her  and mothers nursing home on \"stuff belkys tis all over my condo\". She denies dx of bipolar disorder, does not present as manic ast this time but presents as=wit psychotic flavor, almost schizotypal features.       Biological   - increase luvox 75mg every day for anxiety and depression  -trileptal 300mg bid for mood stabilization?  - says she has taken lithium in the past and it makes her dizzy, denies ever taking depkakote  - cont zyprexa 10mg at bedtime for psychosis and mood stabilization(monitor for somnolence renal values are a little elevated)  -prns avalable     -medical pertinences appreciated     Psychological     Provider encouraged patient to attend groups on unit.     Sociological     Provider encouraged patient to work with social workon discharge plan. She planned to return to her condo initially but now says she thinks she can't care for herself there anymore-family meeting on Monday at 3pm and options willl be discussed- sister thinks AL would be best option.                   I spent 30 minutes in the professional and overall care of this patient.      Elva Murcia, APRN-CNS      "

## 2025-04-04 NOTE — CARE PLAN
The clinical goals for the shift include maintain saqfety, promote rest    Over the shift, the patient did make progress toward the following goals.      Problem: Risk for Suicide  Goal: Accepts medications as prescribed/needed this shift  Outcome: Progressing

## 2025-04-04 NOTE — PROGRESS NOTES
LSW met with pt to clarifying concerns she presented to her sister regarding group home placement. LSW instructed pt that group home placement is not being considered. Pt presented as if she didn't recall the day prior's conversation. Pt continues to be focused on the fact that she does not have clothing on the unit. LSW encourage pt to contact her sister to have her bring some clothes. LSW assisted pt obtain the phone to do so. LSW discussed AL placement with pt. Pt is not sure that she will be able to afford AL as she states she does not have access to her funds. LSW instructed pt that her sister is looking into it. Pt presents with delayed and vague responses. As well pt presented with pessimistic attitude.     Haydee Christensen, MARCELA ECHEVERRIA

## 2025-04-04 NOTE — NURSING NOTE
"TAZ NOTE     Problem:  Depression     Behavior:  Pt sitting in group room watching tv at start of shift. Calm, quiet, behavior in control. Compliant with meds, cooperative with care. Pt slow to respond to questions, hopeless about her future, stating \"everything is over.\"  Group Participation: yes  Appetite/Meals: HS snack provided     Interventions:  1:1 provided with reassurance. Evening meds given per orders. Encouraged pt to use call light for needs. Bed alarm engaged for safety.    Response:  Pt resting in bed overnight. Respirations even, unlabored.     Plan:  Will continue to monitor behaviors and effectiveness of interventions while maintaining pt safety.    "

## 2025-04-04 NOTE — PROGRESS NOTES
Physical Therapy    Physical Therapy Treatment    Patient Name: Nevaeh Cuba  MRN: [id number]  Department: [facility]  Room: 400/400-A  Today's Date: 4/4/2025  Time Calculation  Start Time: 1510  Stop Time: 1540  Time Calculation (min): 30 min         Assessment/Plan   PT Assessment  PT Assessment Results: Decreased strength, Decreased endurance, Decreased mobility, Decreased safety awareness, Pain  Barriers to Participation: Insight into problems  Assessment Comment: Declined from baseline function indepenedent without AD.  Will vikas MCCARTHY next visit to assess fall risk.  End of Session Patient Position: Up in chair, Alarm on     PT Plan  Treatment/Interventions: Transfer training, Gait training, Neuromuscular re-education, Therapeutic exercise  PT Plan: Ongoing PT  PT Frequency: 3 times per week  PT Discharge Recommendations: Low intensity level of continued care  Equipment Recommended upon Discharge: Other (comment) (TBD - may require LRD pending response to interventions)  PT Recommended Transfer Status: Assist x1  PT - OK to Discharge: Yes      General Visit Information:   PT  Visit  PT Received On: 04/04/25  Response to Previous Treatment: Patient with no complaints from previous session.  General  Reason for Referral: Impaired mobility/balance associated w/escalating mental health issues  Referred By: Deandre Marin DO  Past Medical History Relevant to Rehab: HTN, afib, Meniere's, hyperthyroid, OA, anxiety, sensorineural hearing loss, h/p mental and behavioral d/o  General Comment: Pt on phone.  HAd CT head.  No results.  Pt had fall 1 week ago per pt.  No mental changes per nsg.  Pt reports depressed    Subjective   Precautions:  Precautions  Hearing/Visual Limitations: glasses, hearing loss  Medical Precautions: Fall precautions     Date/Time Vitals Session Patient Position Pulse Resp SpO2 BP MAP (mmHg)    04/04/25 1450 --  --  98  18  --  174/101  125     04/04/25 1455 --  --  --  --  --   164/88  --     04/04/25 1500 --  --  100  16  97 %  148/99  115                 Objective   Pain:  Pain Assessment  Pain Assessment: 0-10  0-10 (Numeric) Pain Score: 0 - No pain  Cognition:     Coordination:     Postural Control:  Postural Control  Posture Comment: Flexed at hips, rounded shoulders when ambulating without AD.  Dynamic Standing Balance  Dynamic Standing-Balance: Turning, Staggered stance ( object from floor min assist unsteady)  Dynamic Standing-Comments: Will do MCCARTHY next visit to assess fall risk  Extremity/Trunk Assessments:        Activity Tolerance:  Activity Tolerance  Endurance: Tolerates less than 10 min exercise, no significant change in vital signs  Treatments:  Therapeutic Exercise  Therapeutic Exercise Activity 1: Standing at [place] Heel raises 1x15  Therapeutic Exercise Activity 2: 1/2 Squats 1x15 [place]  Therapeutic Exercise Activity 3: Hip Abduction seated with green tband 2x15  Therapeutic Exercise Activity 4: Hamstring Curls with green tband 1x15 difficult  Therapeutic Exercise Activity 5: w/c push ups 1x10         Balance/Neuromuscular Re-Education  Balance/Neuromuscular Re-Education Activity 1:  object from floor without AD min assist.  Effortful, unsteady  Balance/Neuromuscular Re-Education Activity 2: 360 degree turns without AD cgx1    Ambulation/Gait Training 1  Surface 1: Level tile  Device 1: No device  Gait Support Devices: Gait belt  Assistance 1: Contact guard, Minimum assistance  Quality of Gait 1: Diminished heel strike, Inconsistent stride length, Decreased step length, Shuffling gait  Comments/Distance (ft) 1: Very guarded gait arms abducted slow pace.  Fearful.  Min assist to turn. 80 feet x 1  Ambulation/Gait Training 2  Surface 2: Level tile  Device 2: Rolling walker  Gait Support Devices: Gait belt  Quality of Gait 2: Diminished heel strike, Decreased step length, Inconsistent stride length  Comments/Distance (ft) 2: 85  Transfer 1  Transfer  From 1: Wheelchair to  Transfer to 1: Stand  Technique 1: Sit to stand, Stand to sit  Trials/Comments 1: 1x10 w/c push ups     Object From Floor  Devices: No reacher  Assist Level: Minimum assist  Comments: small badge from floor.  Slow, WBOS difficult    Wheelchair Activities  Propulsion: Yes  Propulsion Type 1: Manual  Level 1: Level tile  Method 1: Right upper extremity, Left upper extremity, Right lower extremity, Left lower extremity  Description/Details 1: 100 feet around nsg station    Outcome Measures: [facility] 19/24        OP EDUCATION:  Outpatient Education  Education Comment: Discussed benefits of PT, ambulation, PREs    Encounter Problems       Encounter Problems (Active)       PT  Problem       Patient will transfer sit to stand and stand to sit with independent assist to facilitate mobility. (Progressing)       Start:  04/01/25    Expected End:  04/17/25            Patient will amb 150' feet no device including two turns on even surface with independent assist to facilitate safe mobility.   (Progressing)       Start:  04/01/25    Expected End:  04/17/25            Patient will negotiate 1 stairs with no rail(s) and independent} assist with no device for in home and community. (Progressing)       Start:  04/01/25    Expected End:  04/17/25            Patient will tolerate 15 minutes of standing to improve ability to perform MRADLs. (Progressing)       Start:  04/01/25    Expected End:  04/17/25

## 2025-04-04 NOTE — GROUP NOTE
Group Topic: Goals   Group Date: 4/4/2025  Start Time: 0745  End Time: 0800  Facilitators: Juan Mann   Department: [facility] Geriatric     Number of Participants: 6   Group Focus: goals  Treatment Modality: Other: Goal setting  Interventions utilized were group exercise  Purpose: self-worth    Name: Nevaeh Cuba YOB: 1955   MR: [id number]      Facilitator: [facility]  Level of Participation: active  Quality of Participation: cooperative  Interactions with others: appropriate  Mood/Affect:  appropriate  Triggers (if applicable):     Cognition: coherent/clear  Progress: Other  Comments:     Plan: continue with services

## 2025-04-04 NOTE — NURSING NOTE
TAZ NOTE     Problem:  Hopelessness     Behavior:  Pt has flat affect while on unit. When asked a question or during medication administration she has delayed responses. She will sit in group room with others but remains quiet.       Interventions:  Meals, group and medications offered.     Response:  Pt is med compliant. She participates in group. She eats 25x3 for meals.     Plan:  Continue to maintain pt safety.

## 2025-04-04 NOTE — GROUP NOTE
Group Topic: Social Skills   Group Date: 4/4/2025  Start Time: 2000  End Time: 2030  Facilitators: Leigh Ann Desir, RN   Department: [facility] Geriatric     Number of Participants: 6   Group Focus: check in, communication, and social skills  Treatment Modality: Patient-Centered Therapy  Interventions utilized were support  Purpose: communication skills    Name: Nevaeh Cuba YOB: 1955   MR: [id number]      Facilitator: Registered Nurse  Level of Participation: minimal  Quality of Participation: passive  Interactions with others: supportive  Mood/Affect: flat  Triggers (if applicable): n/a  Cognition: not focused  Progress: Minimal  Comments: Pt problem is delusional thought  Plan: continue with services

## 2025-04-04 NOTE — GROUP NOTE
Group Topic: Other   Group Date: 4/4/2025  Start Time: 1330  End Time: 1430  Facilitators: EVIN GroverS   Department: [facility] REHABTH VIRTUAL    Number of Participants: 5   Group Focus: concentration, self-esteem, and social skills, enhance cognition, mood and memory stimulation  Treatment Modality: Leisure Development and Other: Recreation Therapy  Interventions utilized were group exercise, leisure development, and mental fitness  Purpose: Patients engaged in a therapeutic group game of “GeoPoll”. Patients take turns creating new four letter words only changing one letter per turn.  This game aims to enhance cognition by ways of concentration and memory stimulation. Also, promotes social stimulation, enhanced mood and following directions.     Name: Nevaeh Cuba YOB: 1955   MR: [id number]      Facilitator: Recreational Therapist  Level of Participation: active  Quality of Participation: appropriate/pleasant, attentive, cooperative, and engaged  Interactions with others: appropriate  Mood/Affect: appropriate  Cognition: loose  Progress: Moderate  Comments: pt problem is delusional thought. Pt able to focus more then previous interactions. Able to follow along with group rules. Appropriate interactions with staff and peers. Pt leaves early unit for medical reasons. Does not return.   Plan: continue with services

## 2025-04-04 NOTE — CARE PLAN
The patient's goals for the shift include no falls    The clinical goals for the shift include Maintain safety, promote sleep      Problem: Risk for Suicide  Goal: Accepts medications as prescribed/needed this shift  Outcome: Progressing  Goal: Identifies supports this shift  Outcome: Progressing  Goal: Makes needs known through verbalization or behaviors this shift  Outcome: Progressing  Goal: No self harm this shift  Outcome: Progressing  Goal: Read Safety Guidelines this shift  Outcome: Progressing  Goal: Complete Mental Health Safety Plan (psychiatry only) this shift  Outcome: Progressing     Problem: Discharge Planning - Care Management  Goal: Discharge to post-acute care or home with appropriate resources  Outcome: Progressing

## 2025-04-05 PROCEDURE — 97530 THERAPEUTIC ACTIVITIES: CPT | Mod: GO

## 2025-04-05 PROCEDURE — 97116 GAIT TRAINING THERAPY: CPT | Mod: GP,CQ

## 2025-04-05 PROCEDURE — 1140000001 HC PRIVATE PSYCH ROOM DAILY

## 2025-04-05 PROCEDURE — 2500000005 HC RX 250 GENERAL PHARMACY W/O HCPCS: Performed by: STUDENT IN AN ORGANIZED HEALTH CARE EDUCATION/TRAINING PROGRAM

## 2025-04-05 PROCEDURE — 2500000001 HC RX 250 WO HCPCS SELF ADMINISTERED DRUGS (ALT 637 FOR MEDICARE OP): Performed by: REGISTERED NURSE

## 2025-04-05 PROCEDURE — 2500000004 HC RX 250 GENERAL PHARMACY W/ HCPCS (ALT 636 FOR OP/ED): Performed by: STUDENT IN AN ORGANIZED HEALTH CARE EDUCATION/TRAINING PROGRAM

## 2025-04-05 PROCEDURE — 99233 SBSQ HOSP IP/OBS HIGH 50: CPT | Performed by: INTERNAL MEDICINE

## 2025-04-05 PROCEDURE — 2500000001 HC RX 250 WO HCPCS SELF ADMINISTERED DRUGS (ALT 637 FOR MEDICARE OP): Performed by: INTERNAL MEDICINE

## 2025-04-05 PROCEDURE — 97112 NEUROMUSCULAR REEDUCATION: CPT | Mod: GP,CQ

## 2025-04-05 PROCEDURE — 99232 SBSQ HOSP IP/OBS MODERATE 35: CPT | Performed by: NURSE PRACTITIONER

## 2025-04-05 PROCEDURE — 2500000001 HC RX 250 WO HCPCS SELF ADMINISTERED DRUGS (ALT 637 FOR MEDICARE OP): Performed by: STUDENT IN AN ORGANIZED HEALTH CARE EDUCATION/TRAINING PROGRAM

## 2025-04-05 PROCEDURE — 2500000002 HC RX 250 W HCPCS SELF ADMINISTERED DRUGS (ALT 637 FOR MEDICARE OP, ALT 636 FOR OP/ED): Performed by: REGISTERED NURSE

## 2025-04-05 PROCEDURE — 97535 SELF CARE MNGMENT TRAINING: CPT | Mod: GO

## 2025-04-05 RX ADMIN — FLUVOXAMINE MALEATE 75 MG: 50 TABLET, COATED ORAL at 20:47

## 2025-04-05 RX ADMIN — ATORVASTATIN CALCIUM 20 MG: 20 TABLET, FILM COATED ORAL at 20:47

## 2025-04-05 RX ADMIN — AMLODIPINE BESYLATE 5 MG: 5 TABLET ORAL at 08:46

## 2025-04-05 RX ADMIN — OXCARBAZEPINE 300 MG: 300 TABLET, FILM COATED ORAL at 06:39

## 2025-04-05 RX ADMIN — METHIMAZOLE 5 MG: 5 TABLET ORAL at 08:46

## 2025-04-05 RX ADMIN — OLANZAPINE 10 MG: 10 TABLET ORAL at 20:47

## 2025-04-05 RX ADMIN — Medication 2000 UNITS: at 08:48

## 2025-04-05 RX ADMIN — MELATONIN TAB 3 MG 3 MG: 3 TAB at 17:50

## 2025-04-05 RX ADMIN — OXCARBAZEPINE 300 MG: 300 TABLET, FILM COATED ORAL at 18:43

## 2025-04-05 RX ADMIN — POLYETHYLENE GLYCOL 3350 17 G: 17 POWDER, FOR SOLUTION ORAL at 08:46

## 2025-04-05 ASSESSMENT — PAIN SCALES - GENERAL
PAINLEVEL_OUTOF10: 0 - NO PAIN
PAINLEVEL_OUTOF10: 2
PAINLEVEL_OUTOF10: 0 - NO PAIN

## 2025-04-05 ASSESSMENT — BALANCE ASSESSMENTS
STANDING ON ONE LEG: TRIES TO LIFT LEG UNABLE TO HOLD 3 SECONDS BUT REMAINS STANDING INDEPENDENTLY
TRANSFERS: ABLE TO TRANSFER WITH VERBAL CUEING AND/OR SUPERVISION
TURN 360 DEGREES: ABLE TO TURN 360 DEGREES SAFELY BUT SLOWLY
PICK UP OBJECT FROM THE FLOOR FROM A STANDING POSITION: ABLE TO PICK UP SLIPPER BUT NEEDS SUPERVISION
STANDING UNSUPPORTED WITH FEET TOGETHER: ABLE TO PLACE FEET TOGETHER INDEPENDENTLY AND STAND 1 MINUTE WITH SUPERVISION
SITTING WITH BACK UNSUPPORTED BUT FEET SUPPORTED ON FLOOR OR ON A STOOL: ABLE TO SIT SAFELY AND SECURELY FOR 2 MINUTES
PLACE ALTERNATE FOOT ON STEP OR STOOL WHILE STANDING UNSUPPORTED: LOOKS BEHIND ONE SIDE ONLY OTHER SIDE SHOWS LESS WEIGHT SHIFT
STANDING UNSUPPORTED ONE FOOT IN FRONT: ABLE TO PLACE FOOT AHEAD INDEPENDENTLY AND HOLD 30 SECONDS
STANDING UNSUPPORTED: ABLE TO STAND 2 MINUTES WITH SUPERVISION
STANDING TO SITTING: ABLE TO STAND INDEPENDENTLY USING HANDS
STANDING TO SITTING: CONTROLS DESCENT BY USING HANDS
REACHING FORWARD WITH OUTSTRETCHED ARM WHILE STANDING: CAN REACH FORWARD 12 CM (5 INCHES)
STANDING UNSUPPORTED WITH EYES CLOSED: ABLE TO STAND 10 SECONDS WITH SUPERVISION
PLACE ALTERNATE FOOT ON STEP OR STOOL WHILE STANDING UNSUPPORTED: ABLE TO COMPLETE GREATER THAN 2 STEPS NEEDS MINIMAL ASSIST
LONG VERSION TOTAL SCORE (MAX 56): 37

## 2025-04-05 ASSESSMENT — PAIN - FUNCTIONAL ASSESSMENT
PAIN_FUNCTIONAL_ASSESSMENT: 0-10

## 2025-04-05 ASSESSMENT — COGNITIVE AND FUNCTIONAL STATUS - GENERAL
WALKING IN HOSPITAL ROOM: A LITTLE
HELP NEEDED FOR BATHING: A LITTLE
CLIMB 3 TO 5 STEPS WITH RAILING: A LITTLE
MOVING TO AND FROM BED TO CHAIR: A LITTLE
TOILETING: A LITTLE
DAILY ACTIVITIY SCORE: 22
STANDING UP FROM CHAIR USING ARMS: A LITTLE
TURNING FROM BACK TO SIDE WHILE IN FLAT BAD: A LITTLE
MOBILITY SCORE: 19

## 2025-04-05 ASSESSMENT — COLUMBIA-SUICIDE SEVERITY RATING SCALE - C-SSRS
2. HAVE YOU ACTUALLY HAD ANY THOUGHTS OF KILLING YOURSELF?: NO
1. SINCE LAST CONTACT, HAVE YOU WISHED YOU WERE DEAD OR WISHED YOU COULD GO TO SLEEP AND NOT WAKE UP?: NO
6. HAVE YOU EVER DONE ANYTHING, STARTED TO DO ANYTHING, OR PREPARED TO DO ANYTHING TO END YOUR LIFE?: NO

## 2025-04-05 ASSESSMENT — ACTIVITIES OF DAILY LIVING (ADL): HOME_MANAGEMENT_TIME_ENTRY: 30

## 2025-04-05 NOTE — GROUP NOTE
Group Topic: Other   Group Date: 4/5/2025  Start Time: 1330  End Time: 1430  Facilitators: SUSAN Oreilly   Department: [facility] REHABTH VIRTUAL    Number of Participants: 6   Group Focus: other Tri-Bond Game  Treatment Modality: Other: Recreation Therapy  Interventions utilized were mental fitness  Purpose: other: fun, elevate mood, increase socialization, enhance self esteem, stimulate memory    Name: Nevaeh Cuba YOB: 1955   MR: [id number]      Facilitator: Recreational Therapist  Level of Participation: moderate  Quality of Participation: appropriate/pleasant and cooperative  Interactions with others: appropriate and gave feedback  Mood/Affect: appropriate and flat  Triggers (if applicable): n/a  Cognition: coherent/clear  Progress: Moderate  Comments: pt problem is delusional thought.  Pt slightly more active in group game today.  Remains flat with affect and appears at times, processing questions slowly.  Plan: continue with services

## 2025-04-05 NOTE — GROUP NOTE
Group Topic: Goals   Group Date: 4/5/2025  Start Time: 0730  End Time: 0800  Facilitators: Hiram Mckinney   Department: [facility]    Number of Participants: 5   Group Focus: goals  Treatment Modality: Patient-Centered Therapy  Interventions utilized were assignment and group exercise  Purpose: coping skills and self-care    Name: Nevaeh Cuba YOB: 1955   MR: [id number]      Facilitator: [facility]  Level of Participation: moderate  Quality of Participation: appropriate/pleasant  Interactions with others: appropriate  Mood/Affect: appropriate  Triggers (if applicable): n/a  Cognition: coherent/clear  Progress: Moderate  Comments: py worked on assignment  Plan: continue with services

## 2025-04-05 NOTE — CARE PLAN
The patient's goals for the shift include No falls    The clinical goals for the shift include Maintain patient safety    Over the shift, the patient is in the process of making progress toward the goals of Maintain Safety and No falls. Barriers to progression include Lack of understanding. Recommendations to address these barriers include Education, Safety rounds.

## 2025-04-05 NOTE — NURSING NOTE
MLP NOTE     Problem:  Depression     Behavior:  Patient is in the group room sitting in patient’s wheelchair and watching television. Patient is pleasant and calm. Patient’s affect is flat. Patient is talkative. Patient maintains brief eye contact.    Group Participation: N/A  Appetite/Meals: N/A       Interventions:  This nurse completed a shift assessment and administered patient's scheduled night time medications.    Response:  Patient remained pleasant and calm and was cooperative throughout this shift assessment and medication administration.     Plan:  Continue to monitor for depression. Continue to monitor for patient safety.

## 2025-04-05 NOTE — PROGRESS NOTES
04/05/25 [id number]   OT Last Visit   OT Received On 04/05/25   General   Reason for Referral Impaired mobility/balance associated w/escalating mental health issues   Referred By Deandre Marin DO   Past Medical History Relevant to Rehab HTN, afib, Meniere's, hyperthyroid, OA, anxiety, sensorineural hearing loss, h/p mental and behavioral d/o   Family/Caregiver Present No   Prior to Session Communication Bedside nurse   Patient Position Received Up in chair;Alarm on   Preferred Learning Style verbal;visual   General Comment pt just completed breakfast; agreeable to therapy   Precautions   Hearing/Visual Limitations glasses, hearing loss   Medical Precautions Fall precautions   Pain Assessment   Pain Assessment 0-10   0-10 (Numeric) Pain Score 0 - No pain   Cognition   Orientation Level Appropriate for developmental age;Oriented X4   Processing Speed Delayed  (flat affect)   Coordination   Movements are Fluid and Coordinated Yes   RUE    RUE  WFL   LUE    LUE WFL   Feeding   Feeding Level of Assistance Independent   Feeding Where Assessed Wheelchair   Feeding Comments seated at table in dining room   Grooming   Grooming Level of Assistance Modified independent   Grooming Where Assessed Wheelchair;Sitting sinkside   Grooming Comments to wash face, comb hair, and brush teeth   LE Dressing   LE Dressing Yes   Pants Level of Assistance Close supervision   Sock Level of Assistance Setup   LE Dressing Where Assessed Wheelchair;Other (Comment)  (in stance at grab bar in bathroom)   Functional Standing Tolerance   Time 5 minutes   Activity during functional mobility and during ADLs   Functional Standing Tolerance Comments pt presents with B knee instability   Functional Mobility   Functional Mobility Performed Yes   Functional Mobility 1   Surface 1 Level tile   Device 1 Rolling walker   Assistance 1 Close supervision   Quality of Functional Mobility 1 Inconsistent stride length   Comments 1 performed functional  mobility at a slow pace   Transfers   Transfer Yes   Transfer 1   Transfer From 1 Sit to   Transfer to 1 Stand   Technique 1 Sit to stand;Stand to sit   Transfer Device 1 Walker   Transfer Level of Assistance 1 Close supervision   Modalities   Modalities Used No   Static Sitting Balance   Static Sitting-Balance Support Feet supported   Static Sitting-Level of Assistance Distant supervision   Dynamic Sitting Balance   Dynamic Sitting-Balance Support Feet supported   Dynamic Sitting-Level of Assistance Close supervision   Dynamic Sitting-Balance Forward lean;Reaching for objects;Reaching across midline   Dynamic Sitting-Comments during self care activities   Static Standing Balance   Static Standing-Balance Support Bilateral upper extremity supported   Static Standing-Level of Assistance Close supervision   Static Standing-Comment/Number of Minutes with support of FWW   Dynamic Standing Balance   Dynamic Standing-Balance Support   (alternating UE support for pants management over hips with support of grab bar)   Dynamic Standing-Level of Assistance Close supervision   Dynamic Standing-Balance Lateral lean;Reaching across midline   Dynamic Standing-Comments during pants management over hips   IP OT Assessment   OT Assessment Pt pleasant and cooperative during OT tx.  Pt presents with flat affect and delayed cognitive processing skills.  Pt requires close supervision during ADLs and functional mobility/transfers with use of FWW.  Communicated with staff to make sure pt performs functional mobility to and from the bathroom as pt is currently in a wc for safety.  Staff agreeable.  Continue OT per POC to address remaining goals to maximize independence and safety with ADLs to [place] home [facility].   Prognosis Good   Barriers to Discharge Home Caregiver assistance   Caregiver Assistance Patient lives alone and/or does not have reliable caregiver assistance   Evaluation/Treatment Tolerance Patient tolerated treatment well    Medical Staff Made Aware Yes   End of Session Communication Bedside nurse;PCT/NA/CTA   End of Session Patient Position Up in chair;Alarm on  (pt seated at nurses station)   OT Assessment   OT Assessment Results Decreased ADL status;Decreased functional mobility;Decreased endurance   Barriers to Discharge Decreased caregiver support   Strengths Premorbid level of function   Barriers to Participation Insight into problems   Education   Individual(s) Educated Patient   Education Provided Fall precautons   Risk and Benefits Discussed with Patient/Caregiver/Other yes   Patient/Caregiver Demonstrated Understanding yes   Plan of Care Discussed and Agreed Upon yes   Patient Response to Education Patient/Caregiver Verbalized Understanding of Information;Patient/Caregiver Performed Return Demonstration of Exercises/Activities   Education Comment pt receptive to education   Inpatient/Swing Bed or Outpatient   Inpatient/Swing Bed or Outpatient Inpatient   Inpatient Plan   Treatment Interventions ADL retraining;Functional transfer training;Endurance training;Cognitive reorientation;Patient/family training;Equipment evaluation/education;Compensatory technique education   OT Frequency 2 times per week   OT Discharge Recommendations Low intensity level of continued care   Equipment Recommended upon Discharge   (TBD)   OT Recommended Transfer Status Assist of 1   OT - OK to Discharge Yes

## 2025-04-05 NOTE — NURSING NOTE
MLP NOTE     Problem:  Psychosis / Depression       Behavior:  Pt presents calm, blunted and flat at time of assessment. Pt Denies Auditory and Visual , and Denies suicidal ideation with plan, and voices no homicidal ideation. Pt voices 0/10 pain at this time during assessment. Pt educated on medications and has no other new concerns. Pt compliant with medication at this time. Pt has no other new concerns with nutritional needs at this time but voices that she just has not been that hungry and has not been eating. Pt encouraged to attend unit programing throughout shift to and to adopt positive coping skills. Pt also encouraged to wear slip resistance foot wear to reduce and minimize falls while ambulating around the hospital unit. Pt encouraged to be social while on unit with other peers.     Group Participation: Yes minimal  Appetite/Meals: Poor        Interventions:  Pt. Was offered groups and their scheduled medications per MAR orders.     Response:  Pt. Has been compliant w/ meds, tolerated them well; and did attend the majority of group sessions w/ good results.     Plan:  Pt. will continue to be monitored Q 15 minutes  for changes in mental status & safety on the unit.

## 2025-04-05 NOTE — CARE PLAN
The patient's goals for the shift include to be medication compliant    The clinical goals for the shift include No slips and or falls    Recommendations to address these barriers include educating pt to use and wear non-slip foot wear. Pt also educated to alert staff is assistance is needed with transferring and ambulation.

## 2025-04-05 NOTE — PROGRESS NOTES
[facility]: [facility]  PROGRESS NOTE      Nevaeh Cuba is a 69 y.o. female that is being seen  today for follow-up at [facility]..  Subjective   Patient is being seen for follow-up at [facility].  Patient's blood pressure was elevated after stopping triamterene hydrochlorothiazide.  Patient has been started on amlodipine.  Will repeat lab work      ROS  Negative for fever or chills  Negative for sore throat, ear pain, nasal discharge  Negative for cough, shortness of breath or wheezing  Negative for chest pain, palpitations, swelling of legs  Negative for abdominal pain, constipation, diarrhea, blood in the stools  Negative for urinary complaints  Negative for headache, dizziness, weakness or numbness  Negative for joint pain  Positive for depression or anxiety  All other systems reviewed and were negative   Vitals:    04/05/25 0500   BP: (!) 143/96   Pulse: 96   Resp: 18   Temp: 36.5 °C (97.7 °F)   SpO2: 95%      Vitals:    03/28/25 1819   Weight: 75.8 kg (167 lb)     Body mass index is 26.95 kg/m².  Physical Exam  Constitutional: Patient does not appear to be in any acute distress  Head and Face: NCAT  Eyes: Normal external exam, EOMI  ENT: Normal external inspection of ears and nose. Oropharynx normal.  Cardiovascular: RRR, S1/S2, no murmurs, rubs, or gallops, radial pulses +2, no edema of extremities  Pulmonary: CTAB, no respiratory distress.  Abdomen: +BS, soft, non-tender, nondistended, no guarding or rebound, no masses noted  MSK: No joint swelling, normal movements of all extremities. Range of motion- normal.  Skin- No lesions, contusions, or erythema.  Peripheral puslses palpable bilaterally 2+  Neuro: AAO X3, Cranial nerves 2-12 grossly intact,DTR 2+ in all 4 limbs       LABS   [unfilled]  Lab Results   Component Value Date    GLUCOSE 115 (H) 04/02/2025    CALCIUM 10.1 04/02/2025     04/02/2025    K 3.5 04/02/2025    CO2 28 04/02/2025    CL 99 04/02/2025    BUN 38 (H)  04/02/2025    CREATININE 0.96 04/02/2025     Lab Results   Component Value Date    ALT 27 04/02/2025    AST 17 04/02/2025    ALKPHOS 70 04/02/2025    BILITOT 0.6 04/02/2025     Lab Results   Component Value Date    WBC 9.9 03/28/2025    HGB 17.5 (H) 03/28/2025    HCT 48.7 (H) 03/28/2025    MCV 88 03/28/2025     03/28/2025     Lab Results   Component Value Date    CHOL 142 03/29/2025    CHOL 224 (H) 04/12/2024    CHOL 194 10/06/2023     Lab Results   Component Value Date    HDL 59.7 03/29/2025    HDL 58.0 04/12/2024    HDL 58.0 10/06/2023     Lab Results   Component Value Date    LDLCALC 67 03/29/2025    LDLCALC 102 04/12/2024    LDLCALC 88 10/06/2023     Lab Results   Component Value Date    TRIG 77 03/29/2025    TRIG 318 (H) 04/12/2024    TRIG 240 (H) 10/06/2023     Lab Results   Component Value Date    HGBA1C 5.5 06/25/2024     Other labs not included in the list above were reviewed either before or during this encounter.    History    Past Medical History:   Diagnosis Date    Benign essential hypertension     Estrogen deficiency 11/15/2023    DEXA 1/24 back nl, hip neck T-2.1 recheck 1/26    History of atrial fibrillation     Hyperactive thyroid Dr.Burtch Rand Burnham NSR no AC    History of breast lift 05/2024    History of Meniere's syndrome 11/15/2023    Hx of reduction mammoplasty 05/2024    Hyperthyroidism 09/15/2023    Other specified abnormal findings of blood chemistry     High serum cholesterol sulfate    Personal history of malignant neoplasm, unspecified     History of malignant neoplasm    Personal history of other mental and behavioral disorders     Unspecified osteoarthritis, unspecified site 04/28/2016    Arthritis     Past Surgical History:   Procedure Laterality Date    COSMETIC SURGERY      HYSTERECTOMY  04/28/2016    Hysterectomy    JOINT REPLACEMENT      rt ring finger    JOINT REPLACEMENT Right 05/2022    ring finger    OTHER SURGICAL HISTORY  04/28/2016    Musculoskeletal  Procedures Injection Carpal Tunnel    OTHER SURGICAL HISTORY  04/28/2016    Arthrodesis MCP Joint    OTHER SURGICAL HISTORY  01/11/2021    Ankle surgery    OTHER SURGICAL HISTORY  01/11/2021    Carpal tunnel surgery    OTHER SURGICAL HISTORY  07/06/2022    Nasal septoplasty    OTHER SURGICAL HISTORY  07/2021    heart shock catherization    REDUCTION MAMMAPLASTY  may 7 2024    SHOULDER SURGERY  08/2021    total left shoulder replacement    SINUS SURGERY      TRIGGER FINGER RELEASE Right 01/2024    middle finger     Family History   Problem Relation Name Age of Onset    Stroke Mother      Other (cyst on breast) Mother      Atrial fibrillation Father      Stroke Father      Heart disease Father       Allergies   Allergen Reactions    Latex Itching and Rash    Methotrexate Itching     HEAD BURNING AND ITCHING    Metoprolol Rash    Diltiazem Hcl Itching     Rash face     Latex, Natural Rubber Itching    Pollen Extracts Other     NASAL CONGESTION    Adhesive Tape-Silicones Rash    Folic Acid Rash    Sulfamethoxazole-Trimethoprim Rash     Tinnitus      No current facility-administered medications on file prior to encounter.     Current Outpatient Medications on File Prior to Encounter   Medication Sig Dispense Refill    acetaminophen (Tylenol) 325 mg tablet Take 2 tablets (650 mg) by mouth every 6 hours if needed for mild pain (1 - 3) 20 tablet 0    atorvastatin (Lipitor) 20 mg tablet Take 1 tablet (20 mg) by mouth once daily. 90 tablet 3    cholecalciferol (Vitamin D-3) 25 MCG (1000 UT) tablet Take 2 tablets (2,000 Units) by mouth. As directed      docusate sodium (Colace) 100 mg capsule Take 1 capsule (100 mg) by mouth once daily as needed.      fexofenadine HCl (ALLEGRA ORAL) Take 1 tablet by mouth once daily as needed (ALLERGIES).      fLuvoxaMINE (Luvox) 50 mg tablet Take 1 tablet (50 mg) by mouth early in the morning..      hydrOXYzine HCL (Atarax) 25 mg tablet Take 2 tablets (50 mg) by mouth once daily at bedtime  for 10 days. 20 tablet 0    ibuprofen 200 mg tablet Take 2 tablets (400 mg) by mouth every 6 hours if needed for mild pain (1 - 3).      methIMAzole (Tapazole) 5 mg tablet Take 1 tablet (5 mg) by mouth once daily. 90 tablet 3    mv-min-FA-vit K-lutein-zeaxant (PreserVision AREDS 2 Plus MV) 200 mcg-15 mcg- 5 mg-1 mg capsule Take 1 capsule by mouth once daily.      OXcarbazepine (Trileptal) 150 mg tablet Take 1 tablet (150 mg) by mouth 2 times a day.      OXcarbazepine (Trileptal) 300 mg tablet Take 1 tablet (300 mg) by mouth every 12 hours.      propranolol (Inderal) 10 mg tablet Take 1 tablet (10 mg) by mouth every 8 hours if needed for anxiety.      triamterene-hydrochlorothiazid (Maxzide-25) 37.5-25 mg tablet Take 1 tablet by mouth once daily in the morning. 90 tablet 3     Immunization History   Administered Date(s) Administered    COVID-19, subunit, rS-nanoparticle, adjuvanted, PF, 5 mcg/0.5 mL 09/28/2024    Flu vaccine, quadrivalent, high-dose, preservative free, age 65y+ (FLUZONE) 09/14/2023    Influenza, Seasonal, Quadrivalent, Adjuvanted 09/30/2021, 09/08/2022    Influenza, Unspecified 09/07/2010, 10/17/2011    Influenza, injectable, MDCK, quadrivalent 10/23/2017, 10/30/2018    Influenza, injectable, quadrivalent 09/23/2019, 09/11/2020    Influenza, seasonal, injectable 09/24/2012, 10/17/2013, 10/23/2014, 10/26/2015, 11/17/2016    Moderna COVID-19 vaccine, 12 years and older (50mcg/0.5mL)(Spikevax) 09/29/2023    Pfizer COVID-19 vaccine, bivalent, age 12 years and older (30 mcg/0.3 mL) 09/08/2022    Pfizer Gray Cap SARS-CoV-2 03/31/2022    Pfizer Purple Cap SARS-CoV-2 03/09/2021, 04/06/2021, 10/06/2021    Pneumococcal conjugate vaccine, 13-valent (PREVNAR 13) 07/11/2013    Pneumococcal conjugate vaccine, 20-valent (PREVNAR 20) 09/16/2023    Pneumococcal polysaccharide vaccine, 23-valent, age 2 years and older (PNEUMOVAX 23) 11/05/2020    RSV, 60 Years And Older (AREXVY) 09/16/2023    Tdap vaccine, age 7 year  and older (BOOSTRIX, ADACEL) 09/30/2021    Zoster vaccine, recombinant, adult (SHINGRIX) 10/15/2020, 09/30/2021    Zoster, live 02/10/2012     Patient's medical history was reviewed and updated either before or during this encounter.  ASSESSMENT / PLAN:  Active Hospital Problems    *Psychosis, unspecified psychosis type (Multi)      Anxiety      Insomnia      Sensorineural hearing loss (SNHL) of both ears      Essential hypertension      Hyperlipidemia      Hyperthyroidism    Patient is being seen for follow-up at [facility].  Anxiety has been stable.  Patient's blood pressure was elevated yesterday and started on amlodipine.  Blood pressure is better now we will continue to monitor.

## 2025-04-05 NOTE — PROGRESS NOTES
Physical Therapy       [id number]   PT  Visit   PT Received On 25   Response to Previous Treatment Patient with no complaints from previous session.   General   Reason for Referral Impaired mobility/balance associated w/escalating mental health issues   Referred By Deandre Marin DO   Past Medical History Relevant to Rehab HTN, afib, Meniere's, hyperthyroid, OA, anxiety, sensorineural hearing loss, h/p mental and behavioral d/o   Patient Position Received Up in chair;Alarm on   Preferred Learning Style verbal;visual   General Comment nursing and pt agreeable to tx   Precautions   Medical Precautions Fall precautions   Pain Assessment   Pain Assessment 0-10   0-10 (Numeric) Pain Score 2   Pain Type Acute pain   Pain Location Back   Pain Orientation Lower   Pain Interventions   (denied need for pain intervention)   Response to Interventions No change in pain   Balance/Neuromuscular Re-Education   Balance/Neuromuscular Re-Education Activity 1 MCCARTHY/56   Ambulation/Gait Training 1   Surface 1 Level tile   Device 1 No device   Gait Support Devices Gait belt   Assistance 1 Contact guard;Minimum assistance   Quality of Gait 1 Diminished heel strike;Inconsistent stride length;Decreased step length;Shuffling gait   Comments/Distance (ft) 1 100 ft x 2 with turns included.  Very guarded gait quality with slow mikhail, NBOS, and short step lengths.   Transfer 1   Technique 1 Sit to stand;Stand to sit   Transfer Device 1 Gait belt   Transfer Level of Assistance 1 Close supervision    Object From Floor   Devices No reacher;No device   Assist Level Supervision   Comments crayon box from floor (part of MCCARTHY)   Wheelchair Activities   Propulsion Type 1 Manual   Level 1 Level tile   Method 1 Right upper extremity;Left upper extremity;Right lower extremity;Left lower extremity   Level of Assistance 1 Modified independent   Description/Details 1 ad yulisa around unit   Activity Tolerance   Endurance Tolerates  "10 - 20 min exercise with multiple rests   PT Assessment   PT Assessment Results Decreased strength;Decreased endurance;Decreased mobility;Decreased safety awareness;Pain   Rehab Prognosis Good   Barriers to Discharge Home Caregiver assistance;Cognition needs   Caregiver Assistance Patient lives alone and/or does not have reliable caregiver assistance   Assessment Comment MCCARTHY Balance Assessment performed with score of 37/56.  All functional tasks performed slowly and cautiously.  With inital standing, pt exhibited unsteady legs, requiring extra time to achieve upright posture.  Balance assessment discussed with pt, who reported that this sounded \"accurate\" for her at this time.   End of Session Patient Position Up in chair;Alarm on  (left in TV room with distant supervision from nursing)   PT Plan   Inpatient/Swing Bed or Outpatient Inpatient   PT Plan   Treatment/Interventions Bed mobility;Transfer training;Gait training;Neuromuscular re-education;Therapeutic exercise;Therapeutic activity   PT Plan Ongoing PT   PT Frequency 3 times per week   PT Recommended Transfer Status Assist x1      04/05/25 0900   [facility] Basic Mobility   Turning from your back to your side while in a flat bed without using bedrails 4   Moving from lying on your back to sitting on the side of a flat bed without using bedrails 3   Moving to and from bed to chair (including a wheelchair) 3   Standing up from a chair using your arms (e.g. wheelchair or bedside chair) 3   To walk in hospital room 3   Climbing 3-5 steps with railing 3   Basic Mobility - Total Score 19   Mccarthy Balance Scale   1. Sitting to Standing 3   2. Standing Unsupported 3   3. Sitting with Back Unsupported but Feet Supported on Floor or on a Stool 4   4. Standing to Sitting 3   5.  Transfers 2   6. Standing Unsupported with Eyes Closed 3   7. Standing Unsupported with Feet Together 3   8. Reach Forward with Outstretched Arm While Standing 3   9.  Object from Floor from a " Standing Position 3   10. Turning to Look Behind Over Left and Right Shoulders While Standing 3   11. Turn 360 Degrees 2   12. Place Alternate Foot on Step or Stool While Standing Unsupported 1  (used top of baseboard in hallway as target)   13. Standing Unsupported One Foot in Front 3   14. Standing on One Leg 1   Carmichael Balance Score 37

## 2025-04-05 NOTE — GROUP NOTE
Group Topic: Self-Care/Wellness   Group Date: 4/5/2025  Start Time: 1000  End Time: 1110  Facilitators: SUSAN Oreilly   Department: [facility] REHABTH VIRTUAL    Number of Participants: 5   Group Focus: other Healthy Living Skills Group  Treatment Modality: Other: Recreation Therapy  Interventions utilized were exploration, group exercise, patient education, problem solving, and support  Purpose: coping skills, maladaptive thinking, feelings, irrational fears, communication skills, insight or knowledge, self-worth, self-care, relapse prevention strategies, and trigger / craving management    Name: Nevaeh Cuba YOB: 1955   MR: [id number]      Facilitator: Recreational Therapist  Level of Participation: minimal  Quality of Participation: cooperative, passive, and quiet  Interactions with others:  mostly passive  Mood/Affect:  mostly passive  Triggers (if applicable): n/a  Cognition:  mostly passive  Progress: Minimal  Comments: pt problem is delusional thought.  Pt joins group with little encouragement.  Displays mostly passive participation during session but makes good eye contact and nods her head along with peers and staff in group.    Plan: continue with services

## 2025-04-05 NOTE — PROGRESS NOTES
"Nevaeh Cuba is a 69 y.o. female on day 8 of admission presenting with Psychosis, unspecified psychosis type (Multi).      Subjective   Chart reviewed and case discussed with nursing.    No issues overnight. Pt states she slept well, endorses a poor appetite. She denies SI/HI, does not appear internally stimulated. CT head completed yesterday for memory changes - my personal review it does not reveal anything acute, theres a fair amount of beam artifact from her cochlear implant that impacts evaluation, however no hemorrhage or mass; mild degree of atrophy and moderate amount of white matter hypodense changes most consistent with SVID (MRI brain 3/2024 corroborates).       Objective     Last Recorded Vitals  Blood pressure (!) 143/96, pulse 96, temperature 36.5 °C (97.7 °F), temperature source Temporal, resp. rate 18, height 1.676 m (5' 6\"), weight 75.8 kg (167 lb), SpO2 95%.    Review of Systems    Psychiatric ROS - Adult  Anxiety: General Anxiety Disorder (LANDON)LANDON Behaviors: difficult to control worry, excessive anxiety/worry, difficulty concentrating, restlessness, and sleep disturbance - symptoms continue   Depression: anhedonia, concentration, energy, guilt, helpless, hopeless, interest, persistent thoughts of death, sleep decreased , suicidal thoughts, and withdrawn -  symptoms continue  Delirium: negative  Psychosis: auditory hallucinations and visual hallucinations - not endorsed nor observed today  Usha:  none noted or reported but has decreased sleep and increased spending hx  Safety Issues: suicidal ideation - denies SI/HI today  Psychiatric ROS Comment: less psychotic flavor in presentation- more settled    Physical Exam  Vitals and nursing note reviewed.   Pulmonary:      Effort: Pulmonary effort is normal.   Neurological:      Mental Status: She is alert.       Mental Status Exam  General: adequately groomed appears stated age  Appearance: hospital attire  Attitude: pleasant  Behavior: " "cooperative  Motor Activity: in wheel chair for safety  Speech: normal rate and volume  Mood: \"It could be better\"  Affect: constricted  Thought Process: focused on getting more personal clothes, otherwise does not provide much through discussion today  Thought Content: limited. denies SI/HI denies paranoia  Thought Perception: denies AH/VH, does not appear stimulated  Cognition: alert and oriented x4  Insight: patient knows she needs treatment for psychiatric illness  Judgement: patient is able to participate in medical decsion making    Psychiatric Risk Assessment  Violence Risk Assessment: major mental illness and other psychosis  Acute Risk of Harm to Others is Considered: low   Suicide Risk Assessment: age > 65 yrs old, , current psychiatric illness, feelings of hopelessness, living alone or lack of social support, suicidal ideations, and other guilt cause spent money was supoosed to pay for mother and  in nursing home \"on stuff that is all over the condo\"  Protective Factors against Suicide: adherence to  treatment and sense of responsibility toward family  Acute Risk of Harm to Self is Considered: low      Relevant Results          No results found for this or any previous visit (from the past 24 hours).       Assessment/Plan   Assessment & Plan  Psychosis, unspecified psychosis type (Multi)    Anxiety    Insomnia    Patient is a 70 yo female dx with MDD, LANDON with psychotic features (obsessive features). She says she spent all the money she was supposed to pay for her  and mothers nursing home on \"stuff that is all over my condo\". She denies dx of bipolar disorder, does not present as manic at this time but presents with psychotic flavor, almost schizotypal features.       Biological   - continue Luvox 75mg every day for anxiety and depression  - continue trileptal 300mg bid for mood stabilization?  - says she has taken lithium in the past and it makes her dizzy, denies ever taking " depakote  - cont zyprexa 10mg at bedtime for psychosis and mood stabilization (monitor for somnolence renal values are a little elevated)  - prns avalable  - medical pertinences appreciated     Psychological     Provider encouraged patient to attend groups on unit.     Sociological     Provider encouraged patient to work with social workon discharge plan. She planned to return to her condo initially but now says she thinks she can't care for herself there anymore. Planned family meeting on Monday at 3pm and options willl be discussed- sister thinks AL would be best option.         Total time spent with patient encounter 35 minutes. This includes patient interview, assessment, extensive chart review, personal review of labs and images as noted above, and formulation of recommendations.     KEHINDE Avila-CNP, AGPCNP-BC, PMHNP-BC  Psychiatry, [facility]  1* contact: Nurigene chat preferred

## 2025-04-06 VITALS
BODY MASS INDEX: 26.84 KG/M2 | TEMPERATURE: 97.9 F | RESPIRATION RATE: 16 BRPM | DIASTOLIC BLOOD PRESSURE: 84 MMHG | OXYGEN SATURATION: 97 % | HEIGHT: 66 IN | HEART RATE: 92 BPM | WEIGHT: 167 LBS | SYSTOLIC BLOOD PRESSURE: 130 MMHG

## 2025-04-06 PROCEDURE — 2500000001 HC RX 250 WO HCPCS SELF ADMINISTERED DRUGS (ALT 637 FOR MEDICARE OP): Performed by: STUDENT IN AN ORGANIZED HEALTH CARE EDUCATION/TRAINING PROGRAM

## 2025-04-06 PROCEDURE — 2500000001 HC RX 250 WO HCPCS SELF ADMINISTERED DRUGS (ALT 637 FOR MEDICARE OP): Performed by: INTERNAL MEDICINE

## 2025-04-06 PROCEDURE — 99231 SBSQ HOSP IP/OBS SF/LOW 25: CPT | Performed by: NURSE PRACTITIONER

## 2025-04-06 PROCEDURE — 2500000004 HC RX 250 GENERAL PHARMACY W/ HCPCS (ALT 636 FOR OP/ED): Performed by: STUDENT IN AN ORGANIZED HEALTH CARE EDUCATION/TRAINING PROGRAM

## 2025-04-06 PROCEDURE — 99233 SBSQ HOSP IP/OBS HIGH 50: CPT | Performed by: INTERNAL MEDICINE

## 2025-04-06 PROCEDURE — 2500000001 HC RX 250 WO HCPCS SELF ADMINISTERED DRUGS (ALT 637 FOR MEDICARE OP): Performed by: REGISTERED NURSE

## 2025-04-06 PROCEDURE — 1140000001 HC PRIVATE PSYCH ROOM DAILY

## 2025-04-06 PROCEDURE — 2500000002 HC RX 250 W HCPCS SELF ADMINISTERED DRUGS (ALT 637 FOR MEDICARE OP, ALT 636 FOR OP/ED): Performed by: REGISTERED NURSE

## 2025-04-06 PROCEDURE — 2500000005 HC RX 250 GENERAL PHARMACY W/O HCPCS: Performed by: STUDENT IN AN ORGANIZED HEALTH CARE EDUCATION/TRAINING PROGRAM

## 2025-04-06 RX ADMIN — ATORVASTATIN CALCIUM 20 MG: 20 TABLET, FILM COATED ORAL at 20:21

## 2025-04-06 RX ADMIN — Medication 2000 UNITS: at 08:10

## 2025-04-06 RX ADMIN — MELATONIN TAB 3 MG 3 MG: 3 TAB at 17:31

## 2025-04-06 RX ADMIN — POLYETHYLENE GLYCOL 3350 17 G: 17 POWDER, FOR SOLUTION ORAL at 08:10

## 2025-04-06 RX ADMIN — OLANZAPINE 10 MG: 10 TABLET ORAL at 20:21

## 2025-04-06 RX ADMIN — OXCARBAZEPINE 300 MG: 300 TABLET, FILM COATED ORAL at 06:14

## 2025-04-06 RX ADMIN — AMLODIPINE BESYLATE 5 MG: 5 TABLET ORAL at 08:10

## 2025-04-06 RX ADMIN — METHIMAZOLE 5 MG: 5 TABLET ORAL at 08:10

## 2025-04-06 RX ADMIN — OXCARBAZEPINE 300 MG: 300 TABLET, FILM COATED ORAL at 17:31

## 2025-04-06 RX ADMIN — FLUVOXAMINE MALEATE 75 MG: 50 TABLET, COATED ORAL at 20:21

## 2025-04-06 ASSESSMENT — PAIN SCALES - GENERAL
PAINLEVEL_OUTOF10: 0 - NO PAIN
PAINLEVEL_OUTOF10: 0 - NO PAIN

## 2025-04-06 ASSESSMENT — COLUMBIA-SUICIDE SEVERITY RATING SCALE - C-SSRS
6. HAVE YOU EVER DONE ANYTHING, STARTED TO DO ANYTHING, OR PREPARED TO DO ANYTHING TO END YOUR LIFE?: NO
1. SINCE LAST CONTACT, HAVE YOU WISHED YOU WERE DEAD OR WISHED YOU COULD GO TO SLEEP AND NOT WAKE UP?: NO
2. HAVE YOU ACTUALLY HAD ANY THOUGHTS OF KILLING YOURSELF?: NO
2. HAVE YOU ACTUALLY HAD ANY THOUGHTS OF KILLING YOURSELF?: NO
6. HAVE YOU EVER DONE ANYTHING, STARTED TO DO ANYTHING, OR PREPARED TO DO ANYTHING TO END YOUR LIFE?: NO
1. SINCE LAST CONTACT, HAVE YOU WISHED YOU WERE DEAD OR WISHED YOU COULD GO TO SLEEP AND NOT WAKE UP?: NO

## 2025-04-06 ASSESSMENT — PAIN - FUNCTIONAL ASSESSMENT
PAIN_FUNCTIONAL_ASSESSMENT: 0-10
PAIN_FUNCTIONAL_ASSESSMENT: 0-10

## 2025-04-06 ASSESSMENT — PAIN DESCRIPTION - DESCRIPTORS: DESCRIPTORS: PATIENT UNABLE TO DESCRIBE

## 2025-04-06 ASSESSMENT — ACTIVITIES OF DAILY LIVING (ADL): EFFECT OF PAIN ON DAILY ACTIVITIES: MINIMAL

## 2025-04-06 NOTE — PROGRESS NOTES
"Nevaeh Cuba is a 69 y.o. female on day 9 of admission presenting with Psychosis, unspecified psychosis type (Multi).      Subjective   Chart reviewed and case discussed with nursing.    No issues overnight. Pt states she slept well. Depressive symptoms and poor outlook continue. Denies SI, HI.       Objective     Last Recorded Vitals  Blood pressure 130/84, pulse 92, temperature 36.6 °C (97.9 °F), temperature source Temporal, resp. rate 16, height 1.676 m (5' 6\"), weight 75.8 kg (167 lb), SpO2 97%.    Review of Systems    Psychiatric ROS - Adult  Anxiety: General Anxiety Disorder (LANDON)LANDON Behaviors: difficult to control worry, excessive anxiety/worry, difficulty concentrating, restlessness, and sleep disturbance - symptoms continue   Depression: anhedonia, concentration, energy, guilt, helpless, hopeless, interest, persistent thoughts of death, sleep decreased , suicidal thoughts, and withdrawn -  symptoms continue  Delirium: negative  Psychosis: auditory hallucinations and visual hallucinations - not endorsed nor observed today  Usha:  none noted or reported but has decreased sleep and increased spending hx  Safety Issues: suicidal ideation - denies SI/HI today  Psychiatric ROS Comment: less psychotic flavor in presentation- more settled    Physical Exam  Vitals and nursing note reviewed.   Pulmonary:      Effort: Pulmonary effort is normal.   Neurological:      Mental Status: She is alert.       Mental Status Exam  General: adequately groomed appears stated age  Appearance: hospital attire  Attitude: pleasant  Behavior: cooperative  Motor Activity: in wheel chair for safety  Speech: normal rate and volume  Mood: \"fine\"  Affect: constricted  Thought Process: focused on getting more personal clothes, otherwise does not provide much through discussion today  Thought Content: limited. denies SI/HI denies paranoia  Thought Perception: denies AH/VH, does not appear stimulated  Cognition: alert and oriented " "x4  Insight: patient knows she needs treatment for psychiatric illness  Judgement: patient is able to participate in medical decsion making    Psychiatric Risk Assessment  Violence Risk Assessment: major mental illness and other psychosis  Acute Risk of Harm to Others is Considered: low   Suicide Risk Assessment: age > 65 yrs old, , current psychiatric illness, feelings of hopelessness, living alone or lack of social support, suicidal ideations, and other guilt cause spent money was supoosed to pay for mother and  in nursing home \"on stuff that is all over the condo\"  Protective Factors against Suicide: adherence to  treatment and sense of responsibility toward family  Acute Risk of Harm to Self is Considered: low      Relevant Results          No results found. However, due to the size of the patient record, not all encounters were searched. Please check Results Review for a complete set of results.       Assessment/Plan   Assessment & Plan  Psychosis, unspecified psychosis type (Multi)    Anxiety    Insomnia    Patient is a 70 yo female dx with MDD, LANDON with psychotic features (obsessive features). She says she spent all the money she was supposed to pay for her  and mothers nursing home on \"stuff that is all over my condo\". She denies dx of bipolar disorder, does not present as manic at this time but presents with psychotic flavor, almost schizotypal features.       Biological   - continue Luvox 75mg every day for anxiety and depression  - continue trileptal 300mg bid for mood stabilization?  - says she has taken lithium in the past and it makes her dizzy, denies ever taking depakote  - cont zyprexa 10mg at bedtime for psychosis and mood stabilization (monitor for somnolence renal values are a little elevated)  - prns avalable  - medical pertinences appreciated     Psychological     Provider encouraged patient to attend groups on unit.     Sociological     Provider encouraged patient to " work with social workon discharge plan. She planned to return to her condo initially but now says she thinks she can't care for herself there anymore. Planned family meeting on Monday at 3pm and options willl be discussed- sister thinks AL would be best option.         Total time spent with patient encounter 25 minutes. This includes patient interview, assessment, extensive chart review, personal review of labs and images as noted above, and formulation of recommendations.     KEHINDE Avila-CNP, AGPCNP-BC, PMHNP-BC  Psychiatry, [facility]  1* contact: Monscierge chat preferred

## 2025-04-06 NOTE — GROUP NOTE
Group Topic: Goals   Group Date: 4/6/2025  Start Time: 0730  End Time: 0800  Facilitators: Hiram Mckinney   Department: [facility]     Number of Participants: 5   Group Focus: affirmation  Treatment Modality: Patient-Centered Therapy  Interventions utilized were assignment and group exercise  Purpose: self-worth    Name: Nevaeh Cuba YOB: 1955   MR: [id number]      Facilitator: [facility]  Level of Participation: moderate  Quality of Participation: appropriate/pleasant  Interactions with others: appropriate  Mood/Affect: appropriate  Triggers (if applicable): n/a  Cognition: coherent/clear  Progress: Moderate  Comments: pt completed assignment  Plan: continue with services

## 2025-04-06 NOTE — CARE PLAN
The patient's goals for the shift include to be medication compliant    The clinical goals for the shift include No slips and or falls      Problem: Risk for Suicide  Goal: Accepts medications as prescribed/needed this shift  Outcome: Progressing     Problem: Risk for Suicide  Goal: Makes needs known through verbalization or behaviors this shift  Outcome: Progressing     Problem: Risk for Suicide  Goal: No self harm this shift  Outcome: Progressing     Problem: Fall/Injury  Goal: Not fall by end of shift  Outcome: Progressing

## 2025-04-06 NOTE — GROUP NOTE
Group Topic: Self-Care/Wellness   Group Date: 4/6/2025  Start Time: 1000  End Time: 1120  Facilitators: SUSAN Oreilly   Department: [facility] REHAB[facility] VIRTUAL    Number of Participants: 6   Group Focus: other Positive Steps for Well Being  Treatment Modality: Other: Recreation Therapy  Interventions utilized were exploration, group exercise, patient education, problem solving, and support  Purpose: coping skills, maladaptive thinking, feelings, irrational fears, communication skills, insight or knowledge, self-worth, self-care, relapse prevention strategies, and trigger / craving management    Name: Nevaeh Cuba YOB: 1955   MR: [id number]      Facilitator: Recreational Therapist  Level of Participation: moderate  Quality of Participation: attentive and cooperative  Interactions with others: appropriate and gave feedback  Mood/Affect: flat  Triggers (if applicable): n/a  Cognition: coherent/clear  Progress: Moderate  Comments: pt problem is delusional thought.  Pt joins group with little encouragement.  Engages with some direct prompting.  Slightly more active with participation today.  Displays good eye contact but very flat with affect.  Plan: continue with services

## 2025-04-06 NOTE — GROUP NOTE
Group Topic: Other   Group Date: 4/6/2025  Start Time: 1340  End Time: 1440  Facilitators: SUSAN Oreilly   Department: [facility] REHABTH VIRTUAL    Number of Participants: 5   Group Focus: other Mind Joggers  Treatment Modality: Other: Recreation Therapy  Interventions utilized were mental fitness  Purpose: other: fun, elevate mood, increase socialization, enhance self esteem, stimulate memory    Name: Nevaeh Cuba YOB: 1955   MR: [id number]      Facilitator: Recreational Therapist  Level of Participation: active  Quality of Participation: appropriate/pleasant, attentive, and cooperative  Interactions with others: appropriate and gave feedback  Mood/Affect: appropriate  Triggers (if applicable): n/a  Cognition: processing slowly  Progress: Moderate  Comments: pt problem is delusional thought.  Pt continues to display cooperative behaviors in group.  Reports she is having a difficult understanding some puzzles but continues to try and is able to ask appropriate questions related to figuring puzzles out.    Plan: continue with services

## 2025-04-06 NOTE — NURSING NOTE
TAZ NOTE     Problem:  Pt. Is continuing their journey on working to improve their anxiety & depressive Sx.     Behavior:  Pt. Is cooperative w/ Tx. Plan and has been agreeable to talking w/ staff & peers alike. Pt. Has been able to maintain safety.  Pt. Showered, and was open to decent participation in groups.  Appetite/Meals: good        Interventions:  Pt. Was offered groups and their scheduled medications.     Response:  Pt. Has been compliant w/ meds, tolerated them well; and did attend the majority of group sessions w/ good results.     Plan:  Pt. will continue to be monitored for changes in mental status & safety on the unit.     oral

## 2025-04-06 NOTE — CARE PLAN
The patient's goals for the shift include shower    The clinical goals for the shift include maintain safety/rest    Over the shift, the patient did make progress toward the following goals. Barriers to progression include pervasive depressive sx. Recommendations to address these barriers include positive encouragement to attend groups.      Problem: Risk for Suicide  Goal: No self harm this shift  Outcome: Progressing

## 2025-04-06 NOTE — NURSING NOTE
TAZ NOTE     Problem:  Depression     Behavior:  Pt calm, pleasant and cooperative with care. She is very flat/blunted. Slow movements and responses. Guarded. Currently denies SI. Endorses depression.   Group Participation: Attends  Appetite/Meals: Hs snack provided       Interventions:  HS medications administered per MAR    Response:  Medication compliant. No s/s of distress.      Plan:  Continue to monitor and assist. Maintain q15 minute rounds for safety.

## 2025-04-07 PROCEDURE — 2500000002 HC RX 250 W HCPCS SELF ADMINISTERED DRUGS (ALT 637 FOR MEDICARE OP, ALT 636 FOR OP/ED): Performed by: REGISTERED NURSE

## 2025-04-07 PROCEDURE — 2500000001 HC RX 250 WO HCPCS SELF ADMINISTERED DRUGS (ALT 637 FOR MEDICARE OP): Performed by: STUDENT IN AN ORGANIZED HEALTH CARE EDUCATION/TRAINING PROGRAM

## 2025-04-07 PROCEDURE — 99233 SBSQ HOSP IP/OBS HIGH 50: CPT | Performed by: INTERNAL MEDICINE

## 2025-04-07 PROCEDURE — 99232 SBSQ HOSP IP/OBS MODERATE 35: CPT | Performed by: REGISTERED NURSE

## 2025-04-07 PROCEDURE — 2500000001 HC RX 250 WO HCPCS SELF ADMINISTERED DRUGS (ALT 637 FOR MEDICARE OP): Performed by: REGISTERED NURSE

## 2025-04-07 PROCEDURE — 97530 THERAPEUTIC ACTIVITIES: CPT | Mod: GO,CO

## 2025-04-07 PROCEDURE — 2500000001 HC RX 250 WO HCPCS SELF ADMINISTERED DRUGS (ALT 637 FOR MEDICARE OP): Performed by: INTERNAL MEDICINE

## 2025-04-07 PROCEDURE — 1140000001 HC PRIVATE PSYCH ROOM DAILY

## 2025-04-07 PROCEDURE — 2500000005 HC RX 250 GENERAL PHARMACY W/O HCPCS: Performed by: STUDENT IN AN ORGANIZED HEALTH CARE EDUCATION/TRAINING PROGRAM

## 2025-04-07 RX ADMIN — METHIMAZOLE 5 MG: 5 TABLET ORAL at 08:23

## 2025-04-07 RX ADMIN — MELATONIN TAB 3 MG 3 MG: 3 TAB at 18:02

## 2025-04-07 RX ADMIN — Medication 2000 UNITS: at 08:23

## 2025-04-07 RX ADMIN — ATORVASTATIN CALCIUM 20 MG: 20 TABLET, FILM COATED ORAL at 21:43

## 2025-04-07 RX ADMIN — OXCARBAZEPINE 300 MG: 300 TABLET, FILM COATED ORAL at 18:02

## 2025-04-07 RX ADMIN — FLUVOXAMINE MALEATE 75 MG: 50 TABLET, COATED ORAL at 21:44

## 2025-04-07 RX ADMIN — AMLODIPINE BESYLATE 5 MG: 5 TABLET ORAL at 08:23

## 2025-04-07 RX ADMIN — OLANZAPINE 10 MG: 10 TABLET ORAL at 21:44

## 2025-04-07 RX ADMIN — OXCARBAZEPINE 300 MG: 300 TABLET, FILM COATED ORAL at 06:12

## 2025-04-07 ASSESSMENT — COLUMBIA-SUICIDE SEVERITY RATING SCALE - C-SSRS
6. HAVE YOU EVER DONE ANYTHING, STARTED TO DO ANYTHING, OR PREPARED TO DO ANYTHING TO END YOUR LIFE?: YES
1. IN THE PAST MONTH, HAVE YOU WISHED YOU WERE DEAD OR WISHED YOU COULD GO TO SLEEP AND NOT WAKE UP?: YES
2. HAVE YOU ACTUALLY HAD ANY THOUGHTS OF KILLING YOURSELF?: YES
6. HAVE YOU EVER DONE ANYTHING, STARTED TO DO ANYTHING, OR PREPARED TO DO ANYTHING TO END YOUR LIFE?: NO
5. HAVE YOU STARTED TO WORK OUT OR WORKED OUT THE DETAILS OF HOW TO KILL YOURSELF? DO YOU INTEND TO CARRY OUT THIS PLAN?: NO
4. HAVE YOU HAD THESE THOUGHTS AND HAD SOME INTENTION OF ACTING ON THEM?: NO

## 2025-04-07 ASSESSMENT — PAIN SCALES - GENERAL
PAINLEVEL_OUTOF10: 0 - NO PAIN

## 2025-04-07 ASSESSMENT — PAIN - FUNCTIONAL ASSESSMENT
PAIN_FUNCTIONAL_ASSESSMENT: 0-10

## 2025-04-07 NOTE — CARE PLAN
"The patient's goals for the shift include \"just rest\"    The clinical goals for the shift include maintain safety/rest      Problem: Risk for Suicide  Goal: Identifies supports this shift  Outcome: Progressing     Problem: Risk for Suicide  Goal: Makes needs known through verbalization or behaviors this shift  Outcome: Progressing     Problem: Risk for Suicide  Goal: No self harm this shift  Outcome: Progressing     Problem: Fall/Injury  Goal: Not fall by end of shift  Outcome: Progressing     Problem: Fall/Injury  Goal: Use assistive devices by end of the shift  Outcome: Progressing       "

## 2025-04-07 NOTE — PROGRESS NOTES
[facility]: [facility]  PROGRESS NOTE      Nevaeh Cuba is a 69 y.o. female that is being seen  today for follow-up at [facility]..  Subjective   Patient is being seen for follow-up at [facility]. Patient's blood pressure is fairly controlled with amlodipine.  Denies any issues with the medication.      ROS  Negative for fever or chills  Negative for sore throat, ear pain, nasal discharge  Negative for cough, shortness of breath or wheezing  Negative for chest pain, palpitations, swelling of legs  Negative for abdominal pain, constipation, diarrhea, blood in the stools  Negative for urinary complaints  Negative for headache, dizziness, weakness or numbness  Negative for joint pain  Positive for depression or anxiety  All other systems reviewed and were negative   Vitals:    04/07/25 0823   BP: 132/82   Pulse: 92   Resp:    Temp:    SpO2:       Vitals:    03/28/25 1819   Weight: 75.8 kg (167 lb)     Body mass index is 26.95 kg/m².  Physical Exam  Constitutional: Patient does not appear to be in any acute distress  Head and Face: NCAT  Eyes: Normal external exam, EOMI  ENT: Normal external inspection of ears and nose. Oropharynx normal.  Cardiovascular: RRR, S1/S2, no murmurs, rubs, or gallops, radial pulses +2, no edema of extremities  Pulmonary: CTAB, no respiratory distress.  Abdomen: +BS, soft, non-tender, nondistended, no guarding or rebound, no masses noted  MSK: No joint swelling, normal movements of all extremities. Range of motion- normal.  Skin- No lesions, contusions, or erythema.  Peripheral puslses palpable bilaterally 2+  Neuro: AAO X3, Cranial nerves 2-12 grossly intact,DTR 2+ in all 4 limbs       LABS   [unfilled]  Lab Results   Component Value Date    GLUCOSE 115 (H) 04/02/2025    CALCIUM 10.1 04/02/2025     04/02/2025    K 3.5 04/02/2025    CO2 28 04/02/2025    CL 99 04/02/2025    BUN 38 (H) 04/02/2025    CREATININE 0.96 04/02/2025     Lab Results   Component Value Date     ALT 27 04/02/2025    AST 17 04/02/2025    ALKPHOS 70 04/02/2025    BILITOT 0.6 04/02/2025     Lab Results   Component Value Date    WBC 9.9 03/28/2025    HGB 17.5 (H) 03/28/2025    HCT 48.7 (H) 03/28/2025    MCV 88 03/28/2025     03/28/2025     Lab Results   Component Value Date    CHOL 142 03/29/2025    CHOL 224 (H) 04/12/2024    CHOL 194 10/06/2023     Lab Results   Component Value Date    HDL 59.7 03/29/2025    HDL 58.0 04/12/2024    HDL 58.0 10/06/2023     Lab Results   Component Value Date    LDLCALC 67 03/29/2025    LDLCALC 102 04/12/2024    LDLCALC 88 10/06/2023     Lab Results   Component Value Date    TRIG 77 03/29/2025    TRIG 318 (H) 04/12/2024    TRIG 240 (H) 10/06/2023     Lab Results   Component Value Date    HGBA1C 5.5 06/25/2024     Other labs not included in the list above were reviewed either before or during this encounter.    History    Past Medical History:   Diagnosis Date    Benign essential hypertension     Estrogen deficiency 11/15/2023    DEXA 1/24 back nl, hip neck T-2.1 recheck 1/26    History of atrial fibrillation     Hyperactive thyroid Dr.Burtch Rand [place] NSR no AC    History of breast lift 05/2024    History of Meniere's syndrome 11/15/2023    Hx of reduction mammoplasty 05/2024    Hyperthyroidism 09/15/2023    Other specified abnormal findings of blood chemistry     High serum cholesterol sulfate    Personal history of malignant neoplasm, unspecified     History of malignant neoplasm    Personal history of other mental and behavioral disorders     Unspecified osteoarthritis, unspecified site 04/28/2016    Arthritis     Past Surgical History:   Procedure Laterality Date    COSMETIC SURGERY      HYSTERECTOMY  04/28/2016    Hysterectomy    JOINT REPLACEMENT      rt ring finger    JOINT REPLACEMENT Right 05/2022    ring finger    OTHER SURGICAL HISTORY  04/28/2016    Musculoskeletal Procedures Injection Carpal Tunnel    OTHER SURGICAL HISTORY  04/28/2016    Arthrodesis  MCP Joint    OTHER SURGICAL HISTORY  01/11/2021    Ankle surgery    OTHER SURGICAL HISTORY  01/11/2021    Carpal tunnel surgery    OTHER SURGICAL HISTORY  07/06/2022    Nasal septoplasty    OTHER SURGICAL HISTORY  07/2021    heart shock catherization    REDUCTION MAMMAPLASTY  may 7 2024    SHOULDER SURGERY  08/2021    total left shoulder replacement    SINUS SURGERY      TRIGGER FINGER RELEASE Right 01/2024    middle finger     Family History   Problem Relation Name Age of Onset    Stroke Mother      Other (cyst on breast) Mother      Atrial fibrillation Father      Stroke Father      Heart disease Father       Allergies   Allergen Reactions    Latex Itching and Rash    Methotrexate Itching     HEAD BURNING AND ITCHING    Metoprolol Rash    Diltiazem Hcl Itching     Rash face     Latex, Natural Rubber Itching    Pollen Extracts Other     NASAL CONGESTION    Adhesive Tape-Silicones Rash    Folic Acid Rash    Sulfamethoxazole-Trimethoprim Rash     Tinnitus      No current facility-administered medications on file prior to encounter.     Current Outpatient Medications on File Prior to Encounter   Medication Sig Dispense Refill    acetaminophen (Tylenol) 325 mg tablet Take 2 tablets (650 mg) by mouth every 6 hours if needed for mild pain (1 - 3) 20 tablet 0    atorvastatin (Lipitor) 20 mg tablet Take 1 tablet (20 mg) by mouth once daily. 90 tablet 3    cholecalciferol (Vitamin D-3) 25 MCG (1000 UT) tablet Take 2 tablets (2,000 Units) by mouth. As directed      docusate sodium (Colace) 100 mg capsule Take 1 capsule (100 mg) by mouth once daily as needed.      fexofenadine HCl (ALLEGRA ORAL) Take 1 tablet by mouth once daily as needed (ALLERGIES).      fLuvoxaMINE (Luvox) 50 mg tablet Take 1 tablet (50 mg) by mouth early in the morning..      hydrOXYzine HCL (Atarax) 25 mg tablet Take 2 tablets (50 mg) by mouth once daily at bedtime for 10 days. 20 tablet 0    ibuprofen 200 mg tablet Take 2 tablets (400 mg) by mouth every  6 hours if needed for mild pain (1 - 3).      methIMAzole (Tapazole) 5 mg tablet Take 1 tablet (5 mg) by mouth once daily. 90 tablet 3    mv-min-FA-vit K-lutein-zeaxant (PreserVision AREDS 2 Plus MV) 200 mcg-15 mcg- 5 mg-1 mg capsule Take 1 capsule by mouth once daily.      OXcarbazepine (Trileptal) 150 mg tablet Take 1 tablet (150 mg) by mouth 2 times a day.      OXcarbazepine (Trileptal) 300 mg tablet Take 1 tablet (300 mg) by mouth every 12 hours.      propranolol (Inderal) 10 mg tablet Take 1 tablet (10 mg) by mouth every 8 hours if needed for anxiety.      triamterene-hydrochlorothiazid (Maxzide-25) 37.5-25 mg tablet Take 1 tablet by mouth once daily in the morning. 90 tablet 3     Immunization History   Administered Date(s) Administered    COVID-19, subunit, rS-nanoparticle, adjuvanted, PF, 5 mcg/0.5 mL 09/28/2024    Flu vaccine, quadrivalent, high-dose, preservative free, age 65y+ (FLUZONE) 09/14/2023    Influenza, Seasonal, Quadrivalent, Adjuvanted 09/30/2021, 09/08/2022    Influenza, Unspecified 09/07/2010, 10/17/2011    Influenza, injectable, MDCK, quadrivalent 10/23/2017, 10/30/2018    Influenza, injectable, quadrivalent 09/23/2019, 09/11/2020    Influenza, seasonal, injectable 09/24/2012, 10/17/2013, 10/23/2014, 10/26/2015, 11/17/2016    Moderna COVID-19 vaccine, 12 years and older (50mcg/0.5mL)(Spikevax) 09/29/2023    Pfizer COVID-19 vaccine, bivalent, age 12 years and older (30 mcg/0.3 mL) 09/08/2022    Pfizer Gray Cap SARS-CoV-2 03/31/2022    Pfizer Purple Cap SARS-CoV-2 03/09/2021, 04/06/2021, 10/06/2021    Pneumococcal conjugate vaccine, 13-valent (PREVNAR 13) 07/11/2013    Pneumococcal conjugate vaccine, 20-valent (PREVNAR 20) 09/16/2023    Pneumococcal polysaccharide vaccine, 23-valent, age 2 years and older (PNEUMOVAX 23) 11/05/2020    RSV, 60 Years And Older (AREXVY) 09/16/2023    Tdap vaccine, age 7 year and older (BOOSTRIX, ADACEL) 09/30/2021    Zoster vaccine, recombinant, adult (SHINGRIX)  10/15/2020, 09/30/2021    Zoster, live 02/10/2012     Patient's medical history was reviewed and updated either before or during this encounter.  ASSESSMENT / PLAN:  Active Hospital Problems    *Psychosis, unspecified psychosis type (Multi)      Anxiety      Insomnia      Sensorineural hearing loss (SNHL) of both ears      Essential hypertension      Hyperlipidemia      Hyperthyroidism    Patient is being seen for follow-up at [facility].  Anxiety has been stable.   Blood pressure has been fairly controlled with amlodipine.

## 2025-04-07 NOTE — GROUP NOTE
Group Topic: Other   Group Date: 4/7/2025  Start Time: 1330  End Time: 1430  Facilitators: EVIN GroverS   Department: [facility] REHABTH VIRTUAL    Number of Participants: 6   Group Focus: concentration, leisure skills, self-esteem, and social skills  Treatment Modality: Leisure Development and Other: Recreation Therapy  Interventions utilized were group exercise, leisure development, mental fitness, and reminiscence  Purpose: Patients engaged in group game of Catch Phrase. This game can help to improve frustration tolerance and coping and strategic skills, improving social communication, and building rapport for a positive social interaction while encouraging flexibility in thinking.      Name: Nevaeh Cuba YOB: 1955   MR: [id number]      Facilitator: Recreational Therapist  Level of Participation: active  Quality of Participation: appropriate/pleasant, attentive, cooperative, and engaged  Interactions with others: appropriate  Mood/Affect: appropriate and blunted  Cognition: coherent/clear  Progress: Moderate  Comments: pt problem is delusional thought. Increased participation. Able to follow game directions. Displays appropriate behaviors and mood.   Plan: continue with services

## 2025-04-07 NOTE — GROUP NOTE
Group Topic: Feeling Awareness/Expression   Group Date: 4/6/2025  Start Time: 2000  End Time: 2100  Facilitators: Ana Heller, RN   Department: [facility] Geriatric     Number of Participants: 4   Group Focus: feeling awareness/expression  Treatment Modality: Patient-Centered Therapy  Interventions utilized were problem solving  Purpose: feelings    Name: Nevaeh Cuba YOB: 1955   MR: [id number]      Facilitator: Registered Nurse  Level of Participation: active  Quality of Participation: cooperative  Interactions with others: appropriate  Mood/Affect: brightens with interaction and depressed  Triggers (if applicable): n/a    Cognition: coherent/clear  Progress: Gaining insight or knowledge  Comments: no  Plan: continue with services

## 2025-04-07 NOTE — PROGRESS NOTES
Occupational Therapy    OT Treatment    Patient Name: Nevaeh Cuba  MRN: [id number]  Department: Room: [address] [place] [address]  Today's Date: 4/7/2025  Time Calculation  Start Time: 1125  Stop Time: 1142  Time Calculation (min): 17 min        Assessment:  OT Assessment: Pt pleasant and willing to participate. Remains motivated to participate. Strength improving. Will continue to work towards goals in POC for safe D/C.  Prognosis: Good  Barriers to Discharge Home: Caregiver assistance  Caregiver Assistance: Patient lives alone and/or does not have reliable caregiver assistance  Evaluation/Treatment Tolerance: Patient tolerated treatment well  Medical Staff Made Aware: Yes  End of Session Communication: Bedside nurse, PCT/NA/CTA  End of Session Patient Position: Up in chair, Alarm on  OT Assessment Results: Decreased ADL status, Decreased functional mobility, Decreased endurance  Prognosis: Good  Barriers to Discharge: Decreased caregiver support  Evaluation/Treatment Tolerance: Patient tolerated treatment well  Medical Staff Made Aware: Yes  Strengths: Premorbid level of function  Barriers to Participation: Insight into problems  Plan:  Treatment Interventions: Functional transfer training, UE strengthening/ROM, Endurance training  OT Frequency: 2 times per week  OT Discharge Recommendations: Low intensity level of continued care  Equipment Recommended upon Discharge:  (TBD)  OT Recommended Transfer Status: Assist of 1  OT - OK to Discharge: Yes  Treatment Interventions: Functional transfer training, UE strengthening/ROM, Endurance training    Subjective   Previous Visit Info:  OT Last Visit  OT Received On: 04/07/25  General:  General  Reason for Referral: Impaired mobility/balance associated w/escalating mental health issues  Referred By: Deandre Marin DO  Past Medical History Relevant to Rehab: HTN, afib, Meniere's, hyperthyroid, OA, anxiety, sensorineural hearing loss, h/p mental and behavioral d/o  Prior to Session  Communication: Bedside nurse  Patient Position Received: Up in chair, Alarm on  Preferred Learning Style: verbal, visual  General Comment: Rn and pt agreeable to OT session.  Precautions:  Hearing/Visual Limitations: glasses, hearing loss  Medical Precautions: Fall precautions            Pain:  Pain Assessment  Pain Assessment: 0-10  0-10 (Numeric) Pain Score: 0 - No pain    Objective    Cognition:  Cognition  Orientation Level: Appropriate for developmental age, Oriented X4  Insight: Moderate  Flexibility of Thought: Reduced flexibility  Planning: Reduced planning skills  Processing Speed: Delayed       Bed Mobility/Transfers:      Transfer 1  Transfer From 1: Sit to  Transfer to 1: Stand  Technique 1: Sit to stand, Stand to sit  Transfer Device 1: Gait belt  Transfer Level of Assistance 1: Close supervision  Trials/Comments 1: x3 during session.      Functional Mobility:  Functional Mobility 1  Surface 1: Level tile  Device 1: No device  Functional Mobility Support Devices: Gait belt  Assistance 1: Close supervision, Contact guard  Quality of Functional Mobility 1: Inconsistent stride length  Comments 1: Performed functional mobility in room for item retirval. Performed without AE with CS & intermittent touching for stability. Pt with decreased endurance and balance with no overt LOB. Pt reaching for exteral support without device       Therapy/Activity: Therapeutic Exercise  Therapeutic Exercise Activity 1: Pt was able to perform seated B/L exercises w/ #1 x15 reps. Shoulder flexion, extension, elbow flexion, wrist flexion. extension, supination, pronation.      Education Documentation  Handouts, taught by YUNG Pool at 4/7/2025 11:53 AM.  Learner: Patient  Readiness: Acceptance  Method: Explanation  Response: Verbalizes Understanding, Needs Reinforcement    Body Mechanics, taught by YUNG Pool at 4/7/2025 11:53 AM.  Learner: Patient  Readiness: Acceptance  Method: Explanation  Response:  Verbalizes Understanding, Needs Reinforcement    Precautions, taught by YUNG Pool at 4/7/2025 11:53 AM.  Learner: Patient  Readiness: Acceptance  Method: Explanation  Response: Verbalizes Understanding, Needs Reinforcement    Home Exercise Program, taught by YUNG Pool at 4/7/2025 11:53 AM.  Learner: Patient  Readiness: Acceptance  Method: Explanation  Response: Verbalizes Understanding, Needs Reinforcement    ADL Training, taught by YUNG Pool at 4/7/2025 11:53 AM.  Learner: Patient  Readiness: Acceptance  Method: Explanation  Response: Verbalizes Understanding, Needs Reinforcement    Education Comments  No comments found.        OP EDUCATION:  Education  Individual(s) Educated: Patient  Education Provided: Fall precautons  Risk and Benefits Discussed with Patient/Caregiver/Other: yes  Patient/Caregiver Demonstrated Understanding: yes  Plan of Care Discussed and Agreed Upon: yes  Patient Response to Education: Patient/Caregiver Verbalized Understanding of Information, Patient/Caregiver Performed Return Demonstration of Exercises/Activities  Education Comment: pt receptive to education    Goals:  Encounter Problems       Encounter Problems (Active)       OT Goals       ADL's (Progressing)       Start:  04/01/25    Expected End:  04/15/25       Patient will complete ADL tasks, with independent using AE need in order to increase patient's safety and independence with self-care tasks.           Functional transfers (Progressing)       Start:  04/01/25    Expected End:  04/15/25       Patient will complete functional transfers with independent using least restrictive device, in order to increase patient's safety and independence with daily tasks.           Activity tolerance (Progressing)       Start:  04/01/25    Expected End:  04/15/25       Patient will demonstrate the ability to participate in functional activity at least >/= 25 minutes in order to increase patient's safety and  independence with daily tasks.

## 2025-04-07 NOTE — CONSULTS
Nutrition Assessement Note    Nutrition Assessment    Reason for Assessment: Length of stay    Reason for Hospital Admission:  Nevaeh Cuba is a 69 y.o. female who is admitted for psychosis.    Malnutrition Screening Tool (MST)  Have you recently lost weight without trying?: No  If yes, how much weight have you lost?: Unsure  Weight Loss Score: 0  Have you been eating poorly because of a decreased appetite?: No  Malnutrition Score: 0  Nutrition Screen  Stage 3 or 4 Pressure Injury or Multiple Non-Healing Wounds: No  Home Tube Feeding or Total Parenteral Nutrition (TPN): No  Dietitian Consult Needed: No    Past Medical History:   Diagnosis Date    Benign essential hypertension     Estrogen deficiency 11/15/2023    DEXA 1/24 back nl, hip neck T-2.1 recheck 1/26    History of atrial fibrillation     Hyperactive thyroid Dr.Burtch Rand Burnham NSR no AC    History of breast lift 05/2024    History of Meniere's syndrome 11/15/2023    Hx of reduction mammoplasty 05/2024    Hyperthyroidism 09/15/2023    Other specified abnormal findings of blood chemistry     High serum cholesterol sulfate    Personal history of malignant neoplasm, unspecified     History of malignant neoplasm    Personal history of other mental and behavioral disorders     Unspecified osteoarthritis, unspecified site 04/28/2016    Arthritis      Past Surgical History:   Procedure Laterality Date    COSMETIC SURGERY      HYSTERECTOMY  04/28/2016    Hysterectomy    JOINT REPLACEMENT      rt ring finger    JOINT REPLACEMENT Right 05/2022    ring finger    OTHER SURGICAL HISTORY  04/28/2016    Musculoskeletal Procedures Injection Carpal Tunnel    OTHER SURGICAL HISTORY  04/28/2016    Arthrodesis MCP Joint    OTHER SURGICAL HISTORY  01/11/2021    Ankle surgery    OTHER SURGICAL HISTORY  01/11/2021    Carpal tunnel surgery    OTHER SURGICAL HISTORY  07/06/2022    Nasal septoplasty    OTHER SURGICAL HISTORY  07/2021    heart shock catherization    REDUCTION  "MAMMAPLASTY  may 7 2024    SHOULDER SURGERY  08/2021    total left shoulder replacement    SINUS SURGERY      TRIGGER FINGER RELEASE Right 01/2024    middle finger     Nutrition History:  Energy Intake: Poor < 50 %  Food and Nutrient History: po intake averages 28% over the past 10 meals documented    Anthropometrics:  Ht: 167.6 cm (5' 6\"), Wt: 75.8 kg (167 lb), BMI: 26.97    Weight Change:  Daily Weight  03/28/25 : 75.8 kg (167 lb)  03/28/25 : 75.8 kg (167 lb)  03/21/25 : 76 kg (167 lb 8.8 oz)  03/14/25 : 76.2 kg (168 lb)  01/06/25 : 83 kg (183 lb)  10/16/24 : 83 kg (183 lb)  09/20/24 : 83.9 kg (185 lb)  08/04/24 : 83 kg (182 lb 15.7 oz)  08/02/24 : 82.6 kg (182 lb 3.2 oz)  07/24/24 : 83.5 kg (184 lb 1.4 oz)  07/03/24 : 83.9 kg (185 lb)  06/25/24 : 85.3 kg (188 lb)  05/24/24 : 89.8 kg (198 lb)  05/07/24 : 90.3 kg (199 lb)  04/23/24 : 90.7 kg (200 lb)    Weight History / % Weight Change: per wt hx, pt with a 16# (8.1%) wt loss over ~2.5 months (5/24/24-8/2/24) followed by stable wt from Aug 2024-Jan 2025. most recently pt with an additional 16# (8.7%) wt loss over the past ~2.5 months (1/6-3/28).  Significant Weight Loss: Yes    Nutrition Focused Physical Exam Findings: defer: not appropriate    Nutrition Significant Labs:  Lab Results   Component Value Date    WBC 9.9 03/28/2025    HGB 17.5 (H) 03/28/2025    HCT 48.7 (H) 03/28/2025     03/28/2025    CHOL 142 03/29/2025    TRIG 77 03/29/2025    HDL 59.7 03/29/2025    ALT 27 04/02/2025    AST 17 04/02/2025     04/02/2025    K 3.5 04/02/2025    CL 99 04/02/2025    CREATININE 0.96 04/02/2025    BUN 38 (H) 04/02/2025    CO2 28 04/02/2025    TSH 3.26 03/07/2025    INR 1.0 10/05/2021    GLUF 134 (H) 03/29/2025    HGBA1C 5.5 06/25/2024     Nutrition Specific Medications:  amLODIPine, 5 mg, oral, Daily  atorvastatin, 20 mg, oral, Daily  cholecalciferol, 2,000 Units, oral, Daily  fLuvoxaMINE, 75 mg, oral, Nightly  melatonin, 3 mg, oral, Daily  methIMAzole, 5 mg, " oral, Daily  OLANZapine, 10 mg, oral, Nightly  OXcarbazepine, 300 mg, oral, q12h CAROLINE  polyethylene glycol, 17 g, oral, Daily      Dietary Orders (From admission, onward)       Start     Ordered    04/07/25 1250  Oral nutritional supplements  Until discontinued        Question Answer Comment   Deliver with Breakfast    Deliver with Dinner    Select supplement: Ensure Plus High Protein        04/07/25 1250    03/28/25 2141  Adult diet Regular  Diet effective now        Comments: Low fat, Low Calorie   Question:  Diet type  Answer:  Regular    03/28/25 2141                  Estimated Needs:   Estimated Energy Needs  Total Energy Estimated Needs in 24 hours (kCal): 1898 kCal  Energy Estimated Needs per kg Body Weight in 24 hours (kCal/kg): 25 kCal/kg  Method for Estimating Needs: actual wt    Estimated Protein Needs  Total Protein Estimated Needs in 24 Hours (g): 76 g  Protein Estimated Needs per kg Body Weight in 24 Hours (g/kg): 1 g/kg  Method for Estimating 24 Hour Protein Needs: actual wt    Estimated Fluid Needs  Method for Estimating 24 Hour Fluid Needs: 1 ml/kcal or per MD        Nutrition Diagnosis   Nutrition Diagnosis:  Malnutrition Diagnosis  Patient has Malnutrition Diagnosis: Yes  Diagnosis Status: New  Malnutrition Diagnosis: Severe malnutrition related to acute disease or injury  Related to: decreased ability to consume/tolerate sufficient energy  As Evidenced by: po intake </= 50% estimated needs for >/= 5 days, 16# (8.7%) wt loss over ~2.5 months       Nutrition Interventions/Recommendations   Nutrition Interventions and Recommendations:  Nutrition Prescription: Nutrition prescription for oral nutrition    Nutrition Recommendations:  Individualized Nutrition Prescription Provided for : regular diet with ensure plus high protein BID    Nutrition Interventions/Goals:   Food and/or Nutrient Delivery Interventions  Interventions: Meals and snacks, Medical food supplement  Meals and Snacks: General healthful  diet  Goal: provide as ordered  Medical Food Supplement: Commercial beverage medical food supplement therapy  Goal: ensure plus high protein BID to provide 350 kcals and 20g protein each  Additional Interventions: suggest monitoring for wt changes on a weekly basis    Education Documentation  No documentation found.           Nutrition Monitoring and Evaluation   Monitoring/Evaluation:   Food/Nutrient Related History Monitoring  Monitoring and Evaluation Plan: Estimated Energy Intake  Estimated Energy Intake: Energy intake greater or equal to 75% of estimated energy needs    Anthropometric Measurements  Monitoring and Evaluation Plan: Body weight  Body Weight: Body weight - Maintain stable weight    Goal Status: New goal(s) identified    Follow Up  Time Spent (min): 30 minutes  Last Date of Nutrition Visit: 04/07/25  Nutrition Follow-Up Needed?: 3-5 days  Follow up Comment: 4/11/25

## 2025-04-07 NOTE — CARE PLAN
The patient's goals for the shift include     The clinical goals for the shift include Attend groups/ maintain patient safety/ medication compliance    Problem: Risk for Suicide  Goal: Accepts medications as prescribed/needed this shift  Outcome: Progressing  Goal: Makes needs known through verbalization or behaviors this shift  Outcome: Progressing  Goal: No self harm this shift  Outcome: Progressing     Problem: Fall/Injury  Goal: Not fall by end of shift  Outcome: Progressing  Goal: Be free from injury by end of the shift  Outcome: Progressing  Goal: Use assistive devices by end of the shift  Outcome: Progressing

## 2025-04-07 NOTE — NURSING NOTE
TAZ NOTE     Problem:  Hopelessness  Paranoia  Anxiety  Behavior:  Patient is cooperative with care and medication administration. Flat affect. Patient is observed self-propelling in the hallway in her wheelchair.   Group Participation: Attends groups  Appetite/Meals: Fair       Interventions:  Medications administered per MAR.     Response:  Medication compliant.      Plan:  Continue current plan of care.

## 2025-04-07 NOTE — GROUP NOTE
Group Topic: Other   Group Date: 4/7/2025  Start Time: 1030  End Time: 1130  Facilitators: EVNI GroverS   Department: [facility]    Number of Participants: 7   Group Focus: personal responsibility, problem solving, and self-awareness  Treatment Modality: Psychoeducation, Skills Training, and Other: Recreation Therapy  Interventions utilized were exploration, group exercise, patient education, problem solving, and support  Purpose: Patients engaged in group discussion based on the topic of 'The [place] of control”,?which aims to promote the conscious effort to improve one's self by taking responsibility and creating positive changes in various aspects in one's life and the acceptance on things that cannot be changed. Patients discussed how taking an active role in things of their control, change or influence they can create better outcomes, increase satisfaction and well-being. We also discussed by focusing on the things we cannot control we are focusing on the things that can cause increased stress, anxiety, worry etc. We also discussed our emotions when we feel those reactions.           Name: Nevaeh Cuba YOB: 1955   MR: [id number]      Facilitator: Recreational Therapist  Level of Participation: minimal  Quality of Participation: appropriate/pleasant, cooperative, and quiet  Interactions with others:  quiet  Mood/Affect: flat  Cognition: coherent/clear  Progress: Moderate  Comments: pt problem is delusional thought.  Mostly quiet displaying flat affect. Engages when directly prompted. Appears to understand discussion topic,, responding appropriately.   Plan: continue with services

## 2025-04-07 NOTE — PROGRESS NOTES
LSW met with pt, pt's sister Laura, and aunt Farideh to discuss discharge planning. Pt and pt's family have made the decision that pt will not return to her condo. Pt's family would like AL and NH placement to be considered. LSW explained that pt likely would not meet LOC for NH as well as that there is an assessment that takes place. They would like to continue to pursue both options. LSW will submit PASRR and look into available placement options.     MARCELA CorleyW

## 2025-04-07 NOTE — NURSING NOTE
BIRP NOTE     Problem:  Depression     Behavior:  Pt is calm and pleasant. Continues to maintain a flat/blunted affect. Minimal interaction with peers and staff. Slow movements and responses. Denies SI.   Group Participation: Attends  Appetite/Meals: hs snack provided       Interventions:  1;1 time provided. HS medications administered per MAR    Response:  Medication compliant. No s/s of distress noted.      Plan:  Continue to monitor and assist. Maintain q15 minute rounds for safety.

## 2025-04-07 NOTE — PROGRESS NOTES
"Nevaeh Cuba is a 69 y.o. female on day 10 of admission presenting with Psychosis, unspecified psychosis type (Multi).      Subjective   Chart reviewed and case discussed with nursing. Provider met with sister, aunt and patient and discussed higher level of care which patient appears lul starting to see a need for. She says seh know she can't live alone. She reports sleeping well, appetite is good and denies any adverse side ffects of medications.        Objective     Last Recorded Vitals  Blood pressure 132/82, pulse 92, temperature 36.8 °C (98.2 °F), temperature source Temporal, resp. rate 16, height 1.676 m (5' 6\"), weight 75.8 kg (167 lb), SpO2 99%.    Review of Systems    Psychiatric ROS - Adult  Anxiety: General Anxiety Disorder (LANDON)LANDON Behaviors: difficult to control worry, excessive anxiety/worry, difficulty concentrating, restlessness, and sleep disturbance - symptoms continue   Depression: anhedonia, concentration, energy, guilt, helpless, hopeless, interest, persistent thoughts of death, sleep decreased , suicidal thoughts, and withdrawn -  symptoms continue  Delirium: negative  Psychosis: auditory hallucinations and visual hallucinations - not endorsed nor observed today  Usha:  none noted or reported but has decreased sleep and increased spending hx  Safety Issues: suicidal ideation - denies SI/HI today  Psychiatric ROS Comment: less psychotic flavor in presentation- more settled    Physical Exam  Vitals and nursing note reviewed.   Pulmonary:      Effort: Pulmonary effort is normal.   Neurological:      Mental Status: She is alert.     Mental Status Exam  General: adequately groomed appears stated age  Appearance: hospital attire  Attitude: pleasant  Behavior: cooperative  Motor Activity: in wheel chair for safety  Speech: normal rate and  low volume  Mood: \"worried about paying the bills\"  Affect: constricted  Thought Process: focused on getting more personal clothes, otherwise does not provide " "much through discussion today  Thought Content: limited. denies SI/HI denies paranoia  Thought Perception: denies AH/VH, does not appear stimulated  Cognition: alert and oriented x4  Insight: patient knows she needs treatment for psychiatric illness  Judgement: patient is able to participate in medical decsion making    Psychiatric Risk Assessment  Violence Risk Assessment: major mental illness and other psychosis  Acute Risk of Harm to Others is Considered: low   Suicide Risk Assessment: age > 65 yrs old, , current psychiatric illness, feelings of hopelessness, living alone or lack of social support, suicidal ideations, and other guilt cause spent money was supoosed to pay for mother and  in nursing home \"on stuff that is all over the condo\"  Protective Factors against Suicide: adherence to  treatment and sense of responsibility toward family  Acute Risk of Harm to Self is Considered: low      Relevant Results          No results found. However, due to the size of the patient record, not all encounters were searched. Please check Results Review for a complete set of results.       Assessment/Plan   Assessment & Plan  Psychosis, unspecified psychosis type (Multi)    Anxiety    Insomnia    Patient is a 70 yo female dx with MDD, LANDON with psychotic features (obsessive features). She says she spent all the money she was supposed to pay for her  and mothers nursing home on \"stuff that is all over my condo\". She denies dx of bipolar disorder, does not present as manic at this time but presents with psychotic flavor, almost schizotypal features.       Biological   - continue Luvox 75mg every day for anxiety and depression  - continue trileptal 300mg bid for mood stabilization?  - says she has taken lithium in the past and it makes her dizzy, denies ever taking depakote  - cont zyprexa 10mg at bedtime for psychosis and mood stabilization (monitor for somnolence renal values are a little elevated)  - " mignon lindsay  - medical pertinences appreciated     Psychological     Provider encouraged patient to attend groups on unit.     Sociological     Provider encouraged patient to work with social workon discharge plan. She planned to return to her condo initially but now says she thinks she can't care for herself there anymore. Planned family meeting on Monday at 3pm accomplished- patientis ambivalent at this point about where she should go from here- will let her think it over and discuss again tomorrow.       Total time spent with patient encounter 35 minutes. This includes patient interview, assessment, extensive chart review, personal review of labs and images as noted above, and formulation of recommendations.

## 2025-04-07 NOTE — PROGRESS NOTES
[facility]: [facility]  PROGRESS NOTE      Nevaeh Cuba is a 69 y.o. female that is being seen  today for follow-up at [facility]..  Subjective   Patient is being seen for follow-up at [facility].  Patient's blood pressure was elevated after stopping triamterene hydrochlorothiazide.  Patient has been started on amlodipine.  Will repeat lab work      ROS  Negative for fever or chills  Negative for sore throat, ear pain, nasal discharge  Negative for cough, shortness of breath or wheezing  Negative for chest pain, palpitations, swelling of legs  Negative for abdominal pain, constipation, diarrhea, blood in the stools  Negative for urinary complaints  Negative for headache, dizziness, weakness or numbness  Negative for joint pain  Positive for depression or anxiety  All other systems reviewed and were negative   Vitals:    04/06/25 1539   BP: 130/84   Pulse: 92   Resp:    Temp: 36.6 °C (97.9 °F)   SpO2: 97%      Vitals:    03/28/25 1819   Weight: 75.8 kg (167 lb)     Body mass index is 26.95 kg/m².  Physical Exam  Constitutional: Patient does not appear to be in any acute distress  Head and Face: NCAT  Eyes: Normal external exam, EOMI  ENT: Normal external inspection of ears and nose. Oropharynx normal.  Cardiovascular: RRR, S1/S2, no murmurs, rubs, or gallops, radial pulses +2, no edema of extremities  Pulmonary: CTAB, no respiratory distress.  Abdomen: +BS, soft, non-tender, nondistended, no guarding or rebound, no masses noted  MSK: No joint swelling, normal movements of all extremities. Range of motion- normal.  Skin- No lesions, contusions, or erythema.  Peripheral puslses palpable bilaterally 2+  Neuro: AAO X3, Cranial nerves 2-12 grossly intact,DTR 2+ in all 4 limbs       LABS   [unfilled]  Lab Results   Component Value Date    GLUCOSE 115 (H) 04/02/2025    CALCIUM 10.1 04/02/2025     04/02/2025    K 3.5 04/02/2025    CO2 28 04/02/2025    CL 99 04/02/2025    BUN 38 (H) 04/02/2025     CREATININE 0.96 04/02/2025     Lab Results   Component Value Date    ALT 27 04/02/2025    AST 17 04/02/2025    ALKPHOS 70 04/02/2025    BILITOT 0.6 04/02/2025     Lab Results   Component Value Date    WBC 9.9 03/28/2025    HGB 17.5 (H) 03/28/2025    HCT 48.7 (H) 03/28/2025    MCV 88 03/28/2025     03/28/2025     Lab Results   Component Value Date    CHOL 142 03/29/2025    CHOL 224 (H) 04/12/2024    CHOL 194 10/06/2023     Lab Results   Component Value Date    HDL 59.7 03/29/2025    HDL 58.0 04/12/2024    HDL 58.0 10/06/2023     Lab Results   Component Value Date    LDLCALC 67 03/29/2025    LDLCALC 102 04/12/2024    LDLCALC 88 10/06/2023     Lab Results   Component Value Date    TRIG 77 03/29/2025    TRIG 318 (H) 04/12/2024    TRIG 240 (H) 10/06/2023     Lab Results   Component Value Date    HGBA1C 5.5 06/25/2024     Other labs not included in the list above were reviewed either before or during this encounter.    History    Past Medical History:   Diagnosis Date    Benign essential hypertension     Estrogen deficiency 11/15/2023    DEXA 1/24 back nl, hip neck T-2.1 recheck 1/26    History of atrial fibrillation     Hyperactive thyroid Dr.Burtch Rand Burnham NSR no AC    History of breast lift 05/2024    History of Meniere's syndrome 11/15/2023    Hx of reduction mammoplasty 05/2024    Hyperthyroidism 09/15/2023    Other specified abnormal findings of blood chemistry     High serum cholesterol sulfate    Personal history of malignant neoplasm, unspecified     History of malignant neoplasm    Personal history of other mental and behavioral disorders     Unspecified osteoarthritis, unspecified site 04/28/2016    Arthritis     Past Surgical History:   Procedure Laterality Date    COSMETIC SURGERY      HYSTERECTOMY  04/28/2016    Hysterectomy    JOINT REPLACEMENT      rt ring finger    JOINT REPLACEMENT Right 05/2022    ring finger    OTHER SURGICAL HISTORY  04/28/2016    Musculoskeletal Procedures Injection  Carpal Tunnel    OTHER SURGICAL HISTORY  04/28/2016    Arthrodesis MCP Joint    OTHER SURGICAL HISTORY  01/11/2021    Ankle surgery    OTHER SURGICAL HISTORY  01/11/2021    Carpal tunnel surgery    OTHER SURGICAL HISTORY  07/06/2022    Nasal septoplasty    OTHER SURGICAL HISTORY  07/2021    heart shock catherization    REDUCTION MAMMAPLASTY  may 7 2024    SHOULDER SURGERY  08/2021    total left shoulder replacement    SINUS SURGERY      TRIGGER FINGER RELEASE Right 01/2024    middle finger     Family History   Problem Relation Name Age of Onset    Stroke Mother      Other (cyst on breast) Mother      Atrial fibrillation Father      Stroke Father      Heart disease Father       Allergies   Allergen Reactions    Latex Itching and Rash    Methotrexate Itching     HEAD BURNING AND ITCHING    Metoprolol Rash    Diltiazem Hcl Itching     Rash face     Latex, Natural Rubber Itching    Pollen Extracts Other     NASAL CONGESTION    Adhesive Tape-Silicones Rash    Folic Acid Rash    Sulfamethoxazole-Trimethoprim Rash     Tinnitus      No current facility-administered medications on file prior to encounter.     Current Outpatient Medications on File Prior to Encounter   Medication Sig Dispense Refill    acetaminophen (Tylenol) 325 mg tablet Take 2 tablets (650 mg) by mouth every 6 hours if needed for mild pain (1 - 3) 20 tablet 0    atorvastatin (Lipitor) 20 mg tablet Take 1 tablet (20 mg) by mouth once daily. 90 tablet 3    cholecalciferol (Vitamin D-3) 25 MCG (1000 UT) tablet Take 2 tablets (2,000 Units) by mouth. As directed      docusate sodium (Colace) 100 mg capsule Take 1 capsule (100 mg) by mouth once daily as needed.      fexofenadine HCl (ALLEGRA ORAL) Take 1 tablet by mouth once daily as needed (ALLERGIES).      fLuvoxaMINE (Luvox) 50 mg tablet Take 1 tablet (50 mg) by mouth early in the morning..      hydrOXYzine HCL (Atarax) 25 mg tablet Take 2 tablets (50 mg) by mouth once daily at bedtime for 10 days. 20 tablet  0    ibuprofen 200 mg tablet Take 2 tablets (400 mg) by mouth every 6 hours if needed for mild pain (1 - 3).      methIMAzole (Tapazole) 5 mg tablet Take 1 tablet (5 mg) by mouth once daily. 90 tablet 3    mv-min-FA-vit K-lutein-zeaxant (PreserVision AREDS 2 Plus MV) 200 mcg-15 mcg- 5 mg-1 mg capsule Take 1 capsule by mouth once daily.      OXcarbazepine (Trileptal) 150 mg tablet Take 1 tablet (150 mg) by mouth 2 times a day.      OXcarbazepine (Trileptal) 300 mg tablet Take 1 tablet (300 mg) by mouth every 12 hours.      propranolol (Inderal) 10 mg tablet Take 1 tablet (10 mg) by mouth every 8 hours if needed for anxiety.      triamterene-hydrochlorothiazid (Maxzide-25) 37.5-25 mg tablet Take 1 tablet by mouth once daily in the morning. 90 tablet 3     Immunization History   Administered Date(s) Administered    COVID-19, subunit, rS-nanoparticle, adjuvanted, PF, 5 mcg/0.5 mL 09/28/2024    Flu vaccine, quadrivalent, high-dose, preservative free, age 65y+ (FLUZONE) 09/14/2023    Influenza, Seasonal, Quadrivalent, Adjuvanted 09/30/2021, 09/08/2022    Influenza, Unspecified 09/07/2010, 10/17/2011    Influenza, injectable, MDCK, quadrivalent 10/23/2017, 10/30/2018    Influenza, injectable, quadrivalent 09/23/2019, 09/11/2020    Influenza, seasonal, injectable 09/24/2012, 10/17/2013, 10/23/2014, 10/26/2015, 11/17/2016    Moderna COVID-19 vaccine, 12 years and older (50mcg/0.5mL)(Spikevax) 09/29/2023    Pfizer COVID-19 vaccine, bivalent, age 12 years and older (30 mcg/0.3 mL) 09/08/2022    Pfizer Gray Cap SARS-CoV-2 03/31/2022    Pfizer Purple Cap SARS-CoV-2 03/09/2021, 04/06/2021, 10/06/2021    Pneumococcal conjugate vaccine, 13-valent (PREVNAR 13) 07/11/2013    Pneumococcal conjugate vaccine, 20-valent (PREVNAR 20) 09/16/2023    Pneumococcal polysaccharide vaccine, 23-valent, age 2 years and older (PNEUMOVAX 23) 11/05/2020    RSV, 60 Years And Older (AREXVY) 09/16/2023    Tdap vaccine, age 7 year and older (BOOSTRIX,  ADACEL) 09/30/2021    Zoster vaccine, recombinant, adult (SHINGRIX) 10/15/2020, 09/30/2021    Zoster, live 02/10/2012     Patient's medical history was reviewed and updated either before or during this encounter.  ASSESSMENT / PLAN:  Active Hospital Problems    *Psychosis, unspecified psychosis type (Multi)      Anxiety      Insomnia      Sensorineural hearing loss (SNHL) of both ears      Essential hypertension      Hyperlipidemia      Hyperthyroidism    Patient is being seen for follow-up at [facility].  Anxiety has been stable.  Patient's blood pressure was elevated yesterday and started on amlodipine.  Blood pressure is better now we will continue to monitor.

## 2025-04-08 PROCEDURE — 99233 SBSQ HOSP IP/OBS HIGH 50: CPT | Performed by: INTERNAL MEDICINE

## 2025-04-08 PROCEDURE — 2500000002 HC RX 250 W HCPCS SELF ADMINISTERED DRUGS (ALT 637 FOR MEDICARE OP, ALT 636 FOR OP/ED): Performed by: REGISTERED NURSE

## 2025-04-08 PROCEDURE — 2500000001 HC RX 250 WO HCPCS SELF ADMINISTERED DRUGS (ALT 637 FOR MEDICARE OP): Performed by: REGISTERED NURSE

## 2025-04-08 PROCEDURE — 2500000005 HC RX 250 GENERAL PHARMACY W/O HCPCS: Performed by: STUDENT IN AN ORGANIZED HEALTH CARE EDUCATION/TRAINING PROGRAM

## 2025-04-08 PROCEDURE — 99232 SBSQ HOSP IP/OBS MODERATE 35: CPT | Performed by: REGISTERED NURSE

## 2025-04-08 PROCEDURE — 2500000001 HC RX 250 WO HCPCS SELF ADMINISTERED DRUGS (ALT 637 FOR MEDICARE OP): Performed by: STUDENT IN AN ORGANIZED HEALTH CARE EDUCATION/TRAINING PROGRAM

## 2025-04-08 PROCEDURE — 1140000001 HC PRIVATE PSYCH ROOM DAILY

## 2025-04-08 PROCEDURE — 2500000001 HC RX 250 WO HCPCS SELF ADMINISTERED DRUGS (ALT 637 FOR MEDICARE OP): Performed by: INTERNAL MEDICINE

## 2025-04-08 RX ADMIN — AMLODIPINE BESYLATE 5 MG: 5 TABLET ORAL at 08:25

## 2025-04-08 RX ADMIN — FLUVOXAMINE MALEATE 75 MG: 50 TABLET, COATED ORAL at 20:23

## 2025-04-08 RX ADMIN — METHIMAZOLE 5 MG: 5 TABLET ORAL at 08:25

## 2025-04-08 RX ADMIN — OXCARBAZEPINE 300 MG: 300 TABLET, FILM COATED ORAL at 06:38

## 2025-04-08 RX ADMIN — Medication 2000 UNITS: at 08:25

## 2025-04-08 RX ADMIN — OXCARBAZEPINE 300 MG: 300 TABLET, FILM COATED ORAL at 17:33

## 2025-04-08 RX ADMIN — ATORVASTATIN CALCIUM 20 MG: 20 TABLET, FILM COATED ORAL at 20:24

## 2025-04-08 RX ADMIN — MELATONIN TAB 3 MG 3 MG: 3 TAB at 17:33

## 2025-04-08 RX ADMIN — OLANZAPINE 10 MG: 10 TABLET ORAL at 20:24

## 2025-04-08 ASSESSMENT — COLUMBIA-SUICIDE SEVERITY RATING SCALE - C-SSRS
1. IN THE PAST MONTH, HAVE YOU WISHED YOU WERE DEAD OR WISHED YOU COULD GO TO SLEEP AND NOT WAKE UP?: YES
5. HAVE YOU STARTED TO WORK OUT OR WORKED OUT THE DETAILS OF HOW TO KILL YOURSELF? DO YOU INTEND TO CARRY OUT THIS PLAN?: NO
6. HAVE YOU EVER DONE ANYTHING, STARTED TO DO ANYTHING, OR PREPARED TO DO ANYTHING TO END YOUR LIFE?: YES
4. HAVE YOU HAD THESE THOUGHTS AND HAD SOME INTENTION OF ACTING ON THEM?: NO
6. HAVE YOU EVER DONE ANYTHING, STARTED TO DO ANYTHING, OR PREPARED TO DO ANYTHING TO END YOUR LIFE?: YES
2. HAVE YOU ACTUALLY HAD ANY THOUGHTS OF KILLING YOURSELF?: YES
6. HAVE YOU EVER DONE ANYTHING, STARTED TO DO ANYTHING, OR PREPARED TO DO ANYTHING TO END YOUR LIFE?: YES
1. IN THE PAST MONTH, HAVE YOU WISHED YOU WERE DEAD OR WISHED YOU COULD GO TO SLEEP AND NOT WAKE UP?: YES
6. HAVE YOU EVER DONE ANYTHING, STARTED TO DO ANYTHING, OR PREPARED TO DO ANYTHING TO END YOUR LIFE?: YES
1. IN THE PAST MONTH, HAVE YOU WISHED YOU WERE DEAD OR WISHED YOU COULD GO TO SLEEP AND NOT WAKE UP?: YES

## 2025-04-08 ASSESSMENT — PAIN - FUNCTIONAL ASSESSMENT: PAIN_FUNCTIONAL_ASSESSMENT: 0-10

## 2025-04-08 ASSESSMENT — PAIN SCALES - GENERAL
PAINLEVEL_OUTOF10: 0 - NO PAIN
PAINLEVEL_OUTOF10: 0 - NO PAIN

## 2025-04-08 NOTE — GROUP NOTE
Group Topic: Self-Care/Wellness   Group Date: 4/8/2025  Start Time: 1115  End Time: 1200  Facilitators: SUSAN Oreilly   Department: [facility]    Number of Participants: 5   Group Focus: other Stop Overthinking!  Treatment Modality: Other: Recreation Therapy  Interventions utilized were exploration, group exercise, patient education, problem solving, and support  Purpose: coping skills, maladaptive thinking, feelings, irrational fears, communication skills, insight or knowledge, self-worth, self-care, relapse prevention strategies, and trigger / craving management    Name: Nevaeh Cuba YOB: 1955   MR: [id number]      Facilitator: Recreational Therapist  Level of Participation: moderate  Quality of Participation: appropriate/pleasant, attentive, and cooperative  Interactions with others: appropriate and gave feedback  Mood/Affect: appropriate and blunted  Triggers (if applicable): n/a  Cognition: coherent/clear  Progress: Moderate  Comments: pt problem is delusional thought.  Pt joins group with little encouragement.  Pt displays a quicker response time with questions/comments.  Affect is brighter also.   Plan: continue with services

## 2025-04-08 NOTE — GROUP NOTE
Group Topic: Medication Education   Group Date: 4/8/2025  Start Time: 1005  End Time: 1100  Facilitators: Jayne Powell PharmD   Department: [facility]    Number of Participants: 3   Group Focus: daily focus and other General Medication Education  Treatment Modality: Skills Training  Interventions utilized were group exercise, other BINGO game, and patient education  Purpose: self-care and other: improved medication compliance     Name: Nevaeh Cuba YOB: 1955   MR: [id number]      Facilitator: Pharmacist  Level of Participation: moderate  Quality of Participation: appropriate/pleasant, attentive, cooperative, and quiet  Interactions with others: appropriate  Mood/Affect: blunted  Triggers (if applicable): n/a  Cognition: processing slowly  Progress: Gaining insight or knowledge  Comments: Nevaeh Cuba attended the general medication education group today. We played Jukedeck.  Nevaeh Cuba participated in the game by covering the topics discussed on the Bingo board and answering a few questions when asked of the group as a whole.  Nevaeh Cuba was very quiet but seemed engaged in group.     Plan: continue with services

## 2025-04-08 NOTE — PROGRESS NOTES
[facility]: [facility]  PROGRESS NOTE      Nevaeh Cuba is a 69 y.o. female that is being seen  today for follow-up at [facility]..  Subjective   Patient is being seen for follow-up at [facility]. Patient's blood pressure is fairly controlled with amlodipine.  Denies any issues with the medication.      ROS  Negative for fever or chills  Negative for sore throat, ear pain, nasal discharge  Negative for cough, shortness of breath or wheezing  Negative for chest pain, palpitations, swelling of legs  Negative for abdominal pain, constipation, diarrhea, blood in the stools  Negative for urinary complaints  Negative for headache, dizziness, weakness or numbness  Negative for joint pain  Positive for depression or anxiety  All other systems reviewed and were negative   Vitals:    04/08/25 0825   BP: 130/80   Pulse: 86   Resp:    Temp:    SpO2:       Vitals:    03/28/25 1819   Weight: 75.8 kg (167 lb)     Body mass index is 26.95 kg/m².  Physical Exam  Constitutional: Patient does not appear to be in any acute distress  Head and Face: NCAT  Eyes: Normal external exam, EOMI  ENT: Normal external inspection of ears and nose. Oropharynx normal.  Cardiovascular: RRR, S1/S2, no murmurs, rubs, or gallops, radial pulses +2, no edema of extremities  Pulmonary: CTAB, no respiratory distress.  Abdomen: +BS, soft, non-tender, nondistended, no guarding or rebound, no masses noted  MSK: No joint swelling, normal movements of all extremities. Range of motion- normal.  Skin- No lesions, contusions, or erythema.  Peripheral puslses palpable bilaterally 2+  Neuro: AAO X3, Cranial nerves 2-12 grossly intact,DTR 2+ in all 4 limbs       LABS   [unfilled]  Lab Results   Component Value Date    GLUCOSE 115 (H) 04/02/2025    CALCIUM 10.1 04/02/2025     04/02/2025    K 3.5 04/02/2025    CO2 28 04/02/2025    CL 99 04/02/2025    BUN 38 (H) 04/02/2025    CREATININE 0.96 04/02/2025     Lab Results   Component Value Date     ALT 27 04/02/2025    AST 17 04/02/2025    ALKPHOS 70 04/02/2025    BILITOT 0.6 04/02/2025     Lab Results   Component Value Date    WBC 9.9 03/28/2025    HGB 17.5 (H) 03/28/2025    HCT 48.7 (H) 03/28/2025    MCV 88 03/28/2025     03/28/2025     Lab Results   Component Value Date    CHOL 142 03/29/2025    CHOL 224 (H) 04/12/2024    CHOL 194 10/06/2023     Lab Results   Component Value Date    HDL 59.7 03/29/2025    HDL 58.0 04/12/2024    HDL 58.0 10/06/2023     Lab Results   Component Value Date    LDLCALC 67 03/29/2025    LDLCALC 102 04/12/2024    LDLCALC 88 10/06/2023     Lab Results   Component Value Date    TRIG 77 03/29/2025    TRIG 318 (H) 04/12/2024    TRIG 240 (H) 10/06/2023     Lab Results   Component Value Date    HGBA1C 5.5 06/25/2024     Other labs not included in the list above were reviewed either before or during this encounter.    History    Past Medical History:   Diagnosis Date    Benign essential hypertension     Estrogen deficiency 11/15/2023    DEXA 1/24 back nl, hip neck T-2.1 recheck 1/26    History of atrial fibrillation     Hyperactive thyroid Dr.Burtch Rand Burnham NSR no AC    History of breast lift 05/2024    History of Meniere's syndrome 11/15/2023    Hx of reduction mammoplasty 05/2024    Hyperthyroidism 09/15/2023    Other specified abnormal findings of blood chemistry     High serum cholesterol sulfate    Personal history of malignant neoplasm, unspecified     History of malignant neoplasm    Personal history of other mental and behavioral disorders     Unspecified osteoarthritis, unspecified site 04/28/2016    Arthritis     Past Surgical History:   Procedure Laterality Date    COSMETIC SURGERY      HYSTERECTOMY  04/28/2016    Hysterectomy    JOINT REPLACEMENT      rt ring finger    JOINT REPLACEMENT Right 05/2022    ring finger    OTHER SURGICAL HISTORY  04/28/2016    Musculoskeletal Procedures Injection Carpal Tunnel    OTHER SURGICAL HISTORY  04/28/2016    Arthrodesis  MCP Joint    OTHER SURGICAL HISTORY  01/11/2021    Ankle surgery    OTHER SURGICAL HISTORY  01/11/2021    Carpal tunnel surgery    OTHER SURGICAL HISTORY  07/06/2022    Nasal septoplasty    OTHER SURGICAL HISTORY  07/2021    heart shock catherization    REDUCTION MAMMAPLASTY  may 7 2024    SHOULDER SURGERY  08/2021    total left shoulder replacement    SINUS SURGERY      TRIGGER FINGER RELEASE Right 01/2024    middle finger     Family History   Problem Relation Name Age of Onset    Stroke Mother      Other (cyst on breast) Mother      Atrial fibrillation Father      Stroke Father      Heart disease Father       Allergies   Allergen Reactions    Latex Itching and Rash    Methotrexate Itching     HEAD BURNING AND ITCHING    Metoprolol Rash    Diltiazem Hcl Itching     Rash face     Latex, Natural Rubber Itching    Pollen Extracts Other     NASAL CONGESTION    Adhesive Tape-Silicones Rash    Folic Acid Rash    Sulfamethoxazole-Trimethoprim Rash     Tinnitus      No current facility-administered medications on file prior to encounter.     Current Outpatient Medications on File Prior to Encounter   Medication Sig Dispense Refill    acetaminophen (Tylenol) 325 mg tablet Take 2 tablets (650 mg) by mouth every 6 hours if needed for mild pain (1 - 3) 20 tablet 0    atorvastatin (Lipitor) 20 mg tablet Take 1 tablet (20 mg) by mouth once daily. 90 tablet 3    cholecalciferol (Vitamin D-3) 25 MCG (1000 UT) tablet Take 2 tablets (2,000 Units) by mouth. As directed      docusate sodium (Colace) 100 mg capsule Take 1 capsule (100 mg) by mouth once daily as needed.      fexofenadine HCl (ALLEGRA ORAL) Take 1 tablet by mouth once daily as needed (ALLERGIES).      fLuvoxaMINE (Luvox) 50 mg tablet Take 1 tablet (50 mg) by mouth early in the morning..      hydrOXYzine HCL (Atarax) 25 mg tablet Take 2 tablets (50 mg) by mouth once daily at bedtime for 10 days. 20 tablet 0    ibuprofen 200 mg tablet Take 2 tablets (400 mg) by mouth every  6 hours if needed for mild pain (1 - 3).      methIMAzole (Tapazole) 5 mg tablet Take 1 tablet (5 mg) by mouth once daily. 90 tablet 3    mv-min-FA-vit K-lutein-zeaxant (PreserVision AREDS 2 Plus MV) 200 mcg-15 mcg- 5 mg-1 mg capsule Take 1 capsule by mouth once daily.      OXcarbazepine (Trileptal) 150 mg tablet Take 1 tablet (150 mg) by mouth 2 times a day.      OXcarbazepine (Trileptal) 300 mg tablet Take 1 tablet (300 mg) by mouth every 12 hours.      propranolol (Inderal) 10 mg tablet Take 1 tablet (10 mg) by mouth every 8 hours if needed for anxiety.      triamterene-hydrochlorothiazid (Maxzide-25) 37.5-25 mg tablet Take 1 tablet by mouth once daily in the morning. 90 tablet 3     Immunization History   Administered Date(s) Administered    COVID-19, subunit, rS-nanoparticle, adjuvanted, PF, 5 mcg/0.5 mL 09/28/2024    Flu vaccine, quadrivalent, high-dose, preservative free, age 65y+ (FLUZONE) 09/14/2023    Influenza, Seasonal, Quadrivalent, Adjuvanted 09/30/2021, 09/08/2022    Influenza, Unspecified 09/07/2010, 10/17/2011    Influenza, injectable, MDCK, quadrivalent 10/23/2017, 10/30/2018    Influenza, injectable, quadrivalent 09/23/2019, 09/11/2020    Influenza, seasonal, injectable 09/24/2012, 10/17/2013, 10/23/2014, 10/26/2015, 11/17/2016    Moderna COVID-19 vaccine, 12 years and older (50mcg/0.5mL)(Spikevax) 09/29/2023    Pfizer COVID-19 vaccine, bivalent, age 12 years and older (30 mcg/0.3 mL) 09/08/2022    Pfizer Gray Cap SARS-CoV-2 03/31/2022    Pfizer Purple Cap SARS-CoV-2 03/09/2021, 04/06/2021, 10/06/2021    Pneumococcal conjugate vaccine, 13-valent (PREVNAR 13) 07/11/2013    Pneumococcal conjugate vaccine, 20-valent (PREVNAR 20) 09/16/2023    Pneumococcal polysaccharide vaccine, 23-valent, age 2 years and older (PNEUMOVAX 23) 11/05/2020    RSV, 60 Years And Older (AREXVY) 09/16/2023    Tdap vaccine, age 7 year and older (BOOSTRIX, ADACEL) 09/30/2021    Zoster vaccine, recombinant, adult (SHINGRIX)  10/15/2020, 09/30/2021    Zoster, live 02/10/2012     Patient's medical history was reviewed and updated either before or during this encounter.  ASSESSMENT / PLAN:  Active Hospital Problems    *Psychosis, unspecified psychosis type (Multi)      Anxiety      Insomnia      Sensorineural hearing loss (SNHL) of both ears      Essential hypertension      Hyperlipidemia      Hyperthyroidism    Patient is being seen for follow-up at [facility].  Anxiety has been stable.   Blood pressure has been fairly controlled with amlodipine.

## 2025-04-08 NOTE — CARE PLAN
The patient's goals for the shift include     The clinical goals for the shift include Maintain patient safety/ attend groups    Problem: Risk for Suicide  Goal: Accepts medications as prescribed/needed this shift  Outcome: Progressing  Goal: Makes needs known through verbalization or behaviors this shift  Outcome: Progressing  Goal: No self harm this shift  Outcome: Progressing     Problem: Fall/Injury  Goal: Not fall by end of shift  Outcome: Progressing  Goal: Be free from injury by end of the shift  Outcome: Progressing

## 2025-04-08 NOTE — GROUP NOTE
Group Topic: Goals   Group Date: 4/7/2025  Start Time: 2001  End Time: 2016  Facilitators: Tanika Quinn   Department: [facility]     Number of Participants: 7   Group Focus: goals  Treatment Modality: Other: PCA  Interventions utilized were reminiscence  Purpose: feelings and communication skills    Name: Nevaeh Cuba YOB: 1955   MR: [id number]      Facilitator: Mental Health [facility]  Level of Participation: minimal  Quality of Participation: appropriate/pleasant, attentive, and cooperative  Interactions with others: appropriate, gave feedback, and supportive  Mood/Affect: positive  Triggers (if applicable): N/A  Cognition: coherent/clear  Progress: Minimal  Comments: Pt problem is psychosis.   Plan: continue with services

## 2025-04-08 NOTE — CARE PLAN
The clinical goals for the shift include maintain safety. Promote rest.    Over the shift, the patient did make progress toward the following goals.       Problem: Risk for Suicide  Goal: Accepts medications as prescribed/needed this shift  Outcome: Progressing

## 2025-04-08 NOTE — GROUP NOTE
Group Topic: Other   Group Date: 4/8/2025  Start Time: 1330  End Time: 1430  Facilitators: SUSAN Oreilly   Department: [facility]    Number of Participants: 6   Group Focus: other Mind Joggers  Treatment Modality: Other: Recreation Therapy  Interventions utilized were mental fitness  Purpose: other: fun,  elevate mood, increase socialization, enhance self esteem, stimulate memory    Name: Nevaeh Cuba YOB: 1955   MR: [id number]      Facilitator: Recreational Therapist  Level of Participation: active  Quality of Participation: appropriate/pleasant, attentive, and cooperative  Interactions with others: appropriate and gave feedback  Mood/Affect: appropriate and brightens with interaction  Triggers (if applicable): n/a  Cognition: coherent/clear  Progress: Moderate  Comments: pt problem is delusional thought.  Pt continues to display appropriate participation.   Active, asking questions and able to follow along with rules of play easily.    Plan: continue with services

## 2025-04-08 NOTE — PROGRESS NOTES
"Nevaeh Cuba is a 69 y.o. female on day 11 of admission presenting with Psychosis, unspecified psychosis type (Multi).      Subjective   Chart reviewed and case discussed with nursing. Patient has agreed togo toAL/SNF- social work is arrangin PASSAR after meeting with sister and aunt who would [refer she go to nursing home vs AL.  She appears to be starting tosee the need for this level of care and admits that she does not want to live by herself as she can't care for herself(her sister says he never has- first her parents cared for her then her ). She is eating and sleeping well. Denies any adverse side effects form medication regimen.        Objective     Last Recorded Vitals  Blood pressure 130/80, pulse 86, temperature 36.1 °C (97 °F), temperature source Temporal, resp. rate 16, height 1.676 m (5' 6\"), weight 75.8 kg (167 lb), SpO2 96%.    Review of Systems    Psychiatric ROS - Adult  Anxiety: General Anxiety Disorder (LANDON)LANDON Behaviors: difficult to control worry, excessive anxiety/worry, difficulty concentrating, restlessness, and sleep disturbance - symptoms continue   Depression: anhedonia, concentration, energy, guilt, helpless, hopeless, interest, persistent thoughts of death, sleep decreased , suicidal thoughts, and withdrawn -  symptoms continue  Delirium: negative  Psychosis: auditory hallucinations and visual hallucinations - not endorsed nor observed today  Usha:  none noted or reported but has decreased sleep and increased spending hx  Safety Issues: suicidal ideation - denies SI/HI today  Psychiatric ROS Comment: less psychotic flavor in presentation- more settled    Physical Exam  Vitals and nursing note reviewed.   Pulmonary:      Effort: Pulmonary effort is normal.   Neurological:      Mental Status: She is alert.       Mental Status Exam  General: adequately groomed appears stated age  Appearance: hospital attire  Attitude: pleasant  Behavior: cooperative  Motor Activity: in wheel chair " "for safety  Speech: normal rate and  low volume  Mood: \"I know I can't take care of myself\"  Affect: constricted  Thought Process: focused on getting more personal clothes, otherwise does not provide much through discussion today  Thought Content: limited. denies SI/HI denies paranoia  Thought Perception: denies AH/VH, does not appear stimulated  Cognition: alert and oriented x4  Insight: patient knows she needs treatment for psychiatric illness  Judgement: patient is able to participate in medical decsion making    Psychiatric Risk Assessment  Violence Risk Assessment: major mental illness and other psychosis  Acute Risk of Harm to Others is Considered: low   Suicide Risk Assessment: age > 65 yrs old, , current psychiatric illness, feelings of hopelessness, living alone or lack of social support, suicidal ideations, and other guilt cause spent money was supoosed to pay for mother and  in nursing home \"on stuff that is all over the condo\"  Protective Factors against Suicide: adherence to  treatment and sense of responsibility toward family  Acute Risk of Harm to Self is Considered: low      Relevant Results          No results found. However, due to the size of the patient record, not all encounters were searched. Please check Results Review for a complete set of results.       Assessment/Plan   Assessment & Plan  Psychosis, unspecified psychosis type (Multi)    Anxiety    Insomnia    Patient is a 68 yo female dx with MDD, LANDON with psychotic features (obsessive features). She says she spent all the money she was supposed to pay for her  and mothers nursing home on \"stuff that is all over my condo\". She denies dx of bipolar disorder, does not present as manic at this time but presents with psychotic flavor, almost schizotypal features.       Biological   - continue Luvox 75mg every day for anxiety and depression  - continue trileptal 300mg bid for mood stabilization?  - says she has taken " lithium in the past and it makes her dizzy, denies ever taking depakote  - cont zyprexa 10mg at bedtime for psychosis and mood stabilization (monitor for somnolence renal values are a little elevated)  - prns avalable  - medical pertinences appreciated     Psychological     Provider encouraged patient to attend groups on unit.     Sociological     Provider encouraged patient to work with social workon discharge plan. She planned to return to her condo initially but now says she thinks she can't care for herself there anymore. Planned family meeting on Monday at 3pm accomplished- patientis ambivalent at this point about where she should go from here- will let her think it over and discuss again tomorrow.       Total time spent with patient encounter 20 minutes. This includes patient interview, assessment, extensive chart review, personal review of labs and images as noted above, and formulation of recommendations.

## 2025-04-08 NOTE — PROGRESS NOTES
LSW coordinated onsite visit with [facility]. LSW will wait for results of assessment to further coordinate placement.     MARCELA CorleyW

## 2025-04-08 NOTE — NURSING NOTE
"TAZ NOTE     Problem:  Depression     Behavior:  Pt OOR in group room this evening among peers. Calm, behavior in control. Flat affect noted, pleasant with interaction. Pt responses delayed at times, but improved from prior shifts. During med pass, pt states \"you shouldn't be wasting your time with me.\" Pt compliant with meds, cooperative with care.   Group Participation: yes  Appetite/Meals: HS snack provided     Interventions:  1:1 provided with positive reinforcement. Evening meds administered per orders. Encouraged pt to use call light for needs. Bed alarm engaged.  Response:  Pt resting in bed at this time. Respirations even, unlabored.     Plan:  Will continue to monitor behaviors and effectiveness of interventions while maintaining pt safety.  ,  " independent

## 2025-04-09 PROCEDURE — 2500000001 HC RX 250 WO HCPCS SELF ADMINISTERED DRUGS (ALT 637 FOR MEDICARE OP): Performed by: REGISTERED NURSE

## 2025-04-09 PROCEDURE — 2500000002 HC RX 250 W HCPCS SELF ADMINISTERED DRUGS (ALT 637 FOR MEDICARE OP, ALT 636 FOR OP/ED): Performed by: REGISTERED NURSE

## 2025-04-09 PROCEDURE — 2500000001 HC RX 250 WO HCPCS SELF ADMINISTERED DRUGS (ALT 637 FOR MEDICARE OP): Performed by: STUDENT IN AN ORGANIZED HEALTH CARE EDUCATION/TRAINING PROGRAM

## 2025-04-09 PROCEDURE — 97110 THERAPEUTIC EXERCISES: CPT | Mod: GP

## 2025-04-09 PROCEDURE — 97110 THERAPEUTIC EXERCISES: CPT | Mod: GO

## 2025-04-09 PROCEDURE — 97116 GAIT TRAINING THERAPY: CPT | Mod: GP

## 2025-04-09 PROCEDURE — 2500000001 HC RX 250 WO HCPCS SELF ADMINISTERED DRUGS (ALT 637 FOR MEDICARE OP): Performed by: INTERNAL MEDICINE

## 2025-04-09 PROCEDURE — 2500000005 HC RX 250 GENERAL PHARMACY W/O HCPCS: Performed by: STUDENT IN AN ORGANIZED HEALTH CARE EDUCATION/TRAINING PROGRAM

## 2025-04-09 PROCEDURE — 99233 SBSQ HOSP IP/OBS HIGH 50: CPT | Performed by: INTERNAL MEDICINE

## 2025-04-09 PROCEDURE — 99232 SBSQ HOSP IP/OBS MODERATE 35: CPT | Performed by: REGISTERED NURSE

## 2025-04-09 PROCEDURE — 1140000001 HC PRIVATE PSYCH ROOM DAILY

## 2025-04-09 PROCEDURE — 97535 SELF CARE MNGMENT TRAINING: CPT | Mod: GO

## 2025-04-09 RX ORDER — OLANZAPINE 5 MG/1
5 TABLET, FILM COATED ORAL NIGHTLY
Status: DISPENSED | OUTPATIENT
Start: 2025-04-09

## 2025-04-09 RX ADMIN — OXCARBAZEPINE 300 MG: 300 TABLET, FILM COATED ORAL at 05:30

## 2025-04-09 RX ADMIN — METHIMAZOLE 5 MG: 5 TABLET ORAL at 08:12

## 2025-04-09 RX ADMIN — Medication 2000 UNITS: at 08:12

## 2025-04-09 RX ADMIN — OLANZAPINE 5 MG: 5 TABLET, FILM COATED ORAL at 20:14

## 2025-04-09 RX ADMIN — AMLODIPINE BESYLATE 5 MG: 5 TABLET ORAL at 08:12

## 2025-04-09 RX ADMIN — ATORVASTATIN CALCIUM 20 MG: 20 TABLET, FILM COATED ORAL at 20:14

## 2025-04-09 RX ADMIN — OXCARBAZEPINE 300 MG: 300 TABLET, FILM COATED ORAL at 17:57

## 2025-04-09 RX ADMIN — MELATONIN TAB 3 MG 3 MG: 3 TAB at 17:56

## 2025-04-09 RX ADMIN — FLUVOXAMINE MALEATE 75 MG: 50 TABLET, COATED ORAL at 20:14

## 2025-04-09 ASSESSMENT — ACTIVITIES OF DAILY LIVING (ADL): HOME_MANAGEMENT_TIME_ENTRY: 14

## 2025-04-09 ASSESSMENT — COGNITIVE AND FUNCTIONAL STATUS - GENERAL
MOVING FROM LYING ON BACK TO SITTING ON SIDE OF FLAT BED WITH BEDRAILS: A LITTLE
DAILY ACTIVITIY SCORE: 21
CLIMB 3 TO 5 STEPS WITH RAILING: A LITTLE
TOILETING: A LITTLE
MOBILITY SCORE: 18
MOVING TO AND FROM BED TO CHAIR: A LITTLE
DRESSING REGULAR LOWER BODY CLOTHING: A LITTLE
TURNING FROM BACK TO SIDE WHILE IN FLAT BAD: A LITTLE
HELP NEEDED FOR BATHING: A LITTLE
STANDING UP FROM CHAIR USING ARMS: A LITTLE
WALKING IN HOSPITAL ROOM: A LITTLE

## 2025-04-09 ASSESSMENT — PAIN SCALES - GENERAL
PAINLEVEL_OUTOF10: 0 - NO PAIN

## 2025-04-09 ASSESSMENT — PAIN - FUNCTIONAL ASSESSMENT
PAIN_FUNCTIONAL_ASSESSMENT: 0-10
PAIN_FUNCTIONAL_ASSESSMENT: 0-10

## 2025-04-09 ASSESSMENT — COLUMBIA-SUICIDE SEVERITY RATING SCALE - C-SSRS
1. IN THE PAST MONTH, HAVE YOU WISHED YOU WERE DEAD OR WISHED YOU COULD GO TO SLEEP AND NOT WAKE UP?: YES
6. HAVE YOU EVER DONE ANYTHING, STARTED TO DO ANYTHING, OR PREPARED TO DO ANYTHING TO END YOUR LIFE?: YES
2. HAVE YOU ACTUALLY HAD ANY THOUGHTS OF KILLING YOURSELF?: YES

## 2025-04-09 NOTE — PROGRESS NOTES
[facility]: [facility]  PROGRESS NOTE      Nevaeh Cuba is a 69 y.o. female that is being seen  today for follow-up at [facility]..  Subjective   Patient is being seen for follow-up at [facility]. Patient's blood pressure is fairly controlled with amlodipine.  Denies any issues with the medication.  Patient has elevated kidney functions.  Patient was on diuretics which has been discontinued.  Will repeat lab work tomorrow.      ROS  Negative for fever or chills  Negative for sore throat, ear pain, nasal discharge  Negative for cough, shortness of breath or wheezing  Negative for chest pain, palpitations, swelling of legs  Negative for abdominal pain, constipation, diarrhea, blood in the stools  Negative for urinary complaints  Negative for headache, dizziness, weakness or numbness  Negative for joint pain  Positive for depression or anxiety  All other systems reviewed and were negative   Vitals:    04/09/25 0634   BP: 136/86   Pulse: 92   Resp: 19   Temp: 37 °C (98.6 °F)   SpO2: 97%      Vitals:    04/09/25 0634   Weight: 65.1 kg (143 lb 8.3 oz)     Body mass index is 23.16 kg/m².  Physical Exam  Constitutional: Patient does not appear to be in any acute distress  Head and Face: NCAT  Eyes: Normal external exam, EOMI  ENT: Normal external inspection of ears and nose. Oropharynx normal.  Cardiovascular: RRR, S1/S2, no murmurs, rubs, or gallops, radial pulses +2, no edema of extremities  Pulmonary: CTAB, no respiratory distress.  Abdomen: +BS, soft, non-tender, nondistended, no guarding or rebound, no masses noted  MSK: No joint swelling, normal movements of all extremities. Range of motion- normal.  Skin- No lesions, contusions, or erythema.  Peripheral puslses palpable bilaterally 2+  Neuro: AAO X3, Cranial nerves 2-12 grossly intact,DTR 2+ in all 4 limbs       LABS   [unfilled]  Lab Results   Component Value Date    GLUCOSE 115 (H) 04/02/2025    CALCIUM 10.1 04/02/2025     04/02/2025     K 3.5 04/02/2025    CO2 28 04/02/2025    CL 99 04/02/2025    BUN 38 (H) 04/02/2025    CREATININE 0.96 04/02/2025     Lab Results   Component Value Date    ALT 27 04/02/2025    AST 17 04/02/2025    ALKPHOS 70 04/02/2025    BILITOT 0.6 04/02/2025     Lab Results   Component Value Date    WBC 9.9 03/28/2025    HGB 17.5 (H) 03/28/2025    HCT 48.7 (H) 03/28/2025    MCV 88 03/28/2025     03/28/2025     Lab Results   Component Value Date    CHOL 142 03/29/2025    CHOL 224 (H) 04/12/2024    CHOL 194 10/06/2023     Lab Results   Component Value Date    HDL 59.7 03/29/2025    HDL 58.0 04/12/2024    HDL 58.0 10/06/2023     Lab Results   Component Value Date    LDLCALC 67 03/29/2025    LDLCALC 102 04/12/2024    LDLCALC 88 10/06/2023     Lab Results   Component Value Date    TRIG 77 03/29/2025    TRIG 318 (H) 04/12/2024    TRIG 240 (H) 10/06/2023     Lab Results   Component Value Date    HGBA1C 5.5 06/25/2024     Other labs not included in the list above were reviewed either before or during this encounter.    History    Past Medical History:   Diagnosis Date    Benign essential hypertension     Estrogen deficiency 11/15/2023    DEXA 1/24 back nl, hip neck T-2.1 recheck 1/26    History of atrial fibrillation     Hyperactive thyroid Dr.Burtch Rand [place] NSR no AC    History of breast lift 05/2024    History of Meniere's syndrome 11/15/2023    Hx of reduction mammoplasty 05/2024    Hyperthyroidism 09/15/2023    Other specified abnormal findings of blood chemistry     High serum cholesterol sulfate    Personal history of malignant neoplasm, unspecified     History of malignant neoplasm    Personal history of other mental and behavioral disorders     Unspecified osteoarthritis, unspecified site 04/28/2016    Arthritis     Past Surgical History:   Procedure Laterality Date    COSMETIC SURGERY      HYSTERECTOMY  04/28/2016    Hysterectomy    JOINT REPLACEMENT      rt ring finger    JOINT REPLACEMENT Right 05/2022     ring finger    OTHER SURGICAL HISTORY  04/28/2016    Musculoskeletal Procedures Injection Carpal Tunnel    OTHER SURGICAL HISTORY  04/28/2016    Arthrodesis MCP Joint    OTHER SURGICAL HISTORY  01/11/2021    Ankle surgery    OTHER SURGICAL HISTORY  01/11/2021    Carpal tunnel surgery    OTHER SURGICAL HISTORY  07/06/2022    Nasal septoplasty    OTHER SURGICAL HISTORY  07/2021    heart shock catherization    REDUCTION MAMMAPLASTY  may 7 2024    SHOULDER SURGERY  08/2021    total left shoulder replacement    SINUS SURGERY      TRIGGER FINGER RELEASE Right 01/2024    middle finger     Family History   Problem Relation Name Age of Onset    Stroke Mother      Other (cyst on breast) Mother      Atrial fibrillation Father      Stroke Father      Heart disease Father       Allergies   Allergen Reactions    Latex Itching and Rash    Methotrexate Itching     HEAD BURNING AND ITCHING    Metoprolol Rash    Diltiazem Hcl Itching     Rash face     Latex, Natural Rubber Itching    Pollen Extracts Other     NASAL CONGESTION    Adhesive Tape-Silicones Rash    Folic Acid Rash    Sulfamethoxazole-Trimethoprim Rash     Tinnitus      No current facility-administered medications on file prior to encounter.     Current Outpatient Medications on File Prior to Encounter   Medication Sig Dispense Refill    acetaminophen (Tylenol) 325 mg tablet Take 2 tablets (650 mg) by mouth every 6 hours if needed for mild pain (1 - 3) 20 tablet 0    atorvastatin (Lipitor) 20 mg tablet Take 1 tablet (20 mg) by mouth once daily. 90 tablet 3    cholecalciferol (Vitamin D-3) 25 MCG (1000 UT) tablet Take 2 tablets (2,000 Units) by mouth. As directed      docusate sodium (Colace) 100 mg capsule Take 1 capsule (100 mg) by mouth once daily as needed.      fexofenadine HCl (ALLEGRA ORAL) Take 1 tablet by mouth once daily as needed (ALLERGIES).      fLuvoxaMINE (Luvox) 50 mg tablet Take 1 tablet (50 mg) by mouth early in the morning..      hydrOXYzine HCL (Atarax)  25 mg tablet Take 2 tablets (50 mg) by mouth once daily at bedtime for 10 days. 20 tablet 0    ibuprofen 200 mg tablet Take 2 tablets (400 mg) by mouth every 6 hours if needed for mild pain (1 - 3).      methIMAzole (Tapazole) 5 mg tablet Take 1 tablet (5 mg) by mouth once daily. 90 tablet 3    mv-min-FA-vit K-lutein-zeaxant (PreserVision AREDS 2 Plus MV) 200 mcg-15 mcg- 5 mg-1 mg capsule Take 1 capsule by mouth once daily.      OXcarbazepine (Trileptal) 150 mg tablet Take 1 tablet (150 mg) by mouth 2 times a day.      OXcarbazepine (Trileptal) 300 mg tablet Take 1 tablet (300 mg) by mouth every 12 hours.      propranolol (Inderal) 10 mg tablet Take 1 tablet (10 mg) by mouth every 8 hours if needed for anxiety.      triamterene-hydrochlorothiazid (Maxzide-25) 37.5-25 mg tablet Take 1 tablet by mouth once daily in the morning. 90 tablet 3     Immunization History   Administered Date(s) Administered    COVID-19, subunit, rS-nanoparticle, adjuvanted, PF, 5 mcg/0.5 mL 09/28/2024    Flu vaccine, quadrivalent, high-dose, preservative free, age 65y+ (FLUZONE) 09/14/2023    Influenza, Seasonal, Quadrivalent, Adjuvanted 09/30/2021, 09/08/2022    Influenza, Unspecified 09/07/2010, 10/17/2011    Influenza, injectable, MDCK, quadrivalent 10/23/2017, 10/30/2018    Influenza, injectable, quadrivalent 09/23/2019, 09/11/2020    Influenza, seasonal, injectable 09/24/2012, 10/17/2013, 10/23/2014, 10/26/2015, 11/17/2016    Moderna COVID-19 vaccine, 12 years and older (50mcg/0.5mL)(Spikevax) 09/29/2023    Pfizer COVID-19 vaccine, bivalent, age 12 years and older (30 mcg/0.3 mL) 09/08/2022    Pfizer Gray Cap SARS-CoV-2 03/31/2022    Pfizer Purple Cap SARS-CoV-2 03/09/2021, 04/06/2021, 10/06/2021    Pneumococcal conjugate vaccine, 13-valent (PREVNAR 13) 07/11/2013    Pneumococcal conjugate vaccine, 20-valent (PREVNAR 20) 09/16/2023    Pneumococcal polysaccharide vaccine, 23-valent, age 2 years and older (PNEUMOVAX 23) 11/05/2020    RSV,  60 Years And Older (AREXVY) 09/16/2023    Tdap vaccine, age 7 year and older (BOOSTRIX, ADACEL) 09/30/2021    Zoster vaccine, recombinant, adult (SHINGRIX) 10/15/2020, 09/30/2021    Zoster, live 02/10/2012     Patient's medical history was reviewed and updated either before or during this encounter.  ASSESSMENT / PLAN:  Active Hospital Problems    *Psychosis, unspecified psychosis type (Multi)      Anxiety      Insomnia      Sensorineural hearing loss (SNHL) of both ears      Essential hypertension      Hyperlipidemia      Hyperthyroidism    Patient is being seen for follow-up at [facility].  Anxiety has been stable.   Blood pressure has been fairly controlled with amlodipine.

## 2025-04-09 NOTE — NURSING NOTE
"BIRP NOTE     Problem:  Depression     Behavior:  Pt pleasant and compliant. Pt feels \"hopeless\" r/t financial concerns. Blut/Flat affect. Endorses depression.  Denies SI/HI    Group Participation: Active  Appetite/Meals: Does not finish meals       Interventions:  Administer medication as ordered and complete shift assessment     Response:  Compliant with care       Plan:  Adhere to treatment plan and monitor Q15 minute safety checks     "

## 2025-04-09 NOTE — GROUP NOTE
Group Topic: Other   Group Date: 4/9/2025  Start Time: 1330  End Time: 1430  Facilitators: SUSAN Oreilly   Department: [facility]    Number of Participants: 6   Group Focus: other Mind Joggers  Treatment Modality: Other: Recreation Therapy  Interventions utilized were mental fitness  Purpose: other: fun, elevate mood, increase socialization, enhance self esteem, stimulate memory    Name: Nevaeh Cuba YOB: 1955   MR: [id number]      Facilitator: Recreational Therapist  Level of Participation: moderate  Quality of Participation: cooperative  Interactions with others: appropriate and gave feedback  Mood/Affect: appropriate and flat  Triggers (if applicable): n/a  Cognition: coherent/clear  Progress: Moderate  Comments: pt problem is delusional thought.  Pt continues to display cooperative behaviors.  Pt requires some cueing , remains flat with affect.  Plan: continue with services

## 2025-04-09 NOTE — PROGRESS NOTES
"Nevaeh Cuba is a 69 y.o. female on day 12 of admission presenting with Psychosis, unspecified psychosis type (Multi).      Subjective   Chart reviewed and case discussed with nursing. Patient has agreed togo toAL/SNF- social work informed PASSAR is pending for she go to nursing home vs AL.  She appears to be starting tosee the need for this level of care and admits that she does not want to live by herself as she can't care for herself(her sister says he never has- first her parents cared for her then her ). She is eating and sleeping well. Denies any adverse side effects form medication regimen. Provider is not noting significant difference in obsessive delusional worry and will decrease zyprexa 5mg at bedtime and monitor for increase in delusional obsessiveness(risk vs benefit of using zyprexa). Consider further increasing luvox to 100mg and see if patient tolerates depending on time line of placement in LTC- this can be doen outpatient as well.        Objective     Last Recorded Vitals  Blood pressure 136/86, pulse 92, temperature 37 °C (98.6 °F), temperature source Temporal, resp. rate 19, height 1.676 m (5' 6\"), weight 65.1 kg (143 lb 8.3 oz), SpO2 97%.    Review of Systems    Psychiatric ROS - Adult  Anxiety: General Anxiety Disorder (LANDON)LANDON Behaviors: difficult to control worry, excessive anxiety/worry, difficulty concentrating, restlessness, and sleep disturbance - symptoms continue   Depression: anhedonia, concentration, energy, guilt, helpless, hopeless, interest, persistent thoughts of death, sleep decreased , suicidal thoughts, and withdrawn -  symptoms continue  Delirium: negative  Psychosis: auditory hallucinations and visual hallucinations - not endorsed nor observed today but cont with obsessive delusions- worry about things not real  Usha:  none noted or reported but has decreased sleep and increased spending hx  Safety Issues: suicidal ideation - denies SI/HI today  Psychiatric ROS " "Comment: less psychotic flavor in presentation- more settled    Physical Exam  Vitals and nursing note reviewed.   Pulmonary:      Effort: Pulmonary effort is normal.   Neurological:      Mental Status: She is alert.       Mental Status Exam  General: adequately groomed appears stated age  Appearance: hospital attire  Attitude: pleasant  Behavior: cooperative  Motor Activity: in wheel chair for safety  Speech: normal rate and  low volume  Mood: \"I am worried about bills\"  Affect: constricted  Thought Process: focused on getting more personal clothes, otherwise does not provide much through discussion today  Thought Content: limited. denies SI/HI denies paranoia but has cont obsessive delusions about finances despite much reassurance from family  Thought Perception: denies AH/VH, does not appear stimulated  Cognition: alert and oriented x4  Insight: patient knows she needs treatment for psychiatric illness  Judgement: patient is able to participate in medical decsion making    Psychiatric Risk Assessment  Violence Risk Assessment: major mental illness and other psychosis  Acute Risk of Harm to Others is Considered: low   Suicide Risk Assessment: age > 65 yrs old, , current psychiatric illness, feelings of hopelessness, living alone or lack of social support, suicidal ideations, and other guilt cause spent money was supoosed to pay for mother and  in nursing home \"on stuff that is all over the condo\"  Protective Factors against Suicide: adherence to  treatment and sense of responsibility toward family  Acute Risk of Harm to Self is Considered: low      Relevant Results          No results found. However, due to the size of the patient record, not all encounters were searched. Please check Results Review for a complete set of results.       Assessment/Plan   Assessment & Plan  Psychosis, unspecified psychosis type (Multi)    Anxiety    Insomnia    Patient is a 70 yo female dx with MDD, LANDON with " "psychotic features (obsessive features). She says she spent all the money she was supposed to pay for her  and mothers nursing home on \"stuff that is all over my condo\". She denies dx of bipolar disorder, does not present as manic at this time but presents with psychotic flavor, almost schizotypal features.       Biological   - continue Luvox 75mg every day for anxiety and depression  - continue trileptal 300mg bid for mood stabilization?  - says she has taken lithium in the past and it makes her dizzy, denies ever taking depakote  - decrease zyprexa 5mg at bedtime an dmonitor for increase in obsessional delusions  - prns avalable  - medical pertinences appreciated     Psychological     Provider encouraged patient to attend groups on unit.     Sociological     Provider encouraged patient to work with social workon discharge plan. She planned to return to her condo initially but now says she thinks she can't care for herself there anymore. Social work is pursuing placement.    Total time spent with patient encounter 20 minutes. This includes patient interview, assessment, extensive chart review, personal review of labs and images as noted above, and formulation of recommendations.           "

## 2025-04-09 NOTE — PROGRESS NOTES
LSW met with pt and pt's sister Laura during visitation. Laura had a couple of checks for pt to endorse in order to deposit into pt's bank account. Pt was unable to process this information and would not give an answer about signing the checks and Pt sat unresponsive. LSW instructed that today appears not to be a good day for pt to complete this task. LSW instructed pt's sister to tray another day or wait until pt is discharged. Pt also had told her sister that staff was unable to find a place for her go, as well as that if she left the facility she would be arrested. LSW clarified with pt and sister that both statements are incorrect. LSW provided update on PASRR assessment being conducted but no results. As well as update on referrals submitted to local facilities waiting for responses. Pt's sister is interested in [facility] as it has been recommended to her.     MARCELA CorleyW

## 2025-04-09 NOTE — PROGRESS NOTES
Occupational Therapy    Occupational Therapy Treatment    Name: Nevaeh Cuba  MRN: [id number]  Department: [facility]  Room: 400/400-A  Date: 04/09/25  Time Calculation  Start Time: 0724  Stop Time: 0748  Time Calculation (min): 24 min    Assessment:  OT Assessment: Patient requires increased time to complete tasks to highest level of independence. Patient will continue to benefit from skilled OT to maximzie safety and independence with daily tasks.  Prognosis: Good  Barriers to Discharge Home: Caregiver assistance  Caregiver Assistance: Patient lives alone and/or does not have reliable caregiver assistance  Evaluation/Treatment Tolerance: Patient tolerated treatment well  End of Session Communication: Bedside nurse  End of Session Patient Position: Up in chair, Alarm on (w/c)  Plan:  Treatment Interventions: ADL retraining, Functional transfer training, UE strengthening/ROM, Endurance training, Patient/family training, Equipment evaluation/education, Compensatory technique education  OT Frequency: 2 times per week  OT Discharge Recommendations: Low intensity level of continued care (24/7 supervision for safety)  Equipment Recommended upon Discharge: Wheeled walker  OT Recommended Transfer Status: Assistive equipment (Comment), Assist of 1  OT - OK to Discharge: Yes    Subjective   Previous Visit Info:  OT Last Visit  OT Received On: 04/09/25  General:  General  Reason for Referral: decline in ADLs/balance associated w/escalating mental health issues  Referred By: Deandre Marin DO  Past Medical History Relevant to Rehab: HTN, afib, Meniere's, hyperthyroid, OA, anxiety, sensorineural hearing loss, h/p mental and behavioral d/o  Family/Caregiver Present: No  Prior to Session Communication: Bedside nurse  Patient Position Received: Up in chair, Alarm on (w/c)  Preferred Learning Style: verbal, visual  General Comment: Patient is cleared by nursing for therapy. Patient in bed upon arrival and agreeable to  participate.  Precautions:  Hearing/Visual Limitations: glasses, hearing loss  Medical Precautions: Fall precautions    Pain Assessment:  Pain Assessment  Pain Assessment: 0-10  0-10 (Numeric) Pain Score: 0 - No pain    Objective   Cognition:  Cognition Comments: able to follow commands with increased time. flat affect  Activities of Daily Living: Grooming  Grooming Level of Assistance: Close supervision, Modified independent  Grooming Where Assessed: Wheelchair, Sitting sinkside, Standing sinkside  Grooming Comments: close supervision when standing. fatigues and then Mod I seated in wheelchar. wash face, brush teeth, comb hair. increased time    Toileting  Toileting Level of Assistance: Minimum assistance  Where Assessed: Toilet  Toileting Comments: clothing management    Functional Standing Tolerance:  Functional Standing Tolerance  Time: ~4 minutes  Activity: during ADLs  Functional Standing Tolerance Comments: Close Supervision standing sinkside  Bed Mobility/Transfers: Transfers  Transfer: Yes  Transfer 1  Transfer From 1: Wheelchair to  Transfer to 1: Stand  Technique 1: Sit to stand  Transfer Device 1: Walker  Transfer Level of Assistance 1: Close supervision  Trials/Comments 1: cues for proper hand placement    Toilet Transfers  Toilet Transfer From: Rolling walker  Toilet Transfer Type: To and from  Toilet Transfer to: Standard toilet  Toilet Transfer Technique: Ambulating  Toilet Transfers: Supervision  Toilet Transfers Comments: with use of grab bars. cues for safety    Functional Mobility:  Functional Mobility  Functional Mobility Performed: Yes  Functional Mobility 1  Surface 1: Level tile  Device 1: Rolling walker  Assistance 1: Close supervision, Contact guard  Comments 1: to the bathroom. cues for safety with walker management  Standing Balance:  Static Standing Balance  Static Standing-Balance Support: Bilateral upper extremity supported  Static Standing-Level of Assistance: Close  supervision  Dynamic Standing Balance  Dynamic Standing-Balance Support:  (unilateral UE support)  Dynamic Standing-Level of Assistance: Close supervision  Dynamic Standing-Balance: Lateral lean, Reaching across midline  Dynamic Standing-Comments: during ADLs    Therapy/Activity: Therapeutic Exercise  Therapeutic Exercise Activity 1: patient is seated in the wheelchair and completes 1x10 B UE theraband exercises including band pull aparts, chest press, bicep curls, tricep extension. she completes 1x10 shoulder elevation/depression. fair form and tolerance noted. slow movements    RUE   RUE : Within Functional Limits and LUE   LUE: Within Functional Limits    Outcome Measures:  [facility] Daily Activity  Putting on and taking off regular lower body clothing: A little  Bathing (including washing, rinsing, drying): A little  Putting on and taking off regular upper body clothing: None  Toileting, which includes using toilet, bedpan or urinal: A little  Taking care of personal grooming such as brushing teeth: None  Eating Meals: None  Daily Activity - Total Score: 21    Education Documentation  Body Mechanics, taught by Krys Bowers OT at 4/9/2025  7:54 AM.  Learner: Patient  Readiness: Acceptance  Method: Explanation, Demonstration  Response: Verbalizes Understanding, Needs Reinforcement  Comment: reviewed OT POC. education on safe transfers and mobility. education on ADL completion and safety    Precautions, taught by Krys Bowers OT at 4/9/2025  7:54 AM.  Learner: Patient  Readiness: Acceptance  Method: Explanation, Demonstration  Response: Verbalizes Understanding, Needs Reinforcement  Comment: reviewed OT POC. education on safe transfers and mobility. education on ADL completion and safety    Home Exercise Program, taught by Krys Bowers OT at 4/9/2025  7:54 AM.  Learner: Patient  Readiness: Acceptance  Method: Explanation, Demonstration  Response: Verbalizes Understanding, Needs Reinforcement  Comment: reviewed  OT POC. education on safe transfers and mobility. education on ADL completion and safety    ADL Training, taught by Krys Bowers OT at 4/9/2025  7:54 AM.  Learner: Patient  Readiness: Acceptance  Method: Explanation, Demonstration  Response: Verbalizes Understanding, Needs Reinforcement  Comment: reviewed OT POC. education on safe transfers and mobility. education on ADL completion and safety    Education Comments  No comments found.      Goals:  Encounter Problems       Encounter Problems (Active)       OT Goals       ADL's (Progressing)       Start:  04/01/25    Expected End:  04/15/25       Patient will complete ADL tasks, with independent using AE need in order to increase patient's safety and independence with self-care tasks.           Functional transfers (Progressing)       Start:  04/01/25    Expected End:  04/15/25       Patient will complete functional transfers with independent using least restrictive device, in order to increase patient's safety and independence with daily tasks.           Activity tolerance (Progressing)       Start:  04/01/25    Expected End:  04/15/25       Patient will demonstrate the ability to participate in functional activity at least >/= 25 minutes in order to increase patient's safety and independence with daily tasks.

## 2025-04-09 NOTE — PROGRESS NOTES
LSW verified that pt's PASRR is still pending. LSW submitted referrals for AL placement to area facilities for review. LSW will wait for further response.     MARCELA CorleyW

## 2025-04-09 NOTE — GROUP NOTE
Group Topic: Goals   Group Date: 4/8/2025  Start Time: 2002  End Time: 2014  Facilitators: Tanika Quinn   Department: [facility]     Number of Participants: 6   Group Focus: goals  Treatment Modality: Other: PCA  Interventions utilized were reminiscence  Purpose: feelings and communication skills    Name: Nevaeh Cuba YOB: 1955   MR: [id number]      Facilitator: [facility]  Level of Participation: did not attend  Quality of Participation:  did not attend  Interactions with others:  did not attend  Mood/Affect:  did not attend  Triggers (if applicable): N/A  Cognition:  did not attend  Progress: Other  Comments: Pt problem is psychosis. Pt declined the invitation to attend group.  Plan: continue with services

## 2025-04-09 NOTE — CARE PLAN
The patient's goals for the shift include shower    The clinical goals for the shift include maintain safety    Over the shift, the patient did make progress toward the following goals.

## 2025-04-09 NOTE — NURSING NOTE
TAZ NOTE     Problem:  Depression     Behavior:  Patient was pleasant, calm and cooperative. She was observed in the group room watching television and interacting with peers. Flat affect noted. Patient's responses continue to be slightly delayed at times. Stated that she had a good day today.    Group Participation: No    Appetite/Meals: Snacks provided        Interventions:  Administered scheduled medications     Response:  Compliant with care and medications     Plan:  Continue current plan of care

## 2025-04-09 NOTE — GROUP NOTE
Group Topic: Decision Making   Group Date: 4/9/2025  Start Time: 1010  End Time: 1100  Facilitators: SUSAN Oreilly   Department: [facility]    Number of Participants: 7   Group Focus: other Building New Habits  Treatment Modality: Other: Recreation Therapy  Interventions utilized were exploration, group exercise, patient education, problem solving, and support  Purpose: coping skills, maladaptive thinking, feelings, irrational fears, communication skills, insight or knowledge, self-worth, self-care, relapse prevention strategies, and trigger / craving management    Name: Nevaeh Cuba YOB: 1955   MR: [id number]      Facilitator: Recreational Therapist  Level of Participation: minimal  Quality of Participation: passive and quiet  Interactions with others:  mostly passive  Mood/Affect:  mostly passive  Triggers (if applicable): n/a  Cognition:  mostly passive  Progress: Moderate  Comments: pt problem is delusional thought.  Pt joins group with little encouragement.  Makes good eye contact but flat with affect.  Pt displays mostly passive participation but does nod her head often during group as if she understands group topic.   Plan: continue with services

## 2025-04-09 NOTE — PROGRESS NOTES
Physical Therapy    Physical Therapy Treatment    Patient Name: Nevaeh Cuba  MRN: [id number]  Department: [facility]  Room: 400/400-A  Today's Date: 4/9/2025  Time Calculation  Start Time: 1334  Stop Time: 1400  Time Calculation (min): 26 min         Assessment/Plan   PT Assessment  PT Assessment Results: Decreased strength, Decreased endurance, Decreased mobility, Decreased safety awareness, Pain  Rehab Prognosis: Good  Barriers to Discharge Home: Caregiver assistance, Cognition needs  Caregiver Assistance: Patient lives alone and/or does not have reliable caregiver assistance  Cognition Needs: Insight of patient limited regarding functional ability/needs  Evaluation/Treatment Tolerance: Patient tolerated treatment well  Medical Staff Made Aware: Yes  Strengths: Premorbid level of function  Barriers to Participation: Comorbidities  End of Session Communication: Bedside nurse  End of Session Patient Position: Up in chair, Alarm on (in group room)  PT Plan  Inpatient/Swing Bed or Outpatient: Inpatient  PT Plan  Treatment/Interventions: Transfer training, Gait training, Endurance training, Therapeutic exercise, Therapeutic activity  PT Plan: Ongoing PT  PT Frequency: 3 times per week  PT Discharge Recommendations: Low intensity level of continued care  Equipment Recommended upon Discharge: Wheeled walker  PT Recommended Transfer Status: Contact guard, Assistive device  PT - OK to Discharge: Yes      General Visit Information:   PT  Visit  PT Received On: 04/09/25  Response to Previous Treatment: Patient with no complaints from previous session.  General  Reason for Referral: Impaired mobility, balance associated w/escalating mental health issues, Psychosis  Referred By: Deandre Marin DO  Past Medical History Relevant to Rehab: HTN, afib, Meniere's, hyperthyroid, OA, anxiety, sensorineural hearing loss, h/p mental and behavioral d/o  Prior to Session Communication: Bedside nurse  Patient Position Received:  Up in chair, Alarm on  Preferred Learning Style: verbal, visual  General Comment: Patient is cleared by nursing for therapy. Patient in bed upon arrival and agreeable to participate.    Subjective   Precautions:  Precautions  Hearing/Visual Limitations: glasses, hearing loss  Medical Precautions: Fall precautions            Objective   Pain:  Pain Assessment  Pain Assessment: 0-10  0-10 (Numeric) Pain Score: 0 - No pain  Cognition:  Cognition  Cognition Comments: able to follow commands with increased time. flat affect  Coordination:  Movements are Fluid and Coordinated: Yes  Postural Control:  Postural Control  Postural Control: Within Functional Limits  Posture Comment: flexed at hips, rounded shoulders, assist for upright posture  Extremity/Trunk Assessments:        Activity Tolerance:  Activity Tolerance  Endurance: Tolerates 10 - 20 min exercise with multiple rests  Early Mobility/Exercise Safety Screen: Proceed with mobilization - No exclusion criteria met  Activity Tolerance Comments: tolerates activity well with rest breaks  Treatments:  Therapeutic Exercise  Therapeutic Exercise Performed: Yes  Therapeutic Exercise Activity 1: hip flex 2x15  Therapeutic Exercise Activity 2: LAQ 2x15  Therapeutic Exercise Activity 3: hip adduction against ball 2x15  Therapeutic Exercise Activity 4: hip abduction against band  Therapeutic Exercise Activity 5: Standing toe raises 1x10 at RW  Therapeutic Exercise Activity 6: w/c pushups 1x10 reps         Ambulation/Gait Training 1  Surface 1: Level tile  Device 1: No device  Gait Support Devices: Gait belt  Assistance 1: Contact guard, Minimum assistance  Quality of Gait 1: Diminished heel strike, Inconsistent stride length, Decreased step length, Shuffling gait  Comments/Distance (ft) 1: minimized weight shift and absent arm swing  Ambulation/Gait Training 2  Surface 2: Level tile  Device 2: Rolling walker  Gait Support Devices: Gait belt  Assistance 2: Contact guard  Quality  of Gait 2: Diminished heel strike, Decreased step length, Inconsistent stride length  Comments/Distance (ft) 2: 80 ft x 2 with turn  Transfer 1  Transfer From 1: Wheelchair to  Transfer to 1: Stand  Technique 1: Sit to stand, Stand to sit  Transfer Device 1: Walker  Transfer Level of Assistance 1: Close supervision  Trials/Comments 1: cues for safe hand placement and to lock brakes    Outcome Measures:  [facility] Basic Mobility  Turning from your back to your side while in a flat bed without using bedrails: A little  Moving from lying on your back to sitting on the side of a flat bed without using bedrails: A little  Moving to and from bed to chair (including a wheelchair): A little  Standing up from a chair using your arms (e.g. wheelchair or bedside chair): A little  To walk in hospital room: A little  Climbing 3-5 steps with railing: A little  Basic Mobility - Total Score: 18    Education Documentation  Mobility Training, taught by Lana Nj, PT at 4/9/2025  2:37 PM.  Learner: Patient  Readiness: Acceptance  Method: Explanation  Response: Verbalizes Understanding, Needs Reinforcement  Comment: Education provided re: safe mobility techniques, upright posture         Encounter Problems       Encounter Problems (Active)       PT  Problem       Patient will transfer sit to stand and stand to sit with independent assist to facilitate mobility. (Progressing)       Start:  04/01/25    Expected End:  04/17/25            Patient will amb 150' feet no device including two turns on even surface with independent assist to facilitate safe mobility.   (Progressing)       Start:  04/01/25    Expected End:  04/17/25            Patient will negotiate 1 stairs with no rail(s) and independent} assist with no device for in home and community. (Progressing)       Start:  04/01/25    Expected End:  04/17/25            Patient will tolerate 15 minutes of standing to improve ability to perform MRADLs. (Progressing)       Start:   04/01/25    Expected End:  04/17/25

## 2025-04-10 PROBLEM — E87.6 HYPOKALEMIA: Status: ACTIVE | Noted: 2025-04-10

## 2025-04-10 LAB
ALBUMIN SERPL BCP-MCNC: 3.9 G/DL (ref 3.4–5)
ALP SERPL-CCNC: 74 U/L (ref 33–136)
ALT SERPL W P-5'-P-CCNC: 25 U/L (ref 7–45)
ANION GAP SERPL CALCULATED.3IONS-SCNC: 13 MMOL/L (ref 10–20)
AST SERPL W P-5'-P-CCNC: 15 U/L (ref 9–39)
BILIRUB SERPL-MCNC: 0.6 MG/DL (ref 0–1.2)
BUN SERPL-MCNC: 21 MG/DL (ref 6–23)
CALCIUM SERPL-MCNC: 9.3 MG/DL (ref 8.6–10.3)
CHLORIDE SERPL-SCNC: 104 MMOL/L (ref 98–107)
CO2 SERPL-SCNC: 28 MMOL/L (ref 21–32)
CREAT SERPL-MCNC: 0.68 MG/DL (ref 0.5–1.05)
EGFRCR SERPLBLD CKD-EPI 2021: >90 ML/MIN/1.73M*2
GLUCOSE SERPL-MCNC: 105 MG/DL (ref 74–99)
POTASSIUM SERPL-SCNC: 2.9 MMOL/L (ref 3.5–5.3)
PROT SERPL-MCNC: 6.4 G/DL (ref 6.4–8.2)
SODIUM SERPL-SCNC: 142 MMOL/L (ref 136–145)

## 2025-04-10 PROCEDURE — 99232 SBSQ HOSP IP/OBS MODERATE 35: CPT | Performed by: REGISTERED NURSE

## 2025-04-10 PROCEDURE — 2500000002 HC RX 250 W HCPCS SELF ADMINISTERED DRUGS (ALT 637 FOR MEDICARE OP, ALT 636 FOR OP/ED): Performed by: REGISTERED NURSE

## 2025-04-10 PROCEDURE — 1140000001 HC PRIVATE PSYCH ROOM DAILY

## 2025-04-10 PROCEDURE — 2500000005 HC RX 250 GENERAL PHARMACY W/O HCPCS: Performed by: STUDENT IN AN ORGANIZED HEALTH CARE EDUCATION/TRAINING PROGRAM

## 2025-04-10 PROCEDURE — 97110 THERAPEUTIC EXERCISES: CPT | Mod: GO

## 2025-04-10 PROCEDURE — 36415 COLL VENOUS BLD VENIPUNCTURE: CPT | Performed by: INTERNAL MEDICINE

## 2025-04-10 PROCEDURE — 2500000001 HC RX 250 WO HCPCS SELF ADMINISTERED DRUGS (ALT 637 FOR MEDICARE OP): Performed by: INTERNAL MEDICINE

## 2025-04-10 PROCEDURE — 2500000002 HC RX 250 W HCPCS SELF ADMINISTERED DRUGS (ALT 637 FOR MEDICARE OP, ALT 636 FOR OP/ED): Performed by: INTERNAL MEDICINE

## 2025-04-10 PROCEDURE — 2500000001 HC RX 250 WO HCPCS SELF ADMINISTERED DRUGS (ALT 637 FOR MEDICARE OP): Performed by: STUDENT IN AN ORGANIZED HEALTH CARE EDUCATION/TRAINING PROGRAM

## 2025-04-10 PROCEDURE — 99233 SBSQ HOSP IP/OBS HIGH 50: CPT | Performed by: INTERNAL MEDICINE

## 2025-04-10 PROCEDURE — 2500000001 HC RX 250 WO HCPCS SELF ADMINISTERED DRUGS (ALT 637 FOR MEDICARE OP): Performed by: REGISTERED NURSE

## 2025-04-10 PROCEDURE — 80053 COMPREHEN METABOLIC PANEL: CPT | Performed by: INTERNAL MEDICINE

## 2025-04-10 RX ORDER — POTASSIUM CHLORIDE 20 MEQ/1
20 TABLET, EXTENDED RELEASE ORAL 3 TIMES DAILY
Status: COMPLETED | OUTPATIENT
Start: 2025-04-10 | End: 2025-04-10

## 2025-04-10 RX ADMIN — AMLODIPINE BESYLATE 5 MG: 5 TABLET ORAL at 08:18

## 2025-04-10 RX ADMIN — OXCARBAZEPINE 300 MG: 300 TABLET, FILM COATED ORAL at 06:00

## 2025-04-10 RX ADMIN — OLANZAPINE 5 MG: 5 TABLET, FILM COATED ORAL at 21:37

## 2025-04-10 RX ADMIN — ATORVASTATIN CALCIUM 20 MG: 20 TABLET, FILM COATED ORAL at 21:37

## 2025-04-10 RX ADMIN — FLUVOXAMINE MALEATE 75 MG: 50 TABLET, COATED ORAL at 21:37

## 2025-04-10 RX ADMIN — OXCARBAZEPINE 300 MG: 300 TABLET, FILM COATED ORAL at 17:43

## 2025-04-10 RX ADMIN — METHIMAZOLE 5 MG: 5 TABLET ORAL at 08:18

## 2025-04-10 RX ADMIN — POTASSIUM CHLORIDE 20 MEQ: 1500 TABLET, EXTENDED RELEASE ORAL at 11:11

## 2025-04-10 RX ADMIN — POTASSIUM CHLORIDE 20 MEQ: 1500 TABLET, EXTENDED RELEASE ORAL at 21:37

## 2025-04-10 RX ADMIN — Medication 2000 UNITS: at 08:18

## 2025-04-10 RX ADMIN — POTASSIUM CHLORIDE 20 MEQ: 1500 TABLET, EXTENDED RELEASE ORAL at 16:15

## 2025-04-10 RX ADMIN — MELATONIN TAB 3 MG 3 MG: 3 TAB at 17:43

## 2025-04-10 ASSESSMENT — PAIN SCALES - GENERAL
PAINLEVEL_OUTOF10: 0 - NO PAIN
PAINLEVEL_OUTOF10: 0 - NO PAIN

## 2025-04-10 ASSESSMENT — COLUMBIA-SUICIDE SEVERITY RATING SCALE - C-SSRS
6. HAVE YOU EVER DONE ANYTHING, STARTED TO DO ANYTHING, OR PREPARED TO DO ANYTHING TO END YOUR LIFE?: NO
6. HAVE YOU EVER DONE ANYTHING, STARTED TO DO ANYTHING, OR PREPARED TO DO ANYTHING TO END YOUR LIFE?: YES
4. HAVE YOU HAD THESE THOUGHTS AND HAD SOME INTENTION OF ACTING ON THEM?: NO
5. HAVE YOU STARTED TO WORK OUT OR WORKED OUT THE DETAILS OF HOW TO KILL YOURSELF? DO YOU INTEND TO CARRY OUT THIS PLAN?: NO
1. IN THE PAST MONTH, HAVE YOU WISHED YOU WERE DEAD OR WISHED YOU COULD GO TO SLEEP AND NOT WAKE UP?: YES
2. HAVE YOU ACTUALLY HAD ANY THOUGHTS OF KILLING YOURSELF?: YES

## 2025-04-10 ASSESSMENT — PAIN - FUNCTIONAL ASSESSMENT
PAIN_FUNCTIONAL_ASSESSMENT: 0-10
PAIN_FUNCTIONAL_ASSESSMENT: 0-10

## 2025-04-10 ASSESSMENT — COGNITIVE AND FUNCTIONAL STATUS - GENERAL
DRESSING REGULAR LOWER BODY CLOTHING: A LITTLE
HELP NEEDED FOR BATHING: A LITTLE
TOILETING: A LITTLE
DAILY ACTIVITIY SCORE: 21

## 2025-04-10 NOTE — NURSING NOTE
TAZ NOTE     Problem:  Depression     Behavior:  Patient was quiet, calm and more isolative than yesterday. She is not as bright as she was yesterday. Continues feeling guilty over things from the past. She was observed in the group room watching television but not interacting with peers. No other issues to note.    Appetite/Meals: Snacks provided        Interventions:  Administered scheduled medications    Response:  Compliant with care and medications     Plan:  Continue current plan of care

## 2025-04-10 NOTE — NURSING NOTE
TAZ NOTE     Problem:  Depression     Behavior:  Pt calm and cooperative with care and treatment. Pt expressed feelings of hopelessness and guilt about going to assisted living. Pt said she feels like her body will never get better. Pt stated she will work on going to groups ans socializing more with other pts. Pt was playing cards with other pts and did attend groups.     Group Participation: Yes  Appetite/Meals: good       Interventions:  1:1 Nursing time with active listening, Fall precautions maintained, scheduled medications administered    Response:  Receptive     Plan:  Continue with current plan

## 2025-04-10 NOTE — PROGRESS NOTES
[facility]: [facility]  PROGRESS NOTE      Nevaeh Cuba is a 69 y.o. female that is being seen  today for follow-up at [facility]..  Subjective   Patient is being seen for follow-up at [facility]. Patient's blood pressure is fairly controlled with amlodipine.  Patient's lab work reviewed.  Patient has a low potassium of 2.9.  Patient is being started on potassium supplementation.  Kidney functions have been stable.      ROS  Negative for fever or chills  Negative for sore throat, ear pain, nasal discharge  Negative for cough, shortness of breath or wheezing  Negative for chest pain, palpitations, swelling of legs  Negative for abdominal pain, constipation, diarrhea, blood in the stools  Negative for urinary complaints  Negative for headache, dizziness, weakness or numbness  Negative for joint pain  Positive for depression or anxiety  All other systems reviewed and were negative   Vitals:    04/10/25 0500   BP: 140/85   Pulse: 88   Resp: 17   Temp: 36.7 °C (98.1 °F)   SpO2: 98%      Vitals:    04/09/25 0634   Weight: 65.1 kg (143 lb 8.3 oz)     Body mass index is 23.16 kg/m².  Physical Exam  Constitutional: Patient does not appear to be in any acute distress  Head and Face: NCAT  Eyes: Normal external exam, EOMI  ENT: Normal external inspection of ears and nose. Oropharynx normal.  Cardiovascular: RRR, S1/S2, no murmurs, rubs, or gallops, radial pulses +2, no edema of extremities  Pulmonary: CTAB, no respiratory distress.  Abdomen: +BS, soft, non-tender, nondistended, no guarding or rebound, no masses noted  MSK: No joint swelling, normal movements of all extremities. Range of motion- normal.  Skin- No lesions, contusions, or erythema.  Peripheral puslses palpable bilaterally 2+  Neuro: AAO X3, Cranial nerves 2-12 grossly intact,DTR 2+ in all 4 limbs       LABS   [unfilled]  Lab Results   Component Value Date    GLUCOSE 105 (H) 04/10/2025    CALCIUM 9.3 04/10/2025     04/10/2025    K 2.9  (LL) 04/10/2025    CO2 28 04/10/2025     04/10/2025    BUN 21 04/10/2025    CREATININE 0.68 04/10/2025     Lab Results   Component Value Date    ALT 25 04/10/2025    AST 15 04/10/2025    ALKPHOS 74 04/10/2025    BILITOT 0.6 04/10/2025     Lab Results   Component Value Date    WBC 9.9 03/28/2025    HGB 17.5 (H) 03/28/2025    HCT 48.7 (H) 03/28/2025    MCV 88 03/28/2025     03/28/2025     Lab Results   Component Value Date    CHOL 142 03/29/2025    CHOL 224 (H) 04/12/2024    CHOL 194 10/06/2023     Lab Results   Component Value Date    HDL 59.7 03/29/2025    HDL 58.0 04/12/2024    HDL 58.0 10/06/2023     Lab Results   Component Value Date    LDLCALC 67 03/29/2025    LDLCALC 102 04/12/2024    LDLCALC 88 10/06/2023     Lab Results   Component Value Date    TRIG 77 03/29/2025    TRIG 318 (H) 04/12/2024    TRIG 240 (H) 10/06/2023     Lab Results   Component Value Date    HGBA1C 5.5 06/25/2024     Other labs not included in the list above were reviewed either before or during this encounter.    History    Past Medical History:   Diagnosis Date    Benign essential hypertension     Estrogen deficiency 11/15/2023    DEXA 1/24 back nl, hip neck T-2.1 recheck 1/26    History of atrial fibrillation     Hyperactive thyroid Dr.Burtch Faulknerimazole  NSR no AC    History of breast lift 05/2024    History of Meniere's syndrome 11/15/2023    Hx of reduction mammoplasty 05/2024    Hyperthyroidism 09/15/2023    Other specified abnormal findings of blood chemistry     High serum cholesterol sulfate    Personal history of malignant neoplasm, unspecified     History of malignant neoplasm    Personal history of other mental and behavioral disorders     Unspecified osteoarthritis, unspecified site 04/28/2016    Arthritis     Past Surgical History:   Procedure Laterality Date    COSMETIC SURGERY      HYSTERECTOMY  04/28/2016    Hysterectomy    JOINT REPLACEMENT      rt ring finger    JOINT REPLACEMENT Right 05/2022    ring  finger    OTHER SURGICAL HISTORY  04/28/2016    Musculoskeletal Procedures Injection Carpal Tunnel    OTHER SURGICAL HISTORY  04/28/2016    Arthrodesis MCP Joint    OTHER SURGICAL HISTORY  01/11/2021    Ankle surgery    OTHER SURGICAL HISTORY  01/11/2021    Carpal tunnel surgery    OTHER SURGICAL HISTORY  07/06/2022    Nasal septoplasty    OTHER SURGICAL HISTORY  07/2021    heart shock catherization    REDUCTION MAMMAPLASTY  may 7 2024    SHOULDER SURGERY  08/2021    total left shoulder replacement    SINUS SURGERY      TRIGGER FINGER RELEASE Right 01/2024    middle finger     Family History   Problem Relation Name Age of Onset    Stroke Mother      Other (cyst on breast) Mother      Atrial fibrillation Father      Stroke Father      Heart disease Father       Allergies   Allergen Reactions    Latex Itching and Rash    Methotrexate Itching     HEAD BURNING AND ITCHING    Metoprolol Rash    Diltiazem Hcl Itching     Rash face     Latex, Natural Rubber Itching    Pollen Extracts Other     NASAL CONGESTION    Adhesive Tape-Silicones Rash    Folic Acid Rash    Sulfamethoxazole-Trimethoprim Rash     Tinnitus      No current facility-administered medications on file prior to encounter.     Current Outpatient Medications on File Prior to Encounter   Medication Sig Dispense Refill    acetaminophen (Tylenol) 325 mg tablet Take 2 tablets (650 mg) by mouth every 6 hours if needed for mild pain (1 - 3) 20 tablet 0    atorvastatin (Lipitor) 20 mg tablet Take 1 tablet (20 mg) by mouth once daily. 90 tablet 3    cholecalciferol (Vitamin D-3) 25 MCG (1000 UT) tablet Take 2 tablets (2,000 Units) by mouth. As directed      docusate sodium (Colace) 100 mg capsule Take 1 capsule (100 mg) by mouth once daily as needed.      fexofenadine HCl (ALLEGRA ORAL) Take 1 tablet by mouth once daily as needed (ALLERGIES).      fLuvoxaMINE (Luvox) 50 mg tablet Take 1 tablet (50 mg) by mouth early in the morning..      hydrOXYzine HCL (Atarax) 25 mg  tablet Take 2 tablets (50 mg) by mouth once daily at bedtime for 10 days. 20 tablet 0    ibuprofen 200 mg tablet Take 2 tablets (400 mg) by mouth every 6 hours if needed for mild pain (1 - 3).      methIMAzole (Tapazole) 5 mg tablet Take 1 tablet (5 mg) by mouth once daily. 90 tablet 3    mv-min-FA-vit K-lutein-zeaxant (PreserVision AREDS 2 Plus MV) 200 mcg-15 mcg- 5 mg-1 mg capsule Take 1 capsule by mouth once daily.      OXcarbazepine (Trileptal) 150 mg tablet Take 1 tablet (150 mg) by mouth 2 times a day.      OXcarbazepine (Trileptal) 300 mg tablet Take 1 tablet (300 mg) by mouth every 12 hours.      propranolol (Inderal) 10 mg tablet Take 1 tablet (10 mg) by mouth every 8 hours if needed for anxiety.      triamterene-hydrochlorothiazid (Maxzide-25) 37.5-25 mg tablet Take 1 tablet by mouth once daily in the morning. 90 tablet 3     Immunization History   Administered Date(s) Administered    COVID-19, subunit, rS-nanoparticle, adjuvanted, PF, 5 mcg/0.5 mL 09/28/2024    Flu vaccine, quadrivalent, high-dose, preservative free, age 65y+ (FLUZONE) 09/14/2023    Influenza, Seasonal, Quadrivalent, Adjuvanted 09/30/2021, 09/08/2022    Influenza, Unspecified 09/07/2010, 10/17/2011    Influenza, injectable, MDCK, quadrivalent 10/23/2017, 10/30/2018    Influenza, injectable, quadrivalent 09/23/2019, 09/11/2020    Influenza, seasonal, injectable 09/24/2012, 10/17/2013, 10/23/2014, 10/26/2015, 11/17/2016    Moderna COVID-19 vaccine, 12 years and older (50mcg/0.5mL)(Spikevax) 09/29/2023    Pfizer COVID-19 vaccine, bivalent, age 12 years and older (30 mcg/0.3 mL) 09/08/2022    Pfizer Gray Cap SARS-CoV-2 03/31/2022    Pfizer Purple Cap SARS-CoV-2 03/09/2021, 04/06/2021, 10/06/2021    Pneumococcal conjugate vaccine, 13-valent (PREVNAR 13) 07/11/2013    Pneumococcal conjugate vaccine, 20-valent (PREVNAR 20) 09/16/2023    Pneumococcal polysaccharide vaccine, 23-valent, age 2 years and older (PNEUMOVAX 23) 11/05/2020    RSV, 60  Years And Older (AREXVY) 09/16/2023    Tdap vaccine, age 7 year and older (BOOSTRIX, ADACEL) 09/30/2021    Zoster vaccine, recombinant, adult (SHINGRIX) 10/15/2020, 09/30/2021    Zoster, live 02/10/2012     Patient's medical history was reviewed and updated either before or during this encounter.  ASSESSMENT / PLAN:  Active Hospital Problems    Hypokalemia      *Psychosis, unspecified psychosis type (Multi)      Anxiety      Insomnia      Sensorineural hearing loss (SNHL) of both ears      Essential hypertension      Hyperlipidemia      Hyperthyroidism    Patient is being seen for follow-up at [facility].  Anxiety has been stable.   Blood pressure has been fairly controlled with amlodipine.  Patient has low potassium and patient is being started on potassium supplementation.  Will monitor  potassium levels.

## 2025-04-10 NOTE — CARE PLAN
The patient's goals for the shift include Not to have a bathroom accident    The clinical goals for the shift include maintain safety    Over the shift, the patient did  make progress toward the following goals. Barriers to progression include adherence. Recommendations to address these barriers include Education and reenforcement.    Problem: Risk for Suicide  Goal: Makes needs known through verbalization or behaviors this shift  Outcome: Progressing     Problem: Risk for Suicide  Goal: Identifies supports this shift  Outcome: Progressing

## 2025-04-10 NOTE — PROGRESS NOTES
"Nevaeh Cuba is a 69 y.o. female on day 13 of admission presenting with Psychosis, unspecified psychosis type (Multi).      Subjective   Chart reviewed and case discussed with nursing. Patient has agreed to go toAL/SNF- social work informed PASSAR is pending for she go to nursing home vs AL.  Social work is waitng to see how it goes with meeting with AL.      Objective     Last Recorded Vitals  Blood pressure 140/85, pulse 88, temperature 36.4 °C (97.5 °F), temperature source Temporal, resp. rate 17, height 1.676 m (5' 6\"), weight 65.1 kg (143 lb 8.3 oz), SpO2 98%.    Review of Systems    Psychiatric ROS - Adult  Anxiety: General Anxiety Disorder (LANDON)LANDON Behaviors: difficult to control worry, excessive anxiety/worry, difficulty concentrating, restlessness, and sleep disturbance - symptoms continue   Depression: anhedonia, concentration, energy, guilt, helpless, hopeless, interest, persistent thoughts of death, sleep decreased , suicidal thoughts, and withdrawn -  symptoms continue  Delirium: negative  Psychosis: auditory hallucinations and visual hallucinations - not endorsed nor observed today but cont with obsessive delusions- worry about things not real  Usha:  none noted or reported but has decreased sleep and increased spending hx  Safety Issues: suicidal ideation - denies SI/HI today  Psychiatric ROS Comment: less psychotic flavor in presentation- more settled    Physical Exam  Vitals and nursing note reviewed.   Pulmonary:      Effort: Pulmonary effort is normal.   Neurological:      Mental Status: She is alert.       Mental Status Exam  General: adequately groomed appears stated age  Appearance: hospital attire  Attitude: pleasant  Behavior: cooperative  Motor Activity: in wheel chair for safety  Speech: normal rate and  low volume  Mood: \"ok\"  Affect: constricted  Thought Process: focused on getting more personal clothes, otherwise does not provide much through discussion today  Thought Content: limited. " "denies SI/HI denies paranoia but has cont obsessive delusions about finances despite much reassurance from family  Thought Perception: denies AH/VH, does not appear stimulated  Cognition: alert and oriented x4  Insight: patient knows she needs treatment for psychiatric illness  Judgement: patient is able to participate in medical decsion making    Psychiatric Risk Assessment  Violence Risk Assessment: major mental illness and other psychosis  Acute Risk of Harm to Others is Considered: low   Suicide Risk Assessment: age > 65 yrs old, , current psychiatric illness, feelings of hopelessness, living alone or lack of social support, suicidal ideations, and other guilt cause spent money was supoosed to pay for mother and  in nursing home \"on stuff that is all over the condo\"  Protective Factors against Suicide: adherence to  treatment and sense of responsibility toward family  Acute Risk of Harm to Self is Considered: low      Relevant Results          Results for orders placed or performed during the hospital encounter of 03/28/25 (from the past 24 hours)   Comprehensive metabolic panel   Result Value Ref Range    Glucose 105 (H) 74 - 99 mg/dL    Sodium 142 136 - 145 mmol/L    Potassium 2.9 (LL) 3.5 - 5.3 mmol/L    Chloride 104 98 - 107 mmol/L    Bicarbonate 28 21 - 32 mmol/L    Anion Gap 13 10 - 20 mmol/L    Urea Nitrogen 21 6 - 23 mg/dL    Creatinine 0.68 0.50 - 1.05 mg/dL    eGFR >90 >60 mL/min/1.73m*2    Calcium 9.3 8.6 - 10.3 mg/dL    Albumin 3.9 3.4 - 5.0 g/dL    Alkaline Phosphatase 74 33 - 136 U/L    Total Protein 6.4 6.4 - 8.2 g/dL    AST 15 9 - 39 U/L    Bilirubin, Total 0.6 0.0 - 1.2 mg/dL    ALT 25 7 - 45 U/L     *Note: Due to a large number of results and/or encounters for the requested time period, some results have not been displayed. A complete set of results can be found in Results Review.          Assessment/Plan   Assessment & Plan  Psychosis, unspecified psychosis type " "(Multi)    Anxiety    Insomnia    Hypokalemia    Patient is a 68 yo female dx with MDD, LANDON with psychotic features (obsessive features). She says she spent all the money she was supposed to pay for her  and mothers nursing home on \"stuff that is all over my condo\". She denies dx of bipolar disorder, does not present as manic at this time but presents with psychotic flavor, almost schizotypal features.       Biological   - continue Luvox 75mg every day for anxiety and depression  - continue trileptal 300mg bid for mood stabilization?  - says she has taken lithium in the past and it makes her dizzy, denies ever taking depakote  - decrease zyprexa 5mg at bedtime an dmonitor for increase in obsessional delusions  - prns avalable  - medical pertinences appreciated     Psychological     Provider encouraged patient to attend groups on unit.     Sociological     Provider encouraged patient to work with social workon discharge plan. She planned to return to her condo initially but now says she thinks she can't care for herself there anymore. Social work is pursuing placement.    Total time spent with patient encounter 20 minutes. This includes patient interview, assessment, extensive chart review, personal review of labs and images as noted above, and formulation of recommendations.           "

## 2025-04-10 NOTE — PROGRESS NOTES
LSW received the completed PASRR which is approved for SNF/NH placement. It is uncertain as to whether pt needs this level of care. LSW will consult with provider and pt's family. LSW submitted AL referrals on pt's behalf, especially due to the fact that those take longer to process. LSW contacted [facility] which was pt's family's first choice, however they do not currently have any openings.     Haydee Christensen, MSW LSW

## 2025-04-10 NOTE — PROGRESS NOTES
"Nutrition Follow up Note    Nutrition Assessment      Plan for discharge to AL.    Nutrition History:  Energy Intake: Poor < 50 %  Food and Nutrient History: intake slowly improving. po intake averages 45% over the past 10 meals documented    Anthropometrics:  Ht: 167.6 cm (5' 6\"), Wt: 65.1 kg (143 lb 8.3 oz), BMI: 23.18    Weight Change:  Daily Weight  04/09/25 : 65.1 kg (143 lb 8.3 oz)  03/28/25 : 75.8 kg (167 lb)  03/21/25 : 76 kg (167 lb 8.8 oz)  03/14/25 : 76.2 kg (168 lb)  01/06/25 : 83 kg (183 lb)  10/16/24 : 83 kg (183 lb)  09/20/24 : 83.9 kg (185 lb)  08/04/24 : 83 kg (182 lb 15.7 oz)  08/02/24 : 82.6 kg (182 lb 3.2 oz)  07/24/24 : 83.5 kg (184 lb 1.4 oz)  07/03/24 : 83.9 kg (185 lb)  06/25/24 : 85.3 kg (188 lb)  05/24/24 : 89.8 kg (198 lb)  05/07/24 : 90.3 kg (199 lb)  04/23/24 : 90.7 kg (200 lb)  04/23/24 : 90.7 kg (200 lb)  04/12/24 : 90.3 kg (199 lb)  03/14/24 : 92 kg (202 lb 12.8 oz)  03/08/24 : 88.5 kg (195 lb)  03/01/24 : 88.5 kg (195 lb)     Weight History / % Weight Change: per wt hx, pt with a 16# (8.1%) wt loss over ~2.5 months (5/24/24-8/2/24) followed by stable wt from Aug 2024-Jan 2025. most recently pt with an additional 16# (8.7%) wt loss over the past ~2.5 months (1/6-3/28). updated wt of 143# from 4/9/25. if accurate, pt with a 24# (14.4%) wt loss over ~1.5 weeks (3/38-4/9) and an overall 40# (21.8%) wt loss over the past ~3 months (1/6-4/9).  Significant Weight Loss: Yes    Nutrition Focused Physical Exam Findings: defer: not appropriate    Nutrition Significant Labs:  Lab Results   Component Value Date    WBC 9.9 03/28/2025    HGB 17.5 (H) 03/28/2025    HCT 48.7 (H) 03/28/2025     03/28/2025    CHOL 142 03/29/2025    TRIG 77 03/29/2025    HDL 59.7 03/29/2025    ALT 25 04/10/2025    AST 15 04/10/2025     04/10/2025    K 2.9 (LL) 04/10/2025     04/10/2025    CREATININE 0.68 04/10/2025    BUN 21 04/10/2025    CO2 28 04/10/2025    TSH 3.26 03/07/2025    INR 1.0 10/05/2021 "    GLUF 134 (H) 03/29/2025    HGBA1C 5.5 06/25/2024     Nutrition Specific Medications:  amLODIPine, 5 mg, oral, Daily  atorvastatin, 20 mg, oral, Daily  cholecalciferol, 2,000 Units, oral, Daily  fLuvoxaMINE, 75 mg, oral, Nightly  melatonin, 3 mg, oral, Daily  methIMAzole, 5 mg, oral, Daily  OLANZapine, 5 mg, oral, Nightly  OXcarbazepine, 300 mg, oral, q12h CAROLINE  polyethylene glycol, 17 g, oral, Daily  potassium chloride CR, 20 mEq, oral, TID      Dietary Orders (From admission, onward)       Start     Ordered    04/07/25 1250  Oral nutritional supplements  Until discontinued        Question Answer Comment   Deliver with Breakfast    Deliver with Dinner    Select supplement: Ensure Plus High Protein        04/07/25 1250    03/28/25 2141  Adult diet Regular  Diet effective now        Comments: Low fat, Low Calorie   Question:  Diet type  Answer:  Regular    03/28/25 2141                  Estimated Needs:   Estimated Energy Needs  Total Energy Estimated Needs in 24 hours (kCal): 1950 kCal  Energy Estimated Needs per kg Body Weight in 24 hours (kCal/kg): 30 kCal/kg  Method for Estimating Needs: actual wt    Estimated Protein Needs  Total Protein Estimated Needs in 24 Hours (g): 78 g  Protein Estimated Needs per kg Body Weight in 24 Hours (g/kg): 1.2 g/kg  Method for Estimating 24 Hour Protein Needs: actual wt    Estimated Fluid Needs  Method for Estimating 24 Hour Fluid Needs: 1 ml/kcal or per MD        Nutrition Diagnosis   Nutrition Diagnosis:  Malnutrition Diagnosis  Patient has Malnutrition Diagnosis: Yes  Diagnosis Status: Active  Malnutrition Diagnosis: Severe malnutrition related to acute disease or injury  Related to: decreased ability to consume/tolerate sufficient energy  As Evidenced by: po intake </= 50% estimated needs for >/= 5 days, 16# (8.7%) wt loss over ~2.5 months       Nutrition Interventions/Recommendations   Nutrition Interventions and Recommendations:  Nutrition Prescription: Nutrition  prescription for oral nutrition    Nutrition Recommendations:  Individualized Nutrition Prescription Provided for : regular diet with ensure plus high protein BID    Nutrition Interventions/Goals:   Food and/or Nutrient Delivery Interventions  Interventions: Meals and snacks, Medical food supplement  Meals and Snacks: General healthful diet  Goal: provide as ordered  Medical Food Supplement: Commercial beverage medical food supplement therapy  Goal: ensure plus high protein BID to provide 350 kcals and 20g protein each  Additional Interventions: suggest monitoring for wt changes on a weekly basis    Education Documentation  No documentation found.           Nutrition Monitoring and Evaluation   Monitoring/Evaluation:   Food/Nutrient Related History Monitoring  Monitoring and Evaluation Plan: Estimated Energy Intake  Estimated Energy Intake: Energy intake greater or equal to 75% of estimated energy needs    Anthropometric Measurements  Monitoring and Evaluation Plan: Body weight  Body Weight: Body weight - Maintain stable weight    Goal Status: Some progress toward goal(s)    Follow Up  Time Spent (min): 30 minutes  Last Date of Nutrition Visit: 04/10/25  Nutrition Follow-Up Needed?: 3-5 days  Follow up Comment: 4/15/25

## 2025-04-10 NOTE — GROUP NOTE
Group Topic: Stress Reduction/Relaxation   Group Date: 4/10/2025  Start Time: 1000  End Time: 1100  Facilitators: SUSAN Oreilly   Department: [facility]    Number of Participants: 6   Group Focus: other Stress Management Game  Treatment Modality: Other: Recreation Therapy  Interventions utilized were exploration, group exercise, patient education, problem solving, and support  Purpose: coping skills, maladaptive thinking, feelings, irrational fears, communication skills, insight or knowledge, self-worth, self-care, relapse prevention strategies, and trigger / craving management    Name: Nevaeh Cuba YOB: 1955   MR: [id number]      Facilitator: Recreational Therapist  Level of Participation: minimal  Quality of Participation: attentive and cooperative  Interactions with others:  mostly passive  Mood/Affect:  mostly passive  Triggers (if applicable): n/a  Cognition:  mostly passive  Progress: Moderate  Comments: pt problem is delusional thought.  Pt joins group with little encouragement.  Does not engage much, simple yes/no responses with direct prompting.  Pt does make good eye contact and nod her head along with staff and peers.   Plan: continue with services

## 2025-04-10 NOTE — GROUP NOTE
"Group Topic: Reflection   Group Date: 4/10/2025  Start Time: 1330  End Time: 1430  Facilitators: EVIN OreillyS   Department: [facility]    Number of Participants: 7   Group Focus: other [employer] Game  Treatment Modality: Other: Recreation Therapy  Interventions utilized were exploration, reminiscence, and story telling  Purpose: feelings and other: fun, elevate mood, enhance self esteem    Name: Nevaeh Cuba YOB: 1955   MR: [id number]      Facilitator: Recreational Therapist  Level of Participation: minimal  Quality of Participation: attentive and quiet  Interactions with others: gave feedback  Mood/Affect: blunted  Triggers (if applicable): n/a  Cognition:  pt displays mostly passive participation.  Does smile more often during group but responds \"I don't know \" to most questions asked.  Progress: Moderate  Comments: pt problem is delusional thought.    Plan: continue with services      "

## 2025-04-10 NOTE — PROGRESS NOTES
Occupational Therapy    Occupational Therapy Treatment    Name: Nevaeh Cuba  MRN: [id number]  Department: [facility]  Room: 400/400-A  Date: 04/10/25  Time Calculation  Start Time: 0842  Stop Time: 0858  Time Calculation (min): 16 min    Assessment:  OT Assessment: Patient continues to progress throughout OT POC. She will continue to benefit from skilled OT to maximize her safety and independence with daily functional tasks.  Prognosis: Good  Barriers to Discharge Home: Caregiver assistance  Caregiver Assistance: Patient lives alone and/or does not have reliable caregiver assistance  Evaluation/Treatment Tolerance: Patient tolerated treatment well  End of Session Communication: Bedside nurse  End of Session Patient Position: Up in chair, Alarm on  Plan:  Treatment Interventions: ADL retraining, Functional transfer training, UE strengthening/ROM, Endurance training, Patient/family training, Compensatory technique education  OT Frequency: 2 times per week  OT Discharge Recommendations: Low intensity level of continued care, Other (Comment) (24/7 supervision)  Equipment Recommended upon Discharge: Wheeled walker  OT Recommended Transfer Status: Assistive equipment (Comment), Stand by assist  OT - OK to Discharge: Yes    Subjective   Previous Visit Info:  OT Last Visit  OT Received On: 04/10/25  General:  General  Reason for Referral: decline in ADLs, balance associated w/escalating mental health issues, Psychosis  Referred By: Deandre Marin DO  Past Medical History Relevant to Rehab: HTN, afib, Meniere's, hyperthyroid, OA, anxiety, sensorineural hearing loss, h/p mental and behavioral d/o  Family/Caregiver Present: No  Prior to Session Communication: Bedside nurse  Patient Position Received: Up in chair, Alarm on  Preferred Learning Style: verbal, visual  General Comment: Patient is cleared by nursing for therapy. Patient in her room upon arrival and agreeable to participate  Precautions:  Hearing/Visual  "Limitations: glasses, hearing loss  Medical Precautions: Fall precautions    Pain Assessment:  Pain Assessment  Pain Assessment: 0-10  0-10 (Numeric) Pain Score: 0 - No pain    Objective   Cognition:  Cognition Comments: flat affect, increased time to process. fixated on her pants \"falling down\" and being \"embarrased\" pants changed in tx.  Activities of Daily Living: LE Dressing  Pants Level of Assistance: Close supervision  LE Dressing Where Assessed: Wheelchair, Other (Comment) (standing at 2WW)  LE Dressing Comments: seated in wheelchair to thread B LE into pants. close supervision standing to don pants over her hips at 2WW    Bed Mobility/Transfers: Transfers  Transfer: Yes  Transfer 1  Transfer From 1: Wheelchair to  Transfer to 1: Stand  Technique 1: Sit to stand  Transfer Device 1: Walker  Transfer Level of Assistance 1: Close supervision  Trials/Comments 1: x3 trials    Functional Mobility:  Functional Mobility  Functional Mobility Performed: Yes  Functional Mobility 1  Surface 1: Level tile  Device 1: Rolling walker  Assistance 1: Close supervision, Contact guard  Comments 1: no LOB. > household distances    Therapy/Activity: Therapeutic Exercise  Therapeutic Exercise Activity 1: patient is seated in the wheelchair and completes 1x15 bB UE theraband exercises including band pull aparts, chest press, bicep curls, tricep extension. she completes 1x10 shoulder elevation/depression, shoulder reverse rolls, scapular protraction/retraction. fair+ form and tolerance noted. improvements demonstrated this date    RUE   RUE : Within Functional Limits and LUE   LUE: Within Functional Limits    Outcome Measures:  [facility] Daily Activity  Putting on and taking off regular lower body clothing: A little  Bathing (including washing, rinsing, drying): A little  Putting on and taking off regular upper body clothing: None  Toileting, which includes using toilet, bedpan or urinal: A little  Taking care of personal grooming such " as brushing teeth: None  Eating Meals: None  Daily Activity - Total Score: 21    Education Documentation  Body Mechanics, taught by Krys Bowers OT at 4/10/2025  9:36 AM.  Learner: Patient  Readiness: Acceptance  Method: Explanation, Demonstration  Response: Needs Reinforcement, Verbalizes Understanding  Comment: reviewed OT POC. B UE HEP and safe completion of functional txfers/mobility and ADLs    Precautions, taught by Krys Bowers OT at 4/10/2025  9:36 AM.  Learner: Patient  Readiness: Acceptance  Method: Explanation, Demonstration  Response: Needs Reinforcement, Verbalizes Understanding  Comment: reviewed OT POC. B UE HEP and safe completion of functional txfers/mobility and ADLs    Home Exercise Program, taught by Krys Bowers OT at 4/10/2025  9:36 AM.  Learner: Patient  Readiness: Acceptance  Method: Explanation, Demonstration  Response: Needs Reinforcement, Verbalizes Understanding  Comment: reviewed OT POC. B UE HEP and safe completion of functional txfers/mobility and ADLs    ADL Training, taught by Krys Bowers OT at 4/10/2025  9:36 AM.  Learner: Patient  Readiness: Acceptance  Method: Explanation, Demonstration  Response: Needs Reinforcement, Verbalizes Understanding  Comment: reviewed OT POC. B UE HEP and safe completion of functional txfers/mobility and ADLs    Education Comments  No comments found.      Goals:  Encounter Problems       Encounter Problems (Active)       OT Goals       ADL's (Progressing)       Start:  04/01/25    Expected End:  04/15/25       Patient will complete ADL tasks, with independent using AE need in order to increase patient's safety and independence with self-care tasks.           Functional transfers (Progressing)       Start:  04/01/25    Expected End:  04/15/25       Patient will complete functional transfers with independent using least restrictive device, in order to increase patient's safety and independence with daily tasks.           Activity tolerance  (Progressing)       Start:  04/01/25    Expected End:  04/15/25       Patient will demonstrate the ability to participate in functional activity at least >/= 25 minutes in order to increase patient's safety and independence with daily tasks.

## 2025-04-11 LAB
ALBUMIN SERPL BCP-MCNC: 3.7 G/DL (ref 3.4–5)
ALP SERPL-CCNC: 75 U/L (ref 33–136)
ALT SERPL W P-5'-P-CCNC: 26 U/L (ref 7–45)
ANION GAP SERPL CALCULATED.3IONS-SCNC: 9 MMOL/L (ref 10–20)
AST SERPL W P-5'-P-CCNC: 15 U/L (ref 9–39)
BILIRUB SERPL-MCNC: 0.6 MG/DL (ref 0–1.2)
BUN SERPL-MCNC: 20 MG/DL (ref 6–23)
CALCIUM SERPL-MCNC: 9.1 MG/DL (ref 8.6–10.3)
CHLORIDE SERPL-SCNC: 109 MMOL/L (ref 98–107)
CO2 SERPL-SCNC: 29 MMOL/L (ref 21–32)
CREAT SERPL-MCNC: 0.71 MG/DL (ref 0.5–1.05)
EGFRCR SERPLBLD CKD-EPI 2021: >90 ML/MIN/1.73M*2
GLUCOSE SERPL-MCNC: 111 MG/DL (ref 74–99)
POTASSIUM SERPL-SCNC: 3.6 MMOL/L (ref 3.5–5.3)
PROT SERPL-MCNC: 6.3 G/DL (ref 6.4–8.2)
SODIUM SERPL-SCNC: 143 MMOL/L (ref 136–145)

## 2025-04-11 PROCEDURE — 2500000001 HC RX 250 WO HCPCS SELF ADMINISTERED DRUGS (ALT 637 FOR MEDICARE OP): Performed by: INTERNAL MEDICINE

## 2025-04-11 PROCEDURE — 2500000001 HC RX 250 WO HCPCS SELF ADMINISTERED DRUGS (ALT 637 FOR MEDICARE OP): Performed by: REGISTERED NURSE

## 2025-04-11 PROCEDURE — 2500000002 HC RX 250 W HCPCS SELF ADMINISTERED DRUGS (ALT 637 FOR MEDICARE OP, ALT 636 FOR OP/ED): Performed by: REGISTERED NURSE

## 2025-04-11 PROCEDURE — 97110 THERAPEUTIC EXERCISES: CPT | Mod: GP

## 2025-04-11 PROCEDURE — 36415 COLL VENOUS BLD VENIPUNCTURE: CPT | Performed by: INTERNAL MEDICINE

## 2025-04-11 PROCEDURE — 1140000001 HC PRIVATE PSYCH ROOM DAILY

## 2025-04-11 PROCEDURE — 80053 COMPREHEN METABOLIC PANEL: CPT | Performed by: INTERNAL MEDICINE

## 2025-04-11 PROCEDURE — 99233 SBSQ HOSP IP/OBS HIGH 50: CPT | Performed by: INTERNAL MEDICINE

## 2025-04-11 PROCEDURE — 2500000001 HC RX 250 WO HCPCS SELF ADMINISTERED DRUGS (ALT 637 FOR MEDICARE OP): Performed by: STUDENT IN AN ORGANIZED HEALTH CARE EDUCATION/TRAINING PROGRAM

## 2025-04-11 PROCEDURE — 2500000005 HC RX 250 GENERAL PHARMACY W/O HCPCS: Performed by: STUDENT IN AN ORGANIZED HEALTH CARE EDUCATION/TRAINING PROGRAM

## 2025-04-11 PROCEDURE — 97116 GAIT TRAINING THERAPY: CPT | Mod: GP

## 2025-04-11 PROCEDURE — 99232 SBSQ HOSP IP/OBS MODERATE 35: CPT | Performed by: REGISTERED NURSE

## 2025-04-11 RX ADMIN — FLUVOXAMINE MALEATE 75 MG: 50 TABLET, COATED ORAL at 20:32

## 2025-04-11 RX ADMIN — METHIMAZOLE 5 MG: 5 TABLET ORAL at 08:25

## 2025-04-11 RX ADMIN — OLANZAPINE 5 MG: 5 TABLET, FILM COATED ORAL at 20:32

## 2025-04-11 RX ADMIN — AMLODIPINE BESYLATE 5 MG: 5 TABLET ORAL at 08:25

## 2025-04-11 RX ADMIN — ATORVASTATIN CALCIUM 20 MG: 20 TABLET, FILM COATED ORAL at 20:32

## 2025-04-11 RX ADMIN — OXCARBAZEPINE 300 MG: 300 TABLET, FILM COATED ORAL at 17:34

## 2025-04-11 RX ADMIN — OXCARBAZEPINE 300 MG: 300 TABLET, FILM COATED ORAL at 06:42

## 2025-04-11 RX ADMIN — MELATONIN TAB 3 MG 3 MG: 3 TAB at 17:34

## 2025-04-11 RX ADMIN — Medication 2000 UNITS: at 08:25

## 2025-04-11 ASSESSMENT — COGNITIVE AND FUNCTIONAL STATUS - GENERAL
MOVING FROM LYING ON BACK TO SITTING ON SIDE OF FLAT BED WITH BEDRAILS: A LITTLE
MOBILITY SCORE: 18
TURNING FROM BACK TO SIDE WHILE IN FLAT BAD: A LITTLE
CLIMB 3 TO 5 STEPS WITH RAILING: A LITTLE
WALKING IN HOSPITAL ROOM: A LITTLE
STANDING UP FROM CHAIR USING ARMS: A LITTLE
MOVING TO AND FROM BED TO CHAIR: A LITTLE

## 2025-04-11 ASSESSMENT — COLUMBIA-SUICIDE SEVERITY RATING SCALE - C-SSRS
6. HAVE YOU EVER DONE ANYTHING, STARTED TO DO ANYTHING, OR PREPARED TO DO ANYTHING TO END YOUR LIFE?: YES
2. HAVE YOU ACTUALLY HAD ANY THOUGHTS OF KILLING YOURSELF?: NO
5. HAVE YOU STARTED TO WORK OUT OR WORKED OUT THE DETAILS OF HOW TO KILL YOURSELF? DO YOU INTEND TO CARRY OUT THIS PLAN?: NO
1. IN THE PAST MONTH, HAVE YOU WISHED YOU WERE DEAD OR WISHED YOU COULD GO TO SLEEP AND NOT WAKE UP?: YES
6. HAVE YOU EVER DONE ANYTHING, STARTED TO DO ANYTHING, OR PREPARED TO DO ANYTHING TO END YOUR LIFE?: NO
6. HAVE YOU EVER DONE ANYTHING, STARTED TO DO ANYTHING, OR PREPARED TO DO ANYTHING TO END YOUR LIFE?: YES
6. HAVE YOU EVER DONE ANYTHING, STARTED TO DO ANYTHING, OR PREPARED TO DO ANYTHING TO END YOUR LIFE?: NO
2. HAVE YOU ACTUALLY HAD ANY THOUGHTS OF KILLING YOURSELF?: YES
1. SINCE LAST CONTACT, HAVE YOU WISHED YOU WERE DEAD OR WISHED YOU COULD GO TO SLEEP AND NOT WAKE UP?: NO
6. HAVE YOU EVER DONE ANYTHING, STARTED TO DO ANYTHING, OR PREPARED TO DO ANYTHING TO END YOUR LIFE?: NO
1. IN THE PAST MONTH, HAVE YOU WISHED YOU WERE DEAD OR WISHED YOU COULD GO TO SLEEP AND NOT WAKE UP?: YES
5. HAVE YOU STARTED TO WORK OUT OR WORKED OUT THE DETAILS OF HOW TO KILL YOURSELF? DO YOU INTEND TO CARRY OUT THIS PLAN?: NO
4. HAVE YOU HAD THESE THOUGHTS AND HAD SOME INTENTION OF ACTING ON THEM?: NO
2. HAVE YOU ACTUALLY HAD ANY THOUGHTS OF KILLING YOURSELF?: YES
4. HAVE YOU HAD THESE THOUGHTS AND HAD SOME INTENTION OF ACTING ON THEM?: NO

## 2025-04-11 ASSESSMENT — PAIN SCALES - GENERAL
PAINLEVEL_OUTOF10: 0 - NO PAIN

## 2025-04-11 ASSESSMENT — PAIN - FUNCTIONAL ASSESSMENT
PAIN_FUNCTIONAL_ASSESSMENT: 0-10

## 2025-04-11 NOTE — PROGRESS NOTES
Physical Therapy    Physical Therapy Treatment    Patient Name: Nevaeh Cuba  MRN: [id number]  Department: [facility]  Room: 400/400-A  Today's Date: 4/11/2025  Time Calculation  Start Time: 1135  Stop Time: 1200  Time Calculation (min): 25 min         Assessment/Plan   PT Assessment  PT Assessment Results: Decreased strength, Decreased endurance, Decreased mobility, Decreased safety awareness, Pain  Rehab Prognosis: Good  Barriers to Discharge Home: Caregiver assistance, Cognition needs  Caregiver Assistance: Patient lives alone and/or does not have reliable caregiver assistance  Cognition Needs: Insight of patient limited regarding functional ability/needs  Evaluation/Treatment Tolerance: Patient tolerated treatment well  Medical Staff Made Aware: Yes  Strengths: Ability to acquire knowledge  Barriers to Participation: Comorbidities  End of Session Communication: Bedside nurse  Assessment Comment: Patient increased gait distance  End of Session Patient Position: Up in chair, Alarm off, not on at start of session  PT Plan  Inpatient/Swing Bed or Outpatient: Inpatient  PT Plan  Treatment/Interventions: Transfer training, Gait training, Therapeutic exercise, Therapeutic activity, Endurance training  PT Plan: Ongoing PT  PT Frequency: 3 times per week  PT Discharge Recommendations: Low intensity level of continued care  Equipment Recommended upon Discharge: Wheeled walker  PT Recommended Transfer Status: Contact guard  PT - OK to Discharge: Yes      General Visit Information:   PT  Visit  PT Received On: 04/11/25  Response to Previous Treatment: Patient with no complaints from previous session.  General  Reason for Referral: impaired mobility, balance associated w/escalating mental health issues, Psychosis  Referred By: Deandre Marin DO  Past Medical History Relevant to Rehab: HTN, afib, Meniere's, hyperthyroid, OA, anxiety, sensorineural hearing loss, h/p mental and behavioral d/o  Family/Caregiver  Present: No  Prior to Session Communication: Bedside nurse  Patient Position Received: Up in chair, Alarm on  Preferred Learning Style: verbal, visual  General Comment: Patient is cleared by nursing for therapy. Patient in her room upon arrival and agreeable to participate    Subjective   Precautions:  Precautions  Hearing/Visual Limitations: glasses, hearing loss  Medical Precautions: Fall precautions     Date/Time Vitals Session Patient Position Pulse Resp SpO2 BP MAP (mmHg)    04/11/25 1500 --  --  85  15  97 %  155/90  112                 Objective   Pain:  Pain Assessment  Pain Assessment: 0-10  0-10 (Numeric) Pain Score: 0 - No pain  Cognition:  Cognition  Orientation Level: Oriented X4  Cognition Comments: able to follow commands  Processing Speed: Delayed  Coordination:  Movements are Fluid and Coordinated: Yes  Postural Control:  Postural Control  Postural Control: Within Functional Limits  Extremity/Trunk Assessments:     Activity Tolerance:  Activity Tolerance  Endurance: Tolerates 10 - 20 min exercise with multiple rests  Early Mobility/Exercise Safety Screen: Proceed with mobilization - No exclusion criteria met  Activity Tolerance Comments: fair this date  Treatments:  Therapeutic Exercise  Therapeutic Exercise Activity 1: [facility]  Therapeutic Exercise Activity 2: Standing marching at leija rail 2x15  Therapeutic Exercise Activity 3: Standing hip abduction 2x15 at leija rail  Therapeutic Exercise Activity 4: Mini squats at lejia rail 2x15  Therapeutic Exercise Activity 5: wc pushups 1x10 reps    Ambulation/Gait Training 1  Surface 1: Level tile  Device 1: No device  Gait Support Devices: Gait belt  Assistance 1: Contact guard, Minimum assistance  Quality of Gait 1: Diminished heel strike, Inconsistent stride length, Decreased step length, Shuffling gait  Comments/Distance (ft) 1: 75 ft x 2 with turn, no device, intermittent support on wall rail, slow pace. Declines use of RW  at this time.  Transfer 1  Transfer From 1: Wheelchair to  Transfer to 1: Stand  Technique 1: Sit to stand, Stand to sit  Transfer Level of Assistance 1: Close supervision    Outcome Measures:  [facility] Basic Mobility  Turning from your back to your side while in a flat bed without using bedrails: A little  Moving from lying on your back to sitting on the side of a flat bed without using bedrails: A little  Moving to and from bed to chair (including a wheelchair): A little  Standing up from a chair using your arms (e.g. wheelchair or bedside chair): A little  To walk in hospital room: A little  Climbing 3-5 steps with railing: A little  Basic Mobility - Total Score: 18    Education Documentation  Mobility Training, taught by Lana Nj, PT at 4/11/2025  4:06 PM.  Learner: Patient  Readiness: Acceptance  Method: Explanation  Response: Verbalizes Understanding  Comment: verbal cues for safe mobility techniques, importance use of RW                   Encounter Problems       Encounter Problems (Active)       PT  Problem       Patient will transfer sit to stand and stand to sit with independent assist to facilitate mobility. (Progressing)       Start:  04/01/25    Expected End:  04/17/25            Patient will amb 150' feet no device including two turns on even surface with independent assist to facilitate safe mobility.   (Progressing)       Start:  04/01/25    Expected End:  04/17/25            Patient will negotiate 1 stairs with no rail(s) and independent} assist with no device for in home and community. (Progressing)       Start:  04/01/25    Expected End:  04/17/25            Patient will tolerate 15 minutes of standing to improve ability to perform MRADLs. (Progressing)       Start:  04/01/25    Expected End:  04/17/25               Pain - Adult

## 2025-04-11 NOTE — NURSING NOTE
"TAZ NOTE     Problem:  Depression     Behavior:  Pt OOR in milieu this evening. Flat affect noted. Compliant with meds, cooperative with care. During med pass, pt preoccupied with having no clothes here. Pt states \"tomorrow is d-day.\" Pt expresses belief she is \"stuck here\" if she does not have any clothes, stating \"I did it to myself.\" Pt wearing hospital pants with 1 of her 2 sweatshirts. Pt unable to wear her own pants because they are too large.  Group Participation: yes  Appetite/Meals: HS snack provided     Interventions:  1:1 provided with reassurance and positive reinforcement. Evening meds given per orders. Encouraged pt to use call light for needs.    Response:  Pt calm, appears to be resting in bed at this time.     Plan:  Will continue to monitor behaviors and effectiveness of interventions while maintaining pt safety.    "

## 2025-04-11 NOTE — CARE PLAN
The clinical goals for the shift include maintain safety, promote rest.    Over the shift, the patient did make progress toward the following goals.       Problem: Pain - Adult  Goal: Verbalizes/displays adequate comfort level or baseline comfort level  Outcome: Progressing

## 2025-04-11 NOTE — GROUP NOTE
Group Topic: Feeling Awareness/Expression   Group Date: 4/11/2025  Start Time: 1000  End Time: 1100  Facilitators: SUSAN Oreilly   Department: [facility]    Number of Participants: 7   Group Focus: other Values Discussion  Treatment Modality: Other: Recreation Therapy  Interventions utilized were exploration, group exercise, patient education, problem solving, and support  Purpose: coping skills, maladaptive thinking, feelings, irrational fears, communication skills, insight or knowledge, self-worth, self-care, relapse prevention strategies, and trigger / craving management    Name: Nevaeh Cuba YOB: 1955   MR: [id number]      Facilitator: Recreational Therapist  Level of Participation: minimal  Quality of Participation: attentive and cooperative  Interactions with others:  mostly passive  Mood/Affect: appropriate  Triggers (if applicable): n/a  Cognition:  mostly passive  Progress: Moderate  Comments: pt problem is delusional thought.  Pt joins group with little encouragement.  Appears to be processing group topic/questions AEB nodding along with staff and peers and making good eye contact, but displays mostly passive participation.    Plan: continue with services

## 2025-04-11 NOTE — GROUP NOTE
Group Topic: Art Creative   Group Date: 4/10/2025  Start Time: 2015  End Time: 2030  Facilitators: Arturo Dyson   Department: [facility]    Number of Participants: 7   Group Focus: art therapy  Treatment Modality: Patient-Centered Therapy  Interventions utilized were reminiscence  Purpose: insight or knowledge    Name: Nevaeh Cuba YOB: 1955   MR: [id number]      Facilitator: Mental [facility]  Level of Participation: active  Quality of Participation: appropriate/pleasant  Interactions with others: appropriate  Mood/Affect: positive  Triggers (if applicable): n/a  Cognition: coherent/clear  Progress: Moderate  Comments: pt problem is psychosis  Plan: continue with services

## 2025-04-11 NOTE — GROUP NOTE
"Group Topic: Other   Group Date: 4/11/2025  Start Time: 1330  End Time: 1430  Facilitators: EVIN OreillyS   Department: [facility]    Number of Participants: 6   Group Focus: other Mind Joggers  Treatment Modality: Other: Recreation Therapy  Interventions utilized were mental fitness  Purpose: other: fun, elevate mood, increase socialization, enhance self esteem, simulate memory    Name: Nevaeh Cuba YOB: 1955   MR: [id number]      Facilitator: Recreational Therapist  Level of Participation: minimal  Quality of Participation: passive and quiet  Interactions with others:  mostly passive  Mood/Affect:  passive  Triggers (if applicable): n/a  Cognition:  passive  Progress: Minimal  Comments: pt problem is delusional thought.  Pt reports having difficulty focusing.  States her mind \"is in a loop\".  Passive with participation.   Plan: continue with services      "

## 2025-04-11 NOTE — NURSING NOTE
BIRP NOTE     Problem:  Depression     Behavior:  Patient calm and cooperative throughout the shift. Flat affect. Cooperative with treatment. Endorses trouble focusing on positive goals.   Group Participation: Yes  Appetite/Meals: good       Interventions:  1:1 nursing time with active listening  Fall precautions  Scheduled medications administered per MAR     Response:  Compliant with medications and treatment      Plan:  Continue with treatment plan  Monitor q15 minute safety checks

## 2025-04-11 NOTE — PROGRESS NOTES
[facility]: [facility]  PROGRESS NOTE      Nevaeh Cuba is a 69 y.o. female that is being seen  today for follow-up at [facility]..  Subjective   Patient is being seen for follow-up at [facility]. Patient's blood pressure is fairly controlled with amlodipine.  Patient's lab work reviewed.  Potassium level is within the normal range now.  Will continue to monitor.      ROS  Negative for fever or chills  Negative for sore throat, ear pain, nasal discharge  Negative for cough, shortness of breath or wheezing  Negative for chest pain, palpitations, swelling of legs  Negative for abdominal pain, constipation, diarrhea, blood in the stools  Negative for urinary complaints  Negative for headache, dizziness, weakness or numbness  Negative for joint pain  Positive for depression or anxiety  All other systems reviewed and were negative   Vitals:    04/11/25 0500   BP: 149/89   Pulse: 84   Resp: 18   Temp: 36.9 °C (98.4 °F)   SpO2: 95%      Vitals:    04/09/25 0634   Weight: 65.1 kg (143 lb 8.3 oz)     Body mass index is 23.16 kg/m².  Physical Exam  Constitutional: Patient does not appear to be in any acute distress  Head and Face: NCAT  Eyes: Normal external exam, EOMI  ENT: Normal external inspection of ears and nose. Oropharynx normal.  Cardiovascular: RRR, S1/S2, no murmurs, rubs, or gallops, radial pulses +2, no edema of extremities  Pulmonary: CTAB, no respiratory distress.  Abdomen: +BS, soft, non-tender, nondistended, no guarding or rebound, no masses noted  MSK: No joint swelling, normal movements of all extremities. Range of motion- normal.  Skin- No lesions, contusions, or erythema.  Peripheral puslses palpable bilaterally 2+  Neuro: AAO X3, Cranial nerves 2-12 grossly intact,DTR 2+ in all 4 limbs       LABS   [unfilled]  Lab Results   Component Value Date    GLUCOSE 111 (H) 04/11/2025    CALCIUM 9.1 04/11/2025     04/11/2025    K 3.6 04/11/2025    CO2 29 04/11/2025     (H)  04/11/2025    BUN 20 04/11/2025    CREATININE 0.71 04/11/2025     Lab Results   Component Value Date    ALT 26 04/11/2025    AST 15 04/11/2025    ALKPHOS 75 04/11/2025    BILITOT 0.6 04/11/2025     Lab Results   Component Value Date    WBC 9.9 03/28/2025    HGB 17.5 (H) 03/28/2025    HCT 48.7 (H) 03/28/2025    MCV 88 03/28/2025     03/28/2025     Lab Results   Component Value Date    CHOL 142 03/29/2025    CHOL 224 (H) 04/12/2024    CHOL 194 10/06/2023     Lab Results   Component Value Date    HDL 59.7 03/29/2025    HDL 58.0 04/12/2024    HDL 58.0 10/06/2023     Lab Results   Component Value Date    LDLCALC 67 03/29/2025    LDLCALC 102 04/12/2024    LDLCALC 88 10/06/2023     Lab Results   Component Value Date    TRIG 77 03/29/2025    TRIG 318 (H) 04/12/2024    TRIG 240 (H) 10/06/2023     Lab Results   Component Value Date    HGBA1C 5.5 06/25/2024     Other labs not included in the list above were reviewed either before or during this encounter.    History    Past Medical History:   Diagnosis Date    Benign essential hypertension     Estrogen deficiency 11/15/2023    DEXA 1/24 back nl, hip neck T-2.1 recheck 1/26    History of atrial fibrillation     Hyperactive thyroid Dr.Burtch Rand [place]ham NSR no AC    History of breast lift 05/2024    History of Meniere's syndrome 11/15/2023    Hx of reduction mammoplasty 05/2024    Hyperthyroidism 09/15/2023    Other specified abnormal findings of blood chemistry     High serum cholesterol sulfate    Personal history of malignant neoplasm, unspecified     History of malignant neoplasm    Personal history of other mental and behavioral disorders     Unspecified osteoarthritis, unspecified site 04/28/2016    Arthritis     Past Surgical History:   Procedure Laterality Date    COSMETIC SURGERY      HYSTERECTOMY  04/28/2016    Hysterectomy    JOINT REPLACEMENT      rt ring finger    JOINT REPLACEMENT Right 05/2022    ring finger    OTHER SURGICAL HISTORY  04/28/2016     Musculoskeletal Procedures Injection Carpal Tunnel    OTHER SURGICAL HISTORY  04/28/2016    Arthrodesis MCP Joint    OTHER SURGICAL HISTORY  01/11/2021    Ankle surgery    OTHER SURGICAL HISTORY  01/11/2021    Carpal tunnel surgery    OTHER SURGICAL HISTORY  07/06/2022    Nasal septoplasty    OTHER SURGICAL HISTORY  07/2021    heart shock catherization    REDUCTION MAMMAPLASTY  may 7 2024    SHOULDER SURGERY  08/2021    total left shoulder replacement    SINUS SURGERY      TRIGGER FINGER RELEASE Right 01/2024    middle finger     Family History   Problem Relation Name Age of Onset    Stroke Mother      Other (cyst on breast) Mother      Atrial fibrillation Father      Stroke Father      Heart disease Father       Allergies   Allergen Reactions    Latex Itching and Rash    Methotrexate Itching     HEAD BURNING AND ITCHING    Metoprolol Rash    Diltiazem Hcl Itching     Rash face     Latex, Natural Rubber Itching    Pollen Extracts Other     NASAL CONGESTION    Adhesive Tape-Silicones Rash    Folic Acid Rash    Sulfamethoxazole-Trimethoprim Rash     Tinnitus      No current facility-administered medications on file prior to encounter.     Current Outpatient Medications on File Prior to Encounter   Medication Sig Dispense Refill    acetaminophen (Tylenol) 325 mg tablet Take 2 tablets (650 mg) by mouth every 6 hours if needed for mild pain (1 - 3) 20 tablet 0    atorvastatin (Lipitor) 20 mg tablet Take 1 tablet (20 mg) by mouth once daily. 90 tablet 3    cholecalciferol (Vitamin D-3) 25 MCG (1000 UT) tablet Take 2 tablets (2,000 Units) by mouth. As directed      docusate sodium (Colace) 100 mg capsule Take 1 capsule (100 mg) by mouth once daily as needed.      fexofenadine HCl (ALLEGRA ORAL) Take 1 tablet by mouth once daily as needed (ALLERGIES).      fLuvoxaMINE (Luvox) 50 mg tablet Take 1 tablet (50 mg) by mouth early in the morning..      hydrOXYzine HCL (Atarax) 25 mg tablet Take 2 tablets (50 mg) by mouth once  daily at bedtime for 10 days. 20 tablet 0    ibuprofen 200 mg tablet Take 2 tablets (400 mg) by mouth every 6 hours if needed for mild pain (1 - 3).      methIMAzole (Tapazole) 5 mg tablet Take 1 tablet (5 mg) by mouth once daily. 90 tablet 3    mv-min-FA-vit K-lutein-zeaxant (PreserVision AREDS 2 Plus MV) 200 mcg-15 mcg- 5 mg-1 mg capsule Take 1 capsule by mouth once daily.      OXcarbazepine (Trileptal) 150 mg tablet Take 1 tablet (150 mg) by mouth 2 times a day.      OXcarbazepine (Trileptal) 300 mg tablet Take 1 tablet (300 mg) by mouth every 12 hours.      propranolol (Inderal) 10 mg tablet Take 1 tablet (10 mg) by mouth every 8 hours if needed for anxiety.      triamterene-hydrochlorothiazid (Maxzide-25) 37.5-25 mg tablet Take 1 tablet by mouth once daily in the morning. 90 tablet 3     Immunization History   Administered Date(s) Administered    COVID-19, subunit, rS-nanoparticle, adjuvanted, PF, 5 mcg/0.5 mL 09/28/2024    Flu vaccine, quadrivalent, high-dose, preservative free, age 65y+ (FLUZONE) 09/14/2023    Influenza, Seasonal, Quadrivalent, Adjuvanted 09/30/2021, 09/08/2022    Influenza, Unspecified 09/07/2010, 10/17/2011    Influenza, injectable, MDCK, quadrivalent 10/23/2017, 10/30/2018    Influenza, injectable, quadrivalent 09/23/2019, 09/11/2020    Influenza, seasonal, injectable 09/24/2012, 10/17/2013, 10/23/2014, 10/26/2015, 11/17/2016    Moderna COVID-19 vaccine, 12 years and older (50mcg/0.5mL)(Spikevax) 09/29/2023    Pfizer COVID-19 vaccine, bivalent, age 12 years and older (30 mcg/0.3 mL) 09/08/2022    Pfizer Gray Cap SARS-CoV-2 03/31/2022    Pfizer Purple Cap SARS-CoV-2 03/09/2021, 04/06/2021, 10/06/2021    Pneumococcal conjugate vaccine, 13-valent (PREVNAR 13) 07/11/2013    Pneumococcal conjugate vaccine, 20-valent (PREVNAR 20) 09/16/2023    Pneumococcal polysaccharide vaccine, 23-valent, age 2 years and older (PNEUMOVAX 23) 11/05/2020    RSV, 60 Years And Older (AREXVY) 09/16/2023    Tdap  vaccine, age 7 year and older (BOOSTRIX, ADACEL) 09/30/2021    Zoster vaccine, recombinant, adult (SHINGRIX) 10/15/2020, 09/30/2021    Zoster, live 02/10/2012     Patient's medical history was reviewed and updated either before or during this encounter.  ASSESSMENT / PLAN:  Active Hospital Problems    Hypokalemia      *Psychosis, unspecified psychosis type (Multi)      Anxiety      Insomnia      Sensorineural hearing loss (SNHL) of both ears      Essential hypertension      Hyperlipidemia      Hyperthyroidism    Patient is being seen for follow-up at [facility].  Anxiety has been stable.   Blood pressure has been fairly controlled with amlodipine.  Repeat potassium levels have been stable.  Will continue to monitor.

## 2025-04-11 NOTE — GROUP NOTE
Group Topic: Goals   Group Date: 4/11/2025  Start Time: 0745  End Time: 0800  Facilitators: Juan Mann   Department: [facility]     Number of Participants: 9   Group Focus: goals  Treatment Modality: Other: Goals activity  Interventions utilized were group exercise  Purpose: self-worth    Name: Nevaeh Cuba YOB: 1955   MR: [id number]      Facilitator: Mental Health [facility]  Level of Participation: active  Quality of Participation: appropriate/pleasant  Interactions with others: appropriate  Mood/Affect: appropriate  Triggers (if applicable): None  Cognition: coherent/clear  Progress: Minimal  Comments: N/A  Plan: continue with services

## 2025-04-11 NOTE — PROGRESS NOTES
"Nevaeh Cuba is a 69 y.o. female on day 14 of admission presenting with Psychosis, unspecified psychosis type (Multi).      Subjective   Chart reviewed and case discussed with nursing. Patient has agreed to go toAL/SNF- social work informed patient passed PASSAR so would qualify to go to a nursing home but there will be a challenge with funding. Social work is also exploring AL as it may be more financially possible for patient.  She denies any adverse side effects from meds.  Objective     Last Recorded Vitals  Blood pressure 149/89, pulse 84, temperature 36.9 °C (98.4 °F), temperature source Temporal, resp. rate 18, height 1.676 m (5' 6\"), weight 65.1 kg (143 lb 8.3 oz), SpO2 95%.    Review of Systems    Psychiatric ROS - Adult  Anxiety: General Anxiety Disorder (LANDON)LANDON Behaviors: difficult to control worry, excessive anxiety/worry, difficulty concentrating, restlessness, and sleep disturbance - less anxious  Depression: anhedonia, concentration, energy, guilt, helpless, hopeless, interest, persistent thoughts of death, sleep decreased , suicidal thoughts, and withdrawn -  appears less depressed  Delirium: negative  Psychosis: auditory hallucinations and visual hallucinations - not endorsed nor observed today but cont with obsessive delusions- worry about things not real  Usha:  none noted or reported but has decreased sleep and increased spending hx  Safety Issues: suicidal ideation - denies SI/HI today  Psychiatric ROS Comment: less psychotic flavor in presentation- more settled    Physical Exam  Vitals and nursing note reviewed.   Pulmonary:      Effort: Pulmonary effort is normal.   Neurological:      Mental Status: She is alert.       Mental Status Exam  General: adequately groomed appears stated age  Appearance: hospital attire  Attitude: pleasant  Behavior: cooperative  Motor Activity: in wheel chair for safety  Speech: normal rate and  low volume  Mood: \"ok\"  Affect:  a little less constricted  Thought " "Process: focused on getting more personal clothes, otherwise does not provide much through discussion today, ingroups will make an approporiate comment if called upon but does not volunteer toparticiapte  Thought Content: limited. denies SI/HI denies paranoia but has cont obsessive delusions about finances despite much reassurance from family- seems a little less anxious today  Thought Perception: denies AH/VH, does not appear stimulated  Cognition: alert and oriented x4  Insight: patient knows she needs treatment for psychiatric illness  Judgement: patient is able to participate in medical decsion making    Psychiatric Risk Assessment  Violence Risk Assessment: major mental illness and other psychosis  Acute Risk of Harm to Others is Considered: low   Suicide Risk Assessment: age > 65 yrs old, , current psychiatric illness, feelings of hopelessness, living alone or lack of social support, suicidal ideations, and other guilt cause spent money was supoosed to pay for mother and  in nursing home \"on stuff that is all over the condo\"  Protective Factors against Suicide: adherence to  treatment and sense of responsibility toward family  Acute Risk of Harm to Self is Considered: low      Relevant Results          Results for orders placed or performed during the hospital encounter of 03/28/25 (from the past 24 hours)   Comprehensive Metabolic Panel   Result Value Ref Range    Glucose 111 (H) 74 - 99 mg/dL    Sodium 143 136 - 145 mmol/L    Potassium 3.6 3.5 - 5.3 mmol/L    Chloride 109 (H) 98 - 107 mmol/L    Bicarbonate 29 21 - 32 mmol/L    Anion Gap 9 (L) 10 - 20 mmol/L    Urea Nitrogen 20 6 - 23 mg/dL    Creatinine 0.71 0.50 - 1.05 mg/dL    eGFR >90 >60 mL/min/1.73m*2    Calcium 9.1 8.6 - 10.3 mg/dL    Albumin 3.7 3.4 - 5.0 g/dL    Alkaline Phosphatase 75 33 - 136 U/L    Total Protein 6.3 (L) 6.4 - 8.2 g/dL    AST 15 9 - 39 U/L    Bilirubin, Total 0.6 0.0 - 1.2 mg/dL    ALT 26 7 - 45 U/L     *Note: Due " "to a large number of results and/or encounters for the requested time period, some results have not been displayed. A complete set of results can be found in Results Review.   Scheduled medications  amLODIPine, 5 mg, oral, Daily  atorvastatin, 20 mg, oral, Daily  cholecalciferol, 2,000 Units, oral, Daily  fLuvoxaMINE, 75 mg, oral, Nightly  melatonin, 3 mg, oral, Daily  methIMAzole, 5 mg, oral, Daily  OLANZapine, 5 mg, oral, Nightly  OXcarbazepine, 300 mg, oral, q12h CAROLINE  polyethylene glycol, 17 g, oral, Daily      Continuous medications     PRN medications  PRN medications: acetaminophen, docusate sodium, hydrOXYzine HCL, QUEtiapine **OR** OLANZapine, propranolol        Assessment/Plan   Assessment & Plan  Psychosis, unspecified psychosis type (Multi)    Anxiety    Insomnia    Hypokalemia    Patient is a 68 yo female dx with MDD, LANDON with psychotic features (obsessive features). She says she spent all the money she was supposed to pay for her  and mothers nursing home on \"stuff that is all over my condo\". She denies dx of bipolar disorder, does not present as manic at this time but presents with psychotic flavor, almost schizotypal features.       Biological   - continue Luvox 75mg every day for anxiety and depression  - continue trileptal 300mg bid for mood stabilization?  - says she has taken lithium in the past and it makes her dizzy, denies ever taking depakote  - decrease zyprexa 5mg at bedtime an dmonitor for increase in obsessional delusions  - prns avalable  - medical pertinences appreciated     Psychological     Provider encouraged patient to attend groups on unit.     Sociological     Provider encouraged patient to work with social workon discharge plan. She planned to return to her condo initially but now says she thinks she can't care for herself there anymore. Social work is pursuing placement.    Total time spent with patient encounter 20 minutes. This includes patient interview, assessment, " extensive chart review, personal review of labs and images as noted above, and formulation of recommendations.

## 2025-04-11 NOTE — PROGRESS NOTES
" REHAB Therapy Assessment & Treatment    Patient Name: Nevaeh Cuba  MRN: [id number]  Today's Date: 4/11/2025    Recreation note : pt is alert and oriented x 3 with some poor insight/judgement.  Continues to attend groups of choice daily but also continues to display passive participation.  Pt endorses anxiety and expresses frustration with \"things looping in my head\".   Reports redirection offered is not very effective.  Pt makes good eye contact during group and nods her head along as if processing group material, but offers limited verbal participation.  Pt appears to be eating well and reports she is sleeping \"ok\".  Will continue to encourage pt to attend groups of choice daily.  "

## 2025-04-12 LAB
ALBUMIN SERPL BCP-MCNC: 4.1 G/DL (ref 3.4–5)
ALP SERPL-CCNC: 82 U/L (ref 33–136)
ALT SERPL W P-5'-P-CCNC: 26 U/L (ref 7–45)
ANION GAP SERPL CALCULATED.3IONS-SCNC: 13 MMOL/L (ref 10–20)
AST SERPL W P-5'-P-CCNC: 16 U/L (ref 9–39)
BILIRUB SERPL-MCNC: 0.6 MG/DL (ref 0–1.2)
BUN SERPL-MCNC: 19 MG/DL (ref 6–23)
CALCIUM SERPL-MCNC: 9.4 MG/DL (ref 8.6–10.3)
CHLORIDE SERPL-SCNC: 108 MMOL/L (ref 98–107)
CO2 SERPL-SCNC: 26 MMOL/L (ref 21–32)
CREAT SERPL-MCNC: 0.63 MG/DL (ref 0.5–1.05)
EGFRCR SERPLBLD CKD-EPI 2021: >90 ML/MIN/1.73M*2
GLUCOSE SERPL-MCNC: 111 MG/DL (ref 74–99)
POTASSIUM SERPL-SCNC: 3.5 MMOL/L (ref 3.5–5.3)
PROT SERPL-MCNC: 6.8 G/DL (ref 6.4–8.2)
SODIUM SERPL-SCNC: 143 MMOL/L (ref 136–145)

## 2025-04-12 PROCEDURE — 2500000001 HC RX 250 WO HCPCS SELF ADMINISTERED DRUGS (ALT 637 FOR MEDICARE OP): Performed by: STUDENT IN AN ORGANIZED HEALTH CARE EDUCATION/TRAINING PROGRAM

## 2025-04-12 PROCEDURE — 2500000001 HC RX 250 WO HCPCS SELF ADMINISTERED DRUGS (ALT 637 FOR MEDICARE OP): Performed by: INTERNAL MEDICINE

## 2025-04-12 PROCEDURE — 80053 COMPREHEN METABOLIC PANEL: CPT | Performed by: INTERNAL MEDICINE

## 2025-04-12 PROCEDURE — 36415 COLL VENOUS BLD VENIPUNCTURE: CPT | Performed by: INTERNAL MEDICINE

## 2025-04-12 PROCEDURE — 2500000004 HC RX 250 GENERAL PHARMACY W/ HCPCS (ALT 636 FOR OP/ED): Performed by: STUDENT IN AN ORGANIZED HEALTH CARE EDUCATION/TRAINING PROGRAM

## 2025-04-12 PROCEDURE — 2500000005 HC RX 250 GENERAL PHARMACY W/O HCPCS: Performed by: STUDENT IN AN ORGANIZED HEALTH CARE EDUCATION/TRAINING PROGRAM

## 2025-04-12 PROCEDURE — 1140000001 HC PRIVATE PSYCH ROOM DAILY

## 2025-04-12 PROCEDURE — 2500000002 HC RX 250 W HCPCS SELF ADMINISTERED DRUGS (ALT 637 FOR MEDICARE OP, ALT 636 FOR OP/ED): Performed by: REGISTERED NURSE

## 2025-04-12 PROCEDURE — 99232 SBSQ HOSP IP/OBS MODERATE 35: CPT | Performed by: STUDENT IN AN ORGANIZED HEALTH CARE EDUCATION/TRAINING PROGRAM

## 2025-04-12 RX ORDER — FLUVOXAMINE MALEATE 50 MG/1
100 TABLET, FILM COATED ORAL NIGHTLY
Status: DISCONTINUED | OUTPATIENT
Start: 2025-04-12 | End: 2025-04-24

## 2025-04-12 RX ADMIN — AMLODIPINE BESYLATE 5 MG: 5 TABLET ORAL at 08:15

## 2025-04-12 RX ADMIN — OXCARBAZEPINE 300 MG: 300 TABLET, FILM COATED ORAL at 06:17

## 2025-04-12 RX ADMIN — OXCARBAZEPINE 300 MG: 300 TABLET, FILM COATED ORAL at 17:31

## 2025-04-12 RX ADMIN — OLANZAPINE 5 MG: 5 TABLET, FILM COATED ORAL at 20:19

## 2025-04-12 RX ADMIN — METHIMAZOLE 5 MG: 5 TABLET ORAL at 08:15

## 2025-04-12 RX ADMIN — ATORVASTATIN CALCIUM 20 MG: 20 TABLET, FILM COATED ORAL at 20:19

## 2025-04-12 RX ADMIN — FLUVOXAMINE MALEATE 100 MG: 50 TABLET, COATED ORAL at 20:19

## 2025-04-12 RX ADMIN — POLYETHYLENE GLYCOL 3350 17 G: 17 POWDER, FOR SOLUTION ORAL at 08:15

## 2025-04-12 RX ADMIN — MELATONIN TAB 3 MG 3 MG: 3 TAB at 17:31

## 2025-04-12 RX ADMIN — Medication 2000 UNITS: at 08:15

## 2025-04-12 ASSESSMENT — COLUMBIA-SUICIDE SEVERITY RATING SCALE - C-SSRS
6. HAVE YOU EVER DONE ANYTHING, STARTED TO DO ANYTHING, OR PREPARED TO DO ANYTHING TO END YOUR LIFE?: YES
1. IN THE PAST MONTH, HAVE YOU WISHED YOU WERE DEAD OR WISHED YOU COULD GO TO SLEEP AND NOT WAKE UP?: YES
6. HAVE YOU EVER DONE ANYTHING, STARTED TO DO ANYTHING, OR PREPARED TO DO ANYTHING TO END YOUR LIFE?: NO
2. HAVE YOU ACTUALLY HAD ANY THOUGHTS OF KILLING YOURSELF?: NO
6. HAVE YOU EVER DONE ANYTHING, STARTED TO DO ANYTHING, OR PREPARED TO DO ANYTHING TO END YOUR LIFE?: NO
1. SINCE LAST CONTACT, HAVE YOU WISHED YOU WERE DEAD OR WISHED YOU COULD GO TO SLEEP AND NOT WAKE UP?: NO
4. HAVE YOU HAD THESE THOUGHTS AND HAD SOME INTENTION OF ACTING ON THEM?: NO
2. HAVE YOU ACTUALLY HAD ANY THOUGHTS OF KILLING YOURSELF?: YES
5. HAVE YOU STARTED TO WORK OUT OR WORKED OUT THE DETAILS OF HOW TO KILL YOURSELF? DO YOU INTEND TO CARRY OUT THIS PLAN?: NO

## 2025-04-12 ASSESSMENT — PAIN SCALES - GENERAL
PAINLEVEL_OUTOF10: 0 - NO PAIN
PAINLEVEL_OUTOF10: 0 - NO PAIN

## 2025-04-12 ASSESSMENT — PAIN - FUNCTIONAL ASSESSMENT
PAIN_FUNCTIONAL_ASSESSMENT: 0-10
PAIN_FUNCTIONAL_ASSESSMENT: 0-10

## 2025-04-12 NOTE — GROUP NOTE
Group Topic: Other   Group Date: 4/12/2025  Start Time: 1330  End Time: 1430  Facilitators: EVIN GroverS   Department: [facility]    Number of Participants: 8   Group Focus: leisure skills, self-esteem, and social skills  Treatment Modality: Leisure Development and Other: Recreation Therapy  Interventions utilized were group exercise and leisure development  Purpose: In this therapeutic group patients were prompted to engage in a card game of “Moo”. This game aims to promote increased socialization, self-esteem, motivation, activity level, following directions while also using fine motor skills.      Name: Nevaeh Cuba YOB: 1955   MR: [id number]      Facilitator: Recreational Therapist  Level of Participation: moderate  Quality of Participation: appropriate/pleasant, cooperative, engaged, and passive at first  Interactions with others:  passive and appropriate  Mood/Affect: blunted  Cognition: loose  Progress: Moderate  Comments: pt problem is delusional thought. Pt joins group with passive participation at first. Eventually joins game with some prompting. Blunted flat affect. Appears to have trouble focusing.   Plan: continue with services

## 2025-04-12 NOTE — NURSING NOTE
"BIRP NOTE     Problem:  Hopelessness  Paranoia     Behavior:  Patient is pleasant and cooperative with care and medication administration. Flat affect. Patient voices hopelessness about placement and states \" I don't think I'm ever going to leave here. What do they do in situations like mine?\"  Group Participation: Attends groups  Appetite/Meals: 75-25-25       Interventions:  Medications administered per MAR.   1:1 provided   Active listening and reassurance provided  Response:  Medication compliant.      Plan:  Continue current plan of care.     "

## 2025-04-12 NOTE — CARE PLAN
"The patient's goals for the shift include \"to get out of here soon\"    The clinical goals for the shift include Attend groups/ medication compliance/ maintain patient safety      Problem: Fall/Injury  Goal: Not fall by end of shift  Outcome: Progressing     Problem: Discharge Planning  Goal: Discharge to home or other facility with appropriate resources  Outcome: Progressing       "

## 2025-04-12 NOTE — GROUP NOTE
Group Topic: Stress Reduction/Relaxation   Group Date: 4/12/2025  Start Time: 1000  End Time: 1100  Facilitators: EVIN GroverS   Department: [facility]    Number of Participants: 7   Group Focus: mindfulness, relaxation, and self-awareness  Treatment Modality: Skills Training and Other: Recreation Therapy  Interventions utilized were exploration, group exercise, other mindfulness, and patient education  Purpose: In this therapeutic group patients engaged in mindfulness and relaxation skills including chair yoga exercise/stretching which is utilized to assist in decreasing stress and improve overall mental health. Pt also participated in deep breathing techniques and guided imagery which aims to promote relaxation, self-awareness and helps to decrease stress and anxiety.      Name: Nevaeh Cuba YOB: 1955   MR: [id number]      Facilitator: Recreational Therapist  Level of Participation: minimal  Quality of Participation: appropriate/pleasant, cooperative, and passive  Interactions with others: appropriate  Mood/Affect: flat  Cognition: not focused  Progress: Moderate  Comments: pt problem is delusional thought. Passive with participation. Eyes closed often. Pt in and out of group to be seen for labs and psychiatrist.   Plan: continue with services

## 2025-04-12 NOTE — PROGRESS NOTES
"Nevaeh Cuba is a 69 y.o. female on day 15 of admission presenting with Psychosis, unspecified psychosis type (Multi).      Subjective   Chart reviewed and case discussed with nursing. Patient has agreed to go toAL/SNF- social work informed patient passed PASSAR so would qualify to go to a nursing home but there will be a challenge with funding. Social work is also exploring AL as it may be more financially possible for patient.  She denies any adverse side effects from meds.  Objective     Last Recorded Vitals  Blood pressure (!) 162/105, pulse 90, temperature 36.7 °C (98.1 °F), temperature source Temporal, resp. rate 19, height 1.676 m (5' 6\"), weight 65.1 kg (143 lb 8.3 oz), SpO2 98%.    Review of Systems    Psychiatric ROS - Adult  Anxiety: General Anxiety Disorder (LANDON)LANDON Behaviors: difficult to control worry, excessive anxiety/worry, difficulty concentrating, restlessness, and sleep disturbance - less anxious  Depression: anhedonia, concentration, energy, guilt, helpless, hopeless, interest, persistent thoughts of death, sleep decreased , suicidal thoughts, and withdrawn -  appears less depressed  Delirium: negative  Psychosis: auditory hallucinations and visual hallucinations - not endorsed nor observed today but cont with obsessive delusions- worry about things not real  Usha:  none noted or reported but has decreased sleep and increased spending hx  Safety Issues: suicidal ideation - denies SI/HI today  Psychiatric ROS Comment: less psychotic flavor in presentation- more settled    Physical Exam  Vitals and nursing note reviewed.   Pulmonary:      Effort: Pulmonary effort is normal.   Neurological:      Mental Status: She is alert.       Mental Status Exam  General: adequately groomed appears stated age  Appearance: hospital attire  Attitude: pleasant  Behavior: cooperative  Motor Activity: in wheel chair for safety  Speech: normal rate and  low volume  Mood: \"ok\"  Affect:  a little less " "constricted  Thought Process: focused on getting more personal clothes, otherwise does not provide much through discussion today, ingroups will make an approporiate comment if called upon but does not volunteer toparticiapte  Thought Content: limited. denies SI/HI denies paranoia but has cont obsessive delusions about finances despite much reassurance from family- seems a little less anxious today  Thought Perception: denies AH/VH, does not appear stimulated  Cognition: alert and oriented x4  Insight: patient knows she needs treatment for psychiatric illness  Judgement: patient is able to participate in medical decsion making    Psychiatric Risk Assessment  Violence Risk Assessment: major mental illness and other psychosis  Acute Risk of Harm to Others is Considered: low   Suicide Risk Assessment: age > 65 yrs old, , current psychiatric illness, feelings of hopelessness, living alone or lack of social support, suicidal ideations, and other guilt cause spent money was supoosed to pay for mother and  in nursing home \"on stuff that is all over the condo\"  Protective Factors against Suicide: adherence to  treatment and sense of responsibility toward family  Acute Risk of Harm to Self is Considered: low      Relevant Results  {If you would like to pull in Medications, type .meds     If you would like to pull in Lab results for the last 24 hours, type .[email or web address]    If you would like to pull in Imaging results, type .imgrslt :99}      {Link to Stroke Scoring tools - Link :99}  No results found. However, due to the size of the patient record, not all encounters were searched. Please check Results Review for a complete set of results.  Scheduled medications  amLODIPine, 5 mg, oral, Daily  atorvastatin, 20 mg, oral, Daily  cholecalciferol, 2,000 Units, oral, Daily  fLuvoxaMINE, 75 mg, oral, Nightly  melatonin, 3 mg, oral, Daily  methIMAzole, 5 mg, oral, Daily  OLANZapine, 5 mg, oral, Nightly  OXcarbazepine, " "300 mg, oral, q12h CAROLINE  polyethylene glycol, 17 g, oral, Daily      Continuous medications     PRN medications  PRN medications: acetaminophen, docusate sodium, hydrOXYzine HCL, QUEtiapine **OR** OLANZapine, propranolol        Assessment/Plan   Assessment & Plan  Psychosis, unspecified psychosis type (Multi)    Anxiety    Insomnia    Hypokalemia    Patient is a 70 yo female dx with MDD, LANDON with psychotic features (obsessive features). She says she spent all the money she was supposed to pay for her  and mothers nursing home on \"stuff that is all over my condo\". She denies dx of bipolar disorder, does not present as manic at this time but presents with psychotic flavor, almost schizotypal features.       Biological   - continue Luvox 75mg every day for anxiety and depression  - continue trileptal 300mg bid for mood stabilization?  - says she has taken lithium in the past and it makes her dizzy, denies ever taking depakote  - decrease zyprexa 5mg at bedtime an dmonitor for increase in obsessional delusions  - prns avalable  - medical pertinences appreciated     Psychological     Provider encouraged patient to attend groups on unit.     Sociological     Provider encouraged patient to work with social workon discharge plan. She planned to return to her condo initially but now says she thinks she can't care for herself there anymore. Social work is pursuing placement.    Total time spent with patient encounter 20 minutes. This includes patient interview, assessment, extensive chart review, personal review of labs and images as noted above, and formulation of recommendations.           "

## 2025-04-12 NOTE — CARE PLAN
The patient's goals for the shift include     The clinical goals for the shift include Attend groups/ medication compliance/ maintain patient safety    Problem: Fall/Injury  Goal: Not fall by end of shift  Outcome: Progressing  Goal: Be free from injury by end of the shift  Outcome: Progressing     Problem: Pain - Adult  Goal: Verbalizes/displays adequate comfort level or baseline comfort level  Outcome: Progressing     Problem: Chronic Conditions and Co-morbidities  Goal: Patient's chronic conditions and co-morbidity symptoms are monitored and maintained or improved  Outcome: Progressing     Problem: Nutrition  Goal: Nutrient intake appropriate for maintaining nutritional needs  Outcome: Progressing

## 2025-04-12 NOTE — PROGRESS NOTES
LSW met with pt and pt's sister Laura during visitation to discuss discharge planning. LSW explained that the PASRR was approved. LSW discussed placement options such as NH and AL. LSW explained the level of care between the two as well as the cost difference. It was determined that AL placement is likely the appropriate care for pt at this time. LSW discussed referrals placed. LSW waiting to hear from facilities and will follow up with pt and pt's sister once more information is known. Pt is focused on the fact that she will have to stay at the hospital. LSW instructed pt several times that this will not be the case, and that placement will be obtained.     MARCELA CorleyW

## 2025-04-12 NOTE — GROUP NOTE
Group Topic: Self-Care/Wellness   Group Date: 4/11/2025  Start Time: 2000  End Time: 2015  Facilitators: Arturo Dyson   Department: [facility]    Number of Participants: 9   Group Focus: check in  Treatment Modality: Patient-Centered Therapy  Interventions utilized were support  Purpose: self-care    Name: Nevaeh Cuba YOB: 1955   MR: [id number]      Facilitator: [facility]  Level of Participation: active  Quality of Participation: appropriate/pleasant  Interactions with others: appropriate  Mood/Affect: positive  Triggers (if applicable): n\a  Cognition: coherent/clear  Progress: Moderate  Comments: pt problem is unspecified psychosis  Plan: continue with services

## 2025-04-12 NOTE — NURSING NOTE
"TAZ NOTE     Problem:  Depression     Behavior:  Pt OOR. She is self propelling w/c throughout the hallways. Continues to maintain a flat/blunted affect. She is pleasant, calm and cooperative. Pt states several times to this RN that she hopes that she will be able to leave soon- \"I hope I don't have to be here forever.\"   Group Participation: Attends  Appetite/Meals: HS snack provided       Interventions:  HS medications administered per MAR    Response:  Medication compliant.      Plan:  Continue to monitor and assist. Maintain q15 minute rounds for safety.    "

## 2025-04-12 NOTE — CARE PLAN
"The patient's goals for the shift include \"to be able to leave soon\"    The clinical goals for the shift include maintain safety/rest      Problem: Fall/Injury  Goal: Not fall by end of shift  Outcome: Progressing     Problem: Fall/Injury  Goal: Be free from injury by end of the shift  Outcome: Progressing      Problem: Discharge Planning  Goal: Discharge to home or other facility with appropriate resources  Outcome: Progressing          "

## 2025-04-13 VITALS
OXYGEN SATURATION: 96 % | SYSTOLIC BLOOD PRESSURE: 130 MMHG | WEIGHT: 143.52 LBS | HEART RATE: 92 BPM | RESPIRATION RATE: 16 BRPM | TEMPERATURE: 98.8 F | BODY MASS INDEX: 23.07 KG/M2 | HEIGHT: 66 IN | DIASTOLIC BLOOD PRESSURE: 87 MMHG

## 2025-04-13 PROCEDURE — 2500000001 HC RX 250 WO HCPCS SELF ADMINISTERED DRUGS (ALT 637 FOR MEDICARE OP): Performed by: INTERNAL MEDICINE

## 2025-04-13 PROCEDURE — 1140000001 HC PRIVATE PSYCH ROOM DAILY

## 2025-04-13 PROCEDURE — 2500000001 HC RX 250 WO HCPCS SELF ADMINISTERED DRUGS (ALT 637 FOR MEDICARE OP): Performed by: STUDENT IN AN ORGANIZED HEALTH CARE EDUCATION/TRAINING PROGRAM

## 2025-04-13 PROCEDURE — 2500000002 HC RX 250 W HCPCS SELF ADMINISTERED DRUGS (ALT 637 FOR MEDICARE OP, ALT 636 FOR OP/ED): Performed by: REGISTERED NURSE

## 2025-04-13 PROCEDURE — 2500000005 HC RX 250 GENERAL PHARMACY W/O HCPCS: Performed by: STUDENT IN AN ORGANIZED HEALTH CARE EDUCATION/TRAINING PROGRAM

## 2025-04-13 RX ADMIN — ATORVASTATIN CALCIUM 20 MG: 20 TABLET, FILM COATED ORAL at 20:42

## 2025-04-13 RX ADMIN — AMLODIPINE BESYLATE 5 MG: 5 TABLET ORAL at 08:28

## 2025-04-13 RX ADMIN — OXCARBAZEPINE 300 MG: 300 TABLET, FILM COATED ORAL at 06:25

## 2025-04-13 RX ADMIN — METHIMAZOLE 5 MG: 5 TABLET ORAL at 08:28

## 2025-04-13 RX ADMIN — OLANZAPINE 5 MG: 5 TABLET, FILM COATED ORAL at 20:42

## 2025-04-13 RX ADMIN — Medication 2000 UNITS: at 08:28

## 2025-04-13 RX ADMIN — MELATONIN TAB 3 MG 3 MG: 3 TAB at 18:10

## 2025-04-13 RX ADMIN — FLUVOXAMINE MALEATE 100 MG: 50 TABLET, COATED ORAL at 20:42

## 2025-04-13 RX ADMIN — OXCARBAZEPINE 300 MG: 300 TABLET, FILM COATED ORAL at 18:10

## 2025-04-13 ASSESSMENT — COLUMBIA-SUICIDE SEVERITY RATING SCALE - C-SSRS
2. HAVE YOU ACTUALLY HAD ANY THOUGHTS OF KILLING YOURSELF?: NO
4. HAVE YOU HAD THESE THOUGHTS AND HAD SOME INTENTION OF ACTING ON THEM?: NO
6. HAVE YOU EVER DONE ANYTHING, STARTED TO DO ANYTHING, OR PREPARED TO DO ANYTHING TO END YOUR LIFE?: NO
1. IN THE PAST MONTH, HAVE YOU WISHED YOU WERE DEAD OR WISHED YOU COULD GO TO SLEEP AND NOT WAKE UP?: YES
5. HAVE YOU STARTED TO WORK OUT OR WORKED OUT THE DETAILS OF HOW TO KILL YOURSELF? DO YOU INTEND TO CARRY OUT THIS PLAN?: NO
6. HAVE YOU EVER DONE ANYTHING, STARTED TO DO ANYTHING, OR PREPARED TO DO ANYTHING TO END YOUR LIFE?: NO
1. SINCE LAST CONTACT, HAVE YOU WISHED YOU WERE DEAD OR WISHED YOU COULD GO TO SLEEP AND NOT WAKE UP?: NO
6. HAVE YOU EVER DONE ANYTHING, STARTED TO DO ANYTHING, OR PREPARED TO DO ANYTHING TO END YOUR LIFE?: YES
2. HAVE YOU ACTUALLY HAD ANY THOUGHTS OF KILLING YOURSELF?: YES

## 2025-04-13 ASSESSMENT — PAIN - FUNCTIONAL ASSESSMENT
PAIN_FUNCTIONAL_ASSESSMENT: 0-10
PAIN_FUNCTIONAL_ASSESSMENT: 0-10

## 2025-04-13 ASSESSMENT — PAIN SCALES - GENERAL
PAINLEVEL_OUTOF10: 0 - NO PAIN
PAINLEVEL_OUTOF10: 0 - NO PAIN

## 2025-04-13 NOTE — NURSING NOTE
TAZ NOTE     Problem:  Hopelessness  Paranoia     Behavior:  Patient is awake in her room upon RN arrival to the unit. Cooperative with care and medication administration. Flat effect. Patient spends time in the group room today watching tv and listening to peers but does not engage in conversations.   Group Participation: Attends groups  Appetite/Meals: 50-25-25       Interventions:  Medications administered per MAR.     Response:  Medication compliant.      Plan:  Continue current plan of care.

## 2025-04-13 NOTE — CARE PLAN
"The patient's goals for the shift include \"to get some good rest\"    The clinical goals for the shift include maintain safety/rest      Problem: Risk for Suicide  Goal: Read Safety Guidelines this shift  Outcome: Progressing     Problem: Fall/Injury  Goal: Not fall by end of shift  Outcome: Progressing     Problem: Pain - Adult  Goal: Verbalizes/displays adequate comfort level or baseline comfort level  Outcome: Progressing       "

## 2025-04-13 NOTE — GROUP NOTE
Group Topic: Other   Group Date: 4/13/2025  Start Time: 1330  End Time: 1430  Facilitators: EVIN GroverS   Department: [facility]    Number of Participants: 8   Group Focus: leisure skills, self-esteem, and social skills, team building, communication  Treatment Modality: Leisure Development and Other: Recreation Therapy  Interventions utilized were group exercise, leisure development, and mental fitness  Purpose: Patients engaged in group “Guess It” game. In this therapeutic group patients play a game which is developed to focus on listening skills, workings together as a team, following directions, promote self-esteem and increase appropriate social interactions.      Name: Nevaeh Cuba YOB: 1955   MR: [id number]      Facilitator: Recreational Therapist  Level of Participation: moderate  Quality of Participation: cooperative and passive  Interactions with others:  passive  Mood/Affect: flat  Cognition: not focused and processing slowly  Progress: Moderate  Comments: pt problem is delusional thought. Needs prompting to participate.   Plan: continue with services

## 2025-04-13 NOTE — PROGRESS NOTES
"Nevaeh Cuba is a 69 y.o. female on day 16 of admission presenting with Psychosis, unspecified psychosis type (Multi).      Subjective   ***       Objective     Last Recorded Vitals  Blood pressure 117/74, pulse 107, temperature 36.3 °C (97.3 °F), temperature source Temporal, resp. rate 19, height 1.676 m (5' 6\"), weight 65.1 kg (143 lb 8.3 oz), SpO2 97%.    Review of Systems    Psychiatric ROS - Adult  Anxiety: {Anxiety:22292270}  Depression: {Depression:42497210}  Delirium: {Delirium:63286274}  Psychosis: {Psychosis:49331541}  Usha: {Usha:91586012}  Safety Issues: {Safety Issues:34246773}  Psychiatric ROS Comment: ***    Physical Exam      Mental Status Exam  General: ***  Appearance: ***  Attitude: ***  Behavior: ***  Motor Activity: ***  Speech: ***  Mood: ***  Affect: ***  Thought Process: ***  Thought Content: ***  Thought Perception: ***  Cognition: ***  Insight: ***  Judgement: ***    Psychiatric Risk Assessment  Violence Risk Assessment: {Violence Risk Assessment:33970}  Acute Risk of Harm to Others is Considered: {Low/ModHigh:54109}   Suicide Risk Assessment: {Suicide Risk Assessment:96976}  Protective Factors against Suicide: {Protective Factors:95997}  Acute Risk of Harm to Self is Considered: {Low/ModHigh:25901}    Relevant Results  {If you would like to pull in Medications, type .meds     If you would like to pull in Lab results for the last 24 hours, type .[id number]    If you would like to pull in Imaging results, type .imgrslt :99}      {Link to Stroke Scoring tools - Link :99}       Assessment/Plan   Assessment & Plan  Psychosis, unspecified psychosis type (Multi)    Anxiety    Insomnia    Hypokalemia    ***    Current malnutriton diganoses:  Malnutrition Diagnosis: Severe malnutrition related to acute disease or injury         {This patient does not have an ACP note on file for this encounter, please fill one out - Advance Care Planning Activity :99}    I spent *** minutes in the professional and " overall care of this patient.      Elijha Cortes MD       " and mothers nursing home on \"stuff that is all over my condo\". She denies dx of bipolar disorder, does not present as manic at this time but presents with psychotic flavor, almost schizotypal features.       Biological   - continue Luvox 100mg every night for anxiety/obsessions and depression  - continue trileptal 300mg bid for mood stabilization?  - says she has taken lithium in the past and it makes her dizzy, denies ever taking depakote  - continue zyprexa 5mg at bedtime and monitor obsessional delusions  - prns avalable  - medical pertinences appreciated     Psychological     Provider encouraged patient to attend groups on unit.     Sociological     Provider encouraged patient to work with social workon discharge plan. She planned to return to her condo initially but now says she thinks she can't care for herself there anymore. Social work is pursuing placement.    Total time spent with patient encounter 25 minutes. This includes patient interview, assessment, extensive chart review, personal review of labs and images as noted above, and formulation of recommendations.           "

## 2025-04-13 NOTE — CARE PLAN
The patient's goals for the shift include     The clinical goals for the shift include Maintain patient safety/ medication compliance/ attend groups    Problem: Fall/Injury  Goal: Not fall by end of shift  Outcome: Progressing  Goal: Be free from injury by end of the shift  Outcome: Progressing     Problem: Pain - Adult  Goal: Verbalizes/displays adequate comfort level or baseline comfort level  Outcome: Progressing     Problem: Chronic Conditions and Co-morbidities  Goal: Patient's chronic conditions and co-morbidity symptoms are monitored and maintained or improved  Outcome: Progressing     Problem: Nutrition  Goal: Nutrient intake appropriate for maintaining nutritional needs  Outcome: Progressing

## 2025-04-13 NOTE — GROUP NOTE
Group Topic: Other   Group Date: 4/13/2025  Start Time: 1000  End Time: 1100  Facilitators: EVIN GroverS   Department: [facility]    Number of Participants: 9   Group Focus: concentration, reminiscence, self-esteem, and social skills  Treatment Modality: Other: Recreation Therapy  Interventions utilized were group exercise, leisure development, reminiscence, and support  Purpose: In this group patients were prompted to engage together in a therapeutic game which aims to promote increased socialization, motivation, physical activity level, communication and following directions along utilizing balance, ROM and motor skills. CTRS integrated music to this activity to enhance mood and promote memory stimulation.      Name: Nevaeh Cuba YOB: 1955   MR: [id number]      Facilitator: Recreational Therapist  Level of Participation: active  Quality of Participation: appropriate/pleasant, attentive, cooperative, and engaged  Interactions with others: appropriate  Mood/Affect: appropriate and brightens with interaction  Cognition: coherent/clear and processing slowly at times  Progress: Moderate  Comments: pt problem is delusional thought. Pt engages appropriately needing some re-direction Slow to process at times needing re-assurance and support.   Plan: continue with services

## 2025-04-13 NOTE — GROUP NOTE
Group Topic: Goals   Group Date: 4/12/2025  Start Time: 2000  End Time: 2014  Facilitators: Tanika Quinn   Department: [facility]     Number of Participants: 6   Group Focus: goals  Treatment Modality: Other: PCA  Interventions utilized were reminiscence  Purpose: feelings and communication skills    Name: Nevaeh Cuba YOB: 1955   MR: [id number]      Facilitator: Mental [facility]  Level of Participation: did not attend  Quality of Participation:  did not attend  Interactions with others:  did not attend  Mood/Affect:  did not attend  Triggers (if applicable): N/A  Cognition:  did not attend  Progress: Other  Comments: Pt problem is psychosis. Pt decline the invitation to attend group.  Plan: continue with services

## 2025-04-14 PROCEDURE — 2500000001 HC RX 250 WO HCPCS SELF ADMINISTERED DRUGS (ALT 637 FOR MEDICARE OP): Performed by: STUDENT IN AN ORGANIZED HEALTH CARE EDUCATION/TRAINING PROGRAM

## 2025-04-14 PROCEDURE — 2500000001 HC RX 250 WO HCPCS SELF ADMINISTERED DRUGS (ALT 637 FOR MEDICARE OP): Performed by: INTERNAL MEDICINE

## 2025-04-14 PROCEDURE — 2500000005 HC RX 250 GENERAL PHARMACY W/O HCPCS: Performed by: STUDENT IN AN ORGANIZED HEALTH CARE EDUCATION/TRAINING PROGRAM

## 2025-04-14 PROCEDURE — 99232 SBSQ HOSP IP/OBS MODERATE 35: CPT | Performed by: REGISTERED NURSE

## 2025-04-14 PROCEDURE — 1140000001 HC PRIVATE PSYCH ROOM DAILY

## 2025-04-14 PROCEDURE — 2500000002 HC RX 250 W HCPCS SELF ADMINISTERED DRUGS (ALT 637 FOR MEDICARE OP, ALT 636 FOR OP/ED): Performed by: REGISTERED NURSE

## 2025-04-14 RX ADMIN — OLANZAPINE 5 MG: 5 TABLET, FILM COATED ORAL at 20:29

## 2025-04-14 RX ADMIN — OXCARBAZEPINE 300 MG: 300 TABLET, FILM COATED ORAL at 17:46

## 2025-04-14 RX ADMIN — Medication 2000 UNITS: at 08:05

## 2025-04-14 RX ADMIN — FLUVOXAMINE MALEATE 100 MG: 50 TABLET, COATED ORAL at 20:29

## 2025-04-14 RX ADMIN — METHIMAZOLE 5 MG: 5 TABLET ORAL at 08:04

## 2025-04-14 RX ADMIN — ATORVASTATIN CALCIUM 20 MG: 20 TABLET, FILM COATED ORAL at 20:29

## 2025-04-14 RX ADMIN — OXCARBAZEPINE 300 MG: 300 TABLET, FILM COATED ORAL at 06:21

## 2025-04-14 RX ADMIN — AMLODIPINE BESYLATE 5 MG: 5 TABLET ORAL at 08:04

## 2025-04-14 RX ADMIN — MELATONIN TAB 3 MG 3 MG: 3 TAB at 17:08

## 2025-04-14 ASSESSMENT — COLUMBIA-SUICIDE SEVERITY RATING SCALE - C-SSRS
2. HAVE YOU ACTUALLY HAD ANY THOUGHTS OF KILLING YOURSELF?: NO
6. HAVE YOU EVER DONE ANYTHING, STARTED TO DO ANYTHING, OR PREPARED TO DO ANYTHING TO END YOUR LIFE?: YES
1. IN THE PAST MONTH, HAVE YOU WISHED YOU WERE DEAD OR WISHED YOU COULD GO TO SLEEP AND NOT WAKE UP?: YES
6. HAVE YOU EVER DONE ANYTHING, STARTED TO DO ANYTHING, OR PREPARED TO DO ANYTHING TO END YOUR LIFE?: CUT SELF
6. HAVE YOU EVER DONE ANYTHING, STARTED TO DO ANYTHING, OR PREPARED TO DO ANYTHING TO END YOUR LIFE?: YES
4. HAVE YOU HAD THESE THOUGHTS AND HAD SOME INTENTION OF ACTING ON THEM?: NO
1. SINCE LAST CONTACT, HAVE YOU WISHED YOU WERE DEAD OR WISHED YOU COULD GO TO SLEEP AND NOT WAKE UP?: NO
2. HAVE YOU ACTUALLY HAD ANY THOUGHTS OF KILLING YOURSELF?: YES
2. HAVE YOU ACTUALLY HAD ANY THOUGHTS OF KILLING YOURSELF?: YES
1. IN THE PAST MONTH, HAVE YOU WISHED YOU WERE DEAD OR WISHED YOU COULD GO TO SLEEP AND NOT WAKE UP?: YES
5. HAVE YOU STARTED TO WORK OUT OR WORKED OUT THE DETAILS OF HOW TO KILL YOURSELF? DO YOU INTEND TO CARRY OUT THIS PLAN?: NO
6. HAVE YOU EVER DONE ANYTHING, STARTED TO DO ANYTHING, OR PREPARED TO DO ANYTHING TO END YOUR LIFE?: NO
6. HAVE YOU EVER DONE ANYTHING, STARTED TO DO ANYTHING, OR PREPARED TO DO ANYTHING TO END YOUR LIFE?: NO

## 2025-04-14 ASSESSMENT — PAIN SCALES - GENERAL
PAINLEVEL_OUTOF10: 0 - NO PAIN
PAINLEVEL_OUTOF10: 0 - NO PAIN

## 2025-04-14 ASSESSMENT — PAIN - FUNCTIONAL ASSESSMENT
PAIN_FUNCTIONAL_ASSESSMENT: 0-10
PAIN_FUNCTIONAL_ASSESSMENT: 0-10

## 2025-04-14 ASSESSMENT — PAIN DESCRIPTION - DESCRIPTORS: DESCRIPTORS: PATIENT UNABLE TO DESCRIBE

## 2025-04-14 ASSESSMENT — ACTIVITIES OF DAILY LIVING (ADL): EFFECT OF PAIN ON DAILY ACTIVITIES: MINIMAL

## 2025-04-14 NOTE — NURSING NOTE
TAZ NOTE     Problem:  Paranoia     Behavior:  Pt remains flat/blunted. Continues to worry whether or not placement will be found for her. Pt is cooperative with care.   Group Participation: Attends  Appetite/Meals: Hs snack provided       Interventions:  Hs medications administered per MAR    Response:  Medication compliant.      Plan:  Continue to monitor and assist. Maintain q15 minute rounds for safety.

## 2025-04-14 NOTE — NURSING NOTE
TAZ NOTE     Problem:  Pt. Is continuing their journey on working to improve their anxiety & depressive Sx.     Behavior:  Pt. Is cooperative w/ Tx. Plan and has been agreeable to talking w/ staff & peers alike. Pt. Has been able to maintain safety. Pt. A bit confused about medication needs.  Appetite/Meals: good        Interventions:  Pt. Was offered groups and their scheduled medications.     Response:  Pt. Has been compliant w/ meds, tolerated them well; and did attend the majority of group sessions w/ good results.     Plan:  Pt. will continue to be monitored for changes in mental status & safety on the unit.

## 2025-04-14 NOTE — GROUP NOTE
"Group Topic: Feeling Awareness/Expression   Group Date: 4/14/2025  Start Time: 0930  End Time: 1030  Facilitators: SUSAN Oreilly   Department: [facility]    Number of Participants: 6   Group Focus: other Cognitive Distortions  Treatment Modality: Other: Recreation Therapy  Interventions utilized were exploration, group exercise, patient education, problem solving, and support  Purpose: coping skills, maladaptive thinking, feelings, irrational fears, communication skills, insight or knowledge, self-worth, self-care, relapse prevention strategies, and trigger / craving management    Name: Nevaeh Cuba YOB: 1955   MR: [id number]      Facilitator: Recreational Therapist  Level of Participation: minimal  Quality of Participation: attentive and cooperative  Interactions with others:  mostly passive  Mood/Affect: flat  Triggers (if applicable): n/a  Cognition: not focused  Progress: Minimal  Comments: pt problem is delusional thought.  Pt joins group with little encouragement.  Makes good eye contact and nods her head along with staff and peers, but limited with verbal interaction.  Reports \"looping\" in her head after group.  Plan: continue with services      "

## 2025-04-14 NOTE — PROGRESS NOTES
"Nutrition Follow up Note    Nutrition Assessment      Nutrition History:  Energy Intake: Poor < 50 %  Food and Nutrient History: po intake averages 50% over the past 10 meals documented. pt reports not drinking ensure - states she cant get to them \"I can't even finish my meals\". offered magic cup which she declined as well.    Anthropometrics:  Ht: 167.6 cm (5' 6\"), Wt: 65.1 kg (143 lb 8.3 oz), BMI: 23.18    Weight Change:  Daily Weight  04/09/25 : 65.1 kg (143 lb 8.3 oz) - pt questions accuracy of this wt but states she does not know her current wt.   03/28/25 : 75.8 kg (167 lb)  03/21/25 : 76 kg (167 lb 8.8 oz)  03/14/25 : 76.2 kg (168 lb)  01/06/25 : 83 kg (183 lb)  10/16/24 : 83 kg (183 lb)  09/20/24 : 83.9 kg (185 lb)  08/04/24 : 83 kg (182 lb 15.7 oz)  08/02/24 : 82.6 kg (182 lb 3.2 oz)  07/24/24 : 83.5 kg (184 lb 1.4 oz)     Weight History / % Weight Change: per wt hx, pt with a 16# (8.1%) wt loss over ~2.5 months (5/24/24-8/2/24) followed by stable wt from Aug 2024-Jan 2025. most recently pt with an additional 16# (8.7%) wt loss over the past ~2.5 months (1/6-3/28). updated wt of 143# from 4/9/25. if accurate, pt with a 24# (14.4%) wt loss over ~1.5 weeks (3/38-4/9) and an overall 40# (21.8%) wt loss over the past ~3 months (1/6-4/9).  Significant Weight Loss: Yes    Nutrition Focused Physical Exam Findings: defer: not appropriate    Nutrition Significant Labs:  Lab Results   Component Value Date    WBC 9.9 03/28/2025    HGB 17.5 (H) 03/28/2025    HCT 48.7 (H) 03/28/2025     03/28/2025    CHOL 142 03/29/2025    TRIG 77 03/29/2025    HDL 59.7 03/29/2025    ALT 26 04/12/2025    AST 16 04/12/2025     04/12/2025    K 3.5 04/12/2025     (H) 04/12/2025    CREATININE 0.63 04/12/2025    BUN 19 04/12/2025    CO2 26 04/12/2025    TSH 3.26 03/07/2025    INR 1.0 10/05/2021    GLUF 134 (H) 03/29/2025    HGBA1C 5.5 06/25/2024     Nutrition Specific Medications:  amLODIPine, 5 mg, oral, " Daily  atorvastatin, 20 mg, oral, Daily  cholecalciferol, 2,000 Units, oral, Daily  fLuvoxaMINE, 100 mg, oral, Nightly  melatonin, 3 mg, oral, Daily  methIMAzole, 5 mg, oral, Daily  OLANZapine, 5 mg, oral, Nightly  OXcarbazepine, 300 mg, oral, q12h CAROLINE  polyethylene glycol, 17 g, oral, Daily      Dietary Orders (From admission, onward)       Start     Ordered    04/07/25 1250  Oral nutritional supplements  Until discontinued        Question Answer Comment   Deliver with Breakfast    Deliver with Dinner    Select supplement: Ensure Plus High Protein        04/07/25 1250    03/28/25 2141  Adult diet Regular  Diet effective now        Comments: Low fat, Low Calorie   Question:  Diet type  Answer:  Regular    03/28/25 2141                     Estimated Needs:   Estimated Energy Needs  Total Energy Estimated Needs in 24 hours (kCal): 1950 kCal  Energy Estimated Needs per kg Body Weight in 24 hours (kCal/kg): 30 kCal/kg  Method for Estimating Needs: actual wt    Estimated Protein Needs  Total Protein Estimated Needs in 24 Hours (g): 78 g  Protein Estimated Needs per kg Body Weight in 24 Hours (g/kg): 1.2 g/kg  Method for Estimating 24 Hour Protein Needs: actual wt    Estimated Fluid Needs  Method for Estimating 24 Hour Fluid Needs: 1 ml/kcal or per MD        Nutrition Diagnosis   Nutrition Diagnosis:  Malnutrition Diagnosis  Patient has Malnutrition Diagnosis: Yes  Diagnosis Status: Active  Malnutrition Diagnosis: Severe malnutrition related to acute disease or injury  Related to: decreased ability to consume/tolerate sufficient energy  As Evidenced by: po intake </= 50% estimated needs for >/= 5 days, 16# (8.7%) wt loss over ~2.5 months       Nutrition Interventions/Recommendations   Nutrition Interventions and Recommendations:  Nutrition Prescription: Nutrition prescription for oral nutrition    Nutrition Recommendations:  Individualized Nutrition Prescription Provided for : regular diet with ensure plus high protein  BID    Nutrition Interventions/Goals:   Food and/or Nutrient Delivery Interventions  Interventions: Meals and snacks, Medical food supplement  Meals and Snacks: General healthful diet  Goal: provide as ordered  Medical Food Supplement: Commercial beverage medical food supplement therapy  Goal: continue ensure plus high protein BID to provide 350 kcals and 20g protein each  Additional Interventions: suggest monitoring for wt changes on a weekly basis    Education Documentation  No documentation found.           Nutrition Monitoring and Evaluation   Monitoring/Evaluation:   Food/Nutrient Related History Monitoring  Monitoring and Evaluation Plan: Estimated Energy Intake  Estimated Energy Intake: Energy intake greater or equal to 75% of estimated energy needs    Anthropometric Measurements  Monitoring and Evaluation Plan: Body weight  Body Weight: Body weight - Maintain stable weight    Goal Status: Some progress toward goal(s)    Follow Up  Time Spent (min): 30 minutes  Last Date of Nutrition Visit: 04/14/25  Nutrition Follow-Up Needed?: 5-7 days  Follow up Comment: 4/21/25

## 2025-04-14 NOTE — GROUP NOTE
Group Topic: Other   Group Date: 4/14/2025  Start Time: 1330  End Time: 1430  Facilitators: SUSAN Oreilly   Department: [facility]    Number of Participants: 7   Group Focus: other Tri-Bond Game  Treatment Modality: Other: Recreation Therapy  Interventions utilized were mental fitness  Purpose: other: fun, elevate mood, increase socialization, enhance self esteem, stimulate memory    Name: Nevaeh Cuba YOB: 1955   MR: [id number]      Facilitator: Recreational Therapist  Level of Participation: minimal  Quality of Participation: passive and quiet  Interactions with others:  passive  Mood/Affect:  passive  Triggers (if applicable): n/a  Cognition:  passive  Progress: Minimal  Comments: pt problem is delusional thought.  Pt continues to display passive participation during group.  Plan: continue with services

## 2025-04-14 NOTE — PROGRESS NOTES
LSW followed up with local AL's regarding referrals sent for placement. LSW spoke with [facility] and they do not feel that they are able to accommodate pt. LSW left messages with several other facilities. LSW met with pt's sister during visitation and discussed the families facility of choice being [facility]. LSW explained that pt would have to admit to a sister facility until there was an opening at [facility], approximately one month. Pt's family would like to pursue this option. LSW left a message with Emerald Valentin at [facility] with this information and requested that pt's sister Laura be contacted.     MARCELA CorleyW

## 2025-04-14 NOTE — CARE PLAN
The patient's goals for the shift include see where i'm going    The clinical goals for the shift include maintain safety/rest      Problem: Risk for Suicide  Goal: Read Safety Guidelines this shift  Outcome: Progressing  Goal: Complete Mental Health Safety Plan (psychiatry only) this shift  Outcome: Progressing     Problem: Discharge Planning - Care Management  Goal: Discharge to post-acute care or home with appropriate resources  Outcome: Progressing     Problem: Fall/Injury  Goal: Not fall by end of shift  Outcome: Progressing  Goal: Be free from injury by end of the shift  Outcome: Progressing     Problem: Pain - Adult  Goal: Verbalizes/displays adequate comfort level or baseline comfort level  Outcome: Progressing     Problem: Discharge Planning  Goal: Discharge to home or other facility with appropriate resources  Outcome: Progressing     Problem: Chronic Conditions and Co-morbidities  Goal: Patient's chronic conditions and co-morbidity symptoms are monitored and maintained or improved  Outcome: Progressing     Problem: Nutrition  Goal: Nutrient intake appropriate for maintaining nutritional needs  Outcome: Progressing

## 2025-04-14 NOTE — GROUP NOTE
Group Topic: Goals   Group Date: 4/13/2025  Start Time: 2000  End Time: 2100  Facilitators: Ana Heller, RN   Department: [facility]    Number of Participants: 11   Group Focus: goals  Treatment Modality: Patient-Centered Therapy  Interventions utilized were group exercise  Purpose: insight or knowledge    Name: Nevaeh Cuba YOB: 1955   MR: [id number]      Facilitator: Registered Nurse  Level of Participation: minimal  Quality of Participation: cooperative  Interactions with others: appropriate  Mood/Affect: blunted  Triggers (if applicable):     Cognition: coherent/clear  Progress: Gaining insight or knowledge  Comments: no    Plan: continue with services

## 2025-04-14 NOTE — PROGRESS NOTES
"Nevaeh Cuba is a 69 y.o. female on day 16 of admission presenting with Psychosis, unspecified psychosis type (Multi).      Subjective   Chart reviewed and case discussed with nursing. Patient said she is having difficulty with retaining data. She did show memory issues when given the SLUMS and scored a 19/30. Will con to monitor. Patient is sleeping well, appetite is good, awaiting placement in AL/SNF.  Objective     Last Recorded Vitals  Blood pressure 146/89, pulse 93, temperature 37.3 °C (99.1 °F), temperature source Axillary, resp. rate 18, height 1.676 m (5' 6\"), weight 65.1 kg (143 lb 8.3 oz), SpO2 95%.    Review of Systems    Psychiatric ROS - Adult  Anxiety: General Anxiety Disorder (LANDON)LANDON Behaviors: difficult to control worry, excessive anxiety/worry, difficulty concentrating, restlessness, and sleep disturbance - less anxious  Depression: anhedonia, concentration, energy, guilt, helpless, hopeless, interest, persistent thoughts of death, sleep decreased , suicidal thoughts, and withdrawn -  appears less depressed  Delirium: negative  Psychosis: auditory hallucinations and visual hallucinations - not endorsed nor observed today. lessening obsessive delusions  Usha:  none noted or reported but has decreased sleep and increased spending hx  Safety Issues: suicidal ideation - denies SI/HI today  Psychiatric ROS Comment: less psychotic flavor in presentation- more settled    Physical Exam  Vitals and nursing note reviewed.   Pulmonary:      Effort: Pulmonary effort is normal.   Neurological:      Mental Status: She is alert.       Mental Status Exam  General: adequately groomed appears stated age  Appearance: hospital attire  Attitude: slightly guarded  Behavior: cooperative  Motor Activity: in wheel chair for safety  Speech: normal rate and  low volume, slightly delayed latency  Mood: \"worried about my comprhension\"  Affect: constricted, odd  Thought Process: perseverative  Thought Content: limited. denies " "SI/HI denies paranoia but has cont obsessions to point of delusions  Thought Perception: denies AH/VH, does not appear stimulated  Cognition: alert and oriented x4  Insight: patient knows she needs treatment for psychiatric illness  Judgement: patient is able to participate in medical decsion making    Psychiatric Risk Assessment  Violence Risk Assessment: major mental illness and other psychosis  Acute Risk of Harm to Others is Considered: low   Suicide Risk Assessment: age > 65 yrs old, , current psychiatric illness, feelings of hopelessness, living alone or lack of social support, suicidal ideations, and other guilt cause spent money was supoosed to pay for mother and  in nursing home \"on stuff that is all over the condo\"  Protective Factors against Suicide: adherence to  treatment and sense of responsibility toward family  Acute Risk of Harm to Self is Considered: low      Relevant Results          No results found. However, due to the size of the patient record, not all encounters were searched. Please check Results Review for a complete set of results.  Scheduled medications  amLODIPine, 5 mg, oral, Daily  atorvastatin, 20 mg, oral, Daily  cholecalciferol, 2,000 Units, oral, Daily  fLuvoxaMINE, 100 mg, oral, Nightly  melatonin, 3 mg, oral, Daily  methIMAzole, 5 mg, oral, Daily  OLANZapine, 5 mg, oral, Nightly  OXcarbazepine, 300 mg, oral, q12h CAROLINE  polyethylene glycol, 17 g, oral, Daily      Continuous medications     PRN medications  PRN medications: acetaminophen, docusate sodium, hydrOXYzine HCL, QUEtiapine **OR** OLANZapine, propranolol        Assessment/Plan   Assessment & Plan  Psychosis, unspecified psychosis type (Multi)    Anxiety    Insomnia    Hypokalemia    Patient is a 68 yo female dx with MDD, LANDON with psychotic features (obsessive features). She says she spent all the money she was supposed to pay for her  and mothers nursing home on \"stuff that is all over my condo\". She " denies dx of bipolar disorder, does not present as manic at this time but presents with psychotic flavor, almost schizotypal features.       Biological   - continue Luvox 100mg every night for anxiety/obsessions and depression  - continue trileptal 300mg bid for mood stabilization?  - says she has taken lithium in the past and it makes her dizzy, denies ever taking depakote  - continue zyprexa 5mg at bedtime and monitor obsessional delusions  - prns avalable  - medical pertinences appreciated     Psychological     Provider encouraged patient to attend groups on unit.     Sociological     Provider encouraged patient to work with social workon discharge plan. She planned to return to her condo initially but now says she thinks she can't care for herself there anymore. Social work is pursuing placement.    Total time spent with patient encounter 25 minutes. This includes patient interview, assessment, extensive chart review, personal review of labs and images as noted above, and formulation of recommendations.

## 2025-04-14 NOTE — CARE PLAN
The patient's goals for the shift include see where i'm going    The clinical goals for the shift include maintain safety/rest    Over the shift, the patient did make progress toward the following goals. Barriers to progression include awaiting disposition. Recommendations to address these barriers include prompting to talk w/ social work.      Problem: Discharge Planning - Care Management  Goal: Discharge to post-acute care or home with appropriate resources  Outcome: Progressing     Problem: Risk for Suicide  Goal: Complete Mental Health Safety Plan (psychiatry only) this shift  Outcome: Progressing

## 2025-04-15 PROCEDURE — 2500000005 HC RX 250 GENERAL PHARMACY W/O HCPCS: Performed by: STUDENT IN AN ORGANIZED HEALTH CARE EDUCATION/TRAINING PROGRAM

## 2025-04-15 PROCEDURE — 2500000004 HC RX 250 GENERAL PHARMACY W/ HCPCS (ALT 636 FOR OP/ED): Performed by: STUDENT IN AN ORGANIZED HEALTH CARE EDUCATION/TRAINING PROGRAM

## 2025-04-15 PROCEDURE — 2500000002 HC RX 250 W HCPCS SELF ADMINISTERED DRUGS (ALT 637 FOR MEDICARE OP, ALT 636 FOR OP/ED): Performed by: REGISTERED NURSE

## 2025-04-15 PROCEDURE — 99232 SBSQ HOSP IP/OBS MODERATE 35: CPT | Performed by: REGISTERED NURSE

## 2025-04-15 PROCEDURE — 2500000001 HC RX 250 WO HCPCS SELF ADMINISTERED DRUGS (ALT 637 FOR MEDICARE OP): Performed by: STUDENT IN AN ORGANIZED HEALTH CARE EDUCATION/TRAINING PROGRAM

## 2025-04-15 PROCEDURE — 2500000001 HC RX 250 WO HCPCS SELF ADMINISTERED DRUGS (ALT 637 FOR MEDICARE OP): Performed by: INTERNAL MEDICINE

## 2025-04-15 PROCEDURE — 99233 SBSQ HOSP IP/OBS HIGH 50: CPT | Performed by: INTERNAL MEDICINE

## 2025-04-15 PROCEDURE — 1140000001 HC PRIVATE PSYCH ROOM DAILY

## 2025-04-15 RX ADMIN — ATORVASTATIN CALCIUM 20 MG: 20 TABLET, FILM COATED ORAL at 20:20

## 2025-04-15 RX ADMIN — AMLODIPINE BESYLATE 5 MG: 5 TABLET ORAL at 08:14

## 2025-04-15 RX ADMIN — MELATONIN TAB 3 MG 3 MG: 3 TAB at 17:11

## 2025-04-15 RX ADMIN — Medication 2000 UNITS: at 08:14

## 2025-04-15 RX ADMIN — OLANZAPINE 5 MG: 5 TABLET, FILM COATED ORAL at 20:20

## 2025-04-15 RX ADMIN — FLUVOXAMINE MALEATE 100 MG: 50 TABLET, COATED ORAL at 20:20

## 2025-04-15 RX ADMIN — OXCARBAZEPINE 300 MG: 300 TABLET, FILM COATED ORAL at 18:19

## 2025-04-15 RX ADMIN — METHIMAZOLE 5 MG: 5 TABLET ORAL at 08:14

## 2025-04-15 RX ADMIN — POLYETHYLENE GLYCOL 3350 17 G: 17 POWDER, FOR SOLUTION ORAL at 08:14

## 2025-04-15 RX ADMIN — OXCARBAZEPINE 300 MG: 300 TABLET, FILM COATED ORAL at 05:31

## 2025-04-15 ASSESSMENT — COLUMBIA-SUICIDE SEVERITY RATING SCALE - C-SSRS
1. SINCE LAST CONTACT, HAVE YOU WISHED YOU WERE DEAD OR WISHED YOU COULD GO TO SLEEP AND NOT WAKE UP?: NO
2. HAVE YOU ACTUALLY HAD ANY THOUGHTS OF KILLING YOURSELF?: NO
1. IN THE PAST MONTH, HAVE YOU WISHED YOU WERE DEAD OR WISHED YOU COULD GO TO SLEEP AND NOT WAKE UP?: YES
2. HAVE YOU ACTUALLY HAD ANY THOUGHTS OF KILLING YOURSELF?: YES
6. HAVE YOU EVER DONE ANYTHING, STARTED TO DO ANYTHING, OR PREPARED TO DO ANYTHING TO END YOUR LIFE?: NO
6. HAVE YOU EVER DONE ANYTHING, STARTED TO DO ANYTHING, OR PREPARED TO DO ANYTHING TO END YOUR LIFE?: YES

## 2025-04-15 ASSESSMENT — PAIN SCALES - GENERAL: PAINLEVEL_OUTOF10: 0 - NO PAIN

## 2025-04-15 NOTE — GROUP NOTE
Group Topic: Other   Group Date: 4/15/2025  Start Time: 1330  End Time: 1430  Facilitators: SUSAN Oreilly   Department: [facility] RE[facility]    Number of Participants: 8   Group Focus: other Storywise  Treatment Modality: Other: Recreation Therapy  Interventions utilized were mental fitness  Purpose: other: fun, elevate mood, increase socialization, enhance slef esteem    Name: Nevaeh Cuba YOB: 1955   MR: [id number]      Facilitator: Recreational Therapist  Level of Participation: minimal  Quality of Participation: passive and quiet  Interactions with others:  passive  Mood/Affect:  passive  Triggers (if applicable): n/a  Cognition:  passive  Progress: Minimal  Comments: pt problem is delusional thought.  Pt displays more passive with participation during this group.  Makes good eye contact but is quiet.  Plan: continue with services

## 2025-04-15 NOTE — GROUP NOTE
Group Topic: Decision Making   Group Date: 4/15/2025  Start Time: 1100  End Time: 1145  Facilitators: SUSAN Oreilly   Department: [facility]    Number of Participants: 5   Group Focus: other [employer]  Treatment Modality: Other: Recreation Therapy  Interventions utilized were exploration, group exercise, patient education, problem solving, and support  Purpose: coping skills, maladaptive thinking, feelings, irrational fears, communication skills, insight or knowledge, self-worth, self-care, relapse prevention strategies, and trigger / craving management    Name: Nevaeh Cuba YOB: 1955   MR: [id number]      Facilitator: Recreational Therapist  Level of Participation: moderate  Quality of Participation: attentive and cooperative  Interactions with others: appropriate and gave feedback  Mood/Affect: appropriate and blunted  Triggers (if applicable): n/a  Cognition: coherent/clear  Progress: Moderate  Comments: pt problem is delusional thought.  Pt joins group with little encouragement.  More active in group and appears quicker to respond to questions asked.    Plan: continue with services

## 2025-04-15 NOTE — GROUP NOTE
Group Topic: Medication Education   Group Date: 4/15/2025  Start Time: 1005  End Time: 1055  Facilitators: Jayne Powell PharmD   Department: [facility] [employer]    Number of Participants: 5   Group Focus: daily focus and other General Medication Education  Treatment Modality: Patient-Centered Therapy and Skills Training  Interventions utilized were group exercise, other [employer] Game, and patient education  Purpose: insight or knowledge and other: improved medication compliance    Name: Nevaeh Cuba YOB: 1955   MR: [id number]      Facilitator: Pharmacist  Level of Participation: moderate  Quality of Participation: appropriate/pleasant, cooperative, and engaged  Interactions with others: appropriate  Mood/Affect: appropriate  Triggers (if applicable): n/a  Cognition: processing slowly  Progress: Gaining insight or knowledge  Comments: Nevaeh Cuba attended the general medication education group today. We played Opentopicdy.  We went around the the room choosing categories and each time that Nevaeh Cuba was called upon (it was her turn) she participated.  Nevaeh Cuba was quiet but seemed fully engaged in group.     Plan: continue with services

## 2025-04-15 NOTE — PROGRESS NOTES
[facility]: [facility]  PROGRESS NOTE      Nevaeh Cuba is a 69 y.o. female that is being seen  today for follow-up at [facility]..  Subjective   Patient is being seen for follow-up at [facility]. Patient's blood pressure is fairly controlled with amlodipine.  Patient's lab work reviewed.  Potassium level is within the normal range now.  Will continue to monitor.      ROS  Negative for fever or chills  Negative for sore throat, ear pain, nasal discharge  Negative for cough, shortness of breath or wheezing  Negative for chest pain, palpitations, swelling of legs  Negative for abdominal pain, constipation, diarrhea, blood in the stools  Negative for urinary complaints  Negative for headache, dizziness, weakness or numbness  Negative for joint pain  Positive for depression or anxiety  All other systems reviewed and were negative   Vitals:    04/14/25 1641   BP: 139/83   Pulse: 85   Resp: 18   Temp: 36.7 °C (98.1 °F)   SpO2: 97%      Vitals:    04/09/25 0634   Weight: 65.1 kg (143 lb 8.3 oz)     Body mass index is 23.16 kg/m².  Physical Exam  Constitutional: Patient does not appear to be in any acute distress  Head and Face: NCAT  Eyes: Normal external exam, EOMI  ENT: Normal external inspection of ears and nose. Oropharynx normal.  Cardiovascular: RRR, S1/S2, no murmurs, rubs, or gallops, radial pulses +2, no edema of extremities  Pulmonary: CTAB, no respiratory distress.  Abdomen: +BS, soft, non-tender, nondistended, no guarding or rebound, no masses noted  MSK: No joint swelling, normal movements of all extremities. Range of motion- normal.  Skin- No lesions, contusions, or erythema.  Peripheral puslses palpable bilaterally 2+  Neuro: AAO X3, Cranial nerves 2-12 grossly intact,DTR 2+ in all 4 limbs       LABS   [unfilled]  Lab Results   Component Value Date    GLUCOSE 111 (H) 04/12/2025    CALCIUM 9.4 04/12/2025     04/12/2025    K 3.5 04/12/2025    CO2 26 04/12/2025     (H)  04/12/2025    BUN 19 04/12/2025    CREATININE 0.63 04/12/2025     Lab Results   Component Value Date    ALT 26 04/12/2025    AST 16 04/12/2025    ALKPHOS 82 04/12/2025    BILITOT 0.6 04/12/2025     Lab Results   Component Value Date    WBC 9.9 03/28/2025    HGB 17.5 (H) 03/28/2025    HCT 48.7 (H) 03/28/2025    MCV 88 03/28/2025     03/28/2025     Lab Results   Component Value Date    CHOL 142 03/29/2025    CHOL 224 (H) 04/12/2024    CHOL 194 10/06/2023     Lab Results   Component Value Date    HDL 59.7 03/29/2025    HDL 58.0 04/12/2024    HDL 58.0 10/06/2023     Lab Results   Component Value Date    LDLCALC 67 03/29/2025    LDLCALC 102 04/12/2024    LDLCALC 88 10/06/2023     Lab Results   Component Value Date    TRIG 77 03/29/2025    TRIG 318 (H) 04/12/2024    TRIG 240 (H) 10/06/2023     Lab Results   Component Value Date    HGBA1C 5.5 06/25/2024     Other labs not included in the list above were reviewed either before or during this encounter.    History    Past Medical History:   Diagnosis Date    Benign essential hypertension     Estrogen deficiency 11/15/2023    DEXA 1/24 back nl, hip neck T-2.1 recheck 1/26    History of atrial fibrillation     Hyperactive thyroid Dr.Burtch Rand Burnham NSR no AC    History of breast lift 05/2024    History of Meniere's syndrome 11/15/2023    Hx of reduction mammoplasty 05/2024    Hyperthyroidism 09/15/2023    Other specified abnormal findings of blood chemistry     High serum cholesterol sulfate    Personal history of malignant neoplasm, unspecified     History of malignant neoplasm    Personal history of other mental and behavioral disorders     Unspecified osteoarthritis, unspecified site 04/28/2016    Arthritis     Past Surgical History:   Procedure Laterality Date    COSMETIC SURGERY      HYSTERECTOMY  04/28/2016    Hysterectomy    JOINT REPLACEMENT      rt ring finger    JOINT REPLACEMENT Right 05/2022    ring finger    OTHER SURGICAL HISTORY  04/28/2016     Musculoskeletal Procedures Injection Carpal Tunnel    OTHER SURGICAL HISTORY  04/28/2016    Arthrodesis MCP Joint    OTHER SURGICAL HISTORY  01/11/2021    Ankle surgery    OTHER SURGICAL HISTORY  01/11/2021    Carpal tunnel surgery    OTHER SURGICAL HISTORY  07/06/2022    Nasal septoplasty    OTHER SURGICAL HISTORY  07/2021    heart shock catherization    REDUCTION MAMMAPLASTY  may 7 2024    SHOULDER SURGERY  08/2021    total left shoulder replacement    SINUS SURGERY      TRIGGER FINGER RELEASE Right 01/2024    middle finger     Family History   Problem Relation Name Age of Onset    Stroke Mother      Other (cyst on breast) Mother      Atrial fibrillation Father      Stroke Father      Heart disease Father       Allergies   Allergen Reactions    Latex Itching and Rash    Methotrexate Itching     HEAD BURNING AND ITCHING    Metoprolol Rash    Diltiazem Hcl Itching     Rash face     Latex, Natural Rubber Itching    Pollen Extracts Other     NASAL CONGESTION    Adhesive Tape-Silicones Rash    Folic Acid Rash    Sulfamethoxazole-Trimethoprim Rash     Tinnitus      No current facility-administered medications on file prior to encounter.     Current Outpatient Medications on File Prior to Encounter   Medication Sig Dispense Refill    acetaminophen (Tylenol) 325 mg tablet Take 2 tablets (650 mg) by mouth every 6 hours if needed for mild pain (1 - 3) 20 tablet 0    atorvastatin (Lipitor) 20 mg tablet Take 1 tablet (20 mg) by mouth once daily. 90 tablet 3    cholecalciferol (Vitamin D-3) 25 MCG (1000 UT) tablet Take 2 tablets (2,000 Units) by mouth. As directed      docusate sodium (Colace) 100 mg capsule Take 1 capsule (100 mg) by mouth once daily as needed.      fexofenadine HCl (ALLEGRA ORAL) Take 1 tablet by mouth once daily as needed (ALLERGIES).      fLuvoxaMINE (Luvox) 50 mg tablet Take 1 tablet (50 mg) by mouth early in the morning..      hydrOXYzine HCL (Atarax) 25 mg tablet Take 2 tablets (50 mg) by mouth once  daily at bedtime for 10 days. 20 tablet 0    ibuprofen 200 mg tablet Take 2 tablets (400 mg) by mouth every 6 hours if needed for mild pain (1 - 3).      methIMAzole (Tapazole) 5 mg tablet Take 1 tablet (5 mg) by mouth once daily. 90 tablet 3    mv-min-FA-vit K-lutein-zeaxant (PreserVision AREDS 2 Plus MV) 200 mcg-15 mcg- 5 mg-1 mg capsule Take 1 capsule by mouth once daily.      OXcarbazepine (Trileptal) 150 mg tablet Take 1 tablet (150 mg) by mouth 2 times a day.      OXcarbazepine (Trileptal) 300 mg tablet Take 1 tablet (300 mg) by mouth every 12 hours.      propranolol (Inderal) 10 mg tablet Take 1 tablet (10 mg) by mouth every 8 hours if needed for anxiety.      triamterene-hydrochlorothiazid (Maxzide-25) 37.5-25 mg tablet Take 1 tablet by mouth once daily in the morning. 90 tablet 3     Immunization History   Administered Date(s) Administered    COVID-19, subunit, rS-nanoparticle, adjuvanted, PF, 5 mcg/0.5 mL 09/28/2024    Flu vaccine, quadrivalent, high-dose, preservative free, age 65y+ (FLUZONE) 09/14/2023    Influenza, Seasonal, Quadrivalent, Adjuvanted 09/30/2021, 09/08/2022    Influenza, Unspecified 09/07/2010, 10/17/2011    Influenza, injectable, MDCK, quadrivalent 10/23/2017, 10/30/2018    Influenza, injectable, quadrivalent 09/23/2019, 09/11/2020    Influenza, seasonal, injectable 09/24/2012, 10/17/2013, 10/23/2014, 10/26/2015, 11/17/2016    Moderna COVID-19 vaccine, 12 years and older (50mcg/0.5mL)(Spikevax) 09/29/2023    Pfizer COVID-19 vaccine, bivalent, age 12 years and older (30 mcg/0.3 mL) 09/08/2022    Pfizer Gray Cap SARS-CoV-2 03/31/2022    Pfizer Purple Cap SARS-CoV-2 03/09/2021, 04/06/2021, 10/06/2021    Pneumococcal conjugate vaccine, 13-valent (PREVNAR 13) 07/11/2013    Pneumococcal conjugate vaccine, 20-valent (PREVNAR 20) 09/16/2023    Pneumococcal polysaccharide vaccine, 23-valent, age 2 years and older (PNEUMOVAX 23) 11/05/2020    RSV, 60 Years And Older (AREXVY) 09/16/2023    Tdap  vaccine, age 7 year and older (BOOSTRIX, ADACEL) 09/30/2021    Zoster vaccine, recombinant, adult (SHINGRIX) 10/15/2020, 09/30/2021    Zoster, live 02/10/2012     Patient's medical history was reviewed and updated either before or during this encounter.  ASSESSMENT / PLAN:  Active Hospital Problems    Hypokalemia      *Psychosis, unspecified psychosis type (Multi)      Anxiety      Insomnia      Sensorineural hearing loss (SNHL) of both ears      Essential hypertension      Hyperlipidemia      Hyperthyroidism    Patient is being seen for follow-up at [facility].  Anxiety has been stable.   Blood pressure has been fairly controlled with amlodipine.  Repeat potassium levels have been stable.  Will continue to monitor.

## 2025-04-15 NOTE — NURSING NOTE
TAZ NOTE     Problem:  Paranoia     Behavior:  Pt with delayed speech and flat affect. She spends a good amount of time out of her room, amongst others but rarely speaks to them. Pt self propels in WC very slowly. She makes physical needs known. No paranoid behaviors observed this shift.   Group Participation: Pt attends but does not participate until prompted to do directly.      Interventions:  Hs meds were administered    Response:  No change     Plan:  Continue to assess pt for paranoia and reorient as necessary.

## 2025-04-15 NOTE — PROGRESS NOTES
LSW was approached by pt in the hallway around 10am. Pt states that she has trouble socializing. Pt states that she has always had trouble as well as she has always tended to isolate herself. Pt states that this is something she would like to be able to do. LSW encouraged pt to try to participate in the 10am group by asking or answering just one question. LSW encouraged pt that this is something that can be accomplished with time and effort. Pt presented with clear conversation and able to express herself with no difficulty. This is much improvement since admission.     LSW sent clinical information to [facility] for review. LSW will wait for further response.     Haydee Christensen, MARCELA ECHEVERRIA

## 2025-04-15 NOTE — CARE PLAN
The patient's goals for the shift include attend group    The clinical goals for the shift include maintain safety    Over the shift, the patient did not make progress toward the following goals. Barriers to progression include pt told staff tat she wants to participate more, but does not do it. Recommendations to address these barriers include pt feels like she can't learn new things or how to socialize.

## 2025-04-15 NOTE — NURSING NOTE
BIRP NOTE     Problem:  paranoid     Behavior:  isolative  Group Participation: some  Appetite/Meals: 25/50/25       Interventions:  Encourage pt to attend group and interact with peers.    Response:  Pt sat in group room and watching without participation.     Plan:  Continue current plan of care.

## 2025-04-16 LAB
ALBUMIN SERPL BCP-MCNC: 4.2 G/DL (ref 3.4–5)
ALP SERPL-CCNC: 77 U/L (ref 33–136)
ALT SERPL W P-5'-P-CCNC: 24 U/L (ref 7–45)
ANION GAP SERPL CALCULATED.3IONS-SCNC: 10 MMOL/L (ref 10–20)
AST SERPL W P-5'-P-CCNC: 14 U/L (ref 9–39)
BILIRUB SERPL-MCNC: 0.5 MG/DL (ref 0–1.2)
BUN SERPL-MCNC: 21 MG/DL (ref 6–23)
CALCIUM SERPL-MCNC: 9.3 MG/DL (ref 8.6–10.3)
CHLORIDE SERPL-SCNC: 107 MMOL/L (ref 98–107)
CO2 SERPL-SCNC: 28 MMOL/L (ref 21–32)
CREAT SERPL-MCNC: 0.57 MG/DL (ref 0.5–1.05)
EGFRCR SERPLBLD CKD-EPI 2021: >90 ML/MIN/1.73M*2
GLUCOSE SERPL-MCNC: 125 MG/DL (ref 74–99)
POTASSIUM SERPL-SCNC: 3.2 MMOL/L (ref 3.5–5.3)
PROT SERPL-MCNC: 6.7 G/DL (ref 6.4–8.2)
SODIUM SERPL-SCNC: 142 MMOL/L (ref 136–145)

## 2025-04-16 PROCEDURE — 1140000001 HC PRIVATE PSYCH ROOM DAILY

## 2025-04-16 PROCEDURE — 2500000005 HC RX 250 GENERAL PHARMACY W/O HCPCS: Performed by: STUDENT IN AN ORGANIZED HEALTH CARE EDUCATION/TRAINING PROGRAM

## 2025-04-16 PROCEDURE — 97110 THERAPEUTIC EXERCISES: CPT | Mod: GO,CO

## 2025-04-16 PROCEDURE — 97110 THERAPEUTIC EXERCISES: CPT | Mod: GP

## 2025-04-16 PROCEDURE — 97116 GAIT TRAINING THERAPY: CPT | Mod: GP

## 2025-04-16 PROCEDURE — 99232 SBSQ HOSP IP/OBS MODERATE 35: CPT | Performed by: REGISTERED NURSE

## 2025-04-16 PROCEDURE — 97535 SELF CARE MNGMENT TRAINING: CPT | Mod: GO,CO

## 2025-04-16 PROCEDURE — 84075 ASSAY ALKALINE PHOSPHATASE: CPT | Performed by: INTERNAL MEDICINE

## 2025-04-16 PROCEDURE — 36415 COLL VENOUS BLD VENIPUNCTURE: CPT | Performed by: INTERNAL MEDICINE

## 2025-04-16 PROCEDURE — 2500000001 HC RX 250 WO HCPCS SELF ADMINISTERED DRUGS (ALT 637 FOR MEDICARE OP): Performed by: INTERNAL MEDICINE

## 2025-04-16 PROCEDURE — 2500000002 HC RX 250 W HCPCS SELF ADMINISTERED DRUGS (ALT 637 FOR MEDICARE OP, ALT 636 FOR OP/ED): Performed by: REGISTERED NURSE

## 2025-04-16 PROCEDURE — 2500000001 HC RX 250 WO HCPCS SELF ADMINISTERED DRUGS (ALT 637 FOR MEDICARE OP): Performed by: STUDENT IN AN ORGANIZED HEALTH CARE EDUCATION/TRAINING PROGRAM

## 2025-04-16 PROCEDURE — 99233 SBSQ HOSP IP/OBS HIGH 50: CPT | Performed by: INTERNAL MEDICINE

## 2025-04-16 RX ADMIN — MELATONIN TAB 3 MG 3 MG: 3 TAB at 17:51

## 2025-04-16 RX ADMIN — OLANZAPINE 5 MG: 5 TABLET, FILM COATED ORAL at 20:02

## 2025-04-16 RX ADMIN — OXCARBAZEPINE 300 MG: 300 TABLET, FILM COATED ORAL at 06:40

## 2025-04-16 RX ADMIN — ATORVASTATIN CALCIUM 20 MG: 20 TABLET, FILM COATED ORAL at 20:02

## 2025-04-16 RX ADMIN — METHIMAZOLE 5 MG: 5 TABLET ORAL at 08:16

## 2025-04-16 RX ADMIN — FLUVOXAMINE MALEATE 100 MG: 50 TABLET, COATED ORAL at 20:02

## 2025-04-16 RX ADMIN — OXCARBAZEPINE 300 MG: 300 TABLET, FILM COATED ORAL at 17:51

## 2025-04-16 RX ADMIN — Medication 2000 UNITS: at 08:16

## 2025-04-16 RX ADMIN — AMLODIPINE BESYLATE 5 MG: 5 TABLET ORAL at 08:16

## 2025-04-16 ASSESSMENT — COLUMBIA-SUICIDE SEVERITY RATING SCALE - C-SSRS
2. HAVE YOU ACTUALLY HAD ANY THOUGHTS OF KILLING YOURSELF?: NO
6. HAVE YOU EVER DONE ANYTHING, STARTED TO DO ANYTHING, OR PREPARED TO DO ANYTHING TO END YOUR LIFE?: YES
5. HAVE YOU STARTED TO WORK OUT OR WORKED OUT THE DETAILS OF HOW TO KILL YOURSELF? DO YOU INTEND TO CARRY OUT THIS PLAN?: NO
1. IN THE PAST MONTH, HAVE YOU WISHED YOU WERE DEAD OR WISHED YOU COULD GO TO SLEEP AND NOT WAKE UP?: YES
1. SINCE LAST CONTACT, HAVE YOU WISHED YOU WERE DEAD OR WISHED YOU COULD GO TO SLEEP AND NOT WAKE UP?: NO
6. HAVE YOU EVER DONE ANYTHING, STARTED TO DO ANYTHING, OR PREPARED TO DO ANYTHING TO END YOUR LIFE?: NO
4. HAVE YOU HAD THESE THOUGHTS AND HAD SOME INTENTION OF ACTING ON THEM?: NO
6. HAVE YOU EVER DONE ANYTHING, STARTED TO DO ANYTHING, OR PREPARED TO DO ANYTHING TO END YOUR LIFE?: NO
2. HAVE YOU ACTUALLY HAD ANY THOUGHTS OF KILLING YOURSELF?: YES

## 2025-04-16 ASSESSMENT — COGNITIVE AND FUNCTIONAL STATUS - GENERAL
WALKING IN HOSPITAL ROOM: A LITTLE
MOBILITY SCORE: 21
CLIMB 3 TO 5 STEPS WITH RAILING: A LITTLE
STANDING UP FROM CHAIR USING ARMS: A LITTLE

## 2025-04-16 ASSESSMENT — ACTIVITIES OF DAILY LIVING (ADL)
BATHING_WHERE_ASSESSED: OTHER (COMMENT)
BATHING_LEVEL_OF_ASSISTANCE: CONTACT GUARD
HOME_MANAGEMENT_TIME_ENTRY: 30

## 2025-04-16 ASSESSMENT — PAIN - FUNCTIONAL ASSESSMENT
PAIN_FUNCTIONAL_ASSESSMENT: 0-10
PAIN_FUNCTIONAL_ASSESSMENT: 0-10

## 2025-04-16 ASSESSMENT — PAIN SCALES - GENERAL
PAINLEVEL_OUTOF10: 0 - NO PAIN
PAINLEVEL_OUTOF10: 3

## 2025-04-16 NOTE — GROUP NOTE
"Group Topic: Personal Responsibility   Group Date: 4/16/2025  Start Time: 1000  End Time: 1100  Facilitators: EVIN OreillyS   Department: [facility]    Number of Participants: 8   Group Focus: other Accepting Personal Responsibility  Treatment Modality: Other: Recreation Therapy  Interventions utilized were exploration, group exercise, patient education, problem solving, and support  Purpose: coping skills, maladaptive thinking, feelings, irrational fears, communication skills, insight or knowledge, self-worth, self-care, relapse prevention strategies, and trigger / craving management    Name: Nevaeh Cuba YOB: 1955   MR: [id number]      Facilitator: Recreational Therapist  Level of Participation: minimal  Quality of Participation: attentive and cooperative  Interactions with others:  mostly passive  Mood/Affect:  passive  Triggers (if applicable): n/a  Cognition:  mostly passive  Progress: Moderate  Comments: pt problem is delusional thought.  Pt joins group with little encouragement.  Displays mostly passive participation but makes good eye contact and nods her head along with staff and peers.  After group, pt engages briefly with CTRS about group topic.  States she has difficulty at times with personal responsibility and is interested in \"doing better\".  Plan: continue with services      "

## 2025-04-16 NOTE — CARE PLAN
The patient's goals for the shift include maintain safety    The clinical goals for the shift include maintain safety    Over the shift, the patient did  make progress toward the following goals.    Problem: Fall/Injury  Goal: Not fall by end of shift  Outcome: Progressing  Goal: Be free from injury by end of the shift  Outcome: Progressing

## 2025-04-16 NOTE — PROGRESS NOTES
"Occupational Therapy     04/16/25 [id number]   OT Last Visit   OT Received On 04/16/25   General   Reason for Referral impaired mobility, balance associated w/escalating mental health issues, Psychosis   Referred By Deandre Marin DO   Past Medical History Relevant to Rehab HTN, afib, Meniere's, hyperthyroid, OA, anxiety, sensorineural hearing loss, h/p mental and behavioral d/o   Patient Position Received Bed, 2 rail up  (Pt sitting edge of bed session start)   General Comment Agreeable to treatment.   Precautions   Hearing/Visual Limitations glasses, hearing loss   Medical Precautions Fall precautions   Pain Assessment   Pain Assessment 0-10   0-10 (Numeric) Pain Score 3   Pain Location Shoulder   Pain Orientation Left;Right   Pain Frequency   (during exercises \" from arthritis\")   Pain Interventions   (rest breaks)   Response to Interventions Content/relaxed   Cognition   Overall Cognitive Status WFL   Orientation Level Oriented X4   Grooming   Grooming Level of Assistance Close supervision   Grooming Where Assessed Wheelchair   Grooming Comments hand hygiene, oral care   LE Bathing   LE Bathing Level of Assistance Contact guard   LE Bathing Where Assessed Other (Comment)  (toilet)   LE Bathing Comments tyler area hygiene, in stance   LE Dressing   Pants Level of Assistance Close supervision   Adult Briefs Level of Assistance Moderate assistance   LE Dressing Where Assessed Toilet   LE Dressing Comments safety cues needed   Toileting   Toileting Level of Assistance Contact guard  (+ brief soiled)   Where Assessed Toilet   Toileting Comments hygiene, Clothing management, safety cues for fall prevention   Functional Mobility 1   Surface 1 Level tile   Device 1 Rolling walker   Assistance 1 Minimum assistance   Quality of Functional Mobility 1 Narrow base of support  (scissoring gait at times, able to correct with cues, unsteady)   Comments 1 household distance in hallway   Transfer 1   Transfer From 1 Bed to "   Transfer to 1 Wheelchair   Technique 1 To right   Transfer Level of Assistance 1 Minimum assistance   Trials/Comments 1 cues for hand/foot placement stand pivot, pt very unsteady with intial stance, no FWW in room.   Toilet Transfers   Toilet Transfer From Wheelchair   Toilet Transfer Type To and from   Toilet Transfer to Standard toilet   Toilet Transfer Technique Stand pivot   Toilet Transfers Contact guard   Toilet Transfers Comments cues for use of grab bars   Therapeutic Exercise   Therapeutic Exercise Activity 1 scap protraction/retreaction   Therapeutic Exercise Activity 2 shoulder IR/ER   Therapeutic Exercise Activity 3 shoulder flexion   Therapeutic Exercise Activity 4 wrist flexion/extension   Therapeutic Exercise Activity 5 forearm pronation/supination   Therapeutic Exercise Activity 6 1 set x 15 reps, 1 lb free wt.   IP OT Assessment   OT Assessment Pt cooperative, flat affect, delayed reponses noted. Unsteady without FWW use.   Prognosis Good   Barriers to Discharge Home Caregiver assistance   Caregiver Assistance Patient lives alone and/or does not have reliable caregiver assistance   Evaluation/Treatment Tolerance Patient tolerated treatment well   End of Session Communication Bedside nurse   End of Session Patient Position Up in chair;Alarm on  (sitting by nursing station)   OT Assessment   OT Assessment Results Decreased ADL status;Decreased functional mobility;Decreased endurance   Education   Individual(s) Educated Patient   Education Provided Fall precautons  (UE therex)   Patient Response to Education Patient/Caregiver Verbalized Understanding of Information;Patient/Caregiver Performed Return Demonstration of Exercises/Activities   Inpatient/Swing Bed or Outpatient   Inpatient/Swing Bed or Outpatient Inpatient   Inpatient Plan   Treatment Interventions ADL retraining;Functional transfer training;UE strengthening/ROM   OT Frequency 2 times per week   OT Discharge Recommendations Low intensity  level of continued care;Other (Comment)   Equipment Recommended upon Discharge Wheeled walker   OT Recommended Transfer Status Assistive equipment (Comment)   OT - OK to Discharge Yes   JOVANY LUIS DAMICO/L [id number]

## 2025-04-16 NOTE — PROGRESS NOTES
"Nevaeh Cuba is a 69 y.o. female on day 16 of admission presenting with Psychosis, unspecified psychosis type (Multi).      Subjective   Chart reviewed and case discussed with nursing. She slept in wheel chiar last night as she does not like bed alarms- nursing I looking into a solution for this. Her family and she have decided on [facility] and social work is pursuing possibilities at that facility. Objective     Last Recorded Vitals  Blood pressure (!) 153/99, pulse 99, temperature 37 °C (98.6 °F), temperature source Temporal, resp. rate 17, height 1.676 m (5' 6\"), weight 65.1 kg (143 lb 8.3 oz), SpO2 98%.    Review of Systems    Psychiatric ROS - Adult  Anxiety: General Anxiety Disorder (LANDON)LANDON Behaviors: difficult to control worry, excessive anxiety/worry, difficulty concentrating, restlessness, and sleep disturbance - less anxious  Depression: anhedonia, concentration, energy, guilt, helpless, hopeless, interest, persistent thoughts of death, sleep decreased , suicidal thoughts, and withdrawn -  appears less depressed  Delirium: negative  Psychosis: auditory hallucinations and visual hallucinations - not endorsed nor observed today. lessening obsessive delusions  Usha:  none noted or reported but has decreased sleep and increased spending hx  Safety Issues: suicidal ideation - denies SI/HI today  Psychiatric ROS Comment: less psychotic flavor in presentation- more settled    Physical Exam  Vitals and nursing note reviewed.   Pulmonary:      Effort: Pulmonary effort is normal.   Neurological:      Mental Status: She is alert.       Mental Status Exam  General: adequately groomed appears stated age  Appearance: hospital attire  Attitude: slightly guarded  Behavior: cooperative  Motor Activity: in wheel chair for safety  Speech: normal rate and  low volume, slightly delayed latency  Mood: \"ok\"  Affect: constricted, odd  Thought Process: more organized  Thought Content: limited. denies SI/HI denies paranoia but " "has cont obsessions to point of delusions(improving)  Thought Perception: denies AH/VH, does not appear internally stimulated  Cognition: alert and oriented x4  Insight: patient knows she needs treatment for psychiatric illness  Judgement: patient is able to participate in medical decsion making    Psychiatric Risk Assessment  Violence Risk Assessment: major mental illness and other psychosis  Acute Risk of Harm to Others is Considered: low   Suicide Risk Assessment: age > 65 yrs old, , current psychiatric illness, feelings of hopelessness, living alone or lack of social support, suicidal ideations, and other guilt cause spent money was supoosed to pay for mother and  in nursing home \"on stuff that is all over the condo\"  Protective Factors against Suicide: adherence to  treatment and sense of responsibility toward family  Acute Risk of Harm to Self is Considered: low      Relevant Results          Results for orders placed or performed during the hospital encounter of 03/28/25 (from the past 24 hours)   Comprehensive Metabolic Panel   Result Value Ref Range    Glucose 125 (H) 74 - 99 mg/dL    Sodium 142 136 - 145 mmol/L    Potassium 3.2 (L) 3.5 - 5.3 mmol/L    Chloride 107 98 - 107 mmol/L    Bicarbonate 28 21 - 32 mmol/L    Anion Gap 10 10 - 20 mmol/L    Urea Nitrogen 21 6 - 23 mg/dL    Creatinine 0.57 0.50 - 1.05 mg/dL    eGFR >90 >60 mL/min/1.73m*2    Calcium 9.3 8.6 - 10.3 mg/dL    Albumin 4.2 3.4 - 5.0 g/dL    Alkaline Phosphatase 77 33 - 136 U/L    Total Protein 6.7 6.4 - 8.2 g/dL    AST 14 9 - 39 U/L    Bilirubin, Total 0.5 0.0 - 1.2 mg/dL    ALT 24 7 - 45 U/L     Scheduled medications  amLODIPine, 5 mg, oral, Daily  atorvastatin, 20 mg, oral, Daily  cholecalciferol, 2,000 Units, oral, Daily  fLuvoxaMINE, 100 mg, oral, Nightly  melatonin, 3 mg, oral, Daily  methIMAzole, 5 mg, oral, Daily  OLANZapine, 5 mg, oral, Nightly  OXcarbazepine, 300 mg, oral, q12h CAROLINE  polyethylene glycol, 17 g, oral, " "Daily      Continuous medications     PRN medications  PRN medications: acetaminophen, docusate sodium, hydrOXYzine HCL, QUEtiapine **OR** OLANZapine, propranolol        Assessment/Plan   Assessment & Plan  Psychosis, unspecified psychosis type (Multi)    Anxiety    Insomnia    Hypokalemia    Patient is a 70 yo female dx with MDD, LANDON with psychotic features (obsessive features). She says she spent all the money she was supposed to pay for her  and mothers nursing home on \"stuff that is all over my condo\". She denies dx of bipolar disorder, does not present as manic at this time but presents with psychotic flavor, almost schizotypal features.       Biological   - continue Luvox 100mg every night for anxiety/obsessions and depression  - continue trileptal 300mg bid for mood stabilization?  - says she has taken lithium in the past and it makes her dizzy, denies ever taking depakote  - continue zyprexa 5mg at bedtime and monitor obsessional delusions  - prns avalable  - medical pertinences appreciated     Psychological     Provider encouraged patient to attend groups on unit.     Sociological     Provider encouraged patient to work with social workon discharge plan. She planned to return to her condo initially but now says she thinks she can't care for herself there anymore. Social work is pursuing placement at [facility].    Total time spent with patient encounter 25 minutes. This includes patient interview, assessment, extensive chart review, personal review of labs and images as noted above, and formulation of recommendations.           "

## 2025-04-16 NOTE — SIGNIFICANT EVENT
"   04/16/25 8741-0401   OT Last Visit   OT Received On 04/16/25   General   Reason for Referral impaired mobility, balance associated w/escalating mental health issues, Psychosis   Referred By Deandre Marin DO   Past Medical History Relevant to Rehab HTN, afib, Meniere's, hyperthyroid, OA, anxiety, sensorineural hearing loss, h/p mental and behavioral d/o   Patient Position Received Bed, 2 rail up  (Pt sitting edge of bed session start)   General Comment Agreeable to treatment.   Precautions   Hearing/Visual Limitations glasses, hearing loss   Medical Precautions Fall precautions   Pain Assessment   Pain Assessment 0-10   0-10 (Numeric) Pain Score 3   Pain Location Shoulder   Pain Orientation Left;Right   Pain Frequency   (during exercises \" from arthritis\")   Pain Interventions   (rest breaks)   Response to Interventions Content/relaxed   Cognition   Overall Cognitive Status WFL   Orientation Level Oriented X4   Grooming   Grooming Level of Assistance Close supervision   Grooming Where Assessed Wheelchair   Grooming Comments hand hygiene, oral care   LE Bathing   LE Bathing Level of Assistance Contact guard   LE Bathing Where Assessed Other (Comment)  (toilet)   LE Bathing Comments tyler area hygiene, in stance   LE Dressing   Pants Level of Assistance Close supervision   Adult Briefs Level of Assistance Moderate assistance   LE Dressing Where Assessed Toilet   LE Dressing Comments safety cues needed   Toileting   Toileting Level of Assistance Contact guard  (+ brief soiled)   Where Assessed Toilet   Toileting Comments hygiene, Clothing management, safety cues for fall prevention   Functional Mobility 1   Surface 1 Level tile   Device 1 Rolling walker   Assistance 1 Minimum assistance   Quality of Functional Mobility 1 Narrow base of support  (scissoring gait at times, able to correct with cues, unsteady)   Comments 1 household distance in hallway   Transfer 1   Transfer From 1 Bed to   Transfer to 1 Wheelchair "   Technique 1 To right   Transfer Level of Assistance 1 Minimum assistance   Trials/Comments 1 cues for hand/foot placement stand pivot, pt very unsteady with intial stance, no FWW in room.   Toilet Transfers   Toilet Transfer From Wheelchair   Toilet Transfer Type To and from   Toilet Transfer to Standard toilet   Toilet Transfer Technique Stand pivot   Toilet Transfers Contact guard   Toilet Transfers Comments cues for use of grab bars   Therapeutic Exercise   Therapeutic Exercise Activity 1 scap protraction/retreaction   Therapeutic Exercise Activity 2 shoulder IR/ER   Therapeutic Exercise Activity 3 shoulder flexion   Therapeutic Exercise Activity 4 wrist flexion/extension   Therapeutic Exercise Activity 5 forearm pronation/supination   Therapeutic Exercise Activity 6 1 set x 15 reps, 1 lb free wt.   IP OT Assessment   OT Assessment Pt cooperative, flat affect, delayed reponses noted. Unsteady without FWW use.   Prognosis Good   Barriers to Discharge Home Caregiver assistance   Caregiver Assistance Patient lives alone and/or does not have reliable caregiver assistance   Evaluation/Treatment Tolerance Patient tolerated treatment well   End of Session Communication Bedside nurse   End of Session Patient Position Up in chair;Alarm on  (sitting by nursing station)   OT Assessment   OT Assessment Results Decreased ADL status;Decreased functional mobility;Decreased endurance   Education   Individual(s) Educated Patient   Education Provided Fall precautons  (UE therex)   Patient Response to Education Patient/Caregiver Verbalized Understanding of Information;Patient/Caregiver Performed Return Demonstration of Exercises/Activities   Inpatient/Swing Bed or Outpatient   Inpatient/Swing Bed or Outpatient Inpatient   Inpatient Plan   Treatment Interventions ADL retraining;Functional transfer training;UE strengthening/ROM   OT Frequency 2 times per week   OT Discharge Recommendations Low intensity level of continued  care;Other (Comment)   Equipment Recommended upon Discharge Wheeled walker   OT Recommended Transfer Status Assistive equipment (Comment)   OT - OK to Discharge Yes

## 2025-04-16 NOTE — GROUP NOTE
Group Topic: Other   Group Date: 4/16/2025  Start Time: 1330  End Time: 1430  Facilitators: SUSAN Oreilly   Department: [facility]    Number of Participants: 8   Group Focus: other Can you Name 5?  Treatment Modality: Other: Recreation Therapy  Interventions utilized were mental fitness  Purpose: other: fun, elevate mood, increase socialization, enhance self esteem, stimulate memory    Name: Nevaeh Cuba YOB: 1955   MR: [id number]      Facilitator: Recreational Therapist  Level of Participation: moderate  Quality of Participation: appropriate/pleasant, attentive, and cooperative  Interactions with others: appropriate and gave feedback  Mood/Affect: appropriate and blunted  Triggers (if applicable): n/a  Cognition: coherent/clear  Progress: Moderate  Comments: pt problem os delusional thought.  Pt slightly more active during this group.  Smiling off and on with interaction.   Plan: continue with services       1/week(s)

## 2025-04-16 NOTE — CARE PLAN
The patient's goals for the shift include attend group    The clinical goals for the shift include maintain safety    Over the shift, the patient did make progress toward the following goals.

## 2025-04-16 NOTE — PROGRESS NOTES
LUISW faxed clinical information to [facility] as requested by facility. LSW will wait for further response. LUISW met with pt and pt's sister regarding finances. Pt's sister brought checks for pt to sign so that she can continue to pay pt's bills. This would include payment on a facility once one is obtained. Pt was hesitant to sign the checks and sat blankly when discussing. Pt was instructed that her bills will not be able to be paid without doing so. Pt appeared to understand this but could not give reason as to why she hesitated to sign. Pt did eventually end up signing 3 checks as it took pt a significant amount of time in doing so. Pt presented with negative statements relating to feeling sorry for herself.     Haydee Christensen, MARCELA ECHEVERRIA

## 2025-04-16 NOTE — NURSING NOTE
TAZ NOTE     Problem:  Paranoia     Behavior:  Patient was quiet, calm and cooperative. She was observed in the group room and in the hallways. She does spend time with peers but does not engage them in conversation. She spent part of the night sitting in her wheelchair instead of going to bed. She stated that she was having double vision because she had a CT scan done yesterday. It was noted that pt did not have a CT scan yesterday and the only CT scan done was on 4/4/25. Will pass this info to first shift.    Appetite/Meals: Snacks provided       Interventions:  Administered scheduled medications    Response:  Compliant with care and medications     Plan:  Continue current plan of care

## 2025-04-16 NOTE — PROGRESS NOTES
[facility]: [facility]  PROGRESS NOTE      Nevaeh Cuba is a 69 y.o. female that is being seen  today for follow-up at [facility]..  Subjective   Patient is being seen for follow-up at [facility]. Patient's blood pressure is fairly controlled with amlodipine.  Patient's lab work reviewed.  Potassium level is within the normal range now.  Will continue to monitor.      ROS  Negative for fever or chills  Negative for sore throat, ear pain, nasal discharge  Negative for cough, shortness of breath or wheezing  Negative for chest pain, palpitations, swelling of legs  Negative for abdominal pain, constipation, diarrhea, blood in the stools  Negative for urinary complaints  Negative for headache, dizziness, weakness or numbness  Negative for joint pain  Positive for depression or anxiety  All other systems reviewed and were negative   Vitals:    04/16/25 0500   BP: (!) 153/99   Pulse: 99   Resp: 17   Temp: 37 °C (98.6 °F)   SpO2: 98%      Vitals:    04/09/25 0634   Weight: 65.1 kg (143 lb 8.3 oz)     Body mass index is 23.16 kg/m².  Physical Exam  Constitutional: Patient does not appear to be in any acute distress  Head and Face: NCAT  Eyes: Normal external exam, EOMI  ENT: Normal external inspection of ears and nose. Oropharynx normal.  Cardiovascular: RRR, S1/S2, no murmurs, rubs, or gallops, radial pulses +2, no edema of extremities  Pulmonary: CTAB, no respiratory distress.  Abdomen: +BS, soft, non-tender, nondistended, no guarding or rebound, no masses noted  MSK: No joint swelling, normal movements of all extremities. Range of motion- normal.  Skin- No lesions, contusions, or erythema.  Peripheral puslses palpable bilaterally 2+  Neuro: AAO X3, Cranial nerves 2-12 grossly intact,DTR 2+ in all 4 limbs       LABS   [unfilled]  Lab Results   Component Value Date    GLUCOSE 111 (H) 04/12/2025    CALCIUM 9.4 04/12/2025     04/12/2025    K 3.5 04/12/2025    CO2 26 04/12/2025     (H)  04/12/2025    BUN 19 04/12/2025    CREATININE 0.63 04/12/2025     Lab Results   Component Value Date    ALT 26 04/12/2025    AST 16 04/12/2025    ALKPHOS 82 04/12/2025    BILITOT 0.6 04/12/2025     Lab Results   Component Value Date    WBC 9.9 03/28/2025    HGB 17.5 (H) 03/28/2025    HCT 48.7 (H) 03/28/2025    MCV 88 03/28/2025     03/28/2025     Lab Results   Component Value Date    CHOL 142 03/29/2025    CHOL 224 (H) 04/12/2024    CHOL 194 10/06/2023     Lab Results   Component Value Date    HDL 59.7 03/29/2025    HDL 58.0 04/12/2024    HDL 58.0 10/06/2023     Lab Results   Component Value Date    LDLCALC 67 03/29/2025    LDLCALC 102 04/12/2024    LDLCALC 88 10/06/2023     Lab Results   Component Value Date    TRIG 77 03/29/2025    TRIG 318 (H) 04/12/2024    TRIG 240 (H) 10/06/2023     Lab Results   Component Value Date    HGBA1C 5.5 06/25/2024     Other labs not included in the list above were reviewed either before or during this encounter.    History    Past Medical History:   Diagnosis Date    Benign essential hypertension     Estrogen deficiency 11/15/2023    DEXA 1/24 back nl, hip neck T-2.1 recheck 1/26    History of atrial fibrillation     Hyperactive thyroid Dr.Burtch Rand [place]ham NSR no AC    History of breast lift 05/2024    History of Meniere's syndrome 11/15/2023    Hx of reduction mammoplasty 05/2024    Hyperthyroidism 09/15/2023    Other specified abnormal findings of blood chemistry     High serum cholesterol sulfate    Personal history of malignant neoplasm, unspecified     History of malignant neoplasm    Personal history of other mental and behavioral disorders     Unspecified osteoarthritis, unspecified site 04/28/2016    Arthritis     Past Surgical History:   Procedure Laterality Date    COSMETIC SURGERY      HYSTERECTOMY  04/28/2016    Hysterectomy    JOINT REPLACEMENT      rt ring finger    JOINT REPLACEMENT Right 05/2022    ring finger    OTHER SURGICAL HISTORY  04/28/2016     Musculoskeletal Procedures Injection Carpal Tunnel    OTHER SURGICAL HISTORY  04/28/2016    Arthrodesis MCP Joint    OTHER SURGICAL HISTORY  01/11/2021    Ankle surgery    OTHER SURGICAL HISTORY  01/11/2021    Carpal tunnel surgery    OTHER SURGICAL HISTORY  07/06/2022    Nasal septoplasty    OTHER SURGICAL HISTORY  07/2021    heart shock catherization    REDUCTION MAMMAPLASTY  may 7 2024    SHOULDER SURGERY  08/2021    total left shoulder replacement    SINUS SURGERY      TRIGGER FINGER RELEASE Right 01/2024    middle finger     Family History   Problem Relation Name Age of Onset    Stroke Mother      Other (cyst on breast) Mother      Atrial fibrillation Father      Stroke Father      Heart disease Father       Allergies   Allergen Reactions    Latex Itching and Rash    Methotrexate Itching     HEAD BURNING AND ITCHING    Metoprolol Rash    Diltiazem Hcl Itching     Rash face     Latex, Natural Rubber Itching    Pollen Extracts Other     NASAL CONGESTION    Adhesive Tape-Silicones Rash    Folic Acid Rash    Sulfamethoxazole-Trimethoprim Rash     Tinnitus      No current facility-administered medications on file prior to encounter.     Current Outpatient Medications on File Prior to Encounter   Medication Sig Dispense Refill    acetaminophen (Tylenol) 325 mg tablet Take 2 tablets (650 mg) by mouth every 6 hours if needed for mild pain (1 - 3) 20 tablet 0    atorvastatin (Lipitor) 20 mg tablet Take 1 tablet (20 mg) by mouth once daily. 90 tablet 3    cholecalciferol (Vitamin D-3) 25 MCG (1000 UT) tablet Take 2 tablets (2,000 Units) by mouth. As directed      docusate sodium (Colace) 100 mg capsule Take 1 capsule (100 mg) by mouth once daily as needed.      fexofenadine HCl (ALLEGRA ORAL) Take 1 tablet by mouth once daily as needed (ALLERGIES).      fLuvoxaMINE (Luvox) 50 mg tablet Take 1 tablet (50 mg) by mouth early in the morning..      hydrOXYzine HCL (Atarax) 25 mg tablet Take 2 tablets (50 mg) by mouth once  daily at bedtime for 10 days. 20 tablet 0    ibuprofen 200 mg tablet Take 2 tablets (400 mg) by mouth every 6 hours if needed for mild pain (1 - 3).      methIMAzole (Tapazole) 5 mg tablet Take 1 tablet (5 mg) by mouth once daily. 90 tablet 3    mv-min-FA-vit K-lutein-zeaxant (PreserVision AREDS 2 Plus MV) 200 mcg-15 mcg- 5 mg-1 mg capsule Take 1 capsule by mouth once daily.      OXcarbazepine (Trileptal) 150 mg tablet Take 1 tablet (150 mg) by mouth 2 times a day.      OXcarbazepine (Trileptal) 300 mg tablet Take 1 tablet (300 mg) by mouth every 12 hours.      propranolol (Inderal) 10 mg tablet Take 1 tablet (10 mg) by mouth every 8 hours if needed for anxiety.      triamterene-hydrochlorothiazid (Maxzide-25) 37.5-25 mg tablet Take 1 tablet by mouth once daily in the morning. 90 tablet 3     Immunization History   Administered Date(s) Administered    COVID-19, subunit, rS-nanoparticle, adjuvanted, PF, 5 mcg/0.5 mL 09/28/2024    Flu vaccine, quadrivalent, high-dose, preservative free, age 65y+ (FLUZONE) 09/14/2023    Influenza, Seasonal, Quadrivalent, Adjuvanted 09/30/2021, 09/08/2022    Influenza, Unspecified 09/07/2010, 10/17/2011    Influenza, injectable, MDCK, quadrivalent 10/23/2017, 10/30/2018    Influenza, injectable, quadrivalent 09/23/2019, 09/11/2020    Influenza, seasonal, injectable 09/24/2012, 10/17/2013, 10/23/2014, 10/26/2015, 11/17/2016    Moderna COVID-19 vaccine, 12 years and older (50mcg/0.5mL)(Spikevax) 09/29/2023    Pfizer COVID-19 vaccine, bivalent, age 12 years and older (30 mcg/0.3 mL) 09/08/2022    Pfizer Gray Cap SARS-CoV-2 03/31/2022    Pfizer Purple Cap SARS-CoV-2 03/09/2021, 04/06/2021, 10/06/2021    Pneumococcal conjugate vaccine, 13-valent (PREVNAR 13) 07/11/2013    Pneumococcal conjugate vaccine, 20-valent (PREVNAR 20) 09/16/2023    Pneumococcal polysaccharide vaccine, 23-valent, age 2 years and older (PNEUMOVAX 23) 11/05/2020    RSV, 60 Years And Older (AREXVY) 09/16/2023    Tdap  vaccine, age 7 year and older (BOOSTRIX, ADACEL) 09/30/2021    Zoster vaccine, recombinant, adult (SHINGRIX) 10/15/2020, 09/30/2021    Zoster, live 02/10/2012     Patient's medical history was reviewed and updated either before or during this encounter.  ASSESSMENT / PLAN:  Active Hospital Problems    Hypokalemia      *Psychosis, unspecified psychosis type (Multi)      Anxiety      Insomnia      Sensorineural hearing loss (SNHL) of both ears      Essential hypertension      Hyperlipidemia      Hyperthyroidism    Patient is being seen for follow-up at [facility].  Anxiety has been stable.   Blood pressure has been fairly controlled with amlodipine.  Repeat potassium levels have been stable.  Will continue to monitor.

## 2025-04-16 NOTE — PROGRESS NOTES
Physical Therapy    Physical Therapy Treatment    Patient Name: Nevaeh Cuba  MRN: [id number]  Department: [facility]  Room: 400/400-A  Today's Date: 4/16/2025  Time Calculation  Start Time: 1119  Stop Time: 1145  Time Calculation (min): 26 min         Assessment/Plan   PT Assessment  PT Assessment Results: Decreased strength, Decreased endurance, Decreased mobility, Decreased safety awareness, Pain  Rehab Prognosis: Good  Barriers to Discharge Home: Caregiver assistance, Cognition needs  Caregiver Assistance: Patient lives alone and/or does not have reliable caregiver assistance  Cognition Needs: Insight of patient limited regarding functional ability/needs  Evaluation/Treatment Tolerance: Patient tolerated treatment well  Medical Staff Made Aware: Yes  Strengths: Ability to acquire knowledge  Barriers to Participation: Coping skills  End of Session Communication: Bedside nurse  Assessment Comment: Patient now requiring close Supervision overall for safe mobility, continues with decreased functional activity tolerance and slow pace of mobility.  Continues to function below baseline and requires continue PT at this time.  End of Session Patient Position: Up in chair, Alarm on  PT Plan  Inpatient/Swing Bed or Outpatient: Inpatient  PT Plan  Treatment/Interventions: Transfer training, Gait training, Therapeutic exercise, Therapeutic activity  PT Plan: Ongoing PT  PT Frequency: 3 times per week  PT Discharge Recommendations: Low intensity level of continued care  Equipment Recommended upon Discharge: Wheeled walker  PT Recommended Transfer Status: Stand by assist  PT - OK to Discharge: Yes      General Visit Information:   PT  Visit  PT Received On: 04/16/25  Response to Previous Treatment: Patient with no complaints from previous session.  General  Reason for Referral: impaired mobility, balance associated w/escalating mental health issues, Psychosis  Referred By: Deandre Marin DO  Past Medical History  Relevant to Rehab: HTN, afib, Meniere's, hyperthyroid, OA, anxiety, sensorineural hearing loss, h/p mental and behavioral d/o  Patient Position Received: Up in chair, Alarm on  General Comment: Agreeable to treatment.    Subjective   Precautions:  Precautions  Hearing/Visual Limitations: glasses, hearing loss  Medical Precautions: Fall precautions            Objective   Pain:  Pain Assessment  Pain Assessment: 0-10  0-10 (Numeric) Pain Score: 0 - No pain  Cognition:  Cognition  Orientation Level: Oriented X4  Cognition Comments: able to follow multiple step commands without difficulty  Coordination:  Movements are Fluid and Coordinated: Yes  Postural Control:  Postural Control  Postural Control: Within Functional Limits  Static Standing Balance  Static Standing-Balance Support: No upper extremity supported  Static Standing-Level of Assistance: Close supervision  Dynamic Standing Balance  Dynamic Standing-Balance Support: No upper extremity supported  Dynamic Standing-Level of Assistance: Close supervision       Activity Tolerance:  Activity Tolerance  Endurance: Tolerates 30 min exercise with multiple rests  Early Mobility/Exercise Safety Screen: Proceed with mobilization - No exclusion criteria met  Activity Tolerance Comments: good for activity  Rate of Perceived Exertion (RPE): 2/10  Treatments:  Therapeutic Exercise  Therapeutic Exercise Activity 1: standing toe raises 2x10 2# B LE  Therapeutic Exercise Activity 2: Standing hip flexion 2x10 2# B LE  Therapeutic Exercise Activity 3: Mini squats 2x10    Therapeutic Activity  Therapeutic Activity Performed: Yes  Therapeutic Activity 1: Stands at shelf in room and repositions clothing on different shelves close Supervision    Balance/Neuromuscular Re-Education  Balance/Neuromuscular Re-Education Activity 1: turns 360 degrees to left and right close Supervision    Ambulation/Gait Training 1  Surface 1: Level tile  Device 1: No device  Gait Support Devices: Gait  belt  Assistance 1: Close supervision  Quality of Gait 1: Diminished heel strike, Inconsistent stride length, Decreased step length, Shuffling gait  Comments/Distance (ft) 1: 100 ft x 2 with turns, slow pace, shuffling gait, decreased weight shift and arm swing  Transfer 1  Transfer From 1: Wheelchair to  Transfer to 1: Stand  Technique 1: Sit to stand, Stand to sit  Transfer Level of Assistance 1: Close supervision  Trials/Comments 1: vc's to lock brakes    Outcome Measures:  [facility] Basic Mobility  Turning from your back to your side while in a flat bed without using bedrails: None  Moving from lying on your back to sitting on the side of a flat bed without using bedrails: None  Moving to and from bed to chair (including a wheelchair): None  Standing up from a chair using your arms (e.g. wheelchair or bedside chair): A little  To walk in hospital room: A little  Climbing 3-5 steps with railing: A little  Basic Mobility - Total Score: 21    Education Documentation  Body Mechanics, taught by Lana Nj PT at 4/16/2025 11:51 AM.  Learner: Patient  Readiness: Acceptance  Method: Explanation  Response: Verbalizes Understanding, Needs Reinforcement  Comment: Education provided to lock brakes prior to transfer    Mobility Training, taught by Lana Nj PT at 4/16/2025 11:51 AM.  Learner: Patient  Readiness: Acceptance  Method: Explanation  Response: Verbalizes Understanding, Needs Reinforcement  Comment: Education provided to lock brakes prior to transfer                   Encounter Problems       Encounter Problems (Active)       PT  Problem       Patient will transfer sit to stand and stand to sit with independent assist to facilitate mobility. (Progressing)       Start:  04/01/25    Expected End:  04/17/25            Patient will amb 150' feet no device including two turns on even surface with independent assist to facilitate safe mobility.   (Progressing)       Start:  04/01/25    Expected End:   04/17/25            Patient will negotiate 1 stairs with no rail(s) and independent} assist with no device for in home and community. (Progressing)       Start:  04/01/25    Expected End:  04/17/25            Patient will tolerate 15 minutes of standing to improve ability to perform MRADLs. (Met)       Start:  04/01/25    Expected End:  04/17/25    Resolved:  04/16/25            Pain - Adult

## 2025-04-17 PROCEDURE — 99233 SBSQ HOSP IP/OBS HIGH 50: CPT | Performed by: INTERNAL MEDICINE

## 2025-04-17 PROCEDURE — 2500000001 HC RX 250 WO HCPCS SELF ADMINISTERED DRUGS (ALT 637 FOR MEDICARE OP): Performed by: INTERNAL MEDICINE

## 2025-04-17 PROCEDURE — 2500000001 HC RX 250 WO HCPCS SELF ADMINISTERED DRUGS (ALT 637 FOR MEDICARE OP): Performed by: STUDENT IN AN ORGANIZED HEALTH CARE EDUCATION/TRAINING PROGRAM

## 2025-04-17 PROCEDURE — 1140000001 HC PRIVATE PSYCH ROOM DAILY

## 2025-04-17 PROCEDURE — 2500000002 HC RX 250 W HCPCS SELF ADMINISTERED DRUGS (ALT 637 FOR MEDICARE OP, ALT 636 FOR OP/ED): Performed by: REGISTERED NURSE

## 2025-04-17 PROCEDURE — 99232 SBSQ HOSP IP/OBS MODERATE 35: CPT | Performed by: REGISTERED NURSE

## 2025-04-17 PROCEDURE — 2500000005 HC RX 250 GENERAL PHARMACY W/O HCPCS: Performed by: STUDENT IN AN ORGANIZED HEALTH CARE EDUCATION/TRAINING PROGRAM

## 2025-04-17 RX ADMIN — Medication 2000 UNITS: at 08:09

## 2025-04-17 RX ADMIN — MELATONIN TAB 3 MG 3 MG: 3 TAB at 17:40

## 2025-04-17 RX ADMIN — OXCARBAZEPINE 300 MG: 300 TABLET, FILM COATED ORAL at 06:42

## 2025-04-17 RX ADMIN — FLUVOXAMINE MALEATE 100 MG: 50 TABLET, COATED ORAL at 21:35

## 2025-04-17 RX ADMIN — ATORVASTATIN CALCIUM 20 MG: 20 TABLET, FILM COATED ORAL at 21:35

## 2025-04-17 RX ADMIN — OLANZAPINE 5 MG: 5 TABLET, FILM COATED ORAL at 21:35

## 2025-04-17 RX ADMIN — OXCARBAZEPINE 300 MG: 300 TABLET, FILM COATED ORAL at 17:40

## 2025-04-17 RX ADMIN — METHIMAZOLE 5 MG: 5 TABLET ORAL at 08:09

## 2025-04-17 RX ADMIN — AMLODIPINE BESYLATE 5 MG: 5 TABLET ORAL at 08:09

## 2025-04-17 ASSESSMENT — PAIN SCALES - GENERAL: PAINLEVEL_OUTOF10: 0 - NO PAIN

## 2025-04-17 ASSESSMENT — PAIN - FUNCTIONAL ASSESSMENT: PAIN_FUNCTIONAL_ASSESSMENT: 0-10

## 2025-04-17 ASSESSMENT — COLUMBIA-SUICIDE SEVERITY RATING SCALE - C-SSRS
2. HAVE YOU ACTUALLY HAD ANY THOUGHTS OF KILLING YOURSELF?: YES
1. IN THE PAST MONTH, HAVE YOU WISHED YOU WERE DEAD OR WISHED YOU COULD GO TO SLEEP AND NOT WAKE UP?: YES
6. HAVE YOU EVER DONE ANYTHING, STARTED TO DO ANYTHING, OR PREPARED TO DO ANYTHING TO END YOUR LIFE?: YES

## 2025-04-17 NOTE — GROUP NOTE
Group Topic: Self-Care/Wellness   Group Date: 4/17/2025  Start Time: 1100  End Time: 1200  Facilitators: EVIN GroverS   Department: [facility]    Number of Participants: 5   Group Focus: other Heathy Living   Treatment Modality: Other: Recreation Therapy, Education  Interventions utilized were clarification, exploration, group exercise, leisure development, patient education, and support  Purpose: In this group session patients participated in a healthy-living Eduora game. This session was focused on?promoting a healthy lifestyle aimed to help increase control over their health, wellness and improve quality of life. This group also focused on engaging patients to enhance cognition and stimulate socialization.?Topics included physical health, healthy sleep, stress relief, nutrition, and leisure development.      Name: Nevaeh Cuba YOB: 1955   MR: [id number]      Facilitator: Recreational Therapist  Level of Participation: active  Quality of Participation: appropriate/pleasant, attentive, cooperative, and engaged  Interactions with others: appropriate  Mood/Affect: appropriate and brightens with interaction  Cognition: coherent/clear  Progress: Moderate  Comments: pt problem is delusional thought. Active with brighter affect. Needs some promtping. Displays increased attention to task.   Plan: continue with services

## 2025-04-17 NOTE — PROGRESS NOTES
LUISW contacted Emerald Valentin at [facility] regarding clinical information. LUISW was instructed that the information is being reviewed by administration and that a response should be provided by end of day. Plan is for pt to discharge to their sister facility in [place] until there is an opening at the [place] location. LSW will wait for further response.   MARCELA Corley

## 2025-04-17 NOTE — GROUP NOTE
Group Topic: Other   Group Date: 4/17/2025  Start Time: 1330  End Time: 1430  Facilitators: EVIN GroverS   Department: [facility]    Number of Participants: 6   Group Focus: concentration, leisure skills, self-esteem, and social skills  Treatment Modality: Leisure Development and Other: Recreation Therapy  Interventions utilized were group exercise, leisure development, mental fitness, and reminiscence  Purpose: Patients engaged in group game of Catch Phrase. This game can help to improve frustration tolerance and coping and strategic skills, improving social communication, and building rapport for a positive social interaction while encouraging flexibility in thinking.    Name: Nevaeh Cuba YOB: 1955   MR: [id number]      Facilitator: Recreational Therapist  Level of Participation: active  Quality of Participation: appropriate/pleasant, attentive, cooperative, and engaged  Interactions with others: appropriate  Mood/Affect: appropriate  Cognition: processing slowly  Progress: Moderate  Comments: pt problem is delusional thought. Pt joins group with some encouragement. Needs encouragement to engaged d/t self doubting statements often.   Plan: continue with services

## 2025-04-17 NOTE — GROUP NOTE
Group Topic: Music   Group Date: 4/17/2025  Start Time: 1515  End Time: 1600  Facilitators: Miguel Brown, RN   Department: [facility]    Number of Participants: 5   Group Focus: impulsivity, reminiscence, self-esteem, and social skills, therapeutic music  Treatment Modality: Leisure Development and Other: Reminiscence therapy  Interventions utilized were exploration, group exercise, leisure development, reminiscence, story telling, and support  Purpose: coping skills, feelings, and other: increase socialization    Name: Nevaeh Cuba YOB: 1955   MR: [id number]      Facilitator: Registered Nurse  Level of Participation: minimal  Quality of Participation: appropriate/pleasant, passive  Interactions with others:  Passive and appropriate  Mood/Affect: appropriate, blunted, and closed / guarded  Cognition: processing slowly  Progress: Minimal  Comments: Problem is paranoia and hopelessness  Plan: continue with services

## 2025-04-17 NOTE — CARE PLAN
The patient's goals for the shift include maintain safety    The clinical goals for the shift include maintain safety    Over the shift, the patient did make progress toward the following goals.

## 2025-04-17 NOTE — PROGRESS NOTES
[facility]: [facility]  PROGRESS NOTE      Nevaeh Cuba is a 69 y.o. female that is being seen  today for follow-up at [facility]..  Subjective   Patient is being seen for follow-up at [facility]. Patient's blood pressure is fairly controlled with amlodipine.  Patient's lab work reviewed.  Potassium level is within the normal range now.  Will continue to monitor.      ROS  Negative for fever or chills  Negative for sore throat, ear pain, nasal discharge  Negative for cough, shortness of breath or wheezing  Negative for chest pain, palpitations, swelling of legs  Negative for abdominal pain, constipation, diarrhea, blood in the stools  Negative for urinary complaints  Negative for headache, dizziness, weakness or numbness  Negative for joint pain  Positive for depression or anxiety  All other systems reviewed and were negative   Vitals:    04/17/25 0809   BP: 132/78   Pulse: 84   Resp:    Temp:    SpO2:       Vitals:    04/09/25 0634   Weight: 65.1 kg (143 lb 8.3 oz)     Body mass index is 23.16 kg/m².  Physical Exam  Constitutional: Patient does not appear to be in any acute distress  Head and Face: NCAT  Eyes: Normal external exam, EOMI  ENT: Normal external inspection of ears and nose. Oropharynx normal.  Cardiovascular: RRR, S1/S2, no murmurs, rubs, or gallops, radial pulses +2, no edema of extremities  Pulmonary: CTAB, no respiratory distress.  Abdomen: +BS, soft, non-tender, nondistended, no guarding or rebound, no masses noted  MSK: No joint swelling, normal movements of all extremities. Range of motion- normal.  Skin- No lesions, contusions, or erythema.  Peripheral puslses palpable bilaterally 2+  Neuro: AAO X3, Cranial nerves 2-12 grossly intact,DTR 2+ in all 4 limbs       LABS   [unfilled]  Lab Results   Component Value Date    GLUCOSE 125 (H) 04/16/2025    CALCIUM 9.3 04/16/2025     04/16/2025    K 3.2 (L) 04/16/2025    CO2 28 04/16/2025     04/16/2025    BUN 21  04/16/2025    CREATININE 0.57 04/16/2025     Lab Results   Component Value Date    ALT 24 04/16/2025    AST 14 04/16/2025    ALKPHOS 77 04/16/2025    BILITOT 0.5 04/16/2025     Lab Results   Component Value Date    WBC 9.9 03/28/2025    HGB 17.5 (H) 03/28/2025    HCT 48.7 (H) 03/28/2025    MCV 88 03/28/2025     03/28/2025     Lab Results   Component Value Date    CHOL 142 03/29/2025    CHOL 224 (H) 04/12/2024    CHOL 194 10/06/2023     Lab Results   Component Value Date    HDL 59.7 03/29/2025    HDL 58.0 04/12/2024    HDL 58.0 10/06/2023     Lab Results   Component Value Date    LDLCALC 67 03/29/2025    LDLCALC 102 04/12/2024    LDLCALC 88 10/06/2023     Lab Results   Component Value Date    TRIG 77 03/29/2025    TRIG 318 (H) 04/12/2024    TRIG 240 (H) 10/06/2023     Lab Results   Component Value Date    HGBA1C 5.5 06/25/2024     Other labs not included in the list above were reviewed either before or during this encounter.    History    Past Medical History:   Diagnosis Date    Benign essential hypertension     Estrogen deficiency 11/15/2023    DEXA 1/24 back nl, hip neck T-2.1 recheck 1/26    History of atrial fibrillation     Hyperactive thyroid Dr.Burtch Rand [place] NSR no AC    History of breast lift 05/2024    History of Meniere's syndrome 11/15/2023    Hx of reduction mammoplasty 05/2024    Hyperthyroidism 09/15/2023    Other specified abnormal findings of blood chemistry     High serum cholesterol sulfate    Personal history of malignant neoplasm, unspecified     History of malignant neoplasm    Personal history of other mental and behavioral disorders     Unspecified osteoarthritis, unspecified site 04/28/2016    Arthritis     Past Surgical History:   Procedure Laterality Date    COSMETIC SURGERY      HYSTERECTOMY  04/28/2016    Hysterectomy    JOINT REPLACEMENT      rt ring finger    JOINT REPLACEMENT Right 05/2022    ring finger    OTHER SURGICAL HISTORY  04/28/2016    Musculoskeletal  Procedures Injection Carpal Tunnel    OTHER SURGICAL HISTORY  04/28/2016    Arthrodesis MCP Joint    OTHER SURGICAL HISTORY  01/11/2021    Ankle surgery    OTHER SURGICAL HISTORY  01/11/2021    Carpal tunnel surgery    OTHER SURGICAL HISTORY  07/06/2022    Nasal septoplasty    OTHER SURGICAL HISTORY  07/2021    heart shock catherization    REDUCTION MAMMAPLASTY  may 7 2024    SHOULDER SURGERY  08/2021    total left shoulder replacement    SINUS SURGERY      TRIGGER FINGER RELEASE Right 01/2024    middle finger     Family History   Problem Relation Name Age of Onset    Stroke Mother      Other (cyst on breast) Mother      Atrial fibrillation Father      Stroke Father      Heart disease Father       Allergies   Allergen Reactions    Latex Itching and Rash    Methotrexate Itching     HEAD BURNING AND ITCHING    Metoprolol Rash    Diltiazem Hcl Itching     Rash face     Latex, Natural Rubber Itching    Pollen Extracts Other     NASAL CONGESTION    Adhesive Tape-Silicones Rash    Folic Acid Rash    Sulfamethoxazole-Trimethoprim Rash     Tinnitus      No current facility-administered medications on file prior to encounter.     Current Outpatient Medications on File Prior to Encounter   Medication Sig Dispense Refill    acetaminophen (Tylenol) 325 mg tablet Take 2 tablets (650 mg) by mouth every 6 hours if needed for mild pain (1 - 3) 20 tablet 0    atorvastatin (Lipitor) 20 mg tablet Take 1 tablet (20 mg) by mouth once daily. 90 tablet 3    cholecalciferol (Vitamin D-3) 25 MCG (1000 UT) tablet Take 2 tablets (2,000 Units) by mouth. As directed      docusate sodium (Colace) 100 mg capsule Take 1 capsule (100 mg) by mouth once daily as needed.      fexofenadine HCl (ALLEGRA ORAL) Take 1 tablet by mouth once daily as needed (ALLERGIES).      fLuvoxaMINE (Luvox) 50 mg tablet Take 1 tablet (50 mg) by mouth early in the morning..      hydrOXYzine HCL (Atarax) 25 mg tablet Take 2 tablets (50 mg) by mouth once daily at bedtime  for 10 days. 20 tablet 0    ibuprofen 200 mg tablet Take 2 tablets (400 mg) by mouth every 6 hours if needed for mild pain (1 - 3).      methIMAzole (Tapazole) 5 mg tablet Take 1 tablet (5 mg) by mouth once daily. 90 tablet 3    mv-min-FA-vit K-lutein-zeaxant (PreserVision AREDS 2 Plus MV) 200 mcg-15 mcg- 5 mg-1 mg capsule Take 1 capsule by mouth once daily.      OXcarbazepine (Trileptal) 150 mg tablet Take 1 tablet (150 mg) by mouth 2 times a day.      OXcarbazepine (Trileptal) 300 mg tablet Take 1 tablet (300 mg) by mouth every 12 hours.      propranolol (Inderal) 10 mg tablet Take 1 tablet (10 mg) by mouth every 8 hours if needed for anxiety.      triamterene-hydrochlorothiazid (Maxzide-25) 37.5-25 mg tablet Take 1 tablet by mouth once daily in the morning. 90 tablet 3     Immunization History   Administered Date(s) Administered    COVID-19, subunit, rS-nanoparticle, adjuvanted, PF, 5 mcg/0.5 mL 09/28/2024    Flu vaccine, quadrivalent, high-dose, preservative free, age 65y+ (FLUZONE) 09/14/2023    Influenza, Seasonal, Quadrivalent, Adjuvanted 09/30/2021, 09/08/2022    Influenza, Unspecified 09/07/2010, 10/17/2011    Influenza, injectable, MDCK, quadrivalent 10/23/2017, 10/30/2018    Influenza, injectable, quadrivalent 09/23/2019, 09/11/2020    Influenza, seasonal, injectable 09/24/2012, 10/17/2013, 10/23/2014, 10/26/2015, 11/17/2016    Moderna COVID-19 vaccine, 12 years and older (50mcg/0.5mL)(Spikevax) 09/29/2023    Pfizer COVID-19 vaccine, bivalent, age 12 years and older (30 mcg/0.3 mL) 09/08/2022    Pfizer Gray Cap SARS-CoV-2 03/31/2022    Pfizer Purple Cap SARS-CoV-2 03/09/2021, 04/06/2021, 10/06/2021    Pneumococcal conjugate vaccine, 13-valent (PREVNAR 13) 07/11/2013    Pneumococcal conjugate vaccine, 20-valent (PREVNAR 20) 09/16/2023    Pneumococcal polysaccharide vaccine, 23-valent, age 2 years and older (PNEUMOVAX 23) 11/05/2020    RSV, 60 Years And Older (AREXVY) 09/16/2023    Tdap vaccine, age 7 year  and older (BOOSTRIX, ADACEL) 09/30/2021    Zoster vaccine, recombinant, adult (SHINGRIX) 10/15/2020, 09/30/2021    Zoster, live 02/10/2012     Patient's medical history was reviewed and updated either before or during this encounter.  ASSESSMENT / PLAN:  Active Hospital Problems    Hypokalemia      *Psychosis, unspecified psychosis type (Multi)      Anxiety      Insomnia      Sensorineural hearing loss (SNHL) of both ears      Essential hypertension      Hyperlipidemia      Hyperthyroidism    Patient is being seen for follow-up at [facility].  Anxiety has been stable.   Blood pressure has been fairly controlled with amlodipine.  Repeat potassium levels have been stable.  Will continue to monitor.

## 2025-04-17 NOTE — GROUP NOTE
Group Topic: Music   Group Date: 4/17/2025  Start Time: 1515  End Time: 1600  Facilitators: Miguel Brown, RN   Department: [facility]    Number of Participants: 5   Group Focus: impulsivity, reminiscence, self-esteem, and social skills, therapeutic music  Treatment Modality: Leisure Development and Other: Reminiscence therapy  Interventions utilized were exploration, group exercise, leisure development, reminiscence, story telling, and support  Purpose: coping skills, feelings, and other: increase socialization    Name: Nevaeh Cuba YOB: 1955   MR: [id number]      Facilitator: Registered Nurse  Level of Participation: minimal  Quality of Participation: passive and quiet  Interactions with others: appropriate  Mood/Affect: appropriate and flat  Cognition: processing slowly  Progress: Minimal  Plan: continue with services

## 2025-04-17 NOTE — NURSING NOTE
TAZ NOTE     Problem:  Paranoia     Behavior:  Patient was calm and cooperative this evening. She was more up beat this evening. She was observed in the group room watching a game of cards. She did not interact with peers but did stay in the common areas. No complaints of eye issues today and she did go to sleep in her bed tonight. No paranoid behaviors to note or report.    Appetite/Meals: Snacks provided       Interventions:  Administered scheduled medications     Response:  Compliant with care and medications     Plan:  Continue current plan of care

## 2025-04-17 NOTE — PROGRESS NOTES
"Nevaeh Cuba is a 69 y.o. female on day 16 of admission presenting with Psychosis, unspecified psychosis type (Multi).      Subjective   Chart reviewed and case discussed with nursing. She slept 8hs last night, appetite is good. Denies adverse side effects of medications including increase in luvox and med regimen appears to be helping moderately with obsessional delusions Her family and she have decided on [facility] and social work is pursuing possibilities at that facility. Objective     Last Recorded Vitals  Blood pressure 132/78, pulse 84, temperature 37 °C (98.6 °F), temperature source Temporal, resp. rate 18, height 1.676 m (5' 6\"), weight 65.1 kg (143 lb 8.3 oz), SpO2 96%.    Review of Systems    Psychiatric ROS - Adult  Anxiety: General Anxiety Disorder (LANDON)LANDON Behaviors: difficult to control worry, excessive anxiety/worry, difficulty concentrating, restlessness, and sleep disturbance - less anxious  Depression: anhedonia, concentration, energy, guilt, helpless, hopeless, interest, persistent thoughts of death, sleep decreased , suicidal thoughts, and withdrawn -  appears less depressed  Delirium: negative  Psychosis: auditory hallucinations and visual hallucinations - not endorsed nor observed today. lessening obsessive delusions  Usha:  none noted or reported but has decreased sleep and increased spending hx  Safety Issues: suicidal ideation - denies SI/HI today  Psychiatric ROS Comment: less psychotic flavor in presentation- more settled    Physical Exam  Vitals and nursing note reviewed.   Pulmonary:      Effort: Pulmonary effort is normal.   Neurological:      Mental Status: She is alert.       Mental Status Exam  General: adequately groomed appears stated age  Appearance: hospital attire  Attitude: slightly guarded  Behavior: cooperative  Motor Activity: in wheel chair for safety  Speech: normal rate and  low volume, slightly delayed latency  Mood: \"ok\"  Affect: constricted, odd  Thought Process: " "more organized  Thought Content: limited. denies SI/HI denies paranoia but has cont obsessions to point of delusions(improving)  Thought Perception: denies AH/VH, does not appear internally stimulated  Cognition: alert and oriented x4  Insight: patient knows she needs treatment for psychiatric illness  Judgement: patient is able to participate in medical decsion making    Psychiatric Risk Assessment  Violence Risk Assessment: major mental illness and other psychosis  Acute Risk of Harm to Others is Considered: low   Suicide Risk Assessment: age > 65 yrs old, , current psychiatric illness, feelings of hopelessness, living alone or lack of social support, suicidal ideations, and other guilt cause spent money was supoosed to pay for mother and  in nursing home \"on stuff that is all over the condo\"  Protective Factors against Suicide: adherence to  treatment and sense of responsibility toward family  Acute Risk of Harm to Self is Considered: low      Relevant Results          No results found for this or any previous visit (from the past 24 hours).    Scheduled medications  amLODIPine, 5 mg, oral, Daily  atorvastatin, 20 mg, oral, Daily  cholecalciferol, 2,000 Units, oral, Daily  fLuvoxaMINE, 100 mg, oral, Nightly  melatonin, 3 mg, oral, Daily  methIMAzole, 5 mg, oral, Daily  OLANZapine, 5 mg, oral, Nightly  OXcarbazepine, 300 mg, oral, q12h CAROLINE  polyethylene glycol, 17 g, oral, Daily      Continuous medications     PRN medications  PRN medications: acetaminophen, docusate sodium, hydrOXYzine HCL, QUEtiapine **OR** OLANZapine, propranolol        Assessment/Plan   Assessment & Plan  Psychosis, unspecified psychosis type (Multi)    Anxiety    Insomnia    Hypokalemia    Patient is a 68 yo female dx with MDD, LANDON with psychotic features (obsessive features). She says she spent all the money she was supposed to pay for her  and mothers nursing home on \"stuff that is all over my condo\". She denies dx of " bipolar disorder, does not present as manic at this time but presents with psychotic flavor, almost schizotypal features.       Biological   - continue Luvox 100mg every night for anxiety/obsessions and depression  - continue trileptal 300mg bid for mood stabilization?  - says she has taken lithium in the past and it makes her dizzy, denies ever taking depakote  - continue zyprexa 5mg at bedtime and monitor obsessional delusions  - prns avalable  - medical pertinences appreciated     Psychological     Provider encouraged patient to attend groups on unit.     Sociological     Provider encouraged patient to work with social workon discharge plan. She planned to return to her condo initially but now says she thinks she can't care for herself there anymore. [facility]    Total time spent with patient encounter 25 minutes. This includes patient interview, assessment, extensive chart review, personal review of labs and images as noted above, and formulation of recommendations.

## 2025-04-17 NOTE — NURSING NOTE
"TAZ NOTE     Problem:  Paranoia  Hopelessness  Depression     Behavior:  Patient is sitting on the edge of her bed in her room upon RN arrival to the unit. Patient is cooperative with care and medication administration. Denies SI. Patient endorses feeling hopeless about her situation and feels like she's \"going to be here forever.\" Flat affect.   Group Participation: Attends groups  Appetite/Meals: 50-0-0       Interventions:  Medications administered per MAR.   1:1 and active listening provided.   Response:  Medication compliant.      Plan:  Maintain q 15 minute safety checks.   Continue current plan of care.     "

## 2025-04-18 PROCEDURE — 2500000001 HC RX 250 WO HCPCS SELF ADMINISTERED DRUGS (ALT 637 FOR MEDICARE OP): Performed by: STUDENT IN AN ORGANIZED HEALTH CARE EDUCATION/TRAINING PROGRAM

## 2025-04-18 PROCEDURE — 99232 SBSQ HOSP IP/OBS MODERATE 35: CPT | Performed by: REGISTERED NURSE

## 2025-04-18 PROCEDURE — 2500000005 HC RX 250 GENERAL PHARMACY W/O HCPCS: Performed by: STUDENT IN AN ORGANIZED HEALTH CARE EDUCATION/TRAINING PROGRAM

## 2025-04-18 PROCEDURE — 97110 THERAPEUTIC EXERCISES: CPT | Mod: GP

## 2025-04-18 PROCEDURE — 97116 GAIT TRAINING THERAPY: CPT | Mod: GP

## 2025-04-18 PROCEDURE — 2500000001 HC RX 250 WO HCPCS SELF ADMINISTERED DRUGS (ALT 637 FOR MEDICARE OP): Performed by: INTERNAL MEDICINE

## 2025-04-18 PROCEDURE — 2500000002 HC RX 250 W HCPCS SELF ADMINISTERED DRUGS (ALT 637 FOR MEDICARE OP, ALT 636 FOR OP/ED): Performed by: REGISTERED NURSE

## 2025-04-18 PROCEDURE — 1140000001 HC PRIVATE PSYCH ROOM DAILY

## 2025-04-18 RX ADMIN — Medication 2000 UNITS: at 08:06

## 2025-04-18 RX ADMIN — FLUVOXAMINE MALEATE 100 MG: 50 TABLET, COATED ORAL at 20:58

## 2025-04-18 RX ADMIN — ATORVASTATIN CALCIUM 20 MG: 20 TABLET, FILM COATED ORAL at 20:58

## 2025-04-18 RX ADMIN — MELATONIN TAB 3 MG 3 MG: 3 TAB at 17:55

## 2025-04-18 RX ADMIN — METHIMAZOLE 5 MG: 5 TABLET ORAL at 08:06

## 2025-04-18 RX ADMIN — OLANZAPINE 5 MG: 5 TABLET, FILM COATED ORAL at 20:58

## 2025-04-18 RX ADMIN — OXCARBAZEPINE 300 MG: 300 TABLET, FILM COATED ORAL at 17:55

## 2025-04-18 RX ADMIN — OXCARBAZEPINE 300 MG: 300 TABLET, FILM COATED ORAL at 06:09

## 2025-04-18 RX ADMIN — AMLODIPINE BESYLATE 5 MG: 5 TABLET ORAL at 08:05

## 2025-04-18 ASSESSMENT — COGNITIVE AND FUNCTIONAL STATUS - GENERAL
MOBILITY SCORE: 20
STANDING UP FROM CHAIR USING ARMS: A LITTLE
WALKING IN HOSPITAL ROOM: A LITTLE
MOVING TO AND FROM BED TO CHAIR: A LITTLE
CLIMB 3 TO 5 STEPS WITH RAILING: A LITTLE

## 2025-04-18 ASSESSMENT — COLUMBIA-SUICIDE SEVERITY RATING SCALE - C-SSRS
6. HAVE YOU EVER DONE ANYTHING, STARTED TO DO ANYTHING, OR PREPARED TO DO ANYTHING TO END YOUR LIFE?: YES
1. IN THE PAST MONTH, HAVE YOU WISHED YOU WERE DEAD OR WISHED YOU COULD GO TO SLEEP AND NOT WAKE UP?: YES
2. HAVE YOU ACTUALLY HAD ANY THOUGHTS OF KILLING YOURSELF?: YES

## 2025-04-18 ASSESSMENT — PAIN SCALES - GENERAL
PAINLEVEL_OUTOF10: 0 - NO PAIN
PAINLEVEL_OUTOF10: 0 - NO PAIN

## 2025-04-18 ASSESSMENT — PAIN - FUNCTIONAL ASSESSMENT
PAIN_FUNCTIONAL_ASSESSMENT: 0-10
PAIN_FUNCTIONAL_ASSESSMENT: 0-10

## 2025-04-18 NOTE — GROUP NOTE
Group Topic: Other   Group Date: 4/18/2025  Start Time: 1330  End Time: 1430  Facilitators: EVIN GroverS   Department: [facility] RE[facility] VIRTUAL    Number of Participants: 4   Group Focus: leisure skills, other mental fitness, self-esteem, and social skills  Treatment Modality: Leisure Development and Other: Recreation Therapy  Interventions utilized were group exercise, leisure development, and mental fitness  Purpose: In this Recreation Therapy group of “Name 5” patients were prompted to engage together in a game which aims to promote increased socialization, motivation, activity level, following directions along with  utilizing mental fitness.     Name: Nevaeh Cuba YOB: 1955   MR: [id number]      Facilitator: Recreational Therapist  Level of Participation: active  Quality of Participation: appropriate/pleasant, cooperative, and engaged  Interactions with others: appropriate  Mood/Affect: blunted  Cognition: processing slowly  Progress: Moderate  Comments: pt problem is delusional thought. Needs encouragement often   Plan: continue with services

## 2025-04-18 NOTE — CARE PLAN
The patient's goals for the shift include No falls    The clinical goals for the shift include maintain safety    Over the shift, the patient is in the process of making progress toward the goals of Maintain Safety and No falls. Barriers to progression include Lack of understanding. Recommendations to address these barriers include Education, Safety rounds.

## 2025-04-18 NOTE — PROGRESS NOTES
LSW received a phone call from [facility] stating that pt was denied due to SI. LSW inquired with provider about pt's presentation. Provider states that pt presented with SI upon admission but not since. It appears that the information from the H&P populates on every note and may appear that pt is still presenting with SI. LSW attempted to reach out by phone and email to [facility] at [facility] to clarify and had to leave messages. LSW will wait for further response as to whether the facility is able to reconsider pt. LSW called pt's sister Laura to update her.     MARCELA Corley

## 2025-04-18 NOTE — GROUP NOTE
Group Topic: Goals   Group Date: 4/18/2025  Start Time: 0745  End Time: 0800  Facilitators: Juan Mann   Department: [facility]iatric     Number of Participants: 8   Group Focus: goals  Treatment Modality: Other: Group activity  Interventions utilized were group exercise  Purpose: other: Goals    Name: Nevaeh Cuba YOB: 1955   MR: [id number]      Facilitator: [facility]  Level of Participation: active  Quality of Participation: cooperative  Interactions with others: appropriate  Mood/Affect: appropriate  Triggers (if applicable): N/A  Cognition: coherent/clear  Progress: Minimal  Comments: N/A  Plan: continue with services

## 2025-04-18 NOTE — GROUP NOTE
Group Topic: Self-Care/Wellness   Group Date: 4/17/2025  Start Time: 2000  End Time: 2015  Facilitators: Arturo Dyson   Department: [facility]    Number of Participants: 10   Group Focus: individual meeting  Treatment Modality: Patient-Centered Therapy  Interventions utilized were support  Purpose: feelings and self-care    Name: Nevaeh Cuba YOB: 1955   MR: [id number]      Facilitator: [facility]  Level of Participation: active  Quality of Participation: appropriate/pleasant  Interactions with others: appropriate and supportive  Mood/Affect: positive  Triggers (if applicable): n/a  Cognition: coherent/clear  Progress: Moderate  Comments: pt problem is psychosis  Plan: continue with services

## 2025-04-18 NOTE — PROGRESS NOTES
"Physical Therapy    Physical Therapy Treatment    Patient Name: Nevaeh Cuba  MRN: [id number]  Department: [facility]  Room: 400/400-A  Today's Date: 4/18/2025  Time Calculation  Start Time: 0715  Stop Time: 0745  Time Calculation (min): 30 min         Assessment/Plan   PT Assessment  Barriers to Discharge Home: Caregiver assistance, Cognition needs  Caregiver Assistance: Patient lives alone and/or does not have reliable caregiver assistance  Cognition Needs: Insight of patient limited regarding functional ability/needs  Evaluation/Treatment Tolerance: Patient tolerated treatment well  Medical Staff Made Aware: Yes  Strengths: Ability to acquire knowledge  Barriers to Participation: Coping skills  End of Session Communication: Bedside nurse  Assessment Comment: pt very focused on \" I wiill have no food today cause nobody asked me what I wanted\" throughout session with decreased sustained attention; Flat affect with slow mikhail, transitions and intermittant unsteadiness during amb trial; May benefit from FWW use.  End of Session Patient Position: Up in chair, Alarm on (in room)  PT Plan  Inpatient/Swing Bed or Outpatient: Inpatient  PT Plan  Treatment/Interventions: Transfer training, Gait training, Therapeutic exercise, Therapeutic activity  PT Plan: Ongoing PT  PT Frequency: 3 times per week  PT Discharge Recommendations: Low intensity level of continued care  Equipment Recommended upon Discharge: Wheeled walker  PT Recommended Transfer Status: Contact guard, Assistive device (try FWw)  PT - OK to Discharge: Yes      General Visit Information:   PT  Visit  PT Received On: 04/18/25  General  Family/Caregiver Present: No  Prior to Session Communication: Bedside nurse  Patient Position Received: Up in chair, Alarm on  Preferred Learning Style: verbal, visual  General Comment: cleared by nurse for therapy; pt agreeable to therapy    Subjective   Precautions:  Precautions  Hearing/Visual Limitations: glasses, " hearing loss  Medical Precautions: Fall precautions     Date/Time Vitals Session Patient Position Pulse Resp SpO2 BP MAP (mmHg)    04/18/25 0805 --  --  96  --  --  136/77  97           Vital Signs Comment: O2 sat O2 sat 96% with HR 93 to 102 bpm on room air     Objective   Pain:  Pain Assessment  Pain Assessment: 0-10  0-10 (Numeric) Pain Score: 0 - No pain  Cognition:  Cognition  Overall Cognitive Status: Within Functional Limits  Orientation Level: Oriented X4  Following Commands: Follows all commands and directions without difficulty  Safety/Judgement:  (decreased safety insight during functional mobility)  Insight: Moderate  Flexibility of Thought: Reduced flexibility  Processing Speed: Delayed  Coordination:  Movements are Fluid and Coordinated: Yes  Postural Control:  Postural Control  Posture Comment: mild forward head, protracted shldrs  Extremity/Trunk Assessments:    Activity Tolerance:  Activity Tolerance  Endurance: Decreased tolerance for upright activites  Activity Tolerance Comments: good -; requires frequent rest breat due to fatigue  Treatments:  Therapeutic Exercise  Therapeutic Exercise Performed: Yes  Therapeutic Exercise Activity 1: standing melissa heel raises x 2 sets of 15  Therapeutic Exercise Activity 2: standing melissa hip abd x 2 sets of 15 reps  Therapeutic Exercise Activity 3: standing melissa marching x 2 sets of 15         Balance/Neuromuscular Re-Education  Balance/Neuromuscular Re-Education Activity Performed: Yes  Balance/Neuromuscular Re-Education Activity 1: side-stepping to left/right at wall rail  Balance/Neuromuscular Re-Education Activity 2: 180/360 degree turns to right/left    Bed Mobility  Bed Mobility: No    Ambulation/Gait Training  Ambulation/Gait Training Performed: Yes  Ambulation/Gait Training 1  Surface 1: Level tile  Device 1: No device  Assistance 1: Contact guard, Close supervision, Minimal verbal cues  Quality of Gait 1: Diminished heel strike, Inconsistent stride length,  Decreased step length, Shuffling gait, Narrow base of support (slow mikhail)  Comments/Distance (ft) 1: 100 ft x 2, 40 ft x 2, + turns contact guard to close supervision of 1, verbal cues for increased mikhail and B of S  Transfers  Transfer: Yes  Transfer 1  Transfer From 1: Wheelchair to  Transfer to 1: Stand  Technique 1: Sit to stand  Transfer Level of Assistance 1: Minimum assistance, Minimal verbal cues  Trials/Comments 1: min assist of 1 for trunk up, verbal cues for proper melissa hand placement on arms of w/c  Transfers 2  Transfer From 2: Stand to  Transfer to 2: Wheelchair  Technique 2: Stand to sit  Transfer Level of Assistance 2: Close supervision, Minimal verbal cues  Trials/Comments 2: verbal cues for reaching back with both hands for arms of w/c    Stairs  Stairs: No    Outcome Measures:  [facility] Basic Mobility  Turning from your back to your side while in a flat bed without using bedrails: None  Moving from lying on your back to sitting on the side of a flat bed without using bedrails: None  Moving to and from bed to chair (including a wheelchair): A little  Standing up from a chair using your arms (e.g. wheelchair or bedside chair): A little  To walk in hospital room: A little  Climbing 3-5 steps with railing: A little  Basic Mobility - Total Score: 20    Education Documentation  Body Mechanics, taught by Mable Mendiola PT at 4/18/2025 10:03 AM.  Learner: Patient  Readiness: Acceptance  Method: Explanation  Response: Verbalizes Understanding, Needs Reinforcement  Comment: PT educated pt in safe transfer technique    Mobility Training, taught by Mable Mendiola PT at 4/18/2025 10:03 AM.  Learner: Patient  Readiness: Acceptance  Method: Explanation  Response: Verbalizes Understanding, Needs Reinforcement  Comment: PT educated pt in safe transfer technique    Precautions, taught by Mable Mendiola PT at 4/18/2025 10:03 AM.  Learner: Patient  Readiness: Acceptance  Method: Explanation  Response: Verbalizes  Understanding, Needs Reinforcement  Comment: PT educated pt in safe transfer technique  Education Comments  No comments found.               Encounter Problems       Encounter Problems (Active)       PT  Problem       Patient will transfer sit to stand and stand to sit with independent assist to facilitate mobility. (Progressing)       Start:  04/01/25    Expected End:  04/17/25            Patient will amb 150' feet no device including two turns on even surface with independent assist to facilitate safe mobility.   (Progressing)       Start:  04/01/25    Expected End:  04/17/25            Patient will negotiate 1 stairs with no rail(s) and independent} assist with no device for in home and community. (Progressing)       Start:  04/01/25    Expected End:  04/17/25            Patient will tolerate 15 minutes of standing to improve ability to perform MRADLs. (Met)       Start:  04/01/25    Expected End:  04/17/25    Resolved:  04/16/25            Pain - Adult

## 2025-04-18 NOTE — NURSING NOTE
TAZ NOTE     Problem:  Paranoia  Hopeless     Behavior:  Patient is awake in her room upon RN arrival to the unit. Cooperative with care and medication administration. Patient denies SI. Flat affect. Makes needs known.   Group Participation: Attends groups  Appetite/Meals: 25-25-0       Interventions:  Medications administered per MAR.     Response:  Medication compliant.      Plan:  Continue current plan of care.

## 2025-04-18 NOTE — GROUP NOTE
"Group Topic: Feeling Awareness/Expression   Group Date: 4/18/2025  Start Time: 1015  End Time: 1115  Facilitators: Bang ASCENCIO CTRS   Department: [facility] REHABTH VIRTUAL    Number of Participants: 7   Group Focus: feeling awareness/expression, mindfulness, relaxation, and self-awareness  Treatment Modality: Other: Therapeutic Art, Recreation Therapy  Interventions utilized were exploration, story telling, and support  Purpose: Patients participated in therapeutic creative arts activity utilizing water color. This activity promotes creative outlet, leisure awareness, while also working on fine motor skills and following directions. This group is aimed to explore the use of a mindfulness-based approach in a group environment. Patented were engaged in discussion focused on “Emotional Chek-In\" and the importance of acknowledging and validating our emotions. CTRS also played music to create a calm and inviting atmosphere.      Name: Nevaeh Cuba YOB: 1955   MR: [id number]      Facilitator: {[facility] Group Facilitator:[id number]}  Level of Participation: {THERAPIES; PSYCH GROUP PARTICIPATION LEVEL:27360}  Quality of Participation: {THERAPIES; PSYCH QUALITY OF PARTICIPATION:56126}  Interactions with others: {THERAPIES; PSYCH INTERACTIONS:31152}  Mood/Affect: {THERAPIES; PSYCH MOOD/AFFECT:69963}  Triggers (if applicable): ***  Cognition: {THERAPIES; PSYCH COGNITION:42540}  Progress: {THERAPIES; PSYCH PROGRESS:13801}  Comments: ***  Plan: {THERAPIES; PSYCH PLAN:32886}      "

## 2025-04-18 NOTE — PROGRESS NOTES
"Nevaeh Cuba is a 69 y.o. female on day 16 of admission presenting with Psychosis, unspecified psychosis type (Multi).      Subjective   Chart reviewed and case discussed with nursing. She slept well last night, appetite is good. \"I am just biding time here\"Denies adverse side effects of medications including increase in luvox and med regimen appears to be helping moderately with obsessional delusions Her family and she have decided on [facility](provider updated her on [facility] admission soon)and social work is pursuing possibilities at that facility. Objective     Last Recorded Vitals  Blood pressure 136/77, pulse 96, temperature 37.2 °C (99 °F), temperature source Temporal, resp. rate 16, height 1.676 m (5' 6\"), weight 65.1 kg (143 lb 8.3 oz), SpO2 95%.    Review of Systems    Psychiatric ROS - Adult  Anxiety: General Anxiety Disorder (LANDON)LANDON Behaviors: difficult to control worry, excessive anxiety/worry, difficulty concentrating, restlessness, and sleep disturbance - less anxious  Depression: anhedonia, concentration, energy, guilt, helpless, hopeless, interest, persistent thoughts of death, sleep decreased , suicidal thoughts, and withdrawn -  appears less depressed  Delirium: negative  Psychosis: auditory hallucinations and visual hallucinations - not endorsed nor observed today. lessening obsessive delusions  Usha:  none noted or reported but has decreased sleep and increased spending hx  Safety Issues: suicidal ideation - denies SI/HI today  Psychiatric ROS Comment: less psychotic flavor in presentation- more settled    Physical Exam  Vitals and nursing note reviewed.   Pulmonary:      Effort: Pulmonary effort is normal.   Neurological:      Mental Status: She is alert.       Mental Status Exam  General: adequately groomed appears stated age  Appearance: hospital attire  Attitude: slightly guarded  Behavior: cooperative  Motor Activity: in wheel chair for safety  Speech: normal rate and  low volume, " "slightly delayed latency  Mood: \"ok\"  Affect: constricted, odd  Thought Process: more organized  Thought Content: limited. denies SI/HI denies paranoia but has cont obsessions to point of delusions(improving)  Thought Perception: denies AH/VH, does not appear internally stimulated  Cognition: alert and oriented x4  Insight: patient knows she needs treatment for psychiatric illness  Judgement: patient is able to participate in medical decsion making    Psychiatric Risk Assessment  Violence Risk Assessment: major mental illness and other psychosis  Acute Risk of Harm to Others is Considered: low   Suicide Risk Assessment: age > 65 yrs old, , current psychiatric illness, feelings of hopelessness, living alone or lack of social support, suicidal ideations, and other guilt cause spent money was supoosed to pay for mother and  in nursing home \"on stuff that is all over the condo\"  Protective Factors against Suicide: adherence to  treatment and sense of responsibility toward family  Acute Risk of Harm to Self is Considered: low      Relevant Results          No results found for this or any previous visit (from the past 24 hours).    Scheduled medications  amLODIPine, 5 mg, oral, Daily  atorvastatin, 20 mg, oral, Daily  cholecalciferol, 2,000 Units, oral, Daily  fLuvoxaMINE, 100 mg, oral, Nightly  melatonin, 3 mg, oral, Daily  methIMAzole, 5 mg, oral, Daily  OLANZapine, 5 mg, oral, Nightly  OXcarbazepine, 300 mg, oral, q12h CAROLINE  polyethylene glycol, 17 g, oral, Daily      Continuous medications     PRN medications  PRN medications: acetaminophen, docusate sodium, hydrOXYzine HCL, QUEtiapine **OR** OLANZapine, propranolol        Assessment/Plan   Assessment & Plan  Psychosis, unspecified psychosis type (Multi)    Anxiety    Insomnia    Hypokalemia    Patient is a 68 yo female dx with MDD, LANDON with psychotic features (obsessive features). She says she spent all the money she was supposed to pay for her " " and mothers nursing home on \"stuff that is all over my condo\". She denies dx of bipolar disorder, does not present as manic at this time but presents with psychotic flavor, almost schizotypal features.       Biological   - continue Luvox 100mg every night for anxiety/obsessions and depression  - continue trileptal 300mg bid for mood stabilization?  - says she has taken lithium in the past and it makes her dizzy, denies ever taking depakote  - continue zyprexa 5mg at bedtime and monitor obsessional delusions  - prns avalable  - medical pertinences appreciated     Psychological     Provider encouraged patient to attend groups on unit.     Sociological     Provider encouraged patient to work with social workon discharge plan. She planned to return to her condo initially but now says she thinks she can't care for herself there anymore. Social work is pursuing placement at [facility].    Total time spent with patient encounter 25 minutes. This includes patient interview, assessment, extensive chart review, personal review of labs and images as noted above, and formulation of recommendations.           "

## 2025-04-18 NOTE — CARE PLAN
The patient's goals for the shift include No falls    The clinical goals for the shift include Maintain patient safety/ attend groups/ medication compliance    Problem: Fall/Injury  Goal: Not fall by end of shift  Outcome: Progressing  Goal: Be free from injury by end of the shift  Outcome: Progressing     Problem: Pain - Adult  Goal: Verbalizes/displays adequate comfort level or baseline comfort level  Outcome: Progressing

## 2025-04-18 NOTE — GROUP NOTE
"Group Topic: Feeling Awareness/Expression   Group Date: 4/18/2025  Start Time: 1015  End Time: 1115  Facilitators: Bang ASCENCIO CTRS   Department: [facility]    Number of Participants: 7   Group Focus: feeling awareness/expression, mindfulness, relaxation, and self-awareness  Treatment Modality: Other: Therapeutic Art, Recreation Therapy  Interventions utilized were exploration, story telling, and support  Purpose: Patients participated in therapeutic creative arts activity utilizing water color. This activity promotes creative outlet, leisure awareness, while also working on fine motor skills and following directions. This group is aimed to explore the use of a mindfulness-based approach in a group environment. Patented were engaged in discussion focused on “Emotional Chek-In\" and the importance of acknowledging and validating our emotions. CTRS also played music to create a calm and inviting atmosphere.    Name: Nevaeh Cuba YOB: 1955   MR: [id number]      Facilitator: Recreational Therapist  Level of Participation: moderate  Quality of Participation: appropriate/pleasant and cooperative  Interactions with others: appropriate  Mood/Affect: appropriate and flat  Cognition: not focused and processing slowly  Progress: Minimal  Comments: pt problem is delusional thought. Pt engages minimally secondary to appear having trouble focusing. Pt reports not knowing \"what to paint\" or \"how to describe it\". Despite continued support and encouragement, pt continues to speak with doubt towards self.   Plan: continue with services      "

## 2025-04-18 NOTE — NURSING NOTE
TAZ NOTE     Problem:  Paranoia     Behavior:  Patient is in patient’s room sitting in patient’s wheelchair awake. Patient is pleasant and calm. Patient’s affect is flat. Patient talks very little. Patient maintains brief eye contact.     Group Participation: N/A  Appetite/Meals: N/A       Interventions:  This nurse completed a shift assessment and administered patient's scheduled night time medications.    Response:  Patient remained pleasant and calm and was cooperative throughout this shift assessment and medication administration.     Plan:  Continue to monitor for paranoia. Continue to monitor for patient safety.

## 2025-04-19 PROCEDURE — 2500000001 HC RX 250 WO HCPCS SELF ADMINISTERED DRUGS (ALT 637 FOR MEDICARE OP): Performed by: INTERNAL MEDICINE

## 2025-04-19 PROCEDURE — 99232 SBSQ HOSP IP/OBS MODERATE 35: CPT | Performed by: STUDENT IN AN ORGANIZED HEALTH CARE EDUCATION/TRAINING PROGRAM

## 2025-04-19 PROCEDURE — 2500000005 HC RX 250 GENERAL PHARMACY W/O HCPCS: Performed by: STUDENT IN AN ORGANIZED HEALTH CARE EDUCATION/TRAINING PROGRAM

## 2025-04-19 PROCEDURE — 2500000002 HC RX 250 W HCPCS SELF ADMINISTERED DRUGS (ALT 637 FOR MEDICARE OP, ALT 636 FOR OP/ED): Performed by: REGISTERED NURSE

## 2025-04-19 PROCEDURE — 2500000001 HC RX 250 WO HCPCS SELF ADMINISTERED DRUGS (ALT 637 FOR MEDICARE OP): Performed by: STUDENT IN AN ORGANIZED HEALTH CARE EDUCATION/TRAINING PROGRAM

## 2025-04-19 PROCEDURE — 1140000001 HC PRIVATE PSYCH ROOM DAILY

## 2025-04-19 RX ADMIN — FLUVOXAMINE MALEATE 100 MG: 50 TABLET, COATED ORAL at 20:09

## 2025-04-19 RX ADMIN — AMLODIPINE BESYLATE 5 MG: 5 TABLET ORAL at 08:10

## 2025-04-19 RX ADMIN — OXCARBAZEPINE 300 MG: 300 TABLET, FILM COATED ORAL at 05:55

## 2025-04-19 RX ADMIN — MELATONIN TAB 3 MG 3 MG: 3 TAB at 17:04

## 2025-04-19 RX ADMIN — OLANZAPINE 5 MG: 5 TABLET, FILM COATED ORAL at 20:09

## 2025-04-19 RX ADMIN — Medication 2000 UNITS: at 08:10

## 2025-04-19 RX ADMIN — METHIMAZOLE 5 MG: 5 TABLET ORAL at 08:10

## 2025-04-19 RX ADMIN — OXCARBAZEPINE 300 MG: 300 TABLET, FILM COATED ORAL at 18:17

## 2025-04-19 RX ADMIN — ATORVASTATIN CALCIUM 20 MG: 20 TABLET, FILM COATED ORAL at 20:09

## 2025-04-19 ASSESSMENT — COLUMBIA-SUICIDE SEVERITY RATING SCALE - C-SSRS
6. HAVE YOU EVER DONE ANYTHING, STARTED TO DO ANYTHING, OR PREPARED TO DO ANYTHING TO END YOUR LIFE?: NO
6. HAVE YOU EVER DONE ANYTHING, STARTED TO DO ANYTHING, OR PREPARED TO DO ANYTHING TO END YOUR LIFE?: CUT
5. HAVE YOU STARTED TO WORK OUT OR WORKED OUT THE DETAILS OF HOW TO KILL YOURSELF? DO YOU INTEND TO CARRY OUT THIS PLAN?: NO
6. HAVE YOU EVER DONE ANYTHING, STARTED TO DO ANYTHING, OR PREPARED TO DO ANYTHING TO END YOUR LIFE?: NO
2. HAVE YOU ACTUALLY HAD ANY THOUGHTS OF KILLING YOURSELF?: YES
1. IN THE PAST MONTH, HAVE YOU WISHED YOU WERE DEAD OR WISHED YOU COULD GO TO SLEEP AND NOT WAKE UP?: YES
1. IN THE PAST MONTH, HAVE YOU WISHED YOU WERE DEAD OR WISHED YOU COULD GO TO SLEEP AND NOT WAKE UP?: YES
6. HAVE YOU EVER DONE ANYTHING, STARTED TO DO ANYTHING, OR PREPARED TO DO ANYTHING TO END YOUR LIFE?: YES
2. HAVE YOU ACTUALLY HAD ANY THOUGHTS OF KILLING YOURSELF?: YES
4. HAVE YOU HAD THESE THOUGHTS AND HAD SOME INTENTION OF ACTING ON THEM?: NO
6. HAVE YOU EVER DONE ANYTHING, STARTED TO DO ANYTHING, OR PREPARED TO DO ANYTHING TO END YOUR LIFE?: YES
1. SINCE LAST CONTACT, HAVE YOU WISHED YOU WERE DEAD OR WISHED YOU COULD GO TO SLEEP AND NOT WAKE UP?: NO
2. HAVE YOU ACTUALLY HAD ANY THOUGHTS OF KILLING YOURSELF?: NO

## 2025-04-19 ASSESSMENT — PAIN SCALES - GENERAL
PAINLEVEL_OUTOF10: 0 - NO PAIN

## 2025-04-19 ASSESSMENT — ACTIVITIES OF DAILY LIVING (ADL): EFFECT OF PAIN ON DAILY ACTIVITIES: MINIMAL

## 2025-04-19 ASSESSMENT — PAIN - FUNCTIONAL ASSESSMENT
PAIN_FUNCTIONAL_ASSESSMENT: 0-10
PAIN_FUNCTIONAL_ASSESSMENT: 0-10

## 2025-04-19 ASSESSMENT — PAIN DESCRIPTION - DESCRIPTORS: DESCRIPTORS: PATIENT UNABLE TO DESCRIBE

## 2025-04-19 NOTE — GROUP NOTE
Group Topic: Self-Care/Wellness   Group Date: 4/19/2025  Start Time: 1000  End Time: 1110  Facilitators: SUSAN Oreilly   Department: [facility]    Number of Participants: 4   Group Focus: other Healthy Living Group  Treatment Modality: Other: Recreation Therapy   Interventions utilized were exploration, group exercise, patient education, problem solving, and support  Purpose: coping skills, maladaptive thinking, feelings, irrational fears, communication skills, insight or knowledge, self-worth, self-care, relapse prevention strategies, and trigger / craving management    Name: Nevaeh Cuba YOB: 1955   MR: [id number]      Facilitator: Recreational Therapist  Level of Participation: moderate  Quality of Participation: appropriate/pleasant, attentive, and cooperative  Interactions with others: appropriate and gave feedback  Mood/Affect: appropriate and blunted  Triggers (if applicable): n/a  Cognition: coherent/clear  Progress: Moderate  Comments: pt problem is delusional thought.  Pt joins group with little encouragement.  Quicker response time to questions asked and clear with responses.    Plan: continue with services

## 2025-04-19 NOTE — GROUP NOTE
Group Topic: Goals   Group Date: 4/19/2025  Start Time: 0730  End Time: 0800  Facilitators: Hiram Mckinney   Department: [facility]    Number of Participants: 5   Group Focus: goals  Treatment Modality: Patient-Centered Therapy  Interventions utilized were assignment  Purpose: coping skills and self-care    Name: Nevaeh Cuba YOB: 1955   MR: [id number]      Facilitator: [facility]  Level of Participation: moderate  Quality of Participation: appropriate/pleasant  Interactions with others: appropriate  Mood/Affect: appropriate  Triggers (if applicable): n/a  Cognition: coherent/clear  Progress: Moderate  Comments: pt worked on assignment   Plan: continue with services

## 2025-04-19 NOTE — PROGRESS NOTES
"Nevaeh Cuba is a 69 y.o. female on day 16 of admission presenting with Psychosis, unspecified psychosis type (Multi).      Subjective   Chart reviewed and case discussed with nursing. She slept well last night, appetite is good. States she feels \"off\" in general and has constant anxiety, does not \"feel capable\" of engaging in conversations. Denies adverse side effects of medications and med regimen appears to be helping medestly with obsessional delusions Pending possible placement at [facility] and social work is pursuing possibilities at that facility. Objective     Last Recorded Vitals  Blood pressure 129/71, pulse 90, temperature 37.1 °C (98.8 °F), temperature source Temporal, resp. rate 18, height 1.676 m (5' 6\"), weight 65.1 kg (143 lb 8.3 oz), SpO2 95%.    Review of Systems    Psychiatric ROS - Adult  Anxiety: General Anxiety Disorder (LANDON)LANDON Behaviors: difficult to control worry, excessive anxiety/worry, difficulty concentrating, restlessness, and sleep disturbance - less anxious  Depression: anhedonia, concentration, energy, guilt, helpless, hopeless, interest, persistent thoughts of death, sleep decreased , suicidal thoughts, and withdrawn -  appears less depressed  Delirium: negative  Psychosis: auditory hallucinations and visual hallucinations - not endorsed nor observed today. lessening obsessive delusions  Usha:  none noted or reported but has decreased sleep and increased spending hx  Safety Issues: suicidal ideation - denies SI/HI today  Psychiatric ROS Comment: less psychotic flavor in presentation- more settled    Physical Exam  Vitals and nursing note reviewed.   Pulmonary:      Effort: Pulmonary effort is normal.   Neurological:      Mental Status: She is alert.       Mental Status Exam  General: adequately groomed appears stated age  Appearance: hospital attire  Attitude: slightly guarded  Behavior: superficially cooperative  Motor Activity: in wheel chair for safety  Speech: normal rate " "and  low volume, slightly delayed latency  Mood: \"constant anxiety\"  Affect: constricted, odd  Thought Process: more organized  Thought Content: limited. denies SI/HI denies paranoia but has cont obsessions to point of delusions(improving)  Thought Perception: denies AH/VH, does not appear internally stimulated  Cognition: alert and oriented x4  Insight: patient knows she needs treatment for psychiatric illness  Judgement: patient is able to participate in medical decsion making    Psychiatric Risk Assessment  Violence Risk Assessment: major mental illness and other psychosis  Acute Risk of Harm to Others is Considered: low   Suicide Risk Assessment: age > 65 yrs old, , current psychiatric illness, feelings of hopelessness, living alone or lack of social support, suicidal ideations, and other guilt cause spent money was supoosed to pay for mother and  in nursing home \"on stuff that is all over the condo\"  Protective Factors against Suicide: adherence to  treatment and sense of responsibility toward family  Acute Risk of Harm to Self is Considered: low      Relevant Results          No results found for this or any previous visit (from the past 24 hours).    Scheduled medications  amLODIPine, 5 mg, oral, Daily  atorvastatin, 20 mg, oral, Daily  cholecalciferol, 2,000 Units, oral, Daily  fLuvoxaMINE, 100 mg, oral, Nightly  melatonin, 3 mg, oral, Daily  methIMAzole, 5 mg, oral, Daily  OLANZapine, 5 mg, oral, Nightly  OXcarbazepine, 300 mg, oral, q12h CAROLINE  polyethylene glycol, 17 g, oral, Daily      Continuous medications     PRN medications  PRN medications: acetaminophen, docusate sodium, hydrOXYzine HCL, QUEtiapine **OR** OLANZapine, propranolol        Assessment/Plan   Assessment & Plan  Psychosis, unspecified psychosis type (Multi)    Anxiety    Insomnia    Hypokalemia    Patient is a 70 yo female dx with MDD, LANDON with psychotic features (obsessive features). She says she spent all the money she was " "supposed to pay for her  and mothers nursing home on \"stuff that is all over my condo\". She denies dx of bipolar disorder, does not present as manic at this time but presents with psychotic flavor, almost schizotypal features.       Biological   - continue Luvox 100mg every night for anxiety/obsessions and depression  - continue trileptal 300mg bid for mood stabilization?  - says she has taken lithium in the past and it makes her dizzy, denies ever taking depakote  - continue zyprexa 5mg at bedtime and monitor obsessional delusions  - prns avalable  - medical pertinences appreciated     Psychological     Provider encouraged patient to attend groups on unit.     Sociological     Provider encouraged patient to work with social workon discharge plan. She planned to return to her condo initially but now says she thinks she can't care for herself there anymore. Social work is pursuing placement at [facility].    Total time spent with patient encounter 25 minutes. This includes patient interview, assessment, extensive chart review, personal review of labs and images as noted above, and formulation of recommendations.           "

## 2025-04-19 NOTE — NURSING NOTE
TAZ NOTE     Problem:  Pt. Is continuing their journey on working to improve their anxiety & depressive Sx.     Behavior:  Pt. Is cooperative w/ Tx. Plan and has been agreeable to talking w/ staff & peers alike. Pt. Has been able to maintain safety. Pt. Being prompted to increase group attendance.  Appetite/Meals: good        Interventions:  Pt. Was offered groups and their scheduled medications.     Response:  Pt. Has been compliant w/ meds, tolerated them well; and did attend the majority of group sessions w/ good results.     Plan:  Pt. will continue to be monitored for changes in mental status & safety on the unit.

## 2025-04-19 NOTE — CARE PLAN
The patient's goals for the shift include no falls    The clinical goals for the shift include maintain safety    Over the shift, the patient did make progress toward the following goals. Barriers to progression include some decreased energy levels. Recommendations to address these barriers include promoting socialization & group attendance.      Problem: Fall/Injury  Goal: Not fall by end of shift  Outcome: Progressing

## 2025-04-19 NOTE — NURSING NOTE
TAZ NOTE     Problem:  Paranoia     Behavior:  Patient is in patient’s room sitting in patient’s wheelchair awake. Patient is pleasant and calm. Patient’s affect is flat. Patient a little talkative. Patient maintains brief eye contact.     Group Participation: N/A  Appetite/Meals: N/A       Interventions:  This nurse completed a shift assessment and administered patient's scheduled night time medications.    Response:  Patient remained pleasant and calm and was cooperative throughout this shift assessment and medication administration.     Plan:  Continue to monitor for paranoia. Continue to monitor for patient safety.

## 2025-04-20 VITALS
DIASTOLIC BLOOD PRESSURE: 81 MMHG | BODY MASS INDEX: 23.07 KG/M2 | SYSTOLIC BLOOD PRESSURE: 142 MMHG | TEMPERATURE: 97.7 F | RESPIRATION RATE: 16 BRPM | HEIGHT: 66 IN | HEART RATE: 81 BPM | OXYGEN SATURATION: 95 % | WEIGHT: 143.52 LBS

## 2025-04-20 PROCEDURE — 99232 SBSQ HOSP IP/OBS MODERATE 35: CPT | Performed by: STUDENT IN AN ORGANIZED HEALTH CARE EDUCATION/TRAINING PROGRAM

## 2025-04-20 PROCEDURE — 2500000001 HC RX 250 WO HCPCS SELF ADMINISTERED DRUGS (ALT 637 FOR MEDICARE OP): Performed by: INTERNAL MEDICINE

## 2025-04-20 PROCEDURE — 2500000001 HC RX 250 WO HCPCS SELF ADMINISTERED DRUGS (ALT 637 FOR MEDICARE OP): Performed by: STUDENT IN AN ORGANIZED HEALTH CARE EDUCATION/TRAINING PROGRAM

## 2025-04-20 PROCEDURE — 2500000005 HC RX 250 GENERAL PHARMACY W/O HCPCS: Performed by: STUDENT IN AN ORGANIZED HEALTH CARE EDUCATION/TRAINING PROGRAM

## 2025-04-20 PROCEDURE — 2500000002 HC RX 250 W HCPCS SELF ADMINISTERED DRUGS (ALT 637 FOR MEDICARE OP, ALT 636 FOR OP/ED): Performed by: REGISTERED NURSE

## 2025-04-20 PROCEDURE — 1140000001 HC PRIVATE PSYCH ROOM DAILY

## 2025-04-20 RX ADMIN — FLUVOXAMINE MALEATE 100 MG: 50 TABLET, COATED ORAL at 20:08

## 2025-04-20 RX ADMIN — MELATONIN TAB 3 MG 3 MG: 3 TAB at 17:11

## 2025-04-20 RX ADMIN — OLANZAPINE 5 MG: 5 TABLET, FILM COATED ORAL at 20:08

## 2025-04-20 RX ADMIN — OXCARBAZEPINE 300 MG: 300 TABLET, FILM COATED ORAL at 18:05

## 2025-04-20 RX ADMIN — Medication 2000 UNITS: at 08:16

## 2025-04-20 RX ADMIN — METHIMAZOLE 5 MG: 5 TABLET ORAL at 08:16

## 2025-04-20 RX ADMIN — OXCARBAZEPINE 300 MG: 300 TABLET, FILM COATED ORAL at 06:58

## 2025-04-20 RX ADMIN — ATORVASTATIN CALCIUM 20 MG: 20 TABLET, FILM COATED ORAL at 20:20

## 2025-04-20 RX ADMIN — AMLODIPINE BESYLATE 5 MG: 5 TABLET ORAL at 08:16

## 2025-04-20 ASSESSMENT — PAIN SCALES - GENERAL
PAINLEVEL_OUTOF10: 0 - NO PAIN
PAINLEVEL_OUTOF10: 0 - NO PAIN

## 2025-04-20 ASSESSMENT — COLUMBIA-SUICIDE SEVERITY RATING SCALE - C-SSRS
2. HAVE YOU ACTUALLY HAD ANY THOUGHTS OF KILLING YOURSELF?: NO
2. HAVE YOU ACTUALLY HAD ANY THOUGHTS OF KILLING YOURSELF?: YES
1. IN THE PAST MONTH, HAVE YOU WISHED YOU WERE DEAD OR WISHED YOU COULD GO TO SLEEP AND NOT WAKE UP?: YES
1. SINCE LAST CONTACT, HAVE YOU WISHED YOU WERE DEAD OR WISHED YOU COULD GO TO SLEEP AND NOT WAKE UP?: NO
6. HAVE YOU EVER DONE ANYTHING, STARTED TO DO ANYTHING, OR PREPARED TO DO ANYTHING TO END YOUR LIFE?: NO
6. HAVE YOU EVER DONE ANYTHING, STARTED TO DO ANYTHING, OR PREPARED TO DO ANYTHING TO END YOUR LIFE?: YES

## 2025-04-20 ASSESSMENT — PAIN DESCRIPTION - DESCRIPTORS: DESCRIPTORS: PATIENT UNABLE TO DESCRIBE

## 2025-04-20 ASSESSMENT — ACTIVITIES OF DAILY LIVING (ADL): EFFECT OF PAIN ON DAILY ACTIVITIES: MINIMAL

## 2025-04-20 ASSESSMENT — PAIN - FUNCTIONAL ASSESSMENT: PAIN_FUNCTIONAL_ASSESSMENT: 0-10

## 2025-04-20 NOTE — CARE PLAN
The patient's goals for the shift include no falls    The clinical goals for the shift include maintain safety    Over the shift, the patient did make progress toward the following goals.

## 2025-04-20 NOTE — NURSING NOTE
"TAZ NOTE     Problem:  Paranoia     Behavior:  Patient was calm, quiet, and cooperative. She was more isolative today and less willing to talk. She did state that she does not know why she bothers to continue eating and drinking. She would not explain what she meant. Her affect is flat and she admits to being sad about being in this facility. She states that she's \"not worth it.\" No other issues to note.    Appetite/Meals: Snacks provided       Interventions:  Administered scheduled medications    Response:  Compliant with care and medications     Plan:  Continue current plan of care   "

## 2025-04-20 NOTE — GROUP NOTE
Group Topic: Reminiscence   Group Date: 4/20/2025  Start Time: 1330  End Time: 1430  Facilitators: SUSAN Oreilly   Department: [facility]    Number of Participants: 6   Group Focus: other Recreation  Therapy  Treatment Modality: Other: Recreation Therapy  Interventions utilized were mental fitness  Purpose: other: fun, elevate mood, increase socialization, enhance self esteem, stimulate memory    Name: Nevaeh Cuba YOB: 1955   MR: [id number]      Facilitator: Recreational Therapist  Level of Participation: minimal  Quality of Participation: attentive  Interactions with others:  mostly passive  Mood/Affect:  mostly passive  Triggers (if applicable): n/a  Cognition:  mostly passive  Progress: Moderate  Comments: pt problem is delusional thought.  Pt is more passive during this group.  Nodding her head along with staff and peers.  Good eye contact, flat affect.  Plan: continue with services

## 2025-04-20 NOTE — GROUP NOTE
Group Topic: Stress Reduction/Relaxation   Group Date: 4/20/2025  Start Time: 1015  End Time: 1125  Facilitators: SUSAN Oreilly   Department: [facility]    Number of Participants: 6   Group Focus: other Stress Management Game  Treatment Modality: Other: Recreation Therapy  Interventions utilized were exploration, group exercise, patient education, problem solving, and support  Purpose: coping skills, maladaptive thinking, feelings, irrational fears, communication skills, insight or knowledge, self-worth, self-care, relapse prevention strategies, and trigger / craving management    Name: Nevaeh Cuba YOB: 1955   MR: [id number]      Facilitator: Recreational Therapist  Level of Participation: moderate  Quality of Participation: appropriate/pleasant, attentive, and cooperative  Interactions with others: appropriate and gave feedback  Mood/Affect: appropriate and blunted  Triggers (if applicable): n/a  Cognition: coherent/clear  Progress: Moderate  Comments: pt problem is delusional thought.  Pt joins group with little encouragement.  Engages with slight prompting and is appropriate with time of responses.    Plan: continue with services

## 2025-04-20 NOTE — PROGRESS NOTES
"Nevaeh Cuba is a 69 y.o. female on day 16 of admission presenting with Psychosis, unspecified psychosis type (Multi).      Subjective   Chart reviewed and case discussed with nursing. She slept well last night, appetite is good. States she feels \"off\" in general and has constant anxiety, does not \"feel capable\" of engaging in conversations. Denies adverse side effects of medications and med regimen appears to be helping medestly with obsessional delusions Pending possible placement at [facility] and social work is pursuing possibilities at that facility. Objective     Last Recorded Vitals  Blood pressure 142/81, pulse 81, temperature 36.5 °C (97.7 °F), temperature source Temporal, resp. rate 16, height 1.676 m (5' 6\"), weight 65.1 kg (143 lb 8.3 oz), SpO2 95%.    Review of Systems    Psychiatric ROS - Adult  Anxiety: General Anxiety Disorder (LANDON)LANDON Behaviors: difficult to control worry, excessive anxiety/worry, difficulty concentrating, restlessness, and sleep disturbance - less anxious  Depression: anhedonia, concentration, energy, guilt, helpless, hopeless, interest, persistent thoughts of death, sleep decreased , suicidal thoughts, and withdrawn -  appears less depressed  Delirium: negative  Psychosis: auditory hallucinations and visual hallucinations - not endorsed nor observed today. lessening obsessive delusions  Usah:  none noted or reported but has decreased sleep and increased spending hx  Safety Issues: suicidal ideation - denies SI/HI today  Psychiatric ROS Comment: less psychotic flavor in presentation- more settled    Physical Exam  Vitals and nursing note reviewed.   Pulmonary:      Effort: Pulmonary effort is normal.   Neurological:      Mental Status: She is alert.       Mental Status Exam  General: adequately groomed appears stated age  Appearance: hospital attire  Attitude: slightly guarded  Behavior: superficially cooperative  Motor Activity: in wheel chair for safety  Speech: normal rate " "and  low volume, slightly delayed latency  Mood: \"constant anxiety\"  Affect: constricted, odd  Thought Process: more organized  Thought Content: limited. denies SI/HI denies paranoia but has cont obsessions to point of delusions(improving)  Thought Perception: denies AH/VH, does not appear internally stimulated  Cognition: alert and oriented x4  Insight: patient knows she needs treatment for psychiatric illness  Judgement: patient is able to participate in medical decsion making    Psychiatric Risk Assessment  Violence Risk Assessment: major mental illness and other psychosis  Acute Risk of Harm to Others is Considered: low   Suicide Risk Assessment: age > 65 yrs old, , current psychiatric illness, feelings of hopelessness, living alone or lack of social support, suicidal ideations, and other guilt cause spent money was supoosed to pay for mother and  in nursing home \"on stuff that is all over the condo\"  Protective Factors against Suicide: adherence to  treatment and sense of responsibility toward family  Acute Risk of Harm to Self is Considered: low      Relevant Results  {If you would like to pull in Medications, type .meds     If you would like to pull in Lab results for the last 24 hours, type .[id number]    If you would like to pull in Imaging results, type .[id number] :99}      {Link to Stroke Scoring tools - Link :99}  No results found for this or any previous visit (from the past 24 hours).    Scheduled medications  amLODIPine, 5 mg, oral, Daily  atorvastatin, 20 mg, oral, Daily  cholecalciferol, 2,000 Units, oral, Daily  fLuvoxaMINE, 100 mg, oral, Nightly  melatonin, 3 mg, oral, Daily  methIMAzole, 5 mg, oral, Daily  OLANZapine, 5 mg, oral, Nightly  OXcarbazepine, 300 mg, oral, q12h CAROLINE  polyethylene glycol, 17 g, oral, Daily      Continuous medications     PRN medications  PRN medications: acetaminophen, docusate sodium, hydrOXYzine HCL, QUEtiapine **OR** OLANZapine, propranolol    " "    Assessment/Plan   Assessment & Plan  Psychosis, unspecified psychosis type (Multi)    Anxiety    Insomnia    Hypokalemia    Patient is a 70 yo female dx with MDD, LANDON with psychotic features (obsessive features). She says she spent all the money she was supposed to pay for her  and mothers nursing home on \"stuff that is all over my condo\". She denies dx of bipolar disorder, does not present as manic at this time but presents with psychotic flavor, almost schizotypal features.       Biological   - continue Luvox 100mg every night for anxiety/obsessions and depression  - continue trileptal 300mg bid for mood stabilization?  - says she has taken lithium in the past and it makes her dizzy, denies ever taking depakote  - continue zyprexa 5mg at bedtime and monitor obsessional delusions  - prns avalable  - medical pertinences appreciated     Psychological     Provider encouraged patient to attend groups on unit.     Sociological     Provider encouraged patient to work with social workon discharge plan. She planned to return to her condo initially but now says she thinks she can't care for herself there anymore. Social work is pursuing placement at [facility].    Total time spent with patient encounter 25 minutes. This includes patient interview, assessment, extensive chart review, personal review of labs and images as noted above, and formulation of recommendations.           "

## 2025-04-20 NOTE — CARE PLAN
The patient's goals for the shift include no falls    The clinical goals for the shift include maintain safety    Over the shift, the patient did make progress toward the following goals. Barriers to progression include some deficits in energy levels. Recommendations to address these barriers include promoting socialization & groups.      Problem: Fall/Injury  Goal: Not fall by end of shift  Outcome: Progressing

## 2025-04-20 NOTE — NURSING NOTE
TAZ NOTE     Problem:  Pt. Is continuing their journey on working to improve their anxiety & depressive Sx.     Behavior:  Pt. Is cooperative w/ Tx. Plan and has been agreeable to talking w/ staff & peers alike. Pt. Has been able to maintain safety. Pt. Slightly isolative, but social upon approach. Pt. Had an interactive visit with sister.  Appetite/Meals: good        Interventions:  Pt. Was offered groups and their scheduled medications.     Response:  Pt. Has been compliant w/ meds, tolerated them well; and did attend the majority of group sessions w/ good results.     Plan:  Pt. will continue to be monitored for changes in mental status & safety on the unit.

## 2025-04-21 PROCEDURE — 2500000001 HC RX 250 WO HCPCS SELF ADMINISTERED DRUGS (ALT 637 FOR MEDICARE OP): Performed by: INTERNAL MEDICINE

## 2025-04-21 PROCEDURE — 99233 SBSQ HOSP IP/OBS HIGH 50: CPT | Performed by: INTERNAL MEDICINE

## 2025-04-21 PROCEDURE — 2500000002 HC RX 250 W HCPCS SELF ADMINISTERED DRUGS (ALT 637 FOR MEDICARE OP, ALT 636 FOR OP/ED): Performed by: REGISTERED NURSE

## 2025-04-21 PROCEDURE — 2500000005 HC RX 250 GENERAL PHARMACY W/O HCPCS: Performed by: STUDENT IN AN ORGANIZED HEALTH CARE EDUCATION/TRAINING PROGRAM

## 2025-04-21 PROCEDURE — 2500000001 HC RX 250 WO HCPCS SELF ADMINISTERED DRUGS (ALT 637 FOR MEDICARE OP): Performed by: STUDENT IN AN ORGANIZED HEALTH CARE EDUCATION/TRAINING PROGRAM

## 2025-04-21 PROCEDURE — 97535 SELF CARE MNGMENT TRAINING: CPT | Mod: GO

## 2025-04-21 PROCEDURE — 1140000001 HC PRIVATE PSYCH ROOM DAILY

## 2025-04-21 PROCEDURE — 97116 GAIT TRAINING THERAPY: CPT | Mod: GP

## 2025-04-21 PROCEDURE — 99232 SBSQ HOSP IP/OBS MODERATE 35: CPT | Performed by: REGISTERED NURSE

## 2025-04-21 PROCEDURE — 97110 THERAPEUTIC EXERCISES: CPT | Mod: GP

## 2025-04-21 RX ADMIN — AMLODIPINE BESYLATE 5 MG: 5 TABLET ORAL at 08:12

## 2025-04-21 RX ADMIN — FLUVOXAMINE MALEATE 100 MG: 50 TABLET, COATED ORAL at 20:25

## 2025-04-21 RX ADMIN — OXCARBAZEPINE 300 MG: 300 TABLET, FILM COATED ORAL at 06:05

## 2025-04-21 RX ADMIN — OLANZAPINE 5 MG: 5 TABLET, FILM COATED ORAL at 20:25

## 2025-04-21 RX ADMIN — ATORVASTATIN CALCIUM 20 MG: 20 TABLET, FILM COATED ORAL at 20:25

## 2025-04-21 RX ADMIN — Medication 2000 UNITS: at 08:12

## 2025-04-21 RX ADMIN — METHIMAZOLE 5 MG: 5 TABLET ORAL at 08:12

## 2025-04-21 RX ADMIN — MELATONIN TAB 3 MG 3 MG: 3 TAB at 17:12

## 2025-04-21 RX ADMIN — OXCARBAZEPINE 300 MG: 300 TABLET, FILM COATED ORAL at 17:34

## 2025-04-21 ASSESSMENT — COGNITIVE AND FUNCTIONAL STATUS - GENERAL
MOBILITY SCORE: 20
DAILY ACTIVITIY SCORE: 21
MOVING TO AND FROM BED TO CHAIR: A LITTLE
CLIMB 3 TO 5 STEPS WITH RAILING: A LITTLE
WALKING IN HOSPITAL ROOM: A LITTLE
DRESSING REGULAR LOWER BODY CLOTHING: A LITTLE
HELP NEEDED FOR BATHING: A LITTLE
TOILETING: A LITTLE
STANDING UP FROM CHAIR USING ARMS: A LITTLE

## 2025-04-21 ASSESSMENT — COLUMBIA-SUICIDE SEVERITY RATING SCALE - C-SSRS
6. HAVE YOU EVER DONE ANYTHING, STARTED TO DO ANYTHING, OR PREPARED TO DO ANYTHING TO END YOUR LIFE?: YES
2. HAVE YOU ACTUALLY HAD ANY THOUGHTS OF KILLING YOURSELF?: YES
2. HAVE YOU ACTUALLY HAD ANY THOUGHTS OF KILLING YOURSELF?: YES
1. IN THE PAST MONTH, HAVE YOU WISHED YOU WERE DEAD OR WISHED YOU COULD GO TO SLEEP AND NOT WAKE UP?: YES
6. HAVE YOU EVER DONE ANYTHING, STARTED TO DO ANYTHING, OR PREPARED TO DO ANYTHING TO END YOUR LIFE?: NO
6. HAVE YOU EVER DONE ANYTHING, STARTED TO DO ANYTHING, OR PREPARED TO DO ANYTHING TO END YOUR LIFE?: CUT
6. HAVE YOU EVER DONE ANYTHING, STARTED TO DO ANYTHING, OR PREPARED TO DO ANYTHING TO END YOUR LIFE?: YES
4. HAVE YOU HAD THESE THOUGHTS AND HAD SOME INTENTION OF ACTING ON THEM?: NO
5. HAVE YOU STARTED TO WORK OUT OR WORKED OUT THE DETAILS OF HOW TO KILL YOURSELF? DO YOU INTEND TO CARRY OUT THIS PLAN?: NO
1. IN THE PAST MONTH, HAVE YOU WISHED YOU WERE DEAD OR WISHED YOU COULD GO TO SLEEP AND NOT WAKE UP?: YES

## 2025-04-21 ASSESSMENT — ACTIVITIES OF DAILY LIVING (ADL)
BATHING_EQUIPMENT_NEEDED: REMOVEABLE SHOWER HEAD
HOME_MANAGEMENT_TIME_ENTRY: 24
BATHING_WHERE_ASSESSED: SHOWER
BATHING_LEVEL_OF_ASSISTANCE: CONTACT GUARD

## 2025-04-21 ASSESSMENT — PAIN SCALES - GENERAL
PAINLEVEL_OUTOF10: 0 - NO PAIN

## 2025-04-21 ASSESSMENT — PAIN - FUNCTIONAL ASSESSMENT
PAIN_FUNCTIONAL_ASSESSMENT: 0-10

## 2025-04-21 NOTE — PROGRESS NOTES
LSW attempted to contact Emerald Valentin at [facility] again to inquire about reconsideration of pt for facility.     MARCEAL Corley

## 2025-04-21 NOTE — PROGRESS NOTES
"Nevaeh Cuba is a 69 y.o. female on day 16 of admission presenting with Psychosis, unspecified psychosis type (Multi).      Subjective   Chart reviewed and case discussed with nursing. She slept well last night, appetite is good.  She states she is locked in here forever and  it is her own fault for coming.  Provider discussed the challenges at home that led her to come to the ER and that she has gotten herself the help she needed. She stated it is hard lul kind toherself. Denies adverse side effects of medications and med regimen appears to be helping somewhat with obsessional delusions. Pending possible placement at [facility] and social work is pursuing possibilities at that facility.   Objective     Last Recorded Vitals  Blood pressure 134/84, pulse 98, temperature 37.1 °C (98.8 °F), temperature source Temporal, resp. rate 16, height 1.676 m (5' 6\"), weight 65.1 kg (143 lb 8.3 oz), SpO2 96%.    Review of Systems    Psychiatric ROS - Adult  Anxiety: General Anxiety Disorder (LANDON)LANDON Behaviors: difficult to control worry, excessive anxiety/worry, difficulty concentrating, restlessness, and sleep disturbance - less anxious  Depression: anhedonia, concentration, energy, guilt, helpless, hopeless, interest, persistent thoughts of death, sleep decreased , suicidal thoughts, and withdrawn -  appears less depressed  Delirium: negative  Psychosis: auditory hallucinations and visual hallucinations - not endorsed nor observed today. lessening obsessive delusions  Usha:  none noted or reported but has decreased sleep and increased spending hx  Safety Issues: suicidal ideation on admission - denies SI/HI   Psychiatric ROS Comment: less psychotic flavor in presentation- more settled    Physical Exam  Vitals and nursing note reviewed.   Pulmonary:      Effort: Pulmonary effort is normal.   Neurological:      Mental Status: She is alert.       Mental Status Exam  General: adequately groomed appears stated age  Appearance: " "hospital attire  Attitude: slightly guarded  Behavior: superficially cooperative  Motor Activity: in wheel chair for safety  Speech: normal rate and  low volume, slightly delayed latency  Mood: patient is smiling slightly more often  Affect:  less constricted, odd  Thought Process: more organized  Thought Content: limited. denies SI/HI denies paranoia but has cont obsessions to point of delusions(improving), patient   Thought Perception: denies AH/VH, does not appear internally stimulated  Cognition: alert and oriented x4  Insight: patient knows she needs treatment for psychiatric illness  Judgement: patient is able to participate in medical decsion making    Psychiatric Risk Assessment  Violence Risk Assessment: major mental illness and other psychosis  Acute Risk of Harm to Others is Considered: low   Suicide Risk Assessment: age > 65 yrs old, , current psychiatric illness, feelings of hopelessness, living alone or lack of social support, suicidal ideations, and other guilt cause spent money was supoosed to pay for mother and  in nursing home \"on stuff that is all over the condo\"(improving- denies SI/HI)  Protective Factors against Suicide: adherence to  treatment and sense of responsibility toward family  Acute Risk of Harm to Self is Considered: low      Relevant Results          No results found. However, due to the size of the patient record, not all encounters were searched. Please check Results Review for a complete set of results.    Scheduled medications  amLODIPine, 5 mg, oral, Daily  atorvastatin, 20 mg, oral, Daily  cholecalciferol, 2,000 Units, oral, Daily  fLuvoxaMINE, 100 mg, oral, Nightly  melatonin, 3 mg, oral, Daily  methIMAzole, 5 mg, oral, Daily  OLANZapine, 5 mg, oral, Nightly  OXcarbazepine, 300 mg, oral, q12h CAROLINE  polyethylene glycol, 17 g, oral, Daily      Continuous medications     PRN medications  PRN medications: acetaminophen, docusate sodium, hydrOXYzine HCL, QUEtiapine " "**OR** OLANZapine, propranolol        Assessment/Plan   Assessment & Plan  Psychosis, unspecified psychosis type (Multi)    Anxiety    Insomnia    Hypokalemia    Patient is a 70 yo female dx with MDD, LANDON with psychotic features (obsessive features). She says she spent all the money she was supposed to pay for her  and mothers nursing home on \"stuff that is all over my condo\". She denies dx of bipolar disorder, does not present as manic at this time but presents with psychotic flavor, almost schizotypal features.       Biological   - continue Luvox 100mg every night for anxiety/obsessions and depression  - continue trileptal 300mg bid for mood stabilization?  - says she has taken lithium in the past and it makes her dizzy, denies ever taking depakote  - continue zyprexa 5mg at bedtime and monitor obsessional delusions  - prns avalable  - medical pertinences appreciated     Psychological     Provider encouraged patient to attend groups on unit.     Sociological     Provider encouraged patient to work with social workon discharge plan. She planned to return to her condo initially but now says she thinks she can't care for herself there anymore. Social work is pursuing placement at [facility].    Total time spent with patient encounter 25 minutes. This includes patient interview, assessment, extensive chart review, personal review of labs and images as noted above, and formulation of recommendations.           "

## 2025-04-21 NOTE — NURSING NOTE
TAZ NOTE     Problem:  Depression     Behavior:  Patient was calm and cooperative during this shift. She is pleasant and more social today. She was observed in the hallway interacting with peers  Group Participation: ***  Appetite/Meals: ***       Interventions:  ***    Response:  ***     Plan:  ***

## 2025-04-21 NOTE — CARE PLAN
The patient's goals for the shift include Get through the day    The clinical goals for the shift include maintain safety    Over the shift, the patient did  make progress toward the following goals. Barriers to progression include Understanding and motivation. Recommendations to address these barriers include Education.    Problem: Nutrition  Goal: Nutrient intake appropriate for maintaining nutritional needs  Outcome: Progressing     Problem: Fall/Injury  Goal: Be free from injury by end of the shift  Outcome: Progressing

## 2025-04-21 NOTE — GROUP NOTE
Group Topic: Personal Responsibility   Group Date: 4/21/2025  Start Time: 1330  End Time: 1430  Facilitators: EVIN GroverS   Department: [facility]    Number of Participants: 7   Group Focus: coping skills, personal responsibility, and self-awareness  Treatment Modality: Psychoeducation and Other: Recreation Therapy  Interventions utilized were exploration, group exercise, patient education, problem solving, and support  Purpose: Patients engaged in a group session aimed to increase knowledge focused on stress, triggers, warning signs highlighted by personal responsibilities. Pts also encouraged to create a coping skills toolbox for those times when in a distressed situation/s.      Name: Nevaeh Cuba YOB: 1955   MR: [id number]      Facilitator: Recreational Therapist  Level of Participation: minimal  Quality of Participation:  quiet  Interactions with others:  quiet and appropriate  Mood/Affect: blunted and quiet  Cognition: not focused and processing slowly  Progress: Minimal  Comments: pt problem is delusional thought. Pt participates minimally. Mostly quiet needing some prompting. Pt leaves early to work with PT, does not return.   Plan: continue with services

## 2025-04-21 NOTE — GROUP NOTE
Group Topic: Art Creative   Group Date: 4/21/2025  Start Time: 1330  End Time: 1430  Facilitators: Bang ASCENCIO CTRS   Department: [facility]    Number of Participants: 6   Group Focus: mindfulness, relaxation, reminiscence, self-awareness, and self-esteem, guidance/support  Treatment Modality: Other: Therapeutic Art, Recreation Therapy  Interventions utilized were exploration, reminiscence, and support  Purpose: Patients participated in therapeutic creative arts activity utilizing water color and positive words of affirmation. This activity promoted creative expression, leisure awareness and social interaction, while also working on fine motor skills and following directions. Patients were prompted to visualize “they are lost on a stormy night. They see a glimmer of light leading to land”. They were then asked to paint an image of a lighthouse as a source of guidance in their life. Also to paint themselves in the somewhere in the image and add words to represent their sources of guidance in their life. CTRS also played peaceful music to create a calm and supportive enviournment.      Name: Nevaeh Cuba YOB: 1955   MR: [id number]      Facilitator: Recreational Therapist  Level of Participation: active  Quality of Participation: appropriate/pleasant, attentive, cooperative, and engaged  Interactions with others: appropriate  Mood/Affect: appropriate and blunted  Cognition: coherent/clear,  processing slowly   Progress: Moderate  Comments: pt problem is delusional thought. Able to engage following directions. Still continues to be slow processing.   Plan: continue with services

## 2025-04-21 NOTE — PROGRESS NOTES
Occupational Therapy    Occupational Therapy Treatment    Name: Nevaeh Cuba  MRN: [id number]  Department: [facility]  Room: 400/400-A  Date: 04/21/25  Time Calculation  Start Time: 1108  Stop Time: 1132  Time Calculation (min): 24 min    Assessment:  OT Assessment: Patient is progressing throughout POC. Patient will continue to benefit from skilled OT to maximize safety and independence with daily tasks.  Prognosis: Good  Barriers to Discharge Home: Caregiver assistance  Caregiver Assistance: Patient lives alone and/or does not have reliable caregiver assistance  Evaluation/Treatment Tolerance: Patient tolerated treatment well  End of Session Communication: Bedside nurse  End of Session Patient Position: Up in chair, Alarm on  Plan:  Treatment Interventions: ADL retraining, Functional transfer training, Endurance training, Patient/family training, Cognitive reorientation, Compensatory technique education  OT Frequency: 2 times per week  OT Discharge Recommendations: Low intensity level of continued care, 24 hr supervision due to cognition  Equipment Recommended upon Discharge: Wheeled walker, Wheelchair  OT Recommended Transfer Status: Assistive equipment (Comment), Assist of 1  OT - OK to Discharge: Yes    Subjective   Previous Visit Info:  OT Last Visit  OT Received On: 04/21/25  General:  General  Reason for Referral: decline in ADLs, balance associated w/escalating mental health issues, Psychosis  Referred By: Deandre Marin DO  Past Medical History Relevant to Rehab: HTN, afib, Meniere's, hyperthyroid, OA, anxiety, sensorineural hearing loss, h/p mental and behavioral d/o  Family/Caregiver Present: No  Prior to Session Communication: Bedside nurse  Patient Position Received: Up in chair, Alarm on  Preferred Learning Style: verbal, visual  General Comment: Patient is cleared by nursing for therapy. Patient in her wheelchair upon arrival and agreeable to participate.  Precautions:  Medical Precautions:  Fall precautions    Pain Assessment:  Pain Assessment  Pain Assessment: 0-10  0-10 (Numeric) Pain Score: 0 - No pain    Objective   Cognition:  Cognition Comments: flat affect. able to follow commands with increased cues. fixated on her pants not fitting without even trying them on yet (reassurance they are the same pair she has on in a clean version)  Activities of Daily Living: Grooming  Grooming Level of Assistance: Close supervision  Grooming Where Assessed: Wheelchair  Grooming Comments: comb hair    UE Bathing  UE Bathing Adaptive Equipment: Removeable shower head  UE Bathing Level of Assistance: Close supervision  UE Bathing Where Assessed: Shower  UE Bathing Comments: seated on shower chair    LE Bathing  LE Bathing Adaptive Equipment: Removeable shower head  LE Bathing Level of Assistance: Contact guard  LE Bathing Where Assessed: Shower  LE Bathing Comments: seated on shower chair. CGA in standing to wash tyler area    UE Dressing  UE Dressing Level of Assistance: Setup  UE Dressing Where Assessed: Wheelchair  UE Dressing Comments: doff/don sweatshirt    LE Dressing  LE Dressing: Yes  Pants Level of Assistance: Close supervision  Sock Level of Assistance: Setup  Adult Briefs Level of Assistance: Close supervision  LE Dressing Where Assessed: Wheelchair  LE Dressing Comments: close supervision in standing to pull brief and pants over hips. figure 4 seated to doff/don socks    Toileting  Toileting Level of Assistance: Contact guard  Toileting Comments: in standing to wash tyler area    Bed Mobility/Transfers: Transfers  Transfer: Yes  Transfer 1  Transfer From 1: Wheelchair to  Transfer to 1:  (shower chair)  Technique 1: Sit to stand, Stand pivot  Transfer Level of Assistance 1: Contact guard  Trials/Comments 1: with use of grab bars  Transfers 2  Transfer From 2:  (shower chair with arms)  Transfer to 2: Stand  Technique 2: Sit to stand  Transfer Level of Assistance 2: Contact guard  Trials/Comments 2: x2  trials throughout shower with use of grab bars  Transfers 3  Transfer From 3:  (shower chair)  Transfer to 3: Wheelchair  Technique 3: Sit to stand, Stand pivot  Transfer Level of Assistance 3: Contact guard  Trials/Comments 3: with use of grab bars    Shower Transfers  Shower Transfer From: Wheelchair  Shower Transfer Type: To and from  Shower Transfer to: Shower seat with back  Shower Transfer Technique: Stand pivot  Shower Transfers: Contact guard  Shower Transfers Comments: with use of grab bars    Sitting Balance:  Static Sitting Balance  Static Sitting-Balance Support: Feet supported  Static Sitting-Level of Assistance: Distant supervision  Dynamic Sitting Balance  Dynamic Sitting-Balance Support: Feet supported  Dynamic Sitting-Level of Assistance: Close supervision  Dynamic Sitting-Balance: Forward lean, Reaching for objects, Reaching across midline  Dynamic Sitting-Comments: during ADLs  Standing Balance:  Dynamic Standing Balance  Dynamic Standing-Balance Support:  (unilateral support with grab bars)  Dynamic Standing-Level of Assistance: Contact guard  Dynamic Standing-Balance: Lateral lean, Reaching across midline  Dynamic Standing-Comments: during ADLs    RUE   RUE : Within Functional Limits and LUE   LUE: Within Functional Limits    Outcome Measures:  [facility] Daily Activity  Putting on and taking off regular lower body clothing: A little  Bathing (including washing, rinsing, drying): A little  Putting on and taking off regular upper body clothing: None  Toileting, which includes using toilet, bedpan or urinal: A little  Taking care of personal grooming such as brushing teeth: None  Eating Meals: None  Daily Activity - Total Score: 21    Education Documentation  Body Mechanics, taught by Krys Bowers OT at 4/21/2025 12:01 PM.  Learner: Patient  Readiness: Acceptance  Method: Explanation, Demonstration  Response: Verbalizes Understanding, Needs Reinforcement  Comment: reviewed OT POC. education on safe  completion of ADLs/functional transfers    Precautions, taught by Krys Bowers OT at 4/21/2025 12:01 PM.  Learner: Patient  Readiness: Acceptance  Method: Explanation, Demonstration  Response: Verbalizes Understanding, Needs Reinforcement  Comment: reviewed OT POC. education on safe completion of ADLs/functional transfers    ADL Training, taught by Krys Bowers OT at 4/21/2025 12:01 PM.  Learner: Patient  Readiness: Acceptance  Method: Explanation, Demonstration  Response: Verbalizes Understanding, Needs Reinforcement  Comment: reviewed OT POC. education on safe completion of ADLs/functional transfers    Education Comments  No comments found.      Goals:  Encounter Problems       Encounter Problems (Active)       OT Goals       ADL's (Progressing)       Start:  04/01/25    Expected End:  04/29/25       Patient will complete ADL tasks, with independent using AE need in order to increase patient's safety and independence with self-care tasks.           Functional transfers (Progressing)       Start:  04/01/25    Expected End:  04/29/25       Patient will complete functional transfers with independent using least restrictive device, in order to increase patient's safety and independence with daily tasks.           Activity tolerance (Progressing)       Start:  04/01/25    Expected End:  04/29/25       Patient will demonstrate the ability to participate in functional activity at least >/= 25 minutes in order to increase patient's safety and independence with daily tasks.

## 2025-04-21 NOTE — PROGRESS NOTES
"Nutrition Follow up Note    Nutrition Assessment      Nutrition History:  Energy Intake: Poor < 50 %  Food and Nutrient History: po intake averages 30% over the past 10 meals documented.    Anthropometrics:  Ht: 167.6 cm (5' 6\"), Wt: 65.1 kg (143 lb 8.3 oz), BMI: 23.18    Weight Change:  Daily Weight  04/09/25 : 65.1 kg (143 lb 8.3 oz) - pt questions accuracy of this wt but states she does not know her current wt.   03/28/25 : 75.8 kg (167 lb)  03/21/25 : 76 kg (167 lb 8.8 oz)  03/14/25 : 76.2 kg (168 lb)  01/06/25 : 83 kg (183 lb)  10/16/24 : 83 kg (183 lb)  09/20/24 : 83.9 kg (185 lb)  08/04/24 : 83 kg (182 lb 15.7 oz)  08/02/24 : 82.6 kg (182 lb 3.2 oz)  07/24/24 : 83.5 kg (184 lb 1.4 oz)     Weight History / % Weight Change: per wt hx, pt with a 16# (8.1%) wt loss over ~2.5 months (5/24/24-8/2/24) followed by stable wt from Aug 2024-Jan 2025. most recently pt with an additional 16# (8.7%) wt loss over the past ~2.5 months (1/6-3/28). updated wt of 143# from 4/9/25. if accurate, pt with a 24# (14.4%) wt loss over ~1.5 weeks (3/38-4/9) and an overall 40# (21.8%) wt loss over the past ~3 months (1/6-4/9).  Significant Weight Loss: Yes    Nutrition Focused Physical Exam Findings: defer: not appropriate     Nutrition Significant Labs:  Lab Results   Component Value Date    WBC 9.9 03/28/2025    HGB 17.5 (H) 03/28/2025    HCT 48.7 (H) 03/28/2025     03/28/2025    CHOL 142 03/29/2025    TRIG 77 03/29/2025    HDL 59.7 03/29/2025    ALT 24 04/16/2025    AST 14 04/16/2025     04/16/2025    K 3.2 (L) 04/16/2025     04/16/2025    CREATININE 0.57 04/16/2025    BUN 21 04/16/2025    CO2 28 04/16/2025    TSH 3.26 03/07/2025    INR 1.0 10/05/2021    GLUF 134 (H) 03/29/2025    HGBA1C 5.5 06/25/2024     Nutrition Specific Medications:  Scheduled Medications[1]  Continuous Medications[2]    Dietary Orders (From admission, onward)       Start     Ordered    04/07/25 1250  Oral nutritional supplements  Until " discontinued        Question Answer Comment   Deliver with Breakfast    Deliver with Dinner    Select supplement: Ensure Plus High Protein        04/07/25 1250    03/28/25 2141  Adult diet Regular  Diet effective now        Comments: Low fat, Low Calorie   Question:  Diet type  Answer:  Regular    03/28/25 2141                     Estimated Needs:   Estimated Energy Needs  Total Energy Estimated Needs in 24 hours (kCal): 1950 kCal  Energy Estimated Needs per kg Body Weight in 24 hours (kCal/kg): 30 kCal/kg  Method for Estimating Needs: actual wt    Estimated Protein Needs  Total Protein Estimated Needs in 24 Hours (g): 78 g  Protein Estimated Needs per kg Body Weight in 24 Hours (g/kg): 1.2 g/kg  Method for Estimating 24 Hour Protein Needs: actual wt    Estimated Fluid Needs  Method for Estimating 24 Hour Fluid Needs: 1 ml/kcal or per MD        Nutrition Diagnosis   Nutrition Diagnosis:  Malnutrition Diagnosis  Patient has Malnutrition Diagnosis: Yes  Diagnosis Status: Active  Malnutrition Diagnosis: Severe malnutrition related to acute disease or injury  Related to: decreased ability to consume/tolerate sufficient energy  As Evidenced by: po intake </= 50% estimated needs for >/= 5 days, 16# (8.7%) wt loss over ~2.5 months       Nutrition Interventions/Recommendations   Nutrition Interventions and Recommendations:  Nutrition Prescription: Nutrition prescription for oral nutrition    Nutrition Recommendations:  Individualized Nutrition Prescription Provided for : regular diet with ensure plus high protein BID    Nutrition Interventions/Goals:   Food and/or Nutrient Delivery Interventions  Interventions: Meals and snacks  Meals and Snacks: General healthful diet  Goal: provide as ordered  Medical Food Supplement: Commercial beverage medical food supplement therapy  Goal: continue ensure plus high protein BID to provide 350 kcals and 20g protein each  Additional Interventions: suggest monitoring for wt changes on a  weekly basis    Education Documentation  No documentation found.           Nutrition Monitoring and Evaluation   Monitoring/Evaluation:   Food/Nutrient Related History Monitoring  Monitoring and Evaluation Plan: Estimated Energy Intake  Estimated Energy Intake: Energy intake greater or equal to 75% of estimated energy needs    Anthropometric Measurements  Monitoring and Evaluation Plan: Body weight  Body Weight: Body weight - Maintain stable weight    Goal Status: Some progress toward goal(s)    Follow Up  Time Spent (min): 20 minutes  Last Date of Nutrition Visit: 04/21/25  Nutrition Follow-Up Needed?: 5-7 days  Follow up Comment: 4/28/25          [1] amLODIPine, 5 mg, oral, Daily  atorvastatin, 20 mg, oral, Daily  cholecalciferol, 2,000 Units, oral, Daily  fLuvoxaMINE, 100 mg, oral, Nightly  melatonin, 3 mg, oral, Daily  methIMAzole, 5 mg, oral, Daily  OLANZapine, 5 mg, oral, Nightly  OXcarbazepine, 300 mg, oral, q12h CAROLINE  polyethylene glycol, 17 g, oral, Daily     [2]

## 2025-04-21 NOTE — NURSING NOTE
TAZ NOTE     Problem:  Depression     Behavior:  Patient was calm and cooperative during this shift. She is pleasant and more social today. She was observed in the hallway interacting with peers. Admits to being confused as to why she is behind double doors. She asked why staff can leave the unit and she can not. Explanation was ineffective. She was easily redirectable.     Appetite/Meals: Snacks provided       Interventions:  Administered scheduled medications    Response:  Compliant with care and medications     Plan:  Continue current plan of care

## 2025-04-21 NOTE — PROGRESS NOTES
[facility]: [facility]  PROGRESS NOTE      Nevaeh Cuba is a 69 y.o. female that is being seen  today for follow-up at [facility]..  Subjective   Patient is being seen for follow-up at [facility]. Patient's blood pressure is fairly controlled with amlodipine.  Patient's lab work reviewed.  Will continue to monitor.      ROS  Negative for fever or chills  Negative for sore throat, ear pain, nasal discharge  Negative for cough, shortness of breath or wheezing  Negative for chest pain, palpitations, swelling of legs  Negative for abdominal pain, constipation, diarrhea, blood in the stools  Negative for urinary complaints  Negative for headache, dizziness, weakness or numbness  Negative for joint pain  Positive for depression or anxiety  All other systems reviewed and were negative   Vitals:    04/21/25 0500   BP: 134/84   Pulse: 98   Resp: 16   Temp: 37.1 °C (98.8 °F)   SpO2: 96%      Vitals:    04/09/25 0634   Weight: 65.1 kg (143 lb 8.3 oz)     Body mass index is 23.16 kg/m².  Physical Exam  Constitutional: Patient does not appear to be in any acute distress  Head and Face: NCAT  Eyes: Normal external exam, EOMI  ENT: Normal external inspection of ears and nose. Oropharynx normal.  Cardiovascular: RRR, S1/S2, no murmurs, rubs, or gallops, radial pulses +2, no edema of extremities  Pulmonary: CTAB, no respiratory distress.  Abdomen: +BS, soft, non-tender, nondistended, no guarding or rebound, no masses noted  MSK: No joint swelling, normal movements of all extremities. Range of motion- normal.  Skin- No lesions, contusions, or erythema.  Peripheral puslses palpable bilaterally 2+  Neuro: AAO X3, Cranial nerves 2-12 grossly intact,DTR 2+ in all 4 limbs       LABS   [unfilled]  Lab Results   Component Value Date    GLUCOSE 125 (H) 04/16/2025    CALCIUM 9.3 04/16/2025     04/16/2025    K 3.2 (L) 04/16/2025    CO2 28 04/16/2025     04/16/2025    BUN 21 04/16/2025    CREATININE 0.57  04/16/2025     Lab Results   Component Value Date    ALT 24 04/16/2025    AST 14 04/16/2025    ALKPHOS 77 04/16/2025    BILITOT 0.5 04/16/2025     Lab Results   Component Value Date    WBC 9.9 03/28/2025    HGB 17.5 (H) 03/28/2025    HCT 48.7 (H) 03/28/2025    MCV 88 03/28/2025     03/28/2025     Lab Results   Component Value Date    CHOL 142 03/29/2025    CHOL 224 (H) 04/12/2024    CHOL 194 10/06/2023     Lab Results   Component Value Date    HDL 59.7 03/29/2025    HDL 58.0 04/12/2024    HDL 58.0 10/06/2023     Lab Results   Component Value Date    LDLCALC 67 03/29/2025    LDLCALC 102 04/12/2024    LDLCALC 88 10/06/2023     Lab Results   Component Value Date    TRIG 77 03/29/2025    TRIG 318 (H) 04/12/2024    TRIG 240 (H) 10/06/2023     Lab Results   Component Value Date    HGBA1C 5.5 06/25/2024     Other labs not included in the list above were reviewed either before or during this encounter.    History    Past Medical History:   Diagnosis Date    Benign essential hypertension     Estrogen deficiency 11/15/2023    DEXA 1/24 back nl, hip neck T-2.1 recheck 1/26    History of atrial fibrillation     Hyperactive thyroid Dr.Burtch Rand [place] NSR no AC    History of breast lift 05/2024    History of Meniere's syndrome 11/15/2023    Hx of reduction mammoplasty 05/2024    Hyperthyroidism 09/15/2023    Other specified abnormal findings of blood chemistry     High serum cholesterol sulfate    Personal history of malignant neoplasm, unspecified     History of malignant neoplasm    Personal history of other mental and behavioral disorders     Unspecified osteoarthritis, unspecified site 04/28/2016    Arthritis     Past Surgical History:   Procedure Laterality Date    COSMETIC SURGERY      HYSTERECTOMY  04/28/2016    Hysterectomy    JOINT REPLACEMENT      rt ring finger    JOINT REPLACEMENT Right 05/2022    ring finger    OTHER SURGICAL HISTORY  04/28/2016    Musculoskeletal Procedures Injection Carpal Tunnel     OTHER SURGICAL HISTORY  04/28/2016    Arthrodesis MCP Joint    OTHER SURGICAL HISTORY  01/11/2021    Ankle surgery    OTHER SURGICAL HISTORY  01/11/2021    Carpal tunnel surgery    OTHER SURGICAL HISTORY  07/06/2022    Nasal septoplasty    OTHER SURGICAL HISTORY  07/2021    heart shock catherization    REDUCTION MAMMAPLASTY  may 7 2024    SHOULDER SURGERY  08/2021    total left shoulder replacement    SINUS SURGERY      TRIGGER FINGER RELEASE Right 01/2024    middle finger     Family History   Problem Relation Name Age of Onset    Stroke Mother      Other (cyst on breast) Mother      Atrial fibrillation Father      Stroke Father      Heart disease Father       Allergies   Allergen Reactions    Latex Itching and Rash    Methotrexate Itching     HEAD BURNING AND ITCHING    Metoprolol Rash    Diltiazem Hcl Itching     Rash face     Latex, Natural Rubber Itching    Pollen Extracts Other     NASAL CONGESTION    Adhesive Tape-Silicones Rash    Folic Acid Rash    Sulfamethoxazole-Trimethoprim Rash     Tinnitus      No current facility-administered medications on file prior to encounter.     Current Outpatient Medications on File Prior to Encounter   Medication Sig Dispense Refill    acetaminophen (Tylenol) 325 mg tablet Take 2 tablets (650 mg) by mouth every 6 hours if needed for mild pain (1 - 3) 20 tablet 0    atorvastatin (Lipitor) 20 mg tablet Take 1 tablet (20 mg) by mouth once daily. 90 tablet 3    cholecalciferol (Vitamin D-3) 25 MCG (1000 UT) tablet Take 2 tablets (2,000 Units) by mouth. As directed      docusate sodium (Colace) 100 mg capsule Take 1 capsule (100 mg) by mouth once daily as needed.      fexofenadine HCl (ALLEGRA ORAL) Take 1 tablet by mouth once daily as needed (ALLERGIES).      fLuvoxaMINE (Luvox) 50 mg tablet Take 1 tablet (50 mg) by mouth early in the morning..      hydrOXYzine HCL (Atarax) 25 mg tablet Take 2 tablets (50 mg) by mouth once daily at bedtime for 10 days. 20 tablet 0    ibuprofen  200 mg tablet Take 2 tablets (400 mg) by mouth every 6 hours if needed for mild pain (1 - 3).      methIMAzole (Tapazole) 5 mg tablet Take 1 tablet (5 mg) by mouth once daily. 90 tablet 3    mv-min-FA-vit K-lutein-zeaxant (PreserVision AREDS 2 Plus MV) 200 mcg-15 mcg- 5 mg-1 mg capsule Take 1 capsule by mouth once daily.      OXcarbazepine (Trileptal) 150 mg tablet Take 1 tablet (150 mg) by mouth 2 times a day.      OXcarbazepine (Trileptal) 300 mg tablet Take 1 tablet (300 mg) by mouth every 12 hours.      propranolol (Inderal) 10 mg tablet Take 1 tablet (10 mg) by mouth every 8 hours if needed for anxiety.      triamterene-hydrochlorothiazid (Maxzide-25) 37.5-25 mg tablet Take 1 tablet by mouth once daily in the morning. 90 tablet 3     Immunization History   Administered Date(s) Administered    COVID-19, subunit, rS-nanoparticle, adjuvanted, PF, 5 mcg/0.5 mL 09/28/2024    Flu vaccine, quadrivalent, high-dose, preservative free, age 65y+ (FLUZONE) 09/14/2023    Influenza, Seasonal, Quadrivalent, Adjuvanted 09/30/2021, 09/08/2022    Influenza, Unspecified 09/07/2010, 10/17/2011    Influenza, injectable, MDCK, quadrivalent 10/23/2017, 10/30/2018    Influenza, injectable, quadrivalent 09/23/2019, 09/11/2020    Influenza, seasonal, injectable 09/24/2012, 10/17/2013, 10/23/2014, 10/26/2015, 11/17/2016    Moderna COVID-19 vaccine, 12 years and older (50mcg/0.5mL)(Spikevax) 09/29/2023    Pfizer COVID-19 vaccine, bivalent, age 12 years and older (30 mcg/0.3 mL) 09/08/2022    Pfizer Gray Cap SARS-CoV-2 03/31/2022    Pfizer Purple Cap SARS-CoV-2 03/09/2021, 04/06/2021, 10/06/2021    Pneumococcal conjugate vaccine, 13-valent (PREVNAR 13) 07/11/2013    Pneumococcal conjugate vaccine, 20-valent (PREVNAR 20) 09/16/2023    Pneumococcal polysaccharide vaccine, 23-valent, age 2 years and older (PNEUMOVAX 23) 11/05/2020    RSV, 60 Years And Older (AREXVY) 09/16/2023    Tdap vaccine, age 7 year and older (BOOSTRIX, ADACEL)  09/30/2021    Zoster vaccine, recombinant, adult (SHINGRIX) 10/15/2020, 09/30/2021    Zoster, live 02/10/2012     Patient's medical history was reviewed and updated either before or during this encounter.  ASSESSMENT / PLAN:  Active Hospital Problems    Hypokalemia      *Psychosis, unspecified psychosis type (Multi)      Anxiety      Insomnia      Sensorineural hearing loss (SNHL) of both ears      Essential hypertension      Hyperlipidemia      Hyperthyroidism    Patient is being seen for follow-up at [facility].  Anxiety has been stable.   Blood pressure has been fairly controlled with amlodipine.  Will continue to monitor.

## 2025-04-21 NOTE — PROGRESS NOTES
Physical Therapy    Physical Therapy Treatment    Patient Name: Nevaeh Cuba  MRN: [id number]  Department: [facility]  Room: 400/400-A  Today's Date: 4/21/2025  Time Calculation  Start Time: 1510  Stop Time: 1540  Time Calculation (min): 30 min         Assessment/Plan   PT Assessment  Assessment Comment: Flat Affect.  Feels she is ok.  Poor Insight to balance deficits MCCARTHY Score 37/56 Moderate Fall Risk  End of Session Patient Position: Up in chair, Alarm on  PT Plan  Inpatient/Swing Bed or Outpatient: Inpatient  PT Plan  Treatment/Interventions: Transfer training, Gait training, Balance training  PT Plan: Ongoing PT  PT Frequency: 3 times per week  PT Discharge Recommendations: Low intensity level of continued care  Equipment Recommended upon Discharge: Wheeled walker  PT Recommended Transfer Status: Contact guard  PT - OK to Discharge: Yes      General Visit Information:   PT  Visit  PT Received On: 04/21/25  Response to Previous Treatment: Patient with no complaints from previous session.  General  Reason for Referral: decline in ADLs, balance associated w/escalating mental health issues, Psychosis  Referred By: Deandre Marin DO  Past Medical History Relevant to Rehab: HTN, afib, Meniere's, hyperthyroid, OA, anxiety, sensorineural hearing loss, h/p mental and behavioral d/o  General Comment: Day is so so.  HAd shower today. Concerned she doesnt have enough clothes.    Subjective   Precautions:  Precautions  Medical Precautions: Fall precautions     Date/Time Vitals Session Patient Position Pulse Resp SpO2 BP MAP (mmHg)    04/21/25 1525 --  --  95  --  96 %  115/81  92                 Objective   Pain:  Pain Assessment  Pain Assessment: 0-10  0-10 (Numeric) Pain Score: 0 - No pain     Activity Tolerance:  Activity Tolerance  Rate of Perceived Exertion (RPE): 4/10  Treatments:  Therapeutic Exercise  Therapeutic Exercise Activity 1: Seated Restorator x 10 minutes  light resistence  Therapeutic Exercise  Activity 2: LAQ 2x30 alternating 2.5#  BLE  Therapeutic Exercise Activity 3: Standing Heel Raises 2x15  Therapeutic Exercise Activity 4: Seated Hip Abd 1x30 green tband  Therapeutic Exercise Activity 5: Hip Add sets 2x15 with small ball       Ambulation/Gait Training 1  Surface 1: Level tile  Device 1: No device  Gait Support Devices: Gait belt  Assistance 1: Contact guard, Close supervision  Quality of Gait 1: Narrow base of support, Forward flexed posture, Shuffling gait  Comments/Distance (ft) 1: 100 x  2 feet slow pace.  Guarded posture.  Practice increasing speed, CGx1  Transfer 1  Transfer From 1: Wheelchair to  Transfer to 1: Stand  Technique 1: Sit to stand, Stand to sit  Transfer Level of Assistance 1: Contact guard, Close supervision  Trials/Comments 1: without AD safety cues         Outcome Measures:  [facility] Basic Mobility  Turning from your back to your side while in a flat bed without using bedrails: None  Moving from lying on your back to sitting on the side of a flat bed without using bedrails: None  Moving to and from bed to chair (including a wheelchair): A little  Standing up from a chair using your arms (e.g. wheelchair or bedside chair): A little  To walk in hospital room: A little  Climbing 3-5 steps with railing: A little  Basic Mobility - Total Score: 20      OP EDUCATION:  Outpatient Education  Education Comment: Discussed benefits of PT, ambulation, PREs    Encounter Problems       Encounter Problems (Active)       PT  Problem       Patient will transfer sit to stand and stand to sit with independent assist to facilitate mobility. (Progressing)       Start:  04/01/25    Expected End:  04/17/25            Patient will amb 150' feet no device including two turns on even surface with independent assist to facilitate safe mobility.   (Progressing)       Start:  04/01/25    Expected End:  04/17/25            Patient will negotiate 1 stairs with no rail(s) and independent} assist with no device for  in home and community. (Progressing)       Start:  04/01/25    Expected End:  04/17/25            Patient will tolerate 15 minutes of standing to improve ability to perform MRADLs. (Met)       Start:  04/01/25    Expected End:  04/17/25    Resolved:  04/16/25            Pain - Adult

## 2025-04-21 NOTE — NURSING NOTE
TAZ NOTE     Problem:  Depression     Behavior:  Pt calm and cooperative with care and treatment. Pt did state she felt a little off today and wanted to just get through the day. Pt had a brief period of confusion on what type of medications she was taking. Explained to pt the names and what they are for and reassured the pts concerns.. Pt did work with OT and took a shower. Pt had no other complaints or issues during shift.  Group Participation: yes  Appetite/Meals: good       Interventions:  Administer scheduled medications    Response:  receptive     Plan:  Continue with current plan

## 2025-04-21 NOTE — GROUP NOTE
Group Topic: Goals   Group Date: 4/21/2025  Start Time: 0730  End Time: 0800  Facilitators: Hiram Mckinney   Department: [facility]    Number of Participants: 5   Group Focus: goals  Treatment Modality: Patient-Centered Therapy  Interventions utilized were assignment  Purpose: self-care    Name: Nevaeh Cuba YOB: 1955   MR: [id number]      Facilitator: [facility]  Level of Participation: minimal  Quality of Participation: appropriate/pleasant  Interactions with others:  appropriate  Mood/Affect: appropriate  Triggers (if applicable): n/a  Cognition: coherent/clear  Progress: Minimal  Comments: pt attended group but was didinterested in assignmrnt  Plan: continue with services

## 2025-04-22 PROCEDURE — 1140000001 HC PRIVATE PSYCH ROOM DAILY

## 2025-04-22 PROCEDURE — 2500000004 HC RX 250 GENERAL PHARMACY W/ HCPCS (ALT 636 FOR OP/ED): Performed by: STUDENT IN AN ORGANIZED HEALTH CARE EDUCATION/TRAINING PROGRAM

## 2025-04-22 PROCEDURE — 99233 SBSQ HOSP IP/OBS HIGH 50: CPT | Performed by: INTERNAL MEDICINE

## 2025-04-22 PROCEDURE — 2500000001 HC RX 250 WO HCPCS SELF ADMINISTERED DRUGS (ALT 637 FOR MEDICARE OP): Performed by: STUDENT IN AN ORGANIZED HEALTH CARE EDUCATION/TRAINING PROGRAM

## 2025-04-22 PROCEDURE — 99232 SBSQ HOSP IP/OBS MODERATE 35: CPT | Performed by: REGISTERED NURSE

## 2025-04-22 PROCEDURE — 2500000005 HC RX 250 GENERAL PHARMACY W/O HCPCS: Performed by: STUDENT IN AN ORGANIZED HEALTH CARE EDUCATION/TRAINING PROGRAM

## 2025-04-22 PROCEDURE — 2500000001 HC RX 250 WO HCPCS SELF ADMINISTERED DRUGS (ALT 637 FOR MEDICARE OP): Performed by: INTERNAL MEDICINE

## 2025-04-22 PROCEDURE — 2500000002 HC RX 250 W HCPCS SELF ADMINISTERED DRUGS (ALT 637 FOR MEDICARE OP, ALT 636 FOR OP/ED): Performed by: REGISTERED NURSE

## 2025-04-22 RX ADMIN — OXCARBAZEPINE 300 MG: 300 TABLET, FILM COATED ORAL at 18:24

## 2025-04-22 RX ADMIN — MELATONIN TAB 3 MG 3 MG: 3 TAB at 18:24

## 2025-04-22 RX ADMIN — POLYETHYLENE GLYCOL 3350 17 G: 17 POWDER, FOR SOLUTION ORAL at 08:16

## 2025-04-22 RX ADMIN — METHIMAZOLE 5 MG: 5 TABLET ORAL at 08:15

## 2025-04-22 RX ADMIN — Medication 2000 UNITS: at 08:15

## 2025-04-22 RX ADMIN — AMLODIPINE BESYLATE 5 MG: 5 TABLET ORAL at 08:15

## 2025-04-22 RX ADMIN — OXCARBAZEPINE 300 MG: 300 TABLET, FILM COATED ORAL at 06:11

## 2025-04-22 ASSESSMENT — PAIN - FUNCTIONAL ASSESSMENT
PAIN_FUNCTIONAL_ASSESSMENT: 0-10
PAIN_FUNCTIONAL_ASSESSMENT: 0-10

## 2025-04-22 ASSESSMENT — COLUMBIA-SUICIDE SEVERITY RATING SCALE - C-SSRS
6. HAVE YOU EVER DONE ANYTHING, STARTED TO DO ANYTHING, OR PREPARED TO DO ANYTHING TO END YOUR LIFE?: YES
2. HAVE YOU ACTUALLY HAD ANY THOUGHTS OF KILLING YOURSELF?: YES
1. IN THE PAST MONTH, HAVE YOU WISHED YOU WERE DEAD OR WISHED YOU COULD GO TO SLEEP AND NOT WAKE UP?: YES

## 2025-04-22 ASSESSMENT — PAIN SCALES - GENERAL
PAINLEVEL_OUTOF10: 0 - NO PAIN
PAINLEVEL_OUTOF10: 0 - NO PAIN

## 2025-04-22 NOTE — GROUP NOTE
"Group Topic: Other   Group Date: 4/22/2025  Start Time: 1330  End Time: 1420  Facilitators: SUSAN Oreilly   Department: [facility]    Number of Participants: 5   Group Focus: other [employer]  Treatment Modality: Other: Recreation Therapy  Interventions utilized were mental fitness  Purpose: feelings and other: fun, elevate mood, increase socialization    Name: Nevaeh Cuba YOB: 1955   MR: [id number]      Facilitator: Recreational Therapist  Level of Participation: active  Quality of Participation: appropriate/pleasant and initiates communication  Interactions with others: gave feedback  Mood/Affect: blunted  Triggers (if applicable): n/a  Cognition: coherent/clear  Progress: Moderate  Comments: pt problem is delusional thought.  Pt much more talkative with staff and peers.  Expresses frustration with \"communication problems.  I have a hard time expressing myself\".  Though talkative in group, pt repeats the same comments about communication difficulties.   Plan: continue with services      "

## 2025-04-22 NOTE — PROGRESS NOTES
[facility]: [facility]  PROGRESS NOTE      Nevaeh Cuba is a 69 y.o. female that is being seen  today for follow-up at [facility]..  Subjective   Patient is being seen for follow-up at [facility]. Patient's blood pressure is fairly controlled with amlodipine.  Patient's lab work reviewed.  Will continue to monitor.      ROS  Negative for fever or chills  Negative for sore throat, ear pain, nasal discharge  Negative for cough, shortness of breath or wheezing  Negative for chest pain, palpitations, swelling of legs  Negative for abdominal pain, constipation, diarrhea, blood in the stools  Negative for urinary complaints  Negative for headache, dizziness, weakness or numbness  Negative for joint pain  Positive for depression or anxiety  All other systems reviewed and were negative   Vitals:    04/22/25 0621   BP: 127/76   Pulse: 95   Resp: 17   Temp: 37.1 °C (98.8 °F)   SpO2: 100%      Vitals:    04/09/25 0634   Weight: 65.1 kg (143 lb 8.3 oz)     Body mass index is 23.16 kg/m².  Physical Exam  Constitutional: Patient does not appear to be in any acute distress  Head and Face: NCAT  Eyes: Normal external exam, EOMI  ENT: Normal external inspection of ears and nose. Oropharynx normal.  Cardiovascular: RRR, S1/S2, no murmurs, rubs, or gallops, radial pulses +2, no edema of extremities  Pulmonary: CTAB, no respiratory distress.  Abdomen: +BS, soft, non-tender, nondistended, no guarding or rebound, no masses noted  MSK: No joint swelling, normal movements of all extremities. Range of motion- normal.  Skin- No lesions, contusions, or erythema.  Peripheral puslses palpable bilaterally 2+  Neuro: AAO X3, Cranial nerves 2-12 grossly intact,DTR 2+ in all 4 limbs       LABS   [unfilled]  Lab Results   Component Value Date    GLUCOSE 125 (H) 04/16/2025    CALCIUM 9.3 04/16/2025     04/16/2025    K 3.2 (L) 04/16/2025    CO2 28 04/16/2025     04/16/2025    BUN 21 04/16/2025    CREATININE 0.57  04/16/2025     Lab Results   Component Value Date    ALT 24 04/16/2025    AST 14 04/16/2025    ALKPHOS 77 04/16/2025    BILITOT 0.5 04/16/2025     Lab Results   Component Value Date    WBC 9.9 03/28/2025    HGB 17.5 (H) 03/28/2025    HCT 48.7 (H) 03/28/2025    MCV 88 03/28/2025     03/28/2025     Lab Results   Component Value Date    CHOL 142 03/29/2025    CHOL 224 (H) 04/12/2024    CHOL 194 10/06/2023     Lab Results   Component Value Date    HDL 59.7 03/29/2025    HDL 58.0 04/12/2024    HDL 58.0 10/06/2023     Lab Results   Component Value Date    LDLCALC 67 03/29/2025    LDLCALC 102 04/12/2024    LDLCALC 88 10/06/2023     Lab Results   Component Value Date    TRIG 77 03/29/2025    TRIG 318 (H) 04/12/2024    TRIG 240 (H) 10/06/2023     Lab Results   Component Value Date    HGBA1C 5.5 06/25/2024     Other labs not included in the list above were reviewed either before or during this encounter.    History    Past Medical History:   Diagnosis Date    Benign essential hypertension     Estrogen deficiency 11/15/2023    DEXA 1/24 back nl, hip neck T-2.1 recheck 1/26    History of atrial fibrillation     Hyperactive thyroid Dr.Burtch Rand [place] NSR no AC    History of breast lift 05/2024    History of Meniere's syndrome 11/15/2023    Hx of reduction mammoplasty 05/2024    Hyperthyroidism 09/15/2023    Other specified abnormal findings of blood chemistry     High serum cholesterol sulfate    Personal history of malignant neoplasm, unspecified     History of malignant neoplasm    Personal history of other mental and behavioral disorders     Unspecified osteoarthritis, unspecified site 04/28/2016    Arthritis     Past Surgical History:   Procedure Laterality Date    COSMETIC SURGERY      HYSTERECTOMY  04/28/2016    Hysterectomy    JOINT REPLACEMENT      rt ring finger    JOINT REPLACEMENT Right 05/2022    ring finger    OTHER SURGICAL HISTORY  04/28/2016    Musculoskeletal Procedures Injection Carpal Tunnel     OTHER SURGICAL HISTORY  04/28/2016    Arthrodesis MCP Joint    OTHER SURGICAL HISTORY  01/11/2021    Ankle surgery    OTHER SURGICAL HISTORY  01/11/2021    Carpal tunnel surgery    OTHER SURGICAL HISTORY  07/06/2022    Nasal septoplasty    OTHER SURGICAL HISTORY  07/2021    heart shock catherization    REDUCTION MAMMAPLASTY  may 7 2024    SHOULDER SURGERY  08/2021    total left shoulder replacement    SINUS SURGERY      TRIGGER FINGER RELEASE Right 01/2024    middle finger     Family History   Problem Relation Name Age of Onset    Stroke Mother      Other (cyst on breast) Mother      Atrial fibrillation Father      Stroke Father      Heart disease Father       Allergies   Allergen Reactions    Latex Itching and Rash    Methotrexate Itching     HEAD BURNING AND ITCHING    Metoprolol Rash    Diltiazem Hcl Itching     Rash face     Latex, Natural Rubber Itching    Pollen Extracts Other     NASAL CONGESTION    Adhesive Tape-Silicones Rash    Folic Acid Rash    Sulfamethoxazole-Trimethoprim Rash     Tinnitus      No current facility-administered medications on file prior to encounter.     Current Outpatient Medications on File Prior to Encounter   Medication Sig Dispense Refill    acetaminophen (Tylenol) 325 mg tablet Take 2 tablets (650 mg) by mouth every 6 hours if needed for mild pain (1 - 3) 20 tablet 0    atorvastatin (Lipitor) 20 mg tablet Take 1 tablet (20 mg) by mouth once daily. 90 tablet 3    cholecalciferol (Vitamin D-3) 25 MCG (1000 UT) tablet Take 2 tablets (2,000 Units) by mouth. As directed      docusate sodium (Colace) 100 mg capsule Take 1 capsule (100 mg) by mouth once daily as needed.      fexofenadine HCl (ALLEGRA ORAL) Take 1 tablet by mouth once daily as needed (ALLERGIES).      fLuvoxaMINE (Luvox) 50 mg tablet Take 1 tablet (50 mg) by mouth early in the morning..      hydrOXYzine HCL (Atarax) 25 mg tablet Take 2 tablets (50 mg) by mouth once daily at bedtime for 10 days. 20 tablet 0    ibuprofen  200 mg tablet Take 2 tablets (400 mg) by mouth every 6 hours if needed for mild pain (1 - 3).      methIMAzole (Tapazole) 5 mg tablet Take 1 tablet (5 mg) by mouth once daily. 90 tablet 3    mv-min-FA-vit K-lutein-zeaxant (PreserVision AREDS 2 Plus MV) 200 mcg-15 mcg- 5 mg-1 mg capsule Take 1 capsule by mouth once daily.      OXcarbazepine (Trileptal) 150 mg tablet Take 1 tablet (150 mg) by mouth 2 times a day.      OXcarbazepine (Trileptal) 300 mg tablet Take 1 tablet (300 mg) by mouth every 12 hours.      propranolol (Inderal) 10 mg tablet Take 1 tablet (10 mg) by mouth every 8 hours if needed for anxiety.      triamterene-hydrochlorothiazid (Maxzide-25) 37.5-25 mg tablet Take 1 tablet by mouth once daily in the morning. 90 tablet 3     Immunization History   Administered Date(s) Administered    COVID-19, subunit, rS-nanoparticle, adjuvanted, PF, 5 mcg/0.5 mL 09/28/2024    Flu vaccine, quadrivalent, high-dose, preservative free, age 65y+ (FLUZONE) 09/14/2023    Influenza, Seasonal, Quadrivalent, Adjuvanted 09/30/2021, 09/08/2022    Influenza, Unspecified 09/07/2010, 10/17/2011    Influenza, injectable, MDCK, quadrivalent 10/23/2017, 10/30/2018    Influenza, injectable, quadrivalent 09/23/2019, 09/11/2020    Influenza, seasonal, injectable 09/24/2012, 10/17/2013, 10/23/2014, 10/26/2015, 11/17/2016    Moderna COVID-19 vaccine, 12 years and older (50mcg/0.5mL)(Spikevax) 09/29/2023    Pfizer COVID-19 vaccine, bivalent, age 12 years and older (30 mcg/0.3 mL) 09/08/2022    Pfizer Gray Cap SARS-CoV-2 03/31/2022    Pfizer Purple Cap SARS-CoV-2 03/09/2021, 04/06/2021, 10/06/2021    Pneumococcal conjugate vaccine, 13-valent (PREVNAR 13) 07/11/2013    Pneumococcal conjugate vaccine, 20-valent (PREVNAR 20) 09/16/2023    Pneumococcal polysaccharide vaccine, 23-valent, age 2 years and older (PNEUMOVAX 23) 11/05/2020    RSV, 60 Years And Older (AREXVY) 09/16/2023    Tdap vaccine, age 7 year and older (BOOSTRIX, ADACEL)  09/30/2021    Zoster vaccine, recombinant, adult (SHINGRIX) 10/15/2020, 09/30/2021    Zoster, live 02/10/2012     Patient's medical history was reviewed and updated either before or during this encounter.  ASSESSMENT / PLAN:  Active Hospital Problems    Hypokalemia      *Psychosis, unspecified psychosis type (Multi)      Anxiety      Insomnia      Sensorineural hearing loss (SNHL) of both ears      Essential hypertension      Hyperlipidemia      Hyperthyroidism    Patient is being seen for follow-up at [facility].  Anxiety has been stable.   Blood pressure has been fairly controlled with amlodipine.  Will continue to monitor.

## 2025-04-22 NOTE — GROUP NOTE
Group Topic: Goals   Group Date: 4/21/2025  Start Time: 2010  End Time: 2022  Facilitators: Tanika Quinn   Department: [facility]    Number of Participants: 8   Group Focus: goals  Treatment Modality: Other: PCA  Interventions utilized were reminiscence  Purpose: feelings and communication skills    Name: Nevaeh Cuba YOB: 1955   MR: [id number]      Facilitator: Mental Health [facility]  Level of Participation: active  Quality of Participation: appropriate/pleasant  Interactions with others: appropriate  Mood/Affect: positive  Triggers (if applicable): N/A  Cognition: coherent/clear  Progress: Minimal  Comments: Pt problem is psychosis.   Plan: continue with services

## 2025-04-22 NOTE — PROGRESS NOTES
"LSW met with pt who presents focused on the fact that she \"has worn out her welcome\" and that she is \"taking up a bed for someone else\". LSW attempted to reassure pt that this is not the case. LSW asked pt where she would go if she discharged and she didn't know. LSW attempted to explain to pt that we are working on placement for pt. LSW explained that it takes time to arrange. Pt continues to be focussed on the fact that she does not have clothes. LSW spoke with pt's sister to report that there has been no word from [facility] about reconsidering pt. LSW explained that referrals will be resubmitted to other facilities as well as contacting additional facilities outside of area.     MARCELA CorleyW   "

## 2025-04-22 NOTE — CARE PLAN
The patient's goals for the shift include get through this day, make a decision    The clinical goals for the shift include maintain safety    Over the shift, the patient did not make progress toward the following goals. Barriers to progression include not being able to concentrate on one thing at a time, focused on other things not relevant to what is happening now. Recommendations to address these barriers include encourage pt to focus on the task at hand.

## 2025-04-22 NOTE — CARE PLAN
The patient's goals for the shift include Get through the day    The clinical goals for the shift include maintain safety    Over the shift, the patient did make progress toward the following goals.

## 2025-04-22 NOTE — GROUP NOTE
"Group Topic: Self-Care/Wellness   Group Date: 4/22/2025  Start Time: 1110  End Time: 1145  Facilitators: SUSAN Oreilly   Department: [facility]    Number of Participants: 8   Group Focus: other Self Care Tips  Treatment Modality: Other: Recreation Therapy  Interventions utilized were exploration, group exercise, patient education, problem solving, and support  Purpose: coping skills, maladaptive thinking, feelings, irrational fears, communication skills, insight or knowledge, self-worth, self-care, relapse prevention strategies, and trigger / craving management    Name: Nevaeh Cuba YOB: 1955   MR: [id number]      Facilitator: Recreational Therapist  Level of Participation: minimal  Quality of Participation: attentive, cooperative, and quiet  Interactions with others:  pt verbally not very active but nods head frequently with staff and peers as if she understands , makes good eye contact and gave feedback  Mood/Affect: appropriate  Triggers (if applicable): n/a  Cognition:  pt mostly passive with verbal participation.  Progress: Moderate  Comments: pt problem is delusional thought.  Pt mostly passive but is expressive with facial movements.  Also nods her head often with staff  and peers.  Reports \"looping in my head\" after group.  Plan: continue with services      "

## 2025-04-22 NOTE — GROUP NOTE
Group Topic: Medication Education   Group Date: 4/22/2025  Start Time: 1005  End Time: 1105  Facilitators: Jayne Powell PharmD   Department: [facility]    Number of Participants: 7   Group Focus: daily focus and other medication education  Treatment Modality: Patient-Centered Therapy and Skills Training  Interventions utilized were group exercise, other Jeopardy Game, and patient education  Purpose: insight or knowledge and other: Improved Medication Compliance    Name: Nevaeh Cuba YOB: 1955   MR: [id number]      Facilitator: Pharmacist  Level of Participation: minimal  Quality of Participation: attentive and cooperative  Interactions with others: appropriate  Mood/Affect: appropriate  Triggers (if applicable): n/a  Cognition: processing slowly  Progress: Gaining insight or knowledge  Comments: Nevaeh Cuba attended the general medication education group today. We played Jeopardy.  We went around the the room choosing categories and each time that Nevaeh Cuba was called upon (it was her turn) she participated. Nevaeh Cuba was quiet and came to me at the conclusion of group stating that she was sad she could not answer all the questions like others in group.  I reassured her that attending group and listening met my expectations.    Plan: continue with services

## 2025-04-22 NOTE — PROGRESS NOTES
"Nevaeh Cuba is a 69 y.o. female on day 25 of admission presenting with Psychosis, unspecified psychosis type (Multi).      Subjective   Case discussed in morning team.  Patient is sleeping and eating well. She is expressing impatience for being here in hospital.  Social work is continuing to pursue placement. She cont to work with OT/PT.       Objective     Last Recorded Vitals  Blood pressure 127/76, pulse 95, temperature 37.1 °C (98.8 °F), temperature source Temporal, resp. rate 17, height 1.676 m (5' 6\"), weight 65.1 kg (143 lb 8.3 oz), SpO2 100%.    Sleep Log  Estimated \"Sleeping\" Durations   Night Est. Hours   04/08 9.25-9.75   04/09 8.00-9.25   04/10 7.50-8.25   04/11 7.00-7.25   04/12 5.50-6.25   04/13 6.00-6.25   04/14 5.25-6.50   04/15 6.00   04/16 7.50-8.50   04/17 8.50-10.25   04/18 9.75-10.25   04/19 7.50-7.75   04/20 6.75-7.00   04/21 7.75-9.00   04/22 0.00        Review of Systems    Psychiatric ROS - Adult  Anxiety: patient exhibiting less anxious features. Less obsessional content  Depression: improving  Delirium: none  Psychosis: none noted  Usha: none noted  Safety Issues: patient is in wheelchair for safety, working with PT/OT  Physical Exam      Mental Status Exam  General: adequately groomed appears stated age  Appearance: hospital attire  Attitude: slightly guarded  Behavior: superficially cooperative  Motor Activity: in wheel chair for safety  Speech: normal rate and  low volume, slightly delayed latency  Mood: patient is smiling slightly more often  Affect:  less constricted, odd  Thought Process: more organized  Thought Content: limited. denies SI/HI denies paranoia but has cont obsessions to point of delusions(improving), patient   Thought Perception: denies AH/VH, does not appear internally stimulated  Cognition: alert and oriented x4  Insight: patient knows she needs treatment for psychiatric illness  Judgement: patient is able to participate in medical decsion making  Psychiatric Risk " "Assessment  Violence Risk Assessment: major mental  illness  Acute Risk of Harm to Others is Considered: low   Suicide Risk Assessment: patient reports being more hopeful about future in [place]  Protective Factors against Suicide: denies SI/HI, looking forward to going to AL, family support  Acute Risk of Harm to Self is Considered: low    Relevant Results          Medications Ordered Prior to Encounter[1]    No results found for this or any previous visit (from the past 24 hours).   Assessment & Plan  Psychosis, unspecified psychosis type (Multi)    Anxiety    Insomnia    Hypokalemia    On admission:Patient is a 68 yo female dx with MDD, LANDON with psychotic features (obsessive features). She says she spent all the money she was supposed to pay for her  and mothers nursing home on \"stuff that is all over my condo\". She denies dx of bipolar disorder, does not present as manic at this time but presents with psychotic flavor, almost schizotypal features.    4/22/25 Patient cont with mild confusion and lessening anxiety and obsessional features.     Biological   - continue Luvox 100mg every night for anxiety/obsessions and depression  - continue trileptal 300mg bid for mood stabilization?  - says she has taken lithium in the past and it makes her dizzy, denies ever taking depakote  - continue zyprexa 5mg at bedtime and monitor obsessional delusions  - prns avalable  - medical pertinences appreciated     Psychological     Provider encouraged patient to attend groups on unit.     Sociological     Provider encouraged patient to work with social workon discharge plan. She planned to return to her condo initially but now says she thinks she can't care for herself there anymore. Social work is pursuing placement at [facility].    Current malnutriton diganoses:  Malnutrition Diagnosis: Severe malnutrition related to acute disease or injury        I spent 25 minutes in the professional and overall care of this " patient.      Elva MAYEN [facility], APRN-CNS           [1]   No current facility-administered medications on file prior to encounter.     Current Outpatient Medications on File Prior to Encounter   Medication Sig Dispense Refill    acetaminophen (Tylenol) 325 mg tablet Take 2 tablets (650 mg) by mouth every 6 hours if needed for mild pain (1 - 3) 20 tablet 0    atorvastatin (Lipitor) 20 mg tablet Take 1 tablet (20 mg) by mouth once daily. 90 tablet 3    cholecalciferol (Vitamin D-3) 25 MCG (1000 UT) tablet Take 2 tablets (2,000 Units) by mouth. As directed      docusate sodium (Colace) 100 mg capsule Take 1 capsule (100 mg) by mouth once daily as needed.      fexofenadine HCl (ALLEGRA ORAL) Take 1 tablet by mouth once daily as needed (ALLERGIES).      fLuvoxaMINE (Luvox) 50 mg tablet Take 1 tablet (50 mg) by mouth early in the morning..      hydrOXYzine HCL (Atarax) 25 mg tablet Take 2 tablets (50 mg) by mouth once daily at bedtime for 10 days. 20 tablet 0    ibuprofen 200 mg tablet Take 2 tablets (400 mg) by mouth every 6 hours if needed for mild pain (1 - 3).      methIMAzole (Tapazole) 5 mg tablet Take 1 tablet (5 mg) by mouth once daily. 90 tablet 3    mv-min-FA-vit K-lutein-zeaxant (PreserVision AREDS 2 Plus MV) 200 mcg-15 mcg- 5 mg-1 mg capsule Take 1 capsule by mouth once daily.      OXcarbazepine (Trileptal) 150 mg tablet Take 1 tablet (150 mg) by mouth 2 times a day.      OXcarbazepine (Trileptal) 300 mg tablet Take 1 tablet (300 mg) by mouth every 12 hours.      propranolol (Inderal) 10 mg tablet Take 1 tablet (10 mg) by mouth every 8 hours if needed for anxiety.      triamterene-hydrochlorothiazid (Maxzide-25) 37.5-25 mg tablet Take 1 tablet by mouth once daily in the morning. 90 tablet 3

## 2025-04-22 NOTE — NURSING NOTE
TAZ NOTE     Problem:  Depression     Behavior:  Patient was calm, friendly and cooperative. She was more isolative this evening. No complaints made or noted. Denies depression.    Appetite/Meals: Snacks provided       Interventions:  Administered scheduled medications    Response:  Compliant with care and medications     Plan:  Continue current plan of care

## 2025-04-23 PROBLEM — G31.84 MILD NEUROCOGNITIVE DISORDER: Status: ACTIVE | Noted: 2025-04-23

## 2025-04-23 PROCEDURE — 2500000002 HC RX 250 W HCPCS SELF ADMINISTERED DRUGS (ALT 637 FOR MEDICARE OP, ALT 636 FOR OP/ED): Performed by: REGISTERED NURSE

## 2025-04-23 PROCEDURE — 2500000005 HC RX 250 GENERAL PHARMACY W/O HCPCS: Performed by: STUDENT IN AN ORGANIZED HEALTH CARE EDUCATION/TRAINING PROGRAM

## 2025-04-23 PROCEDURE — 2500000001 HC RX 250 WO HCPCS SELF ADMINISTERED DRUGS (ALT 637 FOR MEDICARE OP): Performed by: INTERNAL MEDICINE

## 2025-04-23 PROCEDURE — 1140000001 HC PRIVATE PSYCH ROOM DAILY

## 2025-04-23 PROCEDURE — 97535 SELF CARE MNGMENT TRAINING: CPT | Mod: GO,CO

## 2025-04-23 PROCEDURE — 99232 SBSQ HOSP IP/OBS MODERATE 35: CPT | Performed by: REGISTERED NURSE

## 2025-04-23 PROCEDURE — 97530 THERAPEUTIC ACTIVITIES: CPT | Mod: GO,CO

## 2025-04-23 PROCEDURE — 2500000001 HC RX 250 WO HCPCS SELF ADMINISTERED DRUGS (ALT 637 FOR MEDICARE OP): Performed by: STUDENT IN AN ORGANIZED HEALTH CARE EDUCATION/TRAINING PROGRAM

## 2025-04-23 RX ORDER — OLANZAPINE 5 MG/1
5 TABLET, ORALLY DISINTEGRATING ORAL ONCE
Status: COMPLETED | OUTPATIENT
Start: 2025-04-23 | End: 2025-04-23

## 2025-04-23 RX ADMIN — OLANZAPINE 5 MG: 5 TABLET, ORALLY DISINTEGRATING ORAL at 10:20

## 2025-04-23 RX ADMIN — OXCARBAZEPINE 300 MG: 300 TABLET, FILM COATED ORAL at 18:16

## 2025-04-23 RX ADMIN — METHIMAZOLE 5 MG: 5 TABLET ORAL at 08:21

## 2025-04-23 RX ADMIN — ATORVASTATIN CALCIUM 20 MG: 20 TABLET, FILM COATED ORAL at 20:09

## 2025-04-23 RX ADMIN — MELATONIN TAB 3 MG 3 MG: 3 TAB at 18:16

## 2025-04-23 RX ADMIN — Medication 2000 UNITS: at 08:21

## 2025-04-23 RX ADMIN — OXCARBAZEPINE 300 MG: 300 TABLET, FILM COATED ORAL at 06:25

## 2025-04-23 RX ADMIN — AMLODIPINE BESYLATE 5 MG: 5 TABLET ORAL at 08:21

## 2025-04-23 RX ADMIN — OLANZAPINE 5 MG: 5 TABLET, FILM COATED ORAL at 20:09

## 2025-04-23 RX ADMIN — FLUVOXAMINE MALEATE 100 MG: 50 TABLET, COATED ORAL at 20:09

## 2025-04-23 ASSESSMENT — COGNITIVE AND FUNCTIONAL STATUS - GENERAL
DRESSING REGULAR LOWER BODY CLOTHING: A LITTLE
DAILY ACTIVITIY SCORE: 21
HELP NEEDED FOR BATHING: A LITTLE
TOILETING: A LITTLE

## 2025-04-23 ASSESSMENT — PAIN - FUNCTIONAL ASSESSMENT
PAIN_FUNCTIONAL_ASSESSMENT: 0-10
PAIN_FUNCTIONAL_ASSESSMENT: 0-10

## 2025-04-23 ASSESSMENT — PAIN SCALES - GENERAL
PAINLEVEL_OUTOF10: 0 - NO PAIN
PAINLEVEL_OUTOF10: 0 - NO PAIN

## 2025-04-23 ASSESSMENT — ACTIVITIES OF DAILY LIVING (ADL)
BATHING_COMMENTS: PT DECLINED
HOME_MANAGEMENT_TIME_ENTRY: 15

## 2025-04-23 NOTE — PROGRESS NOTES
LUISW submitted additional referrals to [place] facilities for placement, including [facility]. A representative form [facility] will come assess pt this afternoon and they have reached out to pt's sister who plans to tour facility on Friday.     MARCELA Corley

## 2025-04-23 NOTE — PROGRESS NOTES
Occupational Therapy    OT Treatment    Patient Name: Nevaeh Cuba  MRN: [id number]  Department: Rehab OT  Room: 400/[facility]  Today's Date: 4/23/2025  Time Calculation  Start Time: 1425  Stop Time: 1453  Time Calculation (min): 28 min        Assessment:  OT Assessment: Pt limited eye contact throughout session making steady progress to POC with  dressing functional mobility and transfers.  Will continue to address remaining deficits with skilled OT intervention .  Prognosis: Good  Barriers to Discharge Home: Caregiver assistance  Caregiver Assistance: Patient lives alone and/or does not have reliable caregiver assistance  Evaluation/Treatment Tolerance: Patient tolerated treatment well  Medical Staff Made Aware: Yes  End of Session Communication: Bedside nurse  End of Session Patient Position: Up in chair, Alarm on (all; needs met in eyesight of staff)  OT Assessment Results: Decreased ADL status, Decreased functional mobility, Decreased endurance  Prognosis: Good  Barriers to Discharge: Decreased caregiver support  Evaluation/Treatment Tolerance: Patient tolerated treatment well  Medical Staff Made Aware: Yes  Strengths: Premorbid level of function  Barriers to Participation: Comorbidities, Attitude of self  Plan:  Treatment Interventions: ADL retraining, Functional transfer training, Endurance training, Patient/family training, Equipment evaluation/education, Compensatory technique education  OT Frequency: 2 times per week  OT Discharge Recommendations: Low intensity level of continued care, 24 hr supervision due to cognition  Equipment Recommended upon Discharge: Wheeled walker  OT Recommended Transfer Status: Assistive equipment (Comment) (RW)  OT - OK to Discharge: Yes  Treatment Interventions: ADL retraining, Functional transfer training, Endurance training, Patient/family training, Equipment evaluation/education, Compensatory technique education    Subjective   Previous Visit Info:  OT Last Visit  OT Received On:  "04/23/25  General:  General  Reason for Referral: decline in ADLs, balance associated w/escalating mental health issues, Psychosis  Referred By: Deandre Marin DO  Past Medical History Relevant to Rehab: HTN, afib, Meniere's, hyperthyroid, OA, anxiety, sensorineural hearing loss, h/p mental and behavioral d/o  Prior to Session Communication: Bedside nurse  Patient Position Received: Up in chair, Alarm on  Preferred Learning Style: verbal, visual  General Comment: Pt cleared by NSG.  Pt stated \"I don't think I will ever get out of here \" Pt agreeable to skilled OT intervention  Precautions:  Hearing/Visual Limitations: glasses, hearing loss  Medical Precautions: Fall precautions            Pain:  Pain Assessment  Pain Assessment: 0-10  0-10 (Numeric) Pain Score: 0 - No pain    Objective    Cognition:  Cognition  Overall Cognitive Status: Within Functional Limits  Orientation Level: Oriented X4  Following Commands: Follows all commands and directions without difficulty  Cognition Comments: flat affect self doubting  Processing Speed: Delayed  Coordination:  Movements are Fluid and Coordinated: Yes  Activities of Daily Living:      Grooming  Grooming Level of Assistance: Modified independent  Grooming Where Assessed: Standing sinkside  Grooming Comments: washing  hands/combing hair    UE Bathing  UE Bathing Comments: Pt declined    LE Bathing  LE Bathing Comments: Pt declined    UE Dressing  UE Dressing Comments: Pt declined    LE Dressing  LE Dressing: Yes  Pants Level of Assistance: Distant supervision  Sock Level of Assistance: Modified independent (seated edge of bed doff/don)  Adult Briefs Level of Assistance: Distant supervision  LE Dressing Where Assessed: Toilet, Wheelchair, Edge of bed  LE Dressing Comments: stance phase mamage hips at edge of bed and toilet    Toileting  Toileting Level of Assistance: Distant supervision  Where Assessed: Toilet  Toileting Comments: Pt adjusting clothing " prior/after/hygiene  Functional Standing Tolerance:  Time: 6 minutes  Activity: functional mobility and self care  Functional Standing Tolerance Comments: unilateral hand support to secure surface slow shuffle movements  Bed Mobility/Transfers: Bed Mobility  Bed Mobility: Yes  Bed Mobility 1  Bed Mobility 1: Sitting to supine, Supine to sitting  Level of Assistance 1: Independent  Bed Mobility Comments 1: bed flat no rail    Transfers  Transfer: Yes  Transfer 1  Transfer From 1: Stand to  Transfer to 1: Wheelchair  Technique 1: Stand to sit  Transfer Level of Assistance 1: Contact guard  Trials/Comments 1: unsteady vc hand placement    Toilet Transfers  Toilet Transfer From: Wheelchair  Toilet Transfer Type: To and from  Toilet Transfer to: Standard toilet  Toilet Transfer Technique: Ambulating  Toilet Transfers: Supervision  Toilet Transfers Comments: grab bar use  Functional Mobility:  Functional Mobility  Functional Mobility Performed: Yes  Functional Mobility 1  Surface 1: Level tile  Device 1:  (w/c)  Assistance 1: Contact guard  Quality of Functional Mobility 1: Inconsistent stride length  Comments 1: 20' to include doorway, turns and backing Pt using w/c as walker  Sitting Balance:  Dynamic Sitting Balance  Dynamic Sitting-Balance Support: Feet supported  Dynamic Sitting-Level of Assistance: Modified independent  Dynamic Sitting-Balance: Forward lean, Reaching for objects, Reaching across midline  Dynamic Sitting-Comments: during self care task  Outcome Measures:[facility] Daily Activity  Putting on and taking off regular lower body clothing: A little  Bathing (including washing, rinsing, drying): A little  Putting on and taking off regular upper body clothing: None  Toileting, which includes using toilet, bedpan or urinal: A little  Taking care of personal grooming such as brushing teeth: None  Eating Meals: None  Daily Activity - Total Score: 21    Education Documentation  Handouts, taught by YUNG Edwards  at 4/23/2025  3:03 PM.  Learner: Patient  Readiness: Acceptance  Method: Explanation, Demonstration, Teach-back  Response: Verbalizes Understanding, Demonstrated Understanding  Comment: Instructed Pt with safety in transfers, balance strategies, adaptive ADL skills    Body Mechanics, taught by YUNG Edwards at 4/23/2025  3:03 PM.  Learner: Patient  Readiness: Acceptance  Method: Explanation, Demonstration, Teach-back  Response: Verbalizes Understanding, Demonstrated Understanding  Comment: Instructed Pt with safety in transfers, balance strategies, adaptive ADL skills    Precautions, taught by YUNG Edwards at 4/23/2025  3:03 PM.  Learner: Patient  Readiness: Acceptance  Method: Explanation, Demonstration, Teach-back  Response: Verbalizes Understanding, Demonstrated Understanding  Comment: Instructed Pt with safety in transfers, balance strategies, adaptive ADL skills    Home Exercise Program, taught by YUNG Edwards at 4/23/2025  3:03 PM.  Learner: Patient  Readiness: Acceptance  Method: Explanation, Demonstration, Teach-back  Response: Verbalizes Understanding, Demonstrated Understanding  Comment: Instructed Pt with safety in transfers, balance strategies, adaptive ADL skills    ADL Training, taught by YUNG Edwards at 4/23/2025  3:03 PM.  Learner: Patient  Readiness: Acceptance  Method: Explanation, Demonstration, Teach-back  Response: Verbalizes Understanding, Demonstrated Understanding  Comment: Instructed Pt with safety in transfers, balance strategies, adaptive ADL skills    Precautions/Seclusions, taught by YUNG Edwards at 4/23/2025  3:03 PM.  Learner: Patient  Readiness: Acceptance  Method: Explanation, Demonstration, Teach-back  Response: Verbalizes Understanding, Demonstrated Understanding  Comment: Instructed Pt with safety in transfers, balance strategies, adaptive ADL skills    Restraint Use, taught by YUGN Edwards at 4/23/2025  3:03 PM.  Learner:  Patient  Readiness: Acceptance  Method: Explanation, Demonstration, Teach-back  Response: Verbalizes Understanding, Demonstrated Understanding  Comment: Instructed Pt with safety in transfers, balance strategies, adaptive ADL skills    Call Light Operations, taught by YUNG Edwards at 4/23/2025  3:03 PM.  Learner: Patient  Readiness: Acceptance  Method: Explanation, Demonstration, Teach-back  Response: Verbalizes Understanding, Demonstrated Understanding  Comment: Instructed Pt with safety in transfers, balance strategies, adaptive ADL skills    Explanation of Safe Environment, taught by YUNG Edwards at 4/23/2025  3:03 PM.  Learner: Patient  Readiness: Acceptance  Method: Explanation, Demonstration, Teach-back  Response: Verbalizes Understanding, Demonstrated Understanding  Comment: Instructed Pt with safety in transfers, balance strategies, adaptive ADL skills    Education Comments  No comments found.        OP EDUCATION:       Goals:  Encounter Problems       Encounter Problems (Active)       OT Goals       ADL's (Progressing)       Start:  04/01/25    Expected End:  04/29/25       Patient will complete ADL tasks, with independent using AE need in order to increase patient's safety and independence with self-care tasks.           Functional transfers (Progressing)       Start:  04/01/25    Expected End:  04/29/25       Patient will complete functional transfers with independent using least restrictive device, in order to increase patient's safety and independence with daily tasks.           Activity tolerance (Progressing)       Start:  04/01/25    Expected End:  04/29/25       Patient will demonstrate the ability to participate in functional activity at least >/= 25 minutes in order to increase patient's safety and independence with daily tasks.

## 2025-04-23 NOTE — PROGRESS NOTES
"Nevaeh Cuba is a 69 y.o. female on day 26 of admission presenting with Psychosis, unspecified psychosis type (Multi).      Subjective   Case discussed in morning team.  Nursing reports that patient refused meds lst night and appeared more delusional.  Patient agreed to take one time dose of zyprexa zydis 5mg this morning. She ate breakfast this morning an dis currently in group. Social work continues to research options for AL/SNF as several have declined due to past SI. She cont to work with OT and PT.       Objective     Last Recorded Vitals  Blood pressure 147/83, pulse 95, temperature 36.5 °C (97.7 °F), temperature source Temporal, resp. rate 18, height 1.676 m (5' 6\"), weight 65.1 kg (143 lb 8.3 oz), SpO2 95%.    Sleep Log  Estimated \"Sleeping\" Durations   Night Est. Hours   04/08 9.25-9.75   04/09 8.00-9.25   04/10 7.50-8.25   04/11 7.00-7.25   04/12 5.50-6.25   04/13 6.00-6.25   04/14 5.25-6.50   04/15 6.00   04/16 7.50-8.50   04/17 8.50-10.25   04/18 9.75-10.25   04/19 7.50-7.75   04/20 6.75-7.00   04/21 7.75-9.00   04/22 6.75-7.50        Review of Systems    Psychiatric ROS - Adult  Anxiety: patient had increased sx last night and refused meds but appears more settled this morning and took zyprexa dose missed last night  Depression: improving, patient is expressing frustration with cont stay in the hospital  Delirium: none  Psychosis: mild delusions noted last evening by nursing, not evident this morning  Usha: none noted  Safety Issues: patient is in wheelchair for safety, working with PT/OT  Physical Exam      Mental Status Exam  General: adequately groomed appears stated age  Appearance: casual attire  Attitude: mostly cooperative but did refuse meds last night, mostly pleasant  Behavior: mostly cooperative but did refuse meds last night,Motor Activity: in wheel chair for safety  Speech: normal rate and  low volume, slightly delayed latency  Mood: patient is smiling slightly more often  Affect:  less " "constricted, odd  Thought Process: more organized overall  Thought Content: limited. denies SI/HI denies paranoia but has cont obsessions to point of delusions(improving)  Thought Perception: denies AH/VH, does not appear internally stimulated  Cognition: alert and oriented x4  Insight: patient knows she needs treatment for psychiatric illness  Judgement: patient is able to participate in medical decsion making  Psychiatric Risk Assessment  Violence Risk Assessment: major mental  illness  Acute Risk of Harm to Others is Considered: low   Suicide Risk Assessment: patient reports being more hopeful about future in AL  Protective Factors against Suicide: denies SI/HI, looking forward to going to AL, family support  Acute Risk of Harm to Self is Considered: low  Relevant Results    Scheduled medications  Scheduled Medications[1]  Continuous medications  Continuous Medications[2]  PRN medications  PRN Medications[3]          No results found. However, due to the size of the patient record, not all encounters were searched. Please check Results Review for a complete set of results.   Assessment & Plan  Psychosis, unspecified psychosis type (Multi)    Anxiety    Insomnia    Hypokalemia    Mild neurocognitive disorder    On admission:Patient is a 70 yo female dx with MDD, LANDON with psychotic features (obsessive features). She says she spent all the money she was supposed to pay for her  and mothers nursing home on \"stuff that is all over my condo\". She denies dx of bipolar disorder, does not present as manic at this time but presents with psychotic flavor, almost schizotypal features.    4/22/25 Patient cont with mild confusion and lessening anxiety and obsessional features.     Biological   - continue Luvox 100mg every night for anxiety/obsessions and depression  - continue trileptal 300mg bid for mood stabilization?  - says she has taken lithium in the past and it makes her dizzy, denies ever taking depakote  - " continue zyprexa 5mg at bedtime and monitor obsessional delusions  - one time zyprexa zydis 5mg as patient refused meds last night  - prns avalable  - medical pertinences appreciated     Psychological     Provider encouraged patient to attend groups on unit.     Sociological     Provider encouraged patient to work with social workon discharge plan. She planned to return to her condo initially but now says she thinks she can't care for herself there anymore. Social work is pursuing placement at [facility] and other facilities.     Current malnutriton diganoses:  Malnutrition Diagnosis: Severe malnutrition related to acute disease or injury       Current malnutriton diganoses:  Malnutrition Diagnosis: Severe malnutrition related to acute disease or injury        I spent 25 minutes in the professional and overall care of this patient.      Elva Murica, KEHINDE-CNS           [1] amLODIPine, 5 mg, oral, Daily  atorvastatin, 20 mg, oral, Daily  cholecalciferol, 2,000 Units, oral, Daily  fLuvoxaMINE, 100 mg, oral, Nightly  melatonin, 3 mg, oral, Daily  methIMAzole, 5 mg, oral, Daily  OLANZapine, 5 mg, oral, Nightly  OXcarbazepine, 300 mg, oral, q12h CAROLINE  polyethylene glycol, 17 g, oral, Daily  [2]    [3] PRN medications: acetaminophen, docusate sodium, hydrOXYzine HCL, QUEtiapine **OR** OLANZapine, propranolol

## 2025-04-23 NOTE — CARE PLAN
The patient's goals for the shift include sleep    The clinical goals for the shift include promote rest    Over the shift, the patient did not make progress toward the following goals. Barriers to progression include mental health illness. Recommendations to address these barriers include provide education.      Problem: Chronic Conditions and Co-morbidities  Goal: Patient's chronic conditions and co-morbidity symptoms are monitored and maintained or improved  Outcome: Progressing     Problem: Nutrition  Goal: Nutrient intake appropriate for maintaining nutritional needs  Outcome: Progressing

## 2025-04-23 NOTE — NURSING NOTE
"MLP NOTE     Problem:  depression     Behavior:  Pt is cam and isolative, spends most time sitting in w/c in the doorway, reported having issues with eye vision - seeing dark spots, cooperative during assessment, verbalized understanding of current hospitalization as \"my depression and anxiety got worse\". During medication administration Pt was hesitant, jumping from topic to topic, preoccupied with her clothes especially pants \"being too large\", attempted to take medication but then spit it out stating \"I can't do it\", Pt explained her refusal to take medication as \"being tired to take all those pills\", expressed her concerns related to having alarms on wheelchair and bed, low depressive mood is observed, denied SI       Group Participation: n/a  Appetite/Meals: refused hs snack        Interventions:  Scheduled medication attempted to  be administered, education regarding medication compliance implemented     Response:  Pt refused night time medication, attempted to take it but then spit it out      Plan:  Continue monitoring, adhere to care plan  "

## 2025-04-23 NOTE — CARE PLAN
The patient's goals for the shift include talk to staff    The clinical goals for the shift include maintain safety    Over the shift, the patient did not make progress toward the following goals. Barriers to progression include having a bad day yesterday. Recommendations to address these barriers include encourage pt that everyone has good and bad days and its insightful that she realized it.

## 2025-04-23 NOTE — PROGRESS NOTES
Physical Therapy                 Therapy Communication Note    Patient Name: Nevaeh Cuba  MRN: [id number]  Department: [facility]  Room: 400/400-A  Today's Date: 4/23/2025     Discipline: Physical Therapy    PT Missed Visit: Yes     Missed Visit Reason: Missed Visit Reason: Other (Comment) (attempted treatment, patient is in meeting with social work re: next care level)

## 2025-04-23 NOTE — GROUP NOTE
Group Topic: Goals   Group Date: 4/22/2025  Start Time: 2005  End Time: 2018  Facilitators: Tanika Quinn   Department: [facility] Ger[facility]     Number of Participants: 5   Group Focus: goals  Treatment Modality: Other: PCA  Interventions utilized were reminiscence  Purpose: feelings and communication skills    Name: Nevaeh Cuba YOB: 1955   MR: [id number]      Facilitator: Mental Health [facility]  Level of Participation: did not attend  Quality of Participation:  did not attend  Interactions with others:  did not attend  Mood/Affect:  did not attend  Triggers (if applicable): N/A  Cognition:  did not attend  Progress: Other  Comments: Pt problem is psychosis. Pt declined the invitation to attend group.  Plan: continue with services

## 2025-04-23 NOTE — GROUP NOTE
"Group Topic: Reminiscence   Group Date: 4/23/2025  Start Time: 1330  End Time: 1430  Facilitators: SUSAN Oreilly   Department: [facility] REHABTH VIRTUAL    Number of Participants: 6   Group Focus: other Storywise  Treatment Modality: Other: Recreation Therapy  Interventions utilized were exploration, reminiscence, and story telling  Purpose: feelings and other: fun, elevate mood, increase socialization, enhance self esteem    Name: Nevaeh Cuba YOB: 1955   MR: [id number]      Facilitator: Recreational Therapist  Level of Participation: minimal  Quality of Participation: passive and quiet  Interactions with others:  passive  Mood/Affect:  passive  Triggers (if applicable): n/a  Cognition:  passive  Progress: Minimal  Comments: pt problem is delusional thought.  Pt displays passive participation during this group.  After group, pt repeats again \"I am just sitting here like a bump on a log\".  Redirection continues to be ineffective.    Plan: continue with services      "

## 2025-04-24 ENCOUNTER — APPOINTMENT (OUTPATIENT)
Dept: PRIMARY CARE | Facility: CLINIC | Age: 70
End: 2025-04-24
Payer: MEDICARE

## 2025-04-24 LAB
ALBUMIN SERPL BCP-MCNC: 3.8 G/DL (ref 3.4–5)
ALP SERPL-CCNC: 66 U/L (ref 33–136)
ALT SERPL W P-5'-P-CCNC: 25 U/L (ref 7–45)
ANION GAP SERPL CALCULATED.3IONS-SCNC: 12 MMOL/L (ref 10–20)
AST SERPL W P-5'-P-CCNC: 15 U/L (ref 9–39)
BILIRUB SERPL-MCNC: 0.5 MG/DL (ref 0–1.2)
BUN SERPL-MCNC: 15 MG/DL (ref 6–23)
CALCIUM SERPL-MCNC: 8.8 MG/DL (ref 8.6–10.3)
CHLORIDE SERPL-SCNC: 104 MMOL/L (ref 98–107)
CO2 SERPL-SCNC: 26 MMOL/L (ref 21–32)
CREAT SERPL-MCNC: 0.61 MG/DL (ref 0.5–1.05)
EGFRCR SERPLBLD CKD-EPI 2021: >90 ML/MIN/1.73M*2
GLUCOSE SERPL-MCNC: 112 MG/DL (ref 74–99)
POTASSIUM SERPL-SCNC: 3.3 MMOL/L (ref 3.5–5.3)
PROT SERPL-MCNC: 6.4 G/DL (ref 6.4–8.2)
SODIUM SERPL-SCNC: 139 MMOL/L (ref 136–145)

## 2025-04-24 PROCEDURE — 1140000001 HC PRIVATE PSYCH ROOM DAILY

## 2025-04-24 PROCEDURE — 2500000005 HC RX 250 GENERAL PHARMACY W/O HCPCS: Performed by: STUDENT IN AN ORGANIZED HEALTH CARE EDUCATION/TRAINING PROGRAM

## 2025-04-24 PROCEDURE — 36415 COLL VENOUS BLD VENIPUNCTURE: CPT | Performed by: INTERNAL MEDICINE

## 2025-04-24 PROCEDURE — 2500000001 HC RX 250 WO HCPCS SELF ADMINISTERED DRUGS (ALT 637 FOR MEDICARE OP): Performed by: STUDENT IN AN ORGANIZED HEALTH CARE EDUCATION/TRAINING PROGRAM

## 2025-04-24 PROCEDURE — 99232 SBSQ HOSP IP/OBS MODERATE 35: CPT | Performed by: REGISTERED NURSE

## 2025-04-24 PROCEDURE — 2500000002 HC RX 250 W HCPCS SELF ADMINISTERED DRUGS (ALT 637 FOR MEDICARE OP, ALT 636 FOR OP/ED): Performed by: REGISTERED NURSE

## 2025-04-24 PROCEDURE — 84075 ASSAY ALKALINE PHOSPHATASE: CPT | Performed by: INTERNAL MEDICINE

## 2025-04-24 PROCEDURE — 2500000001 HC RX 250 WO HCPCS SELF ADMINISTERED DRUGS (ALT 637 FOR MEDICARE OP): Performed by: INTERNAL MEDICINE

## 2025-04-24 PROCEDURE — 97535 SELF CARE MNGMENT TRAINING: CPT | Mod: GO,CO

## 2025-04-24 PROCEDURE — 99233 SBSQ HOSP IP/OBS HIGH 50: CPT | Performed by: INTERNAL MEDICINE

## 2025-04-24 PROCEDURE — 97530 THERAPEUTIC ACTIVITIES: CPT | Mod: GO,CO

## 2025-04-24 PROCEDURE — 2500000001 HC RX 250 WO HCPCS SELF ADMINISTERED DRUGS (ALT 637 FOR MEDICARE OP): Performed by: REGISTERED NURSE

## 2025-04-24 RX ORDER — FLUVOXAMINE MALEATE 50 MG/1
150 TABLET, FILM COATED ORAL NIGHTLY
Status: DISPENSED | OUTPATIENT
Start: 2025-04-24

## 2025-04-24 RX ADMIN — MELATONIN TAB 3 MG 3 MG: 3 TAB at 19:01

## 2025-04-24 RX ADMIN — Medication 2000 UNITS: at 08:08

## 2025-04-24 RX ADMIN — OLANZAPINE 5 MG: 5 TABLET, FILM COATED ORAL at 20:04

## 2025-04-24 RX ADMIN — OXCARBAZEPINE 300 MG: 300 TABLET, FILM COATED ORAL at 19:01

## 2025-04-24 RX ADMIN — AMLODIPINE BESYLATE 5 MG: 5 TABLET ORAL at 08:08

## 2025-04-24 RX ADMIN — ATORVASTATIN CALCIUM 20 MG: 20 TABLET, FILM COATED ORAL at 20:04

## 2025-04-24 RX ADMIN — FLUVOXAMINE MALEATE 150 MG: 50 TABLET, COATED ORAL at 20:04

## 2025-04-24 RX ADMIN — OXCARBAZEPINE 300 MG: 300 TABLET, FILM COATED ORAL at 06:25

## 2025-04-24 RX ADMIN — METHIMAZOLE 5 MG: 5 TABLET ORAL at 08:09

## 2025-04-24 ASSESSMENT — COLUMBIA-SUICIDE SEVERITY RATING SCALE - C-SSRS
2. HAVE YOU ACTUALLY HAD ANY THOUGHTS OF KILLING YOURSELF?: NO
6. HAVE YOU EVER DONE ANYTHING, STARTED TO DO ANYTHING, OR PREPARED TO DO ANYTHING TO END YOUR LIFE?: CUT
5. HAVE YOU STARTED TO WORK OUT OR WORKED OUT THE DETAILS OF HOW TO KILL YOURSELF? DO YOU INTEND TO CARRY OUT THIS PLAN?: NO
6. HAVE YOU EVER DONE ANYTHING, STARTED TO DO ANYTHING, OR PREPARED TO DO ANYTHING TO END YOUR LIFE?: YES
1. SINCE LAST CONTACT, HAVE YOU WISHED YOU WERE DEAD OR WISHED YOU COULD GO TO SLEEP AND NOT WAKE UP?: NO
6. HAVE YOU EVER DONE ANYTHING, STARTED TO DO ANYTHING, OR PREPARED TO DO ANYTHING TO END YOUR LIFE?: NO
2. HAVE YOU ACTUALLY HAD ANY THOUGHTS OF KILLING YOURSELF?: YES
1. IN THE PAST MONTH, HAVE YOU WISHED YOU WERE DEAD OR WISHED YOU COULD GO TO SLEEP AND NOT WAKE UP?: YES
2. HAVE YOU ACTUALLY HAD ANY THOUGHTS OF KILLING YOURSELF?: YES
6. HAVE YOU EVER DONE ANYTHING, STARTED TO DO ANYTHING, OR PREPARED TO DO ANYTHING TO END YOUR LIFE?: YES
1. IN THE PAST MONTH, HAVE YOU WISHED YOU WERE DEAD OR WISHED YOU COULD GO TO SLEEP AND NOT WAKE UP?: YES
6. HAVE YOU EVER DONE ANYTHING, STARTED TO DO ANYTHING, OR PREPARED TO DO ANYTHING TO END YOUR LIFE?: NO
4. HAVE YOU HAD THESE THOUGHTS AND HAD SOME INTENTION OF ACTING ON THEM?: NO

## 2025-04-24 ASSESSMENT — PAIN - FUNCTIONAL ASSESSMENT
PAIN_FUNCTIONAL_ASSESSMENT: 0-10

## 2025-04-24 ASSESSMENT — COGNITIVE AND FUNCTIONAL STATUS - GENERAL
DAILY ACTIVITIY SCORE: 21
DRESSING REGULAR LOWER BODY CLOTHING: A LITTLE
HELP NEEDED FOR BATHING: A LITTLE
TOILETING: A LITTLE

## 2025-04-24 ASSESSMENT — PAIN SCALES - GENERAL
PAINLEVEL_OUTOF10: 0 - NO PAIN

## 2025-04-24 ASSESSMENT — ACTIVITIES OF DAILY LIVING (ADL)
EFFECT OF PAIN ON DAILY ACTIVITIES: MINIMAL
HOME_MANAGEMENT_TIME_ENTRY: 19

## 2025-04-24 ASSESSMENT — PAIN DESCRIPTION - DESCRIPTORS: DESCRIPTORS: PATIENT UNABLE TO DESCRIBE

## 2025-04-24 NOTE — PROGRESS NOTES
"Nevaeh Cuba is a 69 y.o. female on day 27 of admission presenting with Psychosis, unspecified psychosis type (Multi).      Subjective   Case discussed in treatment team today and chart reviewed. Nursing reported patient demonstrates an increase in anxious ruminative thinking- nursing said it took an hour for her to take kehr meds this morning but  finally agreed to take them. Provider spoke with patient and will titrate luvox to 150mg at bedtime for OCD(delusional feature appears to continue dissipated). She agreed with the increase in luvox and said \"Am I doing that more again?\" She was pleased to hear that social work has found placement for her and the plan will be that she will be discharged by the middle of next week.       Objective     Last Recorded Vitals  Blood pressure 112/71, pulse (!) 119, temperature 37.1 °C (98.8 °F), temperature source Oral, resp. rate 18, height 1.676 m (5' 6\"), weight 65.1 kg (143 lb 8.3 oz), SpO2 99%.    Sleep Log  Estimated \"Sleeping\" Durations   Night Est. Hours   04/10 7.50-8.25   04/11 7.00-7.25   04/12 5.50-6.25   04/13 6.00-6.25   04/14 5.25-6.50   04/15 6.00   04/16 7.50-8.50   04/17 8.50-10.25   04/18 9.75-10.25   04/19 7.50-7.75   04/20 6.75-7.00   04/21 7.75-9.00   04/22 6.75-7.50   04/23 7.50-7.75   04/24 0.00        Review of Systems    Psychiatric ROS - Adult  Anxiety: OCD sx increased ruminative thinking per nursing  Depression: cont to improve  Delirium: none noted  Psychosis: none elicited this morning  Usha: none noted  Safety Issues: In wheelchair with alarm fo r safety  Medication side effects: Patient denies any adverse side effects  Physical Exam      Mental Status Exam  General: adequately groomed appears stated age  Appearance: casual attire  Attitude: pleasant  Behavior: mostly cooperative but did refuse meds for an hour this morning  Motor Activity: in wheel chair for safety  Speech: normal rate and  low volume, slightly delayed latency  Mood: patient is " "smiling slightly more often  Affect:  less constricted, odd  Thought Process: more organized overall  Thought Content: limited. denies SI/HI denies paranoia but has cont obsessions to point of delusions(improving)  Thought Perception: denies AH/VH, does not appear internally stimulated  Cognition: alert and oriented x4  Insight: patient knows she needs treatment for psychiatric illness  Judgement: patient is able to participate in medical decsion making  Psychiatric Risk Assessment  Violence Risk Assessment: major mental  illness  Acute Risk of Harm to Others is Considered: low   Suicide Risk Assessment: patient reports being more hopeful about future in AL  Protective Factors against Suicide: denies SI/HI, looking forward to going to AL, family support  Acute Risk of Harm to Self is Considered: low      Relevant Results             Scheduled medications  Scheduled Medications[1]  Continuous medications  Continuous Medications[2]  PRN medications  PRN Medications[3]   Assessment & Plan  Psychosis, unspecified psychosis type (Multi)    Anxiety    Insomnia    Hypokalemia    Mild neurocognitive disorder    On admission:Patient is a 68 yo female dx with MDD, LANDON with psychotic features (obsessive features). She says she spent all the money she was supposed to pay for her  and mothers nursing home on \"stuff that is all over my condo\". She denies dx of bipolar disorder, does not present as manic at this time but presents with psychotic flavor, almost schizotypal features.    4/22/25 Patient cont with mild confusion and lessening anxiety and obsessional features.     Biological   - increase Luvox 150mg every night for anxiety/obsessions and depression  - continue trileptal 300mg bid for mood stabilization?  - says she has taken lithium in the past and it makes her dizzy, denies ever taking depakote  - continue zyprexa 5mg at bedtime and monitor obsessional delusions  - one time zyprexa zydis 5mg as patient refused " meds last night  - prns avalable  - medical pertinences appreciated     Psychological     Provider encouraged patient to attend groups on unit.     Sociological     Provider encouraged patient to work with social workon discharge plan. She planned to return to her condo initially but now says she thinks she can't care for herself there anymore. Social work found placement- projected discharge by middle next week.  Current malnutriton diganoses:  Malnutrition Diagnosis: Severe malnutrition related to acute disease or injury        I spent 30 minutes in the professional and overall care of this patient.      Elva Murcia, APRN-CNS           [1] amLODIPine, 5 mg, oral, Daily  atorvastatin, 20 mg, oral, Daily  cholecalciferol, 2,000 Units, oral, Daily  fLuvoxaMINE, 150 mg, oral, Nightly  melatonin, 3 mg, oral, Daily  methIMAzole, 5 mg, oral, Daily  OLANZapine, 5 mg, oral, Nightly  OXcarbazepine, 300 mg, oral, q12h CAROLINE  polyethylene glycol, 17 g, oral, Daily  [2]    [3] PRN medications: acetaminophen, docusate sodium, hydrOXYzine HCL, QUEtiapine **OR** OLANZapine, propranolol

## 2025-04-24 NOTE — GROUP NOTE
Group Topic: Art Creative   Group Date: 4/24/2025  Start Time: 1300  End Time: 1345  Facilitators: SUSAN Oreilly   Department: [facility] REHABTH VIRTUAL    Number of Participants: 6   Group Focus: other Art Group  Treatment Modality: Other: Recreation Therapy  Interventions utilized were exploration, reminiscence, and story telling  Purpose: other: fun, elevate mood, enhance self esteem    Name: Nevaeh Cuba YOB: 1955   MR: [id number]      Facilitator: Recreational Therapist  Level of Participation: active  Quality of Participation: quiet  Interactions with others: appropriate and gave feedback  Mood/Affect: anxious and flat  Triggers (if applicable): n/a  Cognition: no insight  Progress: Minimal  Comments: pt problem is delusional thought.  Pt joins group and does focus on finishing an art project.  Despite being focused, she continues to repeat negative thoughts.  Redirection offered but not effective.  Plan: continue with services

## 2025-04-24 NOTE — PROGRESS NOTES
Occupational Therapy    OT Treatment    Patient Name: Nevaeh Cuba  MRN: [id number]  Department: Rehab OT Room: 400/[facility][note 3]-A  Today's Date: 4/24/2025  Time Calculation  Start Time: 1521  Stop Time: 1548  Time Calculation (min): 27 min        Assessment:  OT Assessment: pt tolerated treatment fairly and is making fair progress towards OT goals. pt displaying increased activity tolerance for LB dressing tasks which displays progress towards goals. pt would benefit from continued OT services to increase strentgh, balance, and activity tolerance for ADLs  Prognosis: Good  Barriers to Discharge Home: Caregiver assistance  Caregiver Assistance: Patient lives alone and/or does not have reliable caregiver assistance  Evaluation/Treatment Tolerance: Patient tolerated treatment well  Medical Staff Made Aware: Yes  End of Session Communication: Bedside nurse  End of Session Patient Position: Up in chair, Alarm on (in hallway with RN; all needs met)  OT Assessment Results: Decreased ADL status, Decreased functional mobility, Decreased endurance  Prognosis: Good  Barriers to Discharge: Decreased caregiver support  Evaluation/Treatment Tolerance: Patient tolerated treatment well  Medical Staff Made Aware: Yes  Strengths: Ability to acquire knowledge  Barriers to Participation: Comorbidities, Attitude of self  Plan:  Treatment Interventions: ADL retraining, Functional transfer training, Endurance training, Compensatory technique education  OT Frequency: 2 times per week  OT Discharge Recommendations: Low intensity level of continued care, 24 hr supervision due to cognition  Equipment Recommended upon Discharge: Wheeled walker  OT Recommended Transfer Status: Assistive equipment (Comment) (RW)  OT - OK to Discharge: Yes  Treatment Interventions: ADL retraining, Functional transfer training, Endurance training, Compensatory technique education    Subjective   Previous Visit Info:  OT Last Visit  OT Received On:  "04/24/25  General:  General  Reason for Referral: decline in ADLs, balance associated w/escalating mental health issues, Psychosis  Referred By: Deandre Marin DO  Past Medical History Relevant to Rehab: HTN, afib, Meniere's, hyperthyroid, OA, anxiety, sensorineural hearing loss, h/p mental and behavioral d/o  Family/Caregiver Present: No  Prior to Session Communication: Bedside nurse  Patient Position Received: Up in chair, Alarm on  Preferred Learning Style: verbal, visual  General Comment: pt cleared by nursing for OT treatment; motivation to engage in therapy but agreeable  Precautions:  Hearing/Visual Limitations: glasses, hearing loss  Medical Precautions: Fall precautions     Date/Time Vitals Session Patient Position Pulse Resp SpO2 BP MAP (mmHg)    04/24/25 1604 --  --  92  18  97 %  139/75  96           Pain:  Pain Assessment  Pain Assessment: 0-10  0-10 (Numeric) Pain Score: 0 - No pain    Objective    Cognition:  Cognition  Overall Cognitive Status: Within Functional Limits  Orientation Level: Oriented X4  Cognition Comments: required motivation and encouragement; cues to stay on task  Coordination:  Movements are Fluid and Coordinated: Yes  Activities of Daily Living:      Grooming  Grooming Level of Assistance: Distant supervision, Moderate verbal cues  Grooming Where Assessed: Standing sinkside  Grooming Comments: pt engaged in brushing teeth and combing hair while in stance at sinkside; mod verbal cues to stay on task; completed with significantly increased time    LE Dressing  LE Dressing: Yes  Sock Level of Assistance: Modified independent  LE Dressing Where Assessed: Wheelchair  LE Dressing Comments: pt engaged in donning/doffing socks while seated in wheelchair and utilizing figure four technqiue; completed with increased time and effort; max verbal cues for redirection to task as pt stating \"I'm naked\" when fully clothed    Bed Mobility/Transfers:      Transfers  Transfer: Yes  Transfer " 1  Transfer From 1: Wheelchair to  Transfer to 1: Stand  Technique 1: Stand to sit  Transfer Level of Assistance 1: Contact guard  Trials/Comments 1: cues for hand placement with poor follow through; CGA for balance and safety  Transfers 2  Transfer From 2: Stand to  Transfer to 2: Wheelchair  Technique 2: Stand to sit  Transfer Level of Assistance 2: Contact guard  Trials/Comments 2: CGA for balance and safety; cues for hand placement during controlled descent    Standing Balance:  Dynamic Standing Balance  Dynamic Standing-Level of Assistance: Contact guard  Dynamic Standing-Balance: Lateral lean, Forward lean, Reaching for objects, Reaching across midline  Dynamic Standing-Comments: grooming tasks while in stance at sinkside; fair+ standing balance    Therapy/Activity:      Therapeutic Activity  Therapeutic Activity Performed: Yes  Therapeutic Activity 1: performed IADL task of rearranging clothes on shelf while seated in wheelchair; cues for redirection to dressing task    Outcome Measures:  [facility] Daily Activity  Putting on and taking off regular lower body clothing: A little  Bathing (including washing, rinsing, drying): A little  Putting on and taking off regular upper body clothing: None  Toileting, which includes using toilet, bedpan or urinal: A little  Taking care of personal grooming such as brushing teeth: None  Eating Meals: None  Daily Activity - Total Score: 21      Education Documentation  Body Mechanics, taught by MARGAUX Antonio at 4/24/2025  4:16 PM.  Learner: Patient  Readiness: Acceptance  Method: Explanation  Response: Verbalizes Understanding  Comment: pt educated on OT POC, body mechanics during ADL retraining, and balance and safety technqiues    Precautions, taught by MARGAUX Antonio at 4/24/2025  4:16 PM.  Learner: Patient  Readiness: Acceptance  Method: Explanation  Response: Verbalizes Understanding  Comment: pt educated on OT POC, body mechanics during ADL retraining, and balance  and safety technqiues    ADL Training, taught by MARGAUX Antonio at 4/24/2025  4:16 PM.  Learner: Patient  Readiness: Acceptance  Method: Explanation  Response: Verbalizes Understanding  Comment: pt educated on OT POC, body mechanics during ADL retraining, and balance and safety technqiues      Goals:  Encounter Problems       Encounter Problems (Active)       OT Goals       ADL's (Progressing)       Start:  04/01/25    Expected End:  04/29/25       Patient will complete ADL tasks, with independent using AE need in order to increase patient's safety and independence with self-care tasks.           Functional transfers (Progressing)       Start:  04/01/25    Expected End:  04/29/25       Patient will complete functional transfers with independent using least restrictive device, in order to increase patient's safety and independence with daily tasks.           Activity tolerance (Progressing)       Start:  04/01/25    Expected End:  04/29/25       Patient will demonstrate the ability to participate in functional activity at least >/= 25 minutes in order to increase patient's safety and independence with daily tasks.

## 2025-04-24 NOTE — PROGRESS NOTES
Pt had onsite assessment wit [facility] yesterday. Pt has been approved to admit to their facility. Pt's family toured yesterday and will signed paperwork tomorrow. JACKI spoke with both the facility and pt's sister. Pt's sister reports that she needs to order a bed for pt and move in her furniture. Coordinate of pt's move in likely will take a few days. Provider is also going to increase her Luvox to 150mg. Therefore both should take about the same amount of time and things are expected to come together for pt to discharge on Monday or Tuesday of next week. LUISW will coordinate with facility regarding paperwork and other requirements for pt to admit to AL.     MARCELA Corley

## 2025-04-24 NOTE — CARE PLAN
The patient's goals for the shift include sleep    The clinical goals for the shift include promote rest    Over the shift, the patient did not make progress toward the following goals. Barriers to progression include incompliant with medication. Recommendations to address these barriers include education and positive reinforcement.      Problem: Chronic Conditions and Co-morbidities  Goal: Patient's chronic conditions and co-morbidity symptoms are monitored and maintained or improved  Outcome: Progressing     Problem: Nutrition  Goal: Nutrient intake appropriate for maintaining nutritional needs  Outcome: Progressing     Problem: Discharge Planning  Goal: Discharge to home or other facility with appropriate resources  Outcome: Progressing

## 2025-04-24 NOTE — NURSING NOTE
TAZ NOTE     Problem:  Pt. Is continuing their journey on working to improve their anxiety & depressive Sx.     Behavior:  Pt. Is cooperative w/ Tx. Plan and has been agreeable to talking w/ staff & peers alike. Pt. Has been able to maintain safety.  Pt. Often feels negative about current situation and is disheartened regarding after care.  Appetite/Meals: good        Interventions:  Pt. Was offered groups and their scheduled medications.     Response:  Pt. Has been compliant w/ meds, tolerated them well; and did attend the majority of group sessions w/ good results.     Plan:  Pt. will continue to be monitored for changes in mental status & safety on the unit.

## 2025-04-24 NOTE — PROGRESS NOTES
[facility]: [facility]  PROGRESS NOTE      Nevaeh Cuba is a 69 y.o. female that is being seen  today for follow-up at [facility]..  Subjective   Patient is being seen for follow-up at [facility]. Patient's blood pressure is fairly controlled with amlodipine.  Patient's lab work reviewed.  Will continue to monitor.      ROS  Negative for fever or chills  Negative for sore throat, ear pain, nasal discharge  Negative for cough, shortness of breath or wheezing  Negative for chest pain, palpitations, swelling of legs  Negative for abdominal pain, constipation, diarrhea, blood in the stools  Negative for urinary complaints  Negative for headache, dizziness, weakness or numbness  Negative for joint pain  Positive for depression or anxiety  All other systems reviewed and were negative   Vitals:    04/24/25 0620   BP: 112/71   Pulse: (!) 119   Resp: 18   Temp: 37.1 °C (98.8 °F)   SpO2: 99%      Vitals:    04/09/25 0634   Weight: 65.1 kg (143 lb 8.3 oz)     Body mass index is 23.16 kg/m².  Physical Exam  Constitutional: Patient does not appear to be in any acute distress  Head and Face: NCAT  Eyes: Normal external exam, EOMI  ENT: Normal external inspection of ears and nose. Oropharynx normal.  Cardiovascular: RRR, S1/S2, no murmurs, rubs, or gallops, radial pulses +2, no edema of extremities  Pulmonary: CTAB, no respiratory distress.  Abdomen: +BS, soft, non-tender, nondistended, no guarding or rebound, no masses noted  MSK: No joint swelling, normal movements of all extremities. Range of motion- normal.  Skin- No lesions, contusions, or erythema.  Peripheral puslses palpable bilaterally 2+  Neuro: AAO X3, Cranial nerves 2-12 grossly intact,DTR 2+ in all 4 limbs       LABS   [unfilled]  Lab Results   Component Value Date    GLUCOSE 125 (H) 04/16/2025    CALCIUM 9.3 04/16/2025     04/16/2025    K 3.2 (L) 04/16/2025    CO2 28 04/16/2025     04/16/2025    BUN 21 04/16/2025    CREATININE 0.57  04/16/2025     Lab Results   Component Value Date    ALT 24 04/16/2025    AST 14 04/16/2025    ALKPHOS 77 04/16/2025    BILITOT 0.5 04/16/2025     Lab Results   Component Value Date    WBC 9.9 03/28/2025    HGB 17.5 (H) 03/28/2025    HCT 48.7 (H) 03/28/2025    MCV 88 03/28/2025     03/28/2025     Lab Results   Component Value Date    CHOL 142 03/29/2025    CHOL 224 (H) 04/12/2024    CHOL 194 10/06/2023     Lab Results   Component Value Date    HDL 59.7 03/29/2025    HDL 58.0 04/12/2024    HDL 58.0 10/06/2023     Lab Results   Component Value Date    LDLCALC 67 03/29/2025    LDLCALC 102 04/12/2024    LDLCALC 88 10/06/2023     Lab Results   Component Value Date    TRIG 77 03/29/2025    TRIG 318 (H) 04/12/2024    TRIG 240 (H) 10/06/2023     Lab Results   Component Value Date    HGBA1C 5.5 06/25/2024     Other labs not included in the list above were reviewed either before or during this encounter.    History    Past Medical History:   Diagnosis Date    Benign essential hypertension     Estrogen deficiency 11/15/2023    DEXA 1/24 back nl, hip neck T-2.1 recheck 1/26    History of atrial fibrillation     Hyperactive thyroid Dr.Burtch Rand [place]ham NSR no AC    History of breast lift 05/2024    History of Meniere's syndrome 11/15/2023    Hx of reduction mammoplasty 05/2024    Hyperthyroidism 09/15/2023    Other specified abnormal findings of blood chemistry     High serum cholesterol sulfate    Personal history of malignant neoplasm, unspecified     History of malignant neoplasm    Personal history of other mental and behavioral disorders     Unspecified osteoarthritis, unspecified site 04/28/2016    Arthritis     Past Surgical History:   Procedure Laterality Date    COSMETIC SURGERY      HYSTERECTOMY  04/28/2016    Hysterectomy    JOINT REPLACEMENT      rt ring finger    JOINT REPLACEMENT Right 05/2022    ring finger    OTHER SURGICAL HISTORY  04/28/2016    Musculoskeletal Procedures Injection Carpal Tunnel     OTHER SURGICAL HISTORY  04/28/2016    Arthrodesis MCP Joint    OTHER SURGICAL HISTORY  01/11/2021    Ankle surgery    OTHER SURGICAL HISTORY  01/11/2021    Carpal tunnel surgery    OTHER SURGICAL HISTORY  07/06/2022    Nasal septoplasty    OTHER SURGICAL HISTORY  07/2021    heart shock catherization    REDUCTION MAMMAPLASTY  may 7 2024    SHOULDER SURGERY  08/2021    total left shoulder replacement    SINUS SURGERY      TRIGGER FINGER RELEASE Right 01/2024    middle finger     Family History   Problem Relation Name Age of Onset    Stroke Mother      Other (cyst on breast) Mother      Atrial fibrillation Father      Stroke Father      Heart disease Father       Allergies   Allergen Reactions    Latex Itching and Rash    Methotrexate Itching     HEAD BURNING AND ITCHING    Metoprolol Rash    Diltiazem Hcl Itching     Rash face     Latex, Natural Rubber Itching    Pollen Extracts Other     NASAL CONGESTION    Adhesive Tape-Silicones Rash    Folic Acid Rash    Sulfamethoxazole-Trimethoprim Rash     Tinnitus      No current facility-administered medications on file prior to encounter.     Current Outpatient Medications on File Prior to Encounter   Medication Sig Dispense Refill    acetaminophen (Tylenol) 325 mg tablet Take 2 tablets (650 mg) by mouth every 6 hours if needed for mild pain (1 - 3) 20 tablet 0    atorvastatin (Lipitor) 20 mg tablet Take 1 tablet (20 mg) by mouth once daily. 90 tablet 3    cholecalciferol (Vitamin D-3) 25 MCG (1000 UT) tablet Take 2 tablets (2,000 Units) by mouth. As directed      docusate sodium (Colace) 100 mg capsule Take 1 capsule (100 mg) by mouth once daily as needed.      fexofenadine HCl (ALLEGRA ORAL) Take 1 tablet by mouth once daily as needed (ALLERGIES).      fLuvoxaMINE (Luvox) 50 mg tablet Take 1 tablet (50 mg) by mouth early in the morning..      hydrOXYzine HCL (Atarax) 25 mg tablet Take 2 tablets (50 mg) by mouth once daily at bedtime for 10 days. 20 tablet 0    ibuprofen  200 mg tablet Take 2 tablets (400 mg) by mouth every 6 hours if needed for mild pain (1 - 3).      methIMAzole (Tapazole) 5 mg tablet Take 1 tablet (5 mg) by mouth once daily. 90 tablet 3    mv-min-FA-vit K-lutein-zeaxant (PreserVision AREDS 2 Plus MV) 200 mcg-15 mcg- 5 mg-1 mg capsule Take 1 capsule by mouth once daily.      OXcarbazepine (Trileptal) 150 mg tablet Take 1 tablet (150 mg) by mouth 2 times a day.      OXcarbazepine (Trileptal) 300 mg tablet Take 1 tablet (300 mg) by mouth every 12 hours.      propranolol (Inderal) 10 mg tablet Take 1 tablet (10 mg) by mouth every 8 hours if needed for anxiety.      triamterene-hydrochlorothiazid (Maxzide-25) 37.5-25 mg tablet Take 1 tablet by mouth once daily in the morning. 90 tablet 3     Immunization History   Administered Date(s) Administered    COVID-19, subunit, rS-nanoparticle, adjuvanted, PF, 5 mcg/0.5 mL 09/28/2024    Flu vaccine, quadrivalent, high-dose, preservative free, age 65y+ (FLUZONE) 09/14/2023    Influenza, Seasonal, Quadrivalent, Adjuvanted 09/30/2021, 09/08/2022    Influenza, Unspecified 09/07/2010, 10/17/2011    Influenza, injectable, MDCK, quadrivalent 10/23/2017, 10/30/2018    Influenza, injectable, quadrivalent 09/23/2019, 09/11/2020    Influenza, seasonal, injectable 09/24/2012, 10/17/2013, 10/23/2014, 10/26/2015, 11/17/2016    Moderna COVID-19 vaccine, 12 years and older (50mcg/0.5mL)(Spikevax) 09/29/2023    Pfizer COVID-19 vaccine, bivalent, age 12 years and older (30 mcg/0.3 mL) 09/08/2022    Pfizer Gray Cap SARS-CoV-2 03/31/2022    Pfizer Purple Cap SARS-CoV-2 03/09/2021, 04/06/2021, 10/06/2021    Pneumococcal conjugate vaccine, 13-valent (PREVNAR 13) 07/11/2013    Pneumococcal conjugate vaccine, 20-valent (PREVNAR 20) 09/16/2023    Pneumococcal polysaccharide vaccine, 23-valent, age 2 years and older (PNEUMOVAX 23) 11/05/2020    RSV, 60 Years And Older (AREXVY) 09/16/2023    Tdap vaccine, age 7 year and older (BOOSTRIX, ADACEL)  09/30/2021    Zoster vaccine, recombinant, adult (SHINGRIX) 10/15/2020, 09/30/2021    Zoster, live 02/10/2012     Patient's medical history was reviewed and updated either before or during this encounter.  ASSESSMENT / PLAN:  Active Hospital Problems    Mild neurocognitive disorder      Hypokalemia      *Psychosis, unspecified psychosis type (Multi)      Anxiety      Insomnia      Sensorineural hearing loss (SNHL) of both ears      Essential hypertension      Hyperlipidemia      Hyperthyroidism    Patient is being seen for follow-up at [facility].  Anxiety has been stable.   Blood pressure has been fairly controlled with amlodipine.  Will continue to monitor.

## 2025-04-24 NOTE — GROUP NOTE
Group Topic: Coping Skills   Group Date: 4/24/2025  Start Time: 1000  End Time: 1115  Facilitators: SUSAN Oreilly   Department: [facility]    Number of Participants: 6   Group Focus: other Coping Skills for Anxiety/Depression  Treatment Modality: Other: Recreation Therapy  Interventions utilized were exploration, group exercise, patient education, problem solving, and support  Purpose: coping skills, maladaptive thinking, feelings, irrational fears, communication skills, insight or knowledge, self-worth, self-care, relapse prevention strategies, and trigger / craving management    Name: Nevaeh Cuba YOB: 1955   MR: [id number]      Facilitator: Recreational Therapist  Level of Participation: minimal  Quality of Participation: distractible  Interactions with others:  pt does engage during group but comments are not related to group topic.  Repeats negative comments about herself, redirection not effective.  Mood/Affect: flat  Triggers (if applicable): n/a  Cognition: not focused and obsessive  Progress: Minimal  Comments: pt problem is delusional thought.    Plan: continue with services

## 2025-04-24 NOTE — CARE PLAN
The patient's goals for the shift include sleep    The clinical goals for the shift include promote rest    Over the shift, the patient did make progress toward the following goals. Barriers to progression include small depressive pervasiveness. Recommendations to address these barriers include positive affirmations.      Problem: Fall/Injury  Goal: Not fall by end of shift  Outcome: Progressing

## 2025-04-24 NOTE — GROUP NOTE
Group Topic: Goals   Group Date: 4/23/2025  Start Time: 2000  End Time: 2030  Facilitators: Arturo Dyson   Department: [facility]     Number of Participants: 8   Group Focus: daily focus  Treatment Modality: Individual Therapy  Interventions utilized were support  Purpose: self-care    Name: Nevaeh Cuba YOB: 1955   MR: [id number]      Facilitator: [facility]  Level of Participation: active  Quality of Participation: appropriate/pleasant  Interactions with others: appropriate  Mood/Affect: positive  Triggers (if applicable): n\a  Cognition: coherent/clear  Progress: Moderate  Comments: pt problem is psychosis  Plan: continue with services

## 2025-04-24 NOTE — CARE PLAN
The patient's goals for the shift include sleep    The clinical goals for the shift include promote rest      Problem: Risk for Suicide  Goal: Read Safety Guidelines this shift  Outcome: Progressing  Goal: Complete Mental Health Safety Plan (psychiatry only) this shift  Outcome: Progressing     Problem: Discharge Planning - Care Management  Goal: Discharge to post-acute care or home with appropriate resources  Outcome: Progressing     Problem: Fall/Injury  Goal: Not fall by end of shift  Outcome: Progressing  Goal: Be free from injury by end of the shift  Outcome: Progressing     Problem: Pain - Adult  Goal: Verbalizes/displays adequate comfort level or baseline comfort level  Outcome: Progressing     Problem: Discharge Planning  Goal: Discharge to home or other facility with appropriate resources  Outcome: Progressing     Problem: Chronic Conditions and Co-morbidities  Goal: Patient's chronic conditions and co-morbidity symptoms are monitored and maintained or improved  Outcome: Progressing     Problem: Nutrition  Goal: Nutrient intake appropriate for maintaining nutritional needs  Outcome: Progressing     Problem: Risk for Suicide  Goal: Accepts medications as prescribed/needed this shift  Outcome: Progressing  Goal: Identifies supports this shift  Outcome: Progressing  Goal: Makes needs known through verbalization or behaviors this shift  Outcome: Progressing  Goal: No self harm this shift  Outcome: Progressing  Goal: Read Safety Guidelines this shift  Outcome: Progressing  Goal: Complete Mental Health Safety Plan (psychiatry only) this shift  Outcome: Progressing

## 2025-04-24 NOTE — NURSING NOTE
"TAZ NOTE     Problem:  depression     Behavior:  Pt is cam and isolative, sitting in the hallway by her room in  the wheelchair most of the time, addressed her needs, low and depressive mood is noted, Pt reported having \"a bad day\", stated that she is \"on the way to go\" referring to death, another statement made \"I finally realized why I'm here - I'm dying\". After Pt took her scheduled medication, she reported that she regrets taking them. Pt denies SI, plan or intent, but admits to the death wishes.    Later in the shift Pt reported having blurry vision, Pt thinks it is medication that causes visual disturbances.      Group Participation: n/a  Appetite/Meals: hs snack provided        Interventions:  Scheduled medication administered     Response:  Pt is compliant with medication, cooperative with care     Plan:  Continue monitoring, adhere to care plan  "

## 2025-04-25 PROCEDURE — 2500000002 HC RX 250 W HCPCS SELF ADMINISTERED DRUGS (ALT 637 FOR MEDICARE OP, ALT 636 FOR OP/ED): Performed by: REGISTERED NURSE

## 2025-04-25 PROCEDURE — 97110 THERAPEUTIC EXERCISES: CPT | Mod: GP

## 2025-04-25 PROCEDURE — 2500000001 HC RX 250 WO HCPCS SELF ADMINISTERED DRUGS (ALT 637 FOR MEDICARE OP): Performed by: STUDENT IN AN ORGANIZED HEALTH CARE EDUCATION/TRAINING PROGRAM

## 2025-04-25 PROCEDURE — 1140000001 HC PRIVATE PSYCH ROOM DAILY

## 2025-04-25 PROCEDURE — 2500000001 HC RX 250 WO HCPCS SELF ADMINISTERED DRUGS (ALT 637 FOR MEDICARE OP): Performed by: INTERNAL MEDICINE

## 2025-04-25 PROCEDURE — 99232 SBSQ HOSP IP/OBS MODERATE 35: CPT | Performed by: REGISTERED NURSE

## 2025-04-25 PROCEDURE — 97116 GAIT TRAINING THERAPY: CPT | Mod: GP

## 2025-04-25 PROCEDURE — 2500000005 HC RX 250 GENERAL PHARMACY W/O HCPCS: Performed by: STUDENT IN AN ORGANIZED HEALTH CARE EDUCATION/TRAINING PROGRAM

## 2025-04-25 PROCEDURE — 2500000001 HC RX 250 WO HCPCS SELF ADMINISTERED DRUGS (ALT 637 FOR MEDICARE OP): Performed by: REGISTERED NURSE

## 2025-04-25 PROCEDURE — 99233 SBSQ HOSP IP/OBS HIGH 50: CPT | Performed by: INTERNAL MEDICINE

## 2025-04-25 RX ORDER — OLANZAPINE 5 MG/1
5 TABLET, FILM COATED ORAL NIGHTLY
Qty: 30 TABLET | Refills: 0 | Status: SHIPPED | OUTPATIENT
Start: 2025-04-25 | End: 2025-05-26

## 2025-04-25 RX ORDER — TALC
3 POWDER (GRAM) TOPICAL NIGHTLY
Qty: 30 TABLET | Refills: 0 | Status: SHIPPED | OUTPATIENT
Start: 2025-04-25 | End: 2025-05-26

## 2025-04-25 RX ORDER — FLUVOXAMINE MALEATE 50 MG/1
150 TABLET, FILM COATED ORAL NIGHTLY
Qty: 90 TABLET | Refills: 0 | Status: SHIPPED | OUTPATIENT
Start: 2025-04-25 | End: 2025-05-26

## 2025-04-25 RX ORDER — AMLODIPINE BESYLATE 5 MG/1
5 TABLET ORAL DAILY
Qty: 30 TABLET | Refills: 0 | Status: SHIPPED | OUTPATIENT
Start: 2025-04-26 | End: 2025-05-26

## 2025-04-25 RX ORDER — OXCARBAZEPINE 300 MG/1
300 TABLET, FILM COATED ORAL
Qty: 60 TABLET | Refills: 0 | Status: SHIPPED | OUTPATIENT
Start: 2025-04-25 | End: 2025-05-26

## 2025-04-25 RX ORDER — ATORVASTATIN CALCIUM 20 MG/1
20 TABLET, FILM COATED ORAL DAILY
Qty: 30 TABLET | Refills: 0 | Status: SHIPPED | OUTPATIENT
Start: 2025-04-25 | End: 2025-05-26

## 2025-04-25 RX ORDER — METHIMAZOLE 5 MG/1
5 TABLET ORAL DAILY
Qty: 30 TABLET | Refills: 0 | Status: SHIPPED | OUTPATIENT
Start: 2025-04-25 | End: 2025-05-26

## 2025-04-25 RX ORDER — CHOLECALCIFEROL (VITAMIN D3) 50 MCG
50 TABLET ORAL DAILY
Qty: 30 TABLET | Refills: 0 | Status: SHIPPED | OUTPATIENT
Start: 2025-04-26 | End: 2025-05-26

## 2025-04-25 RX ADMIN — ATORVASTATIN CALCIUM 20 MG: 20 TABLET, FILM COATED ORAL at 20:34

## 2025-04-25 RX ADMIN — FLUVOXAMINE MALEATE 150 MG: 50 TABLET, COATED ORAL at 20:34

## 2025-04-25 RX ADMIN — OLANZAPINE 5 MG: 5 TABLET, FILM COATED ORAL at 20:35

## 2025-04-25 RX ADMIN — Medication 2000 UNITS: at 08:19

## 2025-04-25 RX ADMIN — OXCARBAZEPINE 300 MG: 300 TABLET, FILM COATED ORAL at 06:05

## 2025-04-25 RX ADMIN — OXCARBAZEPINE 300 MG: 300 TABLET, FILM COATED ORAL at 17:38

## 2025-04-25 RX ADMIN — AMLODIPINE BESYLATE 5 MG: 5 TABLET ORAL at 08:19

## 2025-04-25 RX ADMIN — MELATONIN TAB 3 MG 3 MG: 3 TAB at 17:38

## 2025-04-25 RX ADMIN — METHIMAZOLE 5 MG: 5 TABLET ORAL at 08:19

## 2025-04-25 ASSESSMENT — COLUMBIA-SUICIDE SEVERITY RATING SCALE - C-SSRS
2. HAVE YOU ACTUALLY HAD ANY THOUGHTS OF KILLING YOURSELF?: NO
1. SINCE LAST CONTACT, HAVE YOU WISHED YOU WERE DEAD OR WISHED YOU COULD GO TO SLEEP AND NOT WAKE UP?: NO
1. IN THE PAST MONTH, HAVE YOU WISHED YOU WERE DEAD OR WISHED YOU COULD GO TO SLEEP AND NOT WAKE UP?: YES
6. HAVE YOU EVER DONE ANYTHING, STARTED TO DO ANYTHING, OR PREPARED TO DO ANYTHING TO END YOUR LIFE?: YES
5. HAVE YOU STARTED TO WORK OUT OR WORKED OUT THE DETAILS OF HOW TO KILL YOURSELF? DO YOU INTEND TO CARRY OUT THIS PLAN?: NO
2. HAVE YOU ACTUALLY HAD ANY THOUGHTS OF KILLING YOURSELF?: YES
2. HAVE YOU ACTUALLY HAD ANY THOUGHTS OF KILLING YOURSELF?: YES
6. HAVE YOU EVER DONE ANYTHING, STARTED TO DO ANYTHING, OR PREPARED TO DO ANYTHING TO END YOUR LIFE?: NO
1. IN THE PAST MONTH, HAVE YOU WISHED YOU WERE DEAD OR WISHED YOU COULD GO TO SLEEP AND NOT WAKE UP?: YES
4. HAVE YOU HAD THESE THOUGHTS AND HAD SOME INTENTION OF ACTING ON THEM?: NO

## 2025-04-25 ASSESSMENT — PAIN SCALES - GENERAL
PAINLEVEL_OUTOF10: 0 - NO PAIN

## 2025-04-25 ASSESSMENT — PAIN - FUNCTIONAL ASSESSMENT
PAIN_FUNCTIONAL_ASSESSMENT: 0-10

## 2025-04-25 ASSESSMENT — COGNITIVE AND FUNCTIONAL STATUS - GENERAL
CLIMB 3 TO 5 STEPS WITH RAILING: A LITTLE
STANDING UP FROM CHAIR USING ARMS: A LITTLE
WALKING IN HOSPITAL ROOM: A LITTLE
MOVING TO AND FROM BED TO CHAIR: A LITTLE
MOBILITY SCORE: 20

## 2025-04-25 NOTE — PROGRESS NOTES
LSW submitted requested paperwork to [facility] as requested. Facility has requested a TB which was ordered for pt and should be completed over the weekend. Also the facility has requested pt admit with 30 days of meds. LSW has passed along this information to provider to request meds to beds. Pt's sister should have met with facility today to sign paperwork. As well as pt's sister is working on obtaining a bed and moving pt's belongings into apartment. LSW will follow up with facility and sister on Monday to further coordinate pt's admission to facility.     MARCELA Corley

## 2025-04-25 NOTE — GROUP NOTE
Group Topic: Goals   Group Date: 4/25/2025  Start Time: 0745  End Time: 0800  Facilitators: Juan Mann   Department: [facility]    Number of Participants: 5   Group Focus: goals  Treatment Modality: Other: Goal setting  Interventions utilized were assignment  Purpose: self-worth    Name: Nevaeh Cuba YOB: 1955   MR: [id number]      Facilitator: [facility]  Level of Participation: active  Quality of Participation: cooperative  Interactions with others: appropriate  Mood/Affect: appropriate  Triggers (if applicable): n/a  Cognition: coherent/clear  Progress: Minimal  Comments: n/a  Plan: continue with services

## 2025-04-25 NOTE — CARE PLAN
The patient's goals for the shift include sleep better    The clinical goals for the shift include maintain pt safety    Problem: Risk for Suicide  Goal: Read Safety Guidelines this shift  Outcome: Progressing  Goal: Complete Mental Health Safety Plan (psychiatry only) this shift  Outcome: Progressing     Problem: Discharge Planning - Care Management  Goal: Discharge to post-acute care or home with appropriate resources  Outcome: Progressing     Problem: Fall/Injury  Goal: Not fall by end of shift  Outcome: Progressing  Goal: Be free from injury by end of the shift  Outcome: Progressing     Problem: Pain - Adult  Goal: Verbalizes/displays adequate comfort level or baseline comfort level  Outcome: Progressing     Problem: Discharge Planning  Goal: Discharge to home or other facility with appropriate resources  Outcome: Progressing     Problem: Chronic Conditions and Co-morbidities  Goal: Patient's chronic conditions and co-morbidity symptoms are monitored and maintained or improved  Outcome: Progressing     Problem: Nutrition  Goal: Nutrient intake appropriate for maintaining nutritional needs  Outcome: Progressing     Problem: Risk for Suicide  Goal: Accepts medications as prescribed/needed this shift  Outcome: Progressing  Goal: Identifies supports this shift  Outcome: Progressing  Goal: Makes needs known through verbalization or behaviors this shift  Outcome: Progressing  Goal: No self harm this shift  Outcome: Progressing  Goal: Read Safety Guidelines this shift  Outcome: Progressing  Goal: Complete Mental Health Safety Plan (psychiatry only) this shift  Outcome: Progressing

## 2025-04-25 NOTE — GROUP NOTE
Group Topic: Self Esteem   Group Date: 4/25/2025  Start Time: 1010  End Time: 1100  Facilitators: SUSAN Oreilly   Department: [facility]    Number of Participants: 4   Group Focus: other Self Esteem Cards  Treatment Modality: Other: Recreation Therapy  Interventions utilized were exploration, group exercise, patient education, problem solving, and support  Purpose: coping skills, maladaptive thinking, feelings, irrational fears, communication skills, insight or knowledge, self-worth, self-care, relapse prevention strategies, and trigger / craving management    Name: Nevaeh Cuba YOB: 1955   MR: [id number]      Facilitator: Recreational Therapist  Level of Participation: moderate  Quality of Participation: passive and quiet  Interactions with others:  mostly passive  Mood/Affect:  mostly passive  Triggers (if applicable): n/a  Cognition:  passive  Progress: Minimal  Comments: pt problem is delusional thought.   Pt displays mostly passive participation.  Good eye contact with limited interaction.    Plan: continue with services

## 2025-04-25 NOTE — PROGRESS NOTES
" REHAB Therapy Assessment & Treatment    Patient Name: Nevaeh Cuba  MRN: [id number]  Today's Date: 4/25/2025      Recreation note : pt is alert and oriented x 2-3 with some confusion, memory loss and poor insight/judgement.  Continues to attend groups offered daily on unit.  Participation is inconsistent secondary to \"looping thoughts\".  Pt is more verbal this week and continues to express frustration with expressing herself.  Pt has been cooperative with behaviors, eating well and reports she is sleeping \"ok\".  Will continue to encourage pt to attend groups of choice daily.    "

## 2025-04-25 NOTE — PROGRESS NOTES
Physical Therapy    Physical Therapy Treatment    Patient Name: Nevaeh Cuba  MRN: [id number]  Department: [facility]  Room: 400/400-A  Today's Date: 4/25/2025  Time Calculation  Start Time: 1145  Stop Time: 1208  Time Calculation (min): 23 min         Assessment/Plan   PT Assessment  PT Assessment Results: Decreased strength, Decreased endurance, Decreased mobility, Decreased safety awareness, Pain  Rehab Prognosis: Good  Barriers to Discharge Home: Caregiver assistance, Cognition needs  Caregiver Assistance: Patient lives alone and/or does not have reliable caregiver assistance  Cognition Needs: Insight of patient limited regarding functional ability/needs  Evaluation/Treatment Tolerance: Patient tolerated treatment well  Medical Staff Made Aware: Yes  Strengths: Ability to acquire knowledge  Barriers to Participation: Comorbidities  End of Session Communication: Bedside nurse  Assessment Comment: Flat Affect.  Feels she is ok.  Poor Insight to balance deficits MCCARTHY Score 37/56 Moderate Fall Risk  End of Session Patient Position: Up in chair, Alarm on  PT Plan  Inpatient/Swing Bed or Outpatient: Inpatient  PT Plan  Treatment/Interventions: Transfer training, Gait training, Therapeutic exercise, Therapeutic activity  PT Plan: Ongoing PT  PT Frequency: 3 times per week  PT Discharge Recommendations: Low intensity level of continued care  Equipment Recommended upon Discharge: Wheeled walker  PT Recommended Transfer Status: Stand by assist, Assistive device  PT - OK to Discharge: Yes      General Visit Information:   PT  Visit  PT Received On: 04/25/25  Response to Previous Treatment: Patient with no complaints from previous session.  General  Reason for Referral: Impaired mobility, balance associated w/escalating mental health issues, Psychosis  Referred By: Deandre Marin DO  Past Medical History Relevant to Rehab: HTN, afib, Meniere's, hyperthyroid, OA, anxiety, sensorineural hearing loss, h/p mental and  behavioral d/o  Prior to Session Communication: Bedside nurse  Patient Position Received: Up in chair, Alarm on  Preferred Learning Style: verbal, visual  General Comment: cleared by RN for therapy, patient is agreeable    Subjective   Precautions:  Precautions  Hearing/Visual Limitations: glasses, hearing loss            Objective   Pain:  Pain Assessment  Pain Assessment: 0-10  0-10 (Numeric) Pain Score: 0 - No pain  Cognition:  Cognition  Overall Cognitive Status: Within Functional Limits  Coordination:  Movements are Fluid and Coordinated: Yes  Postural Control:  Postural Control  Postural Control: Within Functional Limits  Static Sitting Balance  Static Sitting-Level of Assistance: Independent  Dynamic Sitting Balance  Dynamic Sitting-Level of Assistance: Independent  Static Standing Balance  Static Standing-Balance Support: Bilateral upper extremity supported  Static Standing-Level of Assistance: Modified independent  Dynamic Standing Balance  Dynamic Standing-Balance Support: No upper extremity supported  Dynamic Standing-Level of Assistance: Close supervision  Extremity/Trunk Assessments:     Activity Tolerance:  Activity Tolerance  Endurance: Tolerates 30+ min exercise without fatigue  Early Mobility/Exercise Safety Screen: Proceed with mobilization - No exclusion criteria met  Activity Tolerance Comments: good  Rate of Perceived Exertion (RPE): 3/10  Treatments:  Therapeutic Exercise  Therapeutic Exercise Activity 1: standing toe raises 2x15 at leija rail  Therapeutic Exercise Activity 2: Standing mini squats 2x15 at leija rail  Therapeutic Exercise Activity 3: standing marches 2x15 at leija rail    Ambulation/Gait Training 1  Surface 1: Level tile  Device 1: No device  Assistance 1: Close supervision  Quality of Gait 1: Narrow base of support, Forward flexed posture, Shuffling gait  Comments/Distance (ft) 1: 75 ft x 2, slow pace with turn with standing rest breaks, fatigues quickly  Ambulation/Gait Training  2  Surface 2: Level tile  Device 2: Rolling walker  Assistance 2: Distant supervision  Quality of Gait 2: Diminished heel strike, Decreased step length, Inconsistent stride length  Comments/Distance (ft) 2: 100 ft, steady  Transfer 1  Transfer From 1: Wheelchair to  Transfer to 1: Stand  Technique 1: Sit to stand, Stand to sit  Transfer Device 1: Walker  Transfer Level of Assistance 1: Close supervision  Trials/Comments 1: cues to lock breaks    Outcome Measures:  [facility] Basic Mobility  Turning from your back to your side while in a flat bed without using bedrails: None  Moving from lying on your back to sitting on the side of a flat bed without using bedrails: None  Moving to and from bed to chair (including a wheelchair): A little  Standing up from a chair using your arms (e.g. wheelchair or bedside chair): A little  To walk in hospital room: A little  Climbing 3-5 steps with railing: A little  Basic Mobility - Total Score: 20    Education Documentation  Body Mechanics, taught by Lana Nj PT at 4/25/2025  1:04 PM.  Learner: Patient  Readiness: Acceptance  Method: Explanation  Response: Verbalizes Understanding  Comment: Education provided re: safe techniques    Mobility Training, taught by Lana Nj PT at 4/25/2025  1:04 PM.  Learner: Patient  Readiness: Acceptance  Method: Explanation  Response: Verbalizes Understanding  Comment: Education provided re: safe techniques                 Encounter Problems       Encounter Problems (Active)       PT  Problem       Patient will transfer sit to stand and stand to sit with independent assist to facilitate mobility. (Progressing)       Start:  04/01/25    Expected End:  04/17/25            Patient will amb 150' feet no device including two turns on even surface with independent assist to facilitate safe mobility.   (Progressing)       Start:  04/01/25    Expected End:  04/17/25            Patient will negotiate 1 stairs with no rail(s) and independent}  assist with no device for in home and community. (Progressing)       Start:  04/01/25    Expected End:  04/17/25            Patient will tolerate 15 minutes of standing to improve ability to perform MRADLs. (Met)       Start:  04/01/25    Expected End:  04/17/25    Resolved:  04/16/25            Pain - Adult

## 2025-04-25 NOTE — CARE PLAN
The patient's goals for the shift include sleep better    The clinical goals for the shift include maintain pt safety    Over the shift, the patient did make progress toward the following goals. Barriers to progression include pt feeling anxious regarding being discharged and where she is going after leaving the unit. Recommendations to address these barriers include educate pt regarding her discharge.

## 2025-04-25 NOTE — GROUP NOTE
Group Topic: Goals   Group Date: 4/24/2025  Start Time: 2000  End Time: 2030  Facilitators: Arturo Dyson   Department: [facility]     Number of Participants: 8   Group Focus: check in  Treatment Modality: Patient-Centered Therapy  Interventions utilized were support  Purpose: self-care    Name: Nevaeh Cuba YOB: 1955   MR: [id number]      Facilitator: Mental [facility]  Level of Participation: active  Quality of Participation: appropriate/pleasant  Interactions with others: appropriate  Mood/Affect: positive  Triggers (if applicable): n\a  Cognition: coherent/clear  Progress: Moderate  Comments: pt problem is psychosis  Plan: continue with services

## 2025-04-25 NOTE — PROGRESS NOTES
[facility]: [facility]  PROGRESS NOTE      Nevaeh Cuba is a 69 y.o. female that is being seen  today for follow-up at [facility]..  Subjective   Patient is being seen for follow-up at [facility]. Patient's blood pressure is fairly controlled with amlodipine.  Patient's lab work reviewed.  Will continue to monitor.      ROS  Negative for fever or chills  Negative for sore throat, ear pain, nasal discharge  Negative for cough, shortness of breath or wheezing  Negative for chest pain, palpitations, swelling of legs  Negative for abdominal pain, constipation, diarrhea, blood in the stools  Negative for urinary complaints  Negative for headache, dizziness, weakness or numbness  Negative for joint pain  Positive for depression or anxiety  All other systems reviewed and were negative   Vitals:    04/25/25 0500   BP: 122/71   Pulse: 110   Resp: 18   Temp: 36.5 °C (97.7 °F)   SpO2: 96%      Vitals:    04/09/25 0634   Weight: 65.1 kg (143 lb 8.3 oz)     Body mass index is 23.16 kg/m².  Physical Exam  Constitutional: Patient does not appear to be in any acute distress  Head and Face: NCAT  Eyes: Normal external exam, EOMI  ENT: Normal external inspection of ears and nose. Oropharynx normal.  Cardiovascular: RRR, S1/S2, no murmurs, rubs, or gallops, radial pulses +2, no edema of extremities  Pulmonary: CTAB, no respiratory distress.  Abdomen: +BS, soft, non-tender, nondistended, no guarding or rebound, no masses noted  MSK: No joint swelling, normal movements of all extremities. Range of motion- normal.  Skin- No lesions, contusions, or erythema.  Peripheral puslses palpable bilaterally 2+  Neuro: AAO X3, Cranial nerves 2-12 grossly intact,DTR 2+ in all 4 limbs       LABS   [unfilled]  Lab Results   Component Value Date    GLUCOSE 112 (H) 04/24/2025    CALCIUM 8.8 04/24/2025     04/24/2025    K 3.3 (L) 04/24/2025    CO2 26 04/24/2025     04/24/2025    BUN 15 04/24/2025    CREATININE 0.61  04/24/2025     Lab Results   Component Value Date    ALT 25 04/24/2025    AST 15 04/24/2025    ALKPHOS 66 04/24/2025    BILITOT 0.5 04/24/2025     Lab Results   Component Value Date    WBC 9.9 03/28/2025    HGB 17.5 (H) 03/28/2025    HCT 48.7 (H) 03/28/2025    MCV 88 03/28/2025     03/28/2025     Lab Results   Component Value Date    CHOL 142 03/29/2025    CHOL 224 (H) 04/12/2024    CHOL 194 10/06/2023     Lab Results   Component Value Date    HDL 59.7 03/29/2025    HDL 58.0 04/12/2024    HDL 58.0 10/06/2023     Lab Results   Component Value Date    LDLCALC 67 03/29/2025    LDLCALC 102 04/12/2024    LDLCALC 88 10/06/2023     Lab Results   Component Value Date    TRIG 77 03/29/2025    TRIG 318 (H) 04/12/2024    TRIG 240 (H) 10/06/2023     Lab Results   Component Value Date    HGBA1C 5.5 06/25/2024     Other labs not included in the list above were reviewed either before or during this encounter.    History    Past Medical History:   Diagnosis Date    Benign essential hypertension     Estrogen deficiency 11/15/2023    DEXA 1/24 back nl, hip neck T-2.1 recheck 1/26    History of atrial fibrillation     Hyperactive thyroid Dr.Burtch Rand [place]ham NSR no AC    History of breast lift 05/2024    History of Meniere's syndrome 11/15/2023    Hx of reduction mammoplasty 05/2024    Hyperthyroidism 09/15/2023    Other specified abnormal findings of blood chemistry     High serum cholesterol sulfate    Personal history of malignant neoplasm, unspecified     History of malignant neoplasm    Personal history of other mental and behavioral disorders     Unspecified osteoarthritis, unspecified site 04/28/2016    Arthritis     Past Surgical History:   Procedure Laterality Date    COSMETIC SURGERY      HYSTERECTOMY  04/28/2016    Hysterectomy    JOINT REPLACEMENT      rt ring finger    JOINT REPLACEMENT Right 05/2022    ring finger    OTHER SURGICAL HISTORY  04/28/2016    Musculoskeletal Procedures Injection Carpal Tunnel     OTHER SURGICAL HISTORY  04/28/2016    Arthrodesis MCP Joint    OTHER SURGICAL HISTORY  01/11/2021    Ankle surgery    OTHER SURGICAL HISTORY  01/11/2021    Carpal tunnel surgery    OTHER SURGICAL HISTORY  07/06/2022    Nasal septoplasty    OTHER SURGICAL HISTORY  07/2021    heart shock catherization    REDUCTION MAMMAPLASTY  may 7 2024    SHOULDER SURGERY  08/2021    total left shoulder replacement    SINUS SURGERY      TRIGGER FINGER RELEASE Right 01/2024    middle finger     Family History   Problem Relation Name Age of Onset    Stroke Mother      Other (cyst on breast) Mother      Atrial fibrillation Father      Stroke Father      Heart disease Father       Allergies   Allergen Reactions    Latex Itching and Rash    Methotrexate Itching     HEAD BURNING AND ITCHING    Metoprolol Rash    Diltiazem Hcl Itching     Rash face     Latex, Natural Rubber Itching    Pollen Extracts Other     NASAL CONGESTION    Adhesive Tape-Silicones Rash    Folic Acid Rash    Sulfamethoxazole-Trimethoprim Rash     Tinnitus      No current facility-administered medications on file prior to encounter.     Current Outpatient Medications on File Prior to Encounter   Medication Sig Dispense Refill    acetaminophen (Tylenol) 325 mg tablet Take 2 tablets (650 mg) by mouth every 6 hours if needed for mild pain (1 - 3) 20 tablet 0    atorvastatin (Lipitor) 20 mg tablet Take 1 tablet (20 mg) by mouth once daily. 90 tablet 3    cholecalciferol (Vitamin D-3) 25 MCG (1000 UT) tablet Take 2 tablets (2,000 Units) by mouth. As directed      docusate sodium (Colace) 100 mg capsule Take 1 capsule (100 mg) by mouth once daily as needed.      fexofenadine HCl (ALLEGRA ORAL) Take 1 tablet by mouth once daily as needed (ALLERGIES).      fLuvoxaMINE (Luvox) 50 mg tablet Take 1 tablet (50 mg) by mouth early in the morning..      hydrOXYzine HCL (Atarax) 25 mg tablet Take 2 tablets (50 mg) by mouth once daily at bedtime for 10 days. 20 tablet 0    ibuprofen  200 mg tablet Take 2 tablets (400 mg) by mouth every 6 hours if needed for mild pain (1 - 3).      methIMAzole (Tapazole) 5 mg tablet Take 1 tablet (5 mg) by mouth once daily. 90 tablet 3    mv-min-FA-vit K-lutein-zeaxant (PreserVision AREDS 2 Plus MV) 200 mcg-15 mcg- 5 mg-1 mg capsule Take 1 capsule by mouth once daily.      OXcarbazepine (Trileptal) 150 mg tablet Take 1 tablet (150 mg) by mouth 2 times a day.      OXcarbazepine (Trileptal) 300 mg tablet Take 1 tablet (300 mg) by mouth every 12 hours.      propranolol (Inderal) 10 mg tablet Take 1 tablet (10 mg) by mouth every 8 hours if needed for anxiety.      triamterene-hydrochlorothiazid (Maxzide-25) 37.5-25 mg tablet Take 1 tablet by mouth once daily in the morning. 90 tablet 3     Immunization History   Administered Date(s) Administered    COVID-19, subunit, rS-nanoparticle, adjuvanted, PF, 5 mcg/0.5 mL 09/28/2024    Flu vaccine, quadrivalent, high-dose, preservative free, age 65y+ (FLUZONE) 09/14/2023    Influenza, Seasonal, Quadrivalent, Adjuvanted 09/30/2021, 09/08/2022    Influenza, Unspecified 09/07/2010, 10/17/2011    Influenza, injectable, MDCK, quadrivalent 10/23/2017, 10/30/2018    Influenza, injectable, quadrivalent 09/23/2019, 09/11/2020    Influenza, seasonal, injectable 09/24/2012, 10/17/2013, 10/23/2014, 10/26/2015, 11/17/2016    Moderna COVID-19 vaccine, 12 years and older (50mcg/0.5mL)(Spikevax) 09/29/2023    Pfizer COVID-19 vaccine, bivalent, age 12 years and older (30 mcg/0.3 mL) 09/08/2022    Pfizer Gray Cap SARS-CoV-2 03/31/2022    Pfizer Purple Cap SARS-CoV-2 03/09/2021, 04/06/2021, 10/06/2021    Pneumococcal conjugate vaccine, 13-valent (PREVNAR 13) 07/11/2013    Pneumococcal conjugate vaccine, 20-valent (PREVNAR 20) 09/16/2023    Pneumococcal polysaccharide vaccine, 23-valent, age 2 years and older (PNEUMOVAX 23) 11/05/2020    RSV, 60 Years And Older (AREXVY) 09/16/2023    Tdap vaccine, age 7 year and older (BOOSTRIX, ADACEL)  09/30/2021    Zoster vaccine, recombinant, adult (SHINGRIX) 10/15/2020, 09/30/2021    Zoster, live 02/10/2012     Patient's medical history was reviewed and updated either before or during this encounter.  ASSESSMENT / PLAN:  Active Hospital Problems    Mild neurocognitive disorder      Hypokalemia      *Psychosis, unspecified psychosis type (Multi)      Anxiety      Insomnia      Sensorineural hearing loss (SNHL) of both ears      Essential hypertension      Hyperlipidemia      Hyperthyroidism    Patient is being seen for follow-up at [facility].  Anxiety has been stable.   Blood pressure has been fairly controlled with amlodipine.  Will continue to monitor.

## 2025-04-25 NOTE — NURSING NOTE
"Pt calls this nurse in to show her pt's shelves. Pt tells this nurse she would like more clothes. This nurse explains that she can have someone bring them in. Pt then says, \"I know it sounds funny, but I cannot ask.\". When asked to explain, pt gazes off. She says, \"I cannot even eat.\".  "

## 2025-04-25 NOTE — PROGRESS NOTES
"Nevaeh Cuba is a 69 y.o. female on day 28 of admission presenting with Psychosis, unspecified psychosis type (Multi).      Subjective   Case discussed in treatment team today and chart reviewed. Nursing reported patient demonstrates an increase in anxious ruminative thinking- nursing said it took an hour for her to take kehr meds this morning but  finally agreed to take them. Provider spoke with patient and luvox to 150mg at bedtime for OCD appears lul tolerated(delusional feature appears to continue dissipated). Patient will discharge to SNF at beginning of next week.        Objective     Last Recorded Vitals  Blood pressure 114/64, pulse 78, temperature 37.5 °C (99.5 °F), temperature source Temporal, resp. rate 16, height 1.676 m (5' 6\"), weight 65.1 kg (143 lb 8.3 oz), SpO2 97%.    Sleep Log  Estimated \"Sleeping\" Durations   Night Est. Hours   04/11 7.00-7.25   04/12 5.50-6.25   04/13 6.00-6.25   04/14 5.25-6.50   04/15 6.00   04/16 7.50-8.50   04/17 8.50-10.25   04/18 9.75-10.25   04/19 7.50-7.75   04/20 6.75-7.00   04/21 7.75-9.00   04/22 6.75-7.50   04/23 7.50-7.75   04/24 7.75-8.00   04/25 0.00        Review of Systems    Psychiatric ROS - Adult  Anxiety: OCD sx increased ruminative thinking per nursing  Depression: cont to improve  Delirium: none noted  Psychosis: none elicited this morning  Usha: none noted  Safety Issues: In wheelchair with alarm fo r safety  Medication side effects: Patient denies any adverse side effects  Physical Exam      Mental Status Exam  General: adequately groomed appears stated age  Appearance: casual attire  Attitude: pleasant  Behavior: mostly cooperative but did refuse meds for an hour this morning  Motor Activity: in wheel chair for safety  Speech: normal rate and  low volume, slightly delayed latency  Mood: patient is acknowledging provider by b=name and smiling  Affect:  less constricted, odd  Thought Process: more organized overall  Thought Content: limited. denies " "SI/HI denies paranoia but has cont obsessions to point of delusions(improving)  Thought Perception: denies AH/VH, does not appear internally stimulated  Cognition: alert and oriented x4  Insight: patient knows she needs treatment for psychiatric illness  Judgement: patient is able to participate in medical decsion making  Psychiatric Risk Assessment  iolence Risk Assessment: major mental  illness  Acute Risk of Harm to Others is Considered: low   Suicide Risk Assessment: patient reports being more hopeful about future in [place]  Protective Factors against Suicide: denies SI/HI, looking forward to going to [place], family support  Acute Risk of Harm to Self is Considered: low        Relevant Results               Assessment & Plan  Psychosis, unspecified psychosis type (Multi)    Anxiety    Insomnia    Hypokalemia    Mild neurocognitive disorder    On admission:Patient is a 70 yo female dx with MDD, LANDON with psychotic features (obsessive features). She says she spent all the money she was supposed to pay for her  and mothers nursing home on \"stuff that is all over my condo\". She denies dx of bipolar disorder, does not present as manic at this time but presents with psychotic flavor, almost schizotypal features.       Biological   - cont Luvox 150mg every night for anxiety/obsessions and depression  - continue trileptal 300mg bid for mood stabilization?  - says she has taken lithium in the past and it makes her dizzy, denies ever taking depakote  - continue zyprexa 5mg at bedtime and monitor obsessional delusions  - one time zyprexa zydis 5mg as patient refused meds last night  - prns avalable  - medical pertinences appreciated     Psychological     Provider encouraged patient to attend groups on unit.     Sociological     Provider encouraged patient to work with social workon discharge plan. She planned to return to her condo initially but now says she thinks she can't care for herself there anymore. Social work found " placement- projected discharge by middle next week.  Current malnutriton diganoses:  Malnutrition Diagnosis: Severe malnutrition related to acute disease or injury          Current malnutriton diganoses:  Malnutrition Diagnosis: Severe malnutrition related to acute disease or injury        I spent 25 minutes in the professional and overall care of this patient.      Elva Murcia, APRN-CNS

## 2025-04-25 NOTE — NURSING NOTE
TAZ NOTE     Problem:  Hopelessness     Behavior:  Pt was calm and cooperative this shift. She asked about medications during med pass. She expressed a little concern regarding where she is going. She believes she is unable to afford it.   Group Participation: Pt did participate in group  Appetite/Meals: pt eats between 25-50% of meals.       Interventions:  1:1 assessment and medication administration.     Response:  Pt was medication compliant this shift. Remained pleasant, calm and cooperative on the unit with staff and others.     Plan:  Q15 minute checks for pt safety.

## 2025-04-25 NOTE — GROUP NOTE
"Group Topic: Reminiscence   Group Date: 4/25/2025  Start Time: 1330  End Time: 1430  Facilitators: SUSAN Oreilly   Department: [facility]    Number of Participants: 4   Group Focus: other Let's Talk  Treatment Modality: Other: Recreation Therapy  Interventions utilized were exploration, reminiscence, and story telling  Purpose: feelings and other: fun, elevate mood, increases socialization, enhance self esteem    Name: Nevaeh Cuba YOB: 1955   MR: [id number]      Facilitator: Recreational Therapist  Level of Participation: moderate  Quality of Participation: appropriate/pleasant, attentive, and cooperative  Interactions with others: appropriate and gave feedback  Mood/Affect: appropriate and flat  Triggers (if applicable): n/a  Cognition: loose  Progress: Moderate  Comments: pt problem is delusional thought.   Pt more active in this group with prompting.  Responds in appropriate time frame and does report \"looping in her head\".  Is able to share anxiety related with \"trying to communicate and talk, I can't remember much and it is very scary\".    Plan: continue with services      "

## 2025-04-26 PROCEDURE — RXMED WILLOW AMBULATORY MEDICATION CHARGE

## 2025-04-26 PROCEDURE — 1140000001 HC PRIVATE PSYCH ROOM DAILY

## 2025-04-26 PROCEDURE — 2500000001 HC RX 250 WO HCPCS SELF ADMINISTERED DRUGS (ALT 637 FOR MEDICARE OP): Performed by: REGISTERED NURSE

## 2025-04-26 PROCEDURE — 2500000001 HC RX 250 WO HCPCS SELF ADMINISTERED DRUGS (ALT 637 FOR MEDICARE OP): Performed by: STUDENT IN AN ORGANIZED HEALTH CARE EDUCATION/TRAINING PROGRAM

## 2025-04-26 PROCEDURE — 2500000002 HC RX 250 W HCPCS SELF ADMINISTERED DRUGS (ALT 637 FOR MEDICARE OP, ALT 636 FOR OP/ED): Performed by: REGISTERED NURSE

## 2025-04-26 PROCEDURE — 2500000001 HC RX 250 WO HCPCS SELF ADMINISTERED DRUGS (ALT 637 FOR MEDICARE OP): Performed by: INTERNAL MEDICINE

## 2025-04-26 PROCEDURE — 2500000005 HC RX 250 GENERAL PHARMACY W/O HCPCS: Performed by: STUDENT IN AN ORGANIZED HEALTH CARE EDUCATION/TRAINING PROGRAM

## 2025-04-26 PROCEDURE — 99232 SBSQ HOSP IP/OBS MODERATE 35: CPT | Performed by: PSYCHIATRY & NEUROLOGY

## 2025-04-26 PROCEDURE — 99233 SBSQ HOSP IP/OBS HIGH 50: CPT | Performed by: INTERNAL MEDICINE

## 2025-04-26 RX ADMIN — METHIMAZOLE 5 MG: 5 TABLET ORAL at 08:09

## 2025-04-26 RX ADMIN — OXCARBAZEPINE 300 MG: 300 TABLET, FILM COATED ORAL at 17:43

## 2025-04-26 RX ADMIN — AMLODIPINE BESYLATE 5 MG: 5 TABLET ORAL at 08:09

## 2025-04-26 RX ADMIN — OXCARBAZEPINE 300 MG: 300 TABLET, FILM COATED ORAL at 06:27

## 2025-04-26 RX ADMIN — Medication 2000 UNITS: at 08:09

## 2025-04-26 RX ADMIN — MELATONIN TAB 3 MG 3 MG: 3 TAB at 17:43

## 2025-04-26 ASSESSMENT — COLUMBIA-SUICIDE SEVERITY RATING SCALE - C-SSRS
2. HAVE YOU ACTUALLY HAD ANY THOUGHTS OF KILLING YOURSELF?: NO
1. SINCE LAST CONTACT, HAVE YOU WISHED YOU WERE DEAD OR WISHED YOU COULD GO TO SLEEP AND NOT WAKE UP?: NO
1. IN THE PAST MONTH, HAVE YOU WISHED YOU WERE DEAD OR WISHED YOU COULD GO TO SLEEP AND NOT WAKE UP?: YES
2. HAVE YOU ACTUALLY HAD ANY THOUGHTS OF KILLING YOURSELF?: YES
6. HAVE YOU EVER DONE ANYTHING, STARTED TO DO ANYTHING, OR PREPARED TO DO ANYTHING TO END YOUR LIFE?: YES
6. HAVE YOU EVER DONE ANYTHING, STARTED TO DO ANYTHING, OR PREPARED TO DO ANYTHING TO END YOUR LIFE?: NO

## 2025-04-26 ASSESSMENT — PAIN SCALES - GENERAL
PAINLEVEL_OUTOF10: 0 - NO PAIN
PAINLEVEL_OUTOF10: 0 - NO PAIN

## 2025-04-26 ASSESSMENT — PAIN - FUNCTIONAL ASSESSMENT
PAIN_FUNCTIONAL_ASSESSMENT: 0-10
PAIN_FUNCTIONAL_ASSESSMENT: 0-10

## 2025-04-26 NOTE — NURSING NOTE
"MLP NOTE     Problem:  Depression     Behavior:  Pt ambulating in wheelchair in hallway. Stating negative comments \"Today is a bad day.\" This nurse asked pt why was she having a bad day and she did not provide a response. Nurse offered emotional support and goals to look forward to in the future.    Group Participation: Participated  Appetite/Meals: Adequate       Interventions:  Administer medication as ordered and complete shift assessment     Response:  Compliant with care      Plan:  Adhere to treatment plan and monitor Q15 minute safety checks     "

## 2025-04-26 NOTE — PROGRESS NOTES
"Nevaeh Cuba is a 69 y.o. female on day 29 of admission presenting with Psychosis, unspecified psychosis type (Multi).      Subjective   Chart reviewed and case discussed with nursing.    Patient is found sitting in her wheelchair outside of her room this afternoon speaking with a staff member.  She welcomed provider to sit in a chair in the hallway and speak with her.  She reports she is no better than when she arrived one month prior.  She reports she is declining to accept her medications on the premise of not being hungry at that time.  She reports she is soon going to a nursing home in [facility].  She presents with strong characterological features during the encounter often interrupting herself to apologize for being a waste of this provider's time.  She contributes no acute features including denial of thoughts of harming self or others lacking evidence of psychosis and vikram.  She reports her basic needs are able to be met in the community setting and by observation she is not behaving in a way that infringes on the rights of self or others.  She is encouraged to continue accepting medication and work with her primary team on disposition from the hospital.  Patient was asked if she had any questions or concerns and contributed none.       Objective     Last Recorded Vitals  Blood pressure 124/69, pulse 84, temperature 37.4 °C (99.3 °F), temperature source Temporal, resp. rate 16, height 1.676 m (5' 6\"), weight 65.1 kg (143 lb 8.3 oz), SpO2 95%.    Sleep Log  Estimated \"Sleeping\" Durations   Night Est. Hours   04/12 5.50-6.25   04/13 6.00-6.25   04/14 5.25-6.50   04/15 6.00   04/16 7.50-8.50   04/17 8.50-10.25   04/18 9.75-10.25   04/19 7.50-7.75   04/20 6.75-7.00   04/21 7.75-9.00   04/22 6.75-7.50   04/23 7.50-7.75   04/24 7.75-8.00   04/25 5.75-6.00   04/26 0.00        Review of Systems    Psychiatric ROS - Adult  Anxiety: OCD sx increased ruminative thinking per nursing  Depression: cont to " "improve  Delirium: none noted  Psychosis: none elicited this morning  Usha: none noted  Safety Issues: In wheelchair with alarm fo r safety  Medication side effects: Patient denies any adverse side effects    Physical Exam      Mental Status Exam  General: adequately groomed appears stated age  Appearance: casual attire, occasional wincing/grimacing with no appreciated cause for wince/grimace - perhaps baseline facial expressions  Attitude: pleasant  Behavior: controlled  Motor Activity: in wheel chair for safety, moving self about unit with ease in wheelchair  Speech: conversational rate, volume  Mood: \"ill be blunt with you - I dont think its helping\"  Affect:  oddly related  Thought Process: superficial, limited offered content to further opine  Thought Content: limited. denies SI/HI denies paranoia but has cont obsessions to point of delusions(improving)  Thought Perception: denies AH/VH, does not appear internally stimulated  Cognition: alert and oriented x4  Insight: fair with recognition of presence of symptoms (organic vs personality)  Judgement: patient is able to participate in medical decsion making    Psychiatric Risk Assessment  Violence Risk Assessment: major mental  illness  Acute Risk of Harm to Others is Considered: low   Suicide Risk Assessment: patient reports being more hopeful about future in [place]  Protective Factors against Suicide: denies SI/HI, looking forward to going to [place], family support  Acute Risk of Harm to Self is Considered: moderate    Relevant Results               Results for orders placed or performed during the hospital encounter of 03/28/25 (from the past 96 hours)   Comprehensive Metabolic Panel   Result Value Ref Range    Glucose 112 (H) 74 - 99 mg/dL    Sodium 139 136 - 145 mmol/L    Potassium 3.3 (L) 3.5 - 5.3 mmol/L    Chloride 104 98 - 107 mmol/L    Bicarbonate 26 21 - 32 mmol/L    Anion Gap 12 10 - 20 mmol/L    Urea Nitrogen 15 6 - 23 mg/dL    Creatinine 0.61 0.50 - 1.05 " mg/dL    eGFR >90 >60 mL/min/1.73m*2    Calcium 8.8 8.6 - 10.3 mg/dL    Albumin 3.8 3.4 - 5.0 g/dL    Alkaline Phosphatase 66 33 - 136 U/L    Total Protein 6.4 6.4 - 8.2 g/dL    AST 15 9 - 39 U/L    Bilirubin, Total 0.5 0.0 - 1.2 mg/dL    ALT 25 7 - 45 U/L       Assessment & Plan  Psychosis, unspecified psychosis type (Multi)    Anxiety    Insomnia    Hypokalemia    Mild neurocognitive disorder    69-year-old female with history of depression and anxiety concern for psychotic features now hospitalized for a month with subjective report that there is minimal improvement.  With a supervised and safe discharge environment, it is likely patient could discharge with current level of functioning.  Patient is due for discharge early next week per primary team.  Medication administration record reports patient adherence with medications despite patient describing the opposite.    Biological -     Continue current medications as follows:  Luvox 150 mg nightly for anxiety and obsessive features with depression  Trileptal 300 mg twice daily for mood stabilization  Zyprexa 5 mg nightly for potential delusional contributions  Melatonin 3 mg nightly for insomnia    Medical to please co-manage pertinences     Discussion with patient regarding risk benefits and alternatives and side effects with opportunity to ask questions and questions answered.  Patient given consent to the plan as noted    2. Psychological -       Patient is encouraged to participate with the therapeutic milieu and program and group therapy      3. Sociological -      Patient encouraged to cooperate with social work staff on issues relevant to discharge planning    Goals for discharge include:  Psychiatric condition: to lower acute risk, return to baseline level of functioning, work to identify positive coping skills to manage psychiatric symptoms, allow for reconciliation of medications  Physical condition: increase daily physical activity, demonstrate  interest in completing daily activity, and complete Activities of Daily Living  Social function: identify protective factors and family supports, increase social interactions on the unit with peers and providing staff, and demonstrate appropriate problem solving skills for engaging after care on discharge      Current malnutriton diganoses:  Malnutrition Diagnosis: Severe malnutrition related to acute disease or injury        I personally spent 25 minutes today providing care for this patient, including preparation, face to face time, documentation and other services such as review of medical records, diagnostic result, patient education, counseling, coordination of care as specified in the encounter.    Parts of this chart have been completed using voice recognition software.  Please excuse any errors of transcription.  Despite the medical decision making time stamp, my medical decision making has taken place during the patient's entire visit.  Thought process and reason for plan has been formulated from the time that I saw the patient until the time of disposition and is not specific to one specific moment during their visit and furthermore the medical decision making encompasses the entire chart and not only that represented in this note.    Nutrition/Malnutrition:  The diagnosis of malnutrition has significant impact on risk adjustment, mortality and readmission rates, length of stay and hospital reimbursement.  The below is a representation of nutrition status if evaluated.  If blank, there is no associated malnutrition finding to incorporate per the dietician team:    Malnutrition Diagnosis Status: Active  Malnutrition Diagnosis: Severe malnutrition related to acute disease or injury  Related to: decreased ability to consume/tolerate sufficient energy  As Evidenced by: po intake </= 50% estimated needs for >/= 5 days, 16# (8.7%) wt loss over ~2.5 months  I agree with the dietitian's malnutrition diagnosis.          Deandre Humphreys, DO

## 2025-04-26 NOTE — GROUP NOTE
Group Topic: Leisure Skills   Group Date: 4/26/2025  Start Time: 1000  End Time: 1130  Facilitators: EVIN aYdavS   Department: [facility]    Number of Participants: 6   Group Focus: other Leisure Education  Treatment Modality: Other: Recreation Therapy  Interventions utilized were leisure development  Purpose: Session focused on opening discussion on Leisure and the benefits of leisure. Pt.’s then were engaged in filling out a worksheet brainstorming on Leisure Activities and writing down as many activities from A-Z. The group discussed benefits of leisure and barriers to leisure.  Goals:  1. Pt. will identify personal definition of leisure.  2. Pt. will identify 1-3 leisure activities.  3. Pt. engaged in group discussion appropriately and meaningfully    Name: Nevaeh Cuba YOB: 1955   MR: [id number]      Facilitator: Recreational Therapist  Level of Participation: when cued  Quality of Participation: passive, quiet, and withdrawn  Interactions with others:  passive  Mood/Affect: blunted and depressed  Cognition: concrete and processing slowly  Progress: Minimal  Comments: pt problem is delusional thought. She is very passive and had a hard time focusing on topic. She left paper blank and apolgized for not focusing.   Plan: continue with services

## 2025-04-26 NOTE — NURSING NOTE
"TAZ NOTE     Problem:  Depression     Behavior:  Pt telling this nurse repeatedly that she is trespassing. Pt is reassured continuously that she is not but pt will not accept this. Pt asks this nurse to ask another nurse to confirm. When this nurse told pt that her concern was discussed with another nurse pt laughed and said, \"Really? I could have sworn.\". At hs med pass pt states that she is having trouble swallowing pills. This nurse gave her hs meds with yogurt. Pt was able to take them this way easily.    Interventions:  Pt was reoriented  Hs meds were administered.    Response:  Pt calmed. Pt did not remember taking her meds afterwards. She was still asking this nurse if it was time to take them over an hour later.     Plan:  Continue to assess for depression    "

## 2025-04-26 NOTE — CARE PLAN
The patient's goals for the shift include maintain safety    The clinical goals for the shift include maintain safety    Over the shift, the patient did make progress toward the following goals.     Problem: Risk for Suicide  Goal: Makes needs known through verbalization or behaviors this shift  Outcome: Progressing  Goal: No self harm this shift  Outcome: Progressing

## 2025-04-27 VITALS
WEIGHT: 143.52 LBS | TEMPERATURE: 98.2 F | DIASTOLIC BLOOD PRESSURE: 87 MMHG | HEART RATE: 108 BPM | SYSTOLIC BLOOD PRESSURE: 150 MMHG | OXYGEN SATURATION: 98 % | RESPIRATION RATE: 18 BRPM | HEIGHT: 66 IN | BODY MASS INDEX: 23.07 KG/M2

## 2025-04-27 PROCEDURE — 99232 SBSQ HOSP IP/OBS MODERATE 35: CPT | Performed by: PSYCHIATRY & NEUROLOGY

## 2025-04-27 PROCEDURE — 2500000001 HC RX 250 WO HCPCS SELF ADMINISTERED DRUGS (ALT 637 FOR MEDICARE OP): Performed by: REGISTERED NURSE

## 2025-04-27 PROCEDURE — 2500000001 HC RX 250 WO HCPCS SELF ADMINISTERED DRUGS (ALT 637 FOR MEDICARE OP): Performed by: STUDENT IN AN ORGANIZED HEALTH CARE EDUCATION/TRAINING PROGRAM

## 2025-04-27 PROCEDURE — 2500000002 HC RX 250 W HCPCS SELF ADMINISTERED DRUGS (ALT 637 FOR MEDICARE OP, ALT 636 FOR OP/ED): Performed by: REGISTERED NURSE

## 2025-04-27 PROCEDURE — 1140000001 HC PRIVATE PSYCH ROOM DAILY

## 2025-04-27 RX ADMIN — FLUVOXAMINE MALEATE 150 MG: 50 TABLET, COATED ORAL at 20:34

## 2025-04-27 RX ADMIN — OXCARBAZEPINE 300 MG: 300 TABLET, FILM COATED ORAL at 06:13

## 2025-04-27 RX ADMIN — OLANZAPINE 5 MG: 5 TABLET, FILM COATED ORAL at 20:34

## 2025-04-27 RX ADMIN — ATORVASTATIN CALCIUM 20 MG: 20 TABLET, FILM COATED ORAL at 20:34

## 2025-04-27 ASSESSMENT — COLUMBIA-SUICIDE SEVERITY RATING SCALE - C-SSRS
6. HAVE YOU EVER DONE ANYTHING, STARTED TO DO ANYTHING, OR PREPARED TO DO ANYTHING TO END YOUR LIFE?: YES
2. HAVE YOU ACTUALLY HAD ANY THOUGHTS OF KILLING YOURSELF?: YES
2. HAVE YOU ACTUALLY HAD ANY THOUGHTS OF KILLING YOURSELF?: YES
1. IN THE PAST MONTH, HAVE YOU WISHED YOU WERE DEAD OR WISHED YOU COULD GO TO SLEEP AND NOT WAKE UP?: YES
1. IN THE PAST MONTH, HAVE YOU WISHED YOU WERE DEAD OR WISHED YOU COULD GO TO SLEEP AND NOT WAKE UP?: YES
6. HAVE YOU EVER DONE ANYTHING, STARTED TO DO ANYTHING, OR PREPARED TO DO ANYTHING TO END YOUR LIFE?: YES
6. HAVE YOU EVER DONE ANYTHING, STARTED TO DO ANYTHING, OR PREPARED TO DO ANYTHING TO END YOUR LIFE?: YES

## 2025-04-27 ASSESSMENT — PAIN SCALES - GENERAL
PAINLEVEL_OUTOF10: 0 - NO PAIN
PAINLEVEL_OUTOF10: 0 - NO PAIN

## 2025-04-27 ASSESSMENT — PAIN - FUNCTIONAL ASSESSMENT
PAIN_FUNCTIONAL_ASSESSMENT: 0-10
PAIN_FUNCTIONAL_ASSESSMENT: 0-10

## 2025-04-27 NOTE — GROUP NOTE
Group Topic: Feeling Awareness/Expression   Group Date: 4/27/2025  Start Time: 1010  End Time: 1100  Facilitators: EVIN YadavS   Department: [facility]    Number of Participants: 5   Group Focus: feeling awareness/expression  Treatment Modality: Other: Recreation Therapy  Interventions utilized were exploration  Purpose: feelings  Patient will be aware of one's inner self and draw 8 positive pictures reflecting a positive thought or feeling during self-awareness activity.    Name: Nevaeh Cuba YOB: 1955   MR: [id number]      Facilitator: Recreational Therapist  Level of Participation: did not attend  Progress: Minimal  Comments: pt problem is delusional thought. Pt refused groups today, pt focused in being no good.   Plan: continue with services

## 2025-04-27 NOTE — PROGRESS NOTES
"Nevaeh Cuba is a 69 y.o. female on day 30 of admission presenting with Psychosis, unspecified psychosis type (Multi).      Subjective   Chart reviewed and case discussed with nursing.    Increasingly bizarre, seen ambulating around the unit carrying articles of clothing lurking near the exit door vestibule.  Patient uttering repeatedly \"the doors are locked, the doors are locked, the doors are locked\" and \"we're locked in, we're locked in, we can't leave, we can't leave.\"  Informed patient the unit is locked for safety.    Patient otherwise offering limited spontaneous speech provider during encounter during typical encounter questions.  Does not report mood when asked.  Continues to feature facial wince, grimace.  Appears as might be ignoring provider.  Presenting as malodorous however seen ambulating and not using her wheelchair today.  Patient was briefly observed interacting with a peer who was complimenting her ambulation.  Else, provider attempts to engage with patient result in the patient simply walking away from provider down the hallway.  Informed patient not to walk beyond the cafeteria toward the exit doors.  We will add elopement precautions.  Discussed with staff.       Objective     Last Recorded Vitals  Blood pressure 150/87, pulse 108, temperature 36.8 °C (98.2 °F), temperature source Temporal, resp. rate 18, height 1.676 m (5' 6\"), weight 65.1 kg (143 lb 8.3 oz), SpO2 98%.    Sleep Log  Estimated \"Sleeping\" Durations   Night Est. Hours   04/13 6.00-6.25   04/14 5.25-6.50   04/15 6.00   04/16 7.50-8.50   04/17 8.50-10.25   04/18 9.75-10.25   04/19 7.50-7.75   04/20 6.75-7.00   04/21 7.75-9.00   04/22 6.75-7.50   04/23 7.50-7.75   04/24 7.75-8.00   04/25 5.75-6.00   04/26 5.75-6.50   04/27 0.00        Review of Systems    Psychiatric ROS - Adult  Anxiety: OCD sx increased ruminative thinking per nursing  Depression: cont to improve  Delirium: none noted  Psychosis: none elicited this morning  Usha: " none noted  Safety Issues: In wheelchair with alarm fo r safety  Medication side effects: Patient denies any adverse side effects    Physical Exam      Mental Status Exam  General: adequately groomed appears stated age, malodorous  Appearance: casual attire, occasional wincing/grimacing with no appreciated cause for wince/grimace - perhaps baseline facial expressions  Attitude: disengaged  Behavior: controlled, wandering  Motor Activity: ambulating well independently  Speech: near mute - selective?  Mood: not reported when asked  Affect:  oddly related, increasingly bizarre  Thought Process: superficial, limited offered content to further opine  Thought Content: limited speech to opine - focused on exit doors  Thought Perception: denies AH/VH, does not appear internally stimulated  Cognition: alert and oriented x4  Insight: more limited today based on presentation, exit seeking behavior and commentary  Judgement: deteriorating given exit seeking behaviors and commentary    Psychiatric Risk Assessment  Violence Risk Assessment: major mental  illness  Acute Risk of Harm to Others is Considered: low   Suicide Risk Assessment: patient reports being more hopeful about future in [place]  Protective Factors against Suicide: denies SI/HI, looking forward to going to [place], family support  Acute Risk of Harm to Self is Considered: moderate    Relevant Results               Results for orders placed or performed during the hospital encounter of 03/28/25 (from the past 96 hours)   Comprehensive Metabolic Panel   Result Value Ref Range    Glucose 112 (H) 74 - 99 mg/dL    Sodium 139 136 - 145 mmol/L    Potassium 3.3 (L) 3.5 - 5.3 mmol/L    Chloride 104 98 - 107 mmol/L    Bicarbonate 26 21 - 32 mmol/L    Anion Gap 12 10 - 20 mmol/L    Urea Nitrogen 15 6 - 23 mg/dL    Creatinine 0.61 0.50 - 1.05 mg/dL    eGFR >90 >60 mL/min/1.73m*2    Calcium 8.8 8.6 - 10.3 mg/dL    Albumin 3.8 3.4 - 5.0 g/dL    Alkaline Phosphatase 66 33 - 136 U/L     Total Protein 6.4 6.4 - 8.2 g/dL    AST 15 9 - 39 U/L    Bilirubin, Total 0.5 0.0 - 1.2 mg/dL    ALT 25 7 - 45 U/L       Assessment & Plan  Psychosis, unspecified psychosis type (Multi)    Anxiety    Insomnia    Hypokalemia    Mild neurocognitive disorder    69-year-old female with history of depression and anxiety concern for psychotic features now hospitalized for a month with subjective report that there is minimal improvement.  With a supervised and safe discharge environment, it is likely patient could discharge with current level of functioning.  Patient is due for discharge early next week per primary team.  Medication administration record reports patient adherence with medications despite patient describing the opposite.    Biological -     Continue current medications as follows:  Luvox 150 mg nightly for anxiety and obsessive features with depression  Trileptal 300 mg twice daily for mood stabilization  Zyprexa 5 mg nightly for potential delusional contributions  Melatonin 3 mg nightly for insomnia    Initiate elopement precautions - removal of personal garments, to use hospital garments, move to a room closer to nursing station and farther from exit doors    Medical to please co-manage pertinences     Discussion with patient regarding risk benefits and alternatives and side effects with opportunity to ask questions and questions answered.  Patient given consent to the plan as noted    2. Psychological -       Patient is encouraged to participate with the therapeutic milieu and program and group therapy      3. Sociological -      Patient encouraged to cooperate with social work staff on issues relevant to discharge planning    Goals for discharge include:  Psychiatric condition: to lower acute risk, return to baseline level of functioning, work to identify positive coping skills to manage psychiatric symptoms, allow for reconciliation of medications  Physical condition: increase daily physical activity,  demonstrate interest in completing daily activity, and complete Activities of Daily Living  Social function: identify protective factors and family supports, increase social interactions on the unit with peers and providing staff, and demonstrate appropriate problem solving skills for engaging after care on discharge      Current malnutriton diganoses:  Malnutrition Diagnosis: Severe malnutrition related to acute disease or injury        I personally spent 25 minutes today providing care for this patient, including preparation, face to face time, documentation and other services such as review of medical records, diagnostic result, patient education, counseling, coordination of care as specified in the encounter.    Parts of this chart have been completed using voice recognition software.  Please excuse any errors of transcription.  Despite the medical decision making time stamp, my medical decision making has taken place during the patient's entire visit.  Thought process and reason for plan has been formulated from the time that I saw the patient until the time of disposition and is not specific to one specific moment during their visit and furthermore the medical decision making encompasses the entire chart and not only that represented in this note.    Nutrition/Malnutrition:  The diagnosis of malnutrition has significant impact on risk adjustment, mortality and readmission rates, length of stay and hospital reimbursement.  The below is a representation of nutrition status if evaluated.  If blank, there is no associated malnutrition finding to incorporate per the dietician team:     Malnutrition Diagnosis Status: Active  Malnutrition Diagnosis: Severe malnutrition related to acute disease or injury  Related to: decreased ability to consume/tolerate sufficient energy  As Evidenced by: po intake </= 50% estimated needs for >/= 5 days, 16# (8.7%) wt loss over ~2.5 months  I agree with the dietitian's malnutrition  diagnosis.         Deandre Humphreys, DO      Update 1653:  Patient refusing either unwilling or unable to cooperate with elopement precautions including wearing hospital garments instead of personal garments.  Staff expressing concern for patient's refusal to cooperate and potential escalating behaviors to extent sitter will be added at this time.  Will still move room.

## 2025-04-27 NOTE — CARE PLAN
The patient's goals for the shift include DESMOND    The clinical goals for the shift include maintain safety/rest      Problem: Fall/Injury  Goal: Not fall by end of shift  Outcome: Progressing     Problem: Fall/Injury  Goal: Be free from injury by end of the shift  Outcome: Progressing     Problem: Risk for Suicide  Goal: Makes needs known through verbalization or behaviors this shift  Outcome: Progressing     Problem: Risk for Suicide  Goal: No self harm this shift  Outcome: Progressing

## 2025-04-27 NOTE — CARE PLAN
The patient's goals for the shift include maintain safety    The clinical goals for the shift include maintain safety    Over the shift, the patient did not make progress toward the following goals. Barriers to progression include Pt refusing medication. Recommendations to address these barriers include pt education.    Problem: Risk for Suicide  Goal: Accepts medications as prescribed/needed this shift  Outcome: Not Progressing

## 2025-04-27 NOTE — CARE PLAN
The patient's goals for the shift include maintain safety    The clinical goals for the shift include maintain safety    Problem: Risk for Suicide  Goal: Read Safety Guidelines this shift  Outcome: Progressing  Goal: Complete Mental Health Safety Plan (psychiatry only) this shift  Outcome: Progressing     Problem: Discharge Planning - Care Management  Goal: Discharge to post-acute care or home with appropriate resources  Outcome: Progressing     Problem: Fall/Injury  Goal: Not fall by end of shift  Outcome: Progressing  Goal: Be free from injury by end of the shift  Outcome: Progressing     Problem: Pain - Adult  Goal: Verbalizes/displays adequate comfort level or baseline comfort level  Outcome: Progressing     Problem: Discharge Planning  Goal: Discharge to home or other facility with appropriate resources  Outcome: Progressing     Problem: Chronic Conditions and Co-morbidities  Goal: Patient's chronic conditions and co-morbidity symptoms are monitored and maintained or improved  Outcome: Progressing     Problem: Nutrition  Goal: Nutrient intake appropriate for maintaining nutritional needs  Outcome: Progressing     Problem: Risk for Suicide  Goal: Accepts medications as prescribed/needed this shift  Outcome: Progressing  Goal: Identifies supports this shift  Outcome: Progressing  Goal: Makes needs known through verbalization or behaviors this shift  Outcome: Progressing  Goal: No self harm this shift  Outcome: Progressing  Goal: Read Safety Guidelines this shift  Outcome: Progressing  Goal: Complete Mental Health Safety Plan (psychiatry only) this shift  Outcome: Progressing

## 2025-04-27 NOTE — PROGRESS NOTES
[facility]: [facility]  PROGRESS NOTE      Nevaeh Cuba is a 69 y.o. female that is being seen  today for follow-up at [facility]..  Subjective   Patient is being seen for follow-up at [facility]. Patient's blood pressure is fairly controlled with amlodipine.  Patient's lab work reviewed.  Will continue to monitor.      ROS  Negative for fever or chills  Negative for sore throat, ear pain, nasal discharge  Negative for cough, shortness of breath or wheezing  Negative for chest pain, palpitations, swelling of legs  Negative for abdominal pain, constipation, diarrhea, blood in the stools  Negative for urinary complaints  Negative for headache, dizziness, weakness or numbness  Negative for joint pain  Positive for depression or anxiety  All other systems reviewed and were negative   Vitals:    04/26/25 1700   BP: 144/83   Pulse: 88   Resp: 18   Temp: 36.3 °C (97.3 °F)   SpO2: 97%      Vitals:    04/09/25 0634   Weight: 65.1 kg (143 lb 8.3 oz)     Body mass index is 23.16 kg/m².  Physical Exam  Constitutional: Patient does not appear to be in any acute distress  Head and Face: NCAT  Eyes: Normal external exam, EOMI  ENT: Normal external inspection of ears and nose. Oropharynx normal.  Cardiovascular: RRR, S1/S2, no murmurs, rubs, or gallops, radial pulses +2, no edema of extremities  Pulmonary: CTAB, no respiratory distress.  Abdomen: +BS, soft, non-tender, nondistended, no guarding or rebound, no masses noted  MSK: No joint swelling, normal movements of all extremities. Range of motion- normal.  Skin- No lesions, contusions, or erythema.  Peripheral puslses palpable bilaterally 2+  Neuro: AAO X3, Cranial nerves 2-12 grossly intact,DTR 2+ in all 4 limbs       LABS   [unfilled]  Lab Results   Component Value Date    GLUCOSE 112 (H) 04/24/2025    CALCIUM 8.8 04/24/2025     04/24/2025    K 3.3 (L) 04/24/2025    CO2 26 04/24/2025     04/24/2025    BUN 15 04/24/2025    CREATININE 0.61  04/24/2025     Lab Results   Component Value Date    ALT 25 04/24/2025    AST 15 04/24/2025    ALKPHOS 66 04/24/2025    BILITOT 0.5 04/24/2025     Lab Results   Component Value Date    WBC 9.9 03/28/2025    HGB 17.5 (H) 03/28/2025    HCT 48.7 (H) 03/28/2025    MCV 88 03/28/2025     03/28/2025     Lab Results   Component Value Date    CHOL 142 03/29/2025    CHOL 224 (H) 04/12/2024    CHOL 194 10/06/2023     Lab Results   Component Value Date    HDL 59.7 03/29/2025    HDL 58.0 04/12/2024    HDL 58.0 10/06/2023     Lab Results   Component Value Date    LDLCALC 67 03/29/2025    LDLCALC 102 04/12/2024    LDLCALC 88 10/06/2023     Lab Results   Component Value Date    TRIG 77 03/29/2025    TRIG 318 (H) 04/12/2024    TRIG 240 (H) 10/06/2023     Lab Results   Component Value Date    HGBA1C 5.5 06/25/2024     Other labs not included in the list above were reviewed either before or during this encounter.    History    Past Medical History:   Diagnosis Date    Benign essential hypertension     Estrogen deficiency 11/15/2023    DEXA 1/24 back nl, hip neck T-2.1 recheck 1/26    History of atrial fibrillation     Hyperactive thyroid Dr.Burtch Rand [place] NSR no AC    History of breast lift 05/2024    History of Meniere's syndrome 11/15/2023    Hx of reduction mammoplasty 05/2024    Hyperthyroidism 09/15/2023    Other specified abnormal findings of blood chemistry     High serum cholesterol sulfate    Personal history of malignant neoplasm, unspecified     History of malignant neoplasm    Personal history of other mental and behavioral disorders     Unspecified osteoarthritis, unspecified site 04/28/2016    Arthritis     Past Surgical History:   Procedure Laterality Date    COSMETIC SURGERY      HYSTERECTOMY  04/28/2016    Hysterectomy    JOINT REPLACEMENT      rt ring finger    JOINT REPLACEMENT Right 05/2022    ring finger    OTHER SURGICAL HISTORY  04/28/2016    Musculoskeletal Procedures Injection Carpal Tunnel     OTHER SURGICAL HISTORY  04/28/2016    Arthrodesis MCP Joint    OTHER SURGICAL HISTORY  01/11/2021    Ankle surgery    OTHER SURGICAL HISTORY  01/11/2021    Carpal tunnel surgery    OTHER SURGICAL HISTORY  07/06/2022    Nasal septoplasty    OTHER SURGICAL HISTORY  07/2021    heart shock catherization    REDUCTION MAMMAPLASTY  may 7 2024    SHOULDER SURGERY  08/2021    total left shoulder replacement    SINUS SURGERY      TRIGGER FINGER RELEASE Right 01/2024    middle finger     Family History   Problem Relation Name Age of Onset    Stroke Mother      Other (cyst on breast) Mother      Atrial fibrillation Father      Stroke Father      Heart disease Father       Allergies   Allergen Reactions    Latex Itching and Rash    Methotrexate Itching     HEAD BURNING AND ITCHING    Metoprolol Rash    Diltiazem Hcl Itching     Rash face     Latex, Natural Rubber Itching    Pollen Extracts Other     NASAL CONGESTION    Adhesive Tape-Silicones Rash    Folic Acid Rash    Sulfamethoxazole-Trimethoprim Rash     Tinnitus      No current facility-administered medications on file prior to encounter.     Current Outpatient Medications on File Prior to Encounter   Medication Sig Dispense Refill    acetaminophen (Tylenol) 325 mg tablet Take 2 tablets (650 mg) by mouth every 6 hours if needed for mild pain (1 - 3) 20 tablet 0    cholecalciferol (Vitamin D-3) 25 MCG (1000 UT) tablet Take 2 tablets (2,000 Units) by mouth. As directed      docusate sodium (Colace) 100 mg capsule Take 1 capsule (100 mg) by mouth once daily as needed.      fexofenadine HCl (ALLEGRA ORAL) Take 1 tablet by mouth once daily as needed (ALLERGIES).      fLuvoxaMINE (Luvox) 50 mg tablet Take 1 tablet (50 mg) by mouth early in the morning..      hydrOXYzine HCL (Atarax) 25 mg tablet Take 2 tablets (50 mg) by mouth once daily at bedtime for 10 days. 20 tablet 0    ibuprofen 200 mg tablet Take 2 tablets (400 mg) by mouth every 6 hours if needed for mild pain (1 - 3).       mv-min-FA-vit K-lutein-zeaxant (PreserVision AREDS 2 Plus MV) 200 mcg-15 mcg- 5 mg-1 mg capsule Take 1 capsule by mouth once daily.      OXcarbazepine (Trileptal) 150 mg tablet Take 1 tablet (150 mg) by mouth 2 times a day.      propranolol (Inderal) 10 mg tablet Take 1 tablet (10 mg) by mouth every 8 hours if needed for anxiety.      triamterene-hydrochlorothiazid (Maxzide-25) 37.5-25 mg tablet Take 1 tablet by mouth once daily in the morning. 90 tablet 3    [DISCONTINUED] atorvastatin (Lipitor) 20 mg tablet Take 1 tablet (20 mg) by mouth once daily. 90 tablet 3    [DISCONTINUED] methIMAzole (Tapazole) 5 mg tablet Take 1 tablet (5 mg) by mouth once daily. 90 tablet 3    [DISCONTINUED] OXcarbazepine (Trileptal) 300 mg tablet Take 1 tablet (300 mg) by mouth every 12 hours.       Immunization History   Administered Date(s) Administered    COVID-19, subunit, rS-nanoparticle, adjuvanted, PF, 5 mcg/0.5 mL 09/28/2024    Flu vaccine, quadrivalent, high-dose, preservative free, age 65y+ (FLUZONE) 09/14/2023    Influenza, Seasonal, Quadrivalent, Adjuvanted 09/30/2021, 09/08/2022    Influenza, Unspecified 09/07/2010, 10/17/2011    Influenza, injectable, MDCK, quadrivalent 10/23/2017, 10/30/2018    Influenza, injectable, quadrivalent 09/23/2019, 09/11/2020    Influenza, seasonal, injectable 09/24/2012, 10/17/2013, 10/23/2014, 10/26/2015, 11/17/2016    Moderna COVID-19 vaccine, 12 years and older (50mcg/0.5mL)(Spikevax) 09/29/2023    Pfizer COVID-19 vaccine, bivalent, age 12 years and older (30 mcg/0.3 mL) 09/08/2022    Pfizer Gray Cap SARS-CoV-2 03/31/2022    Pfizer Purple Cap SARS-CoV-2 03/09/2021, 04/06/2021, 10/06/2021    Pneumococcal conjugate vaccine, 13-valent (PREVNAR 13) 07/11/2013    Pneumococcal conjugate vaccine, 20-valent (PREVNAR 20) 09/16/2023    Pneumococcal polysaccharide vaccine, 23-valent, age 2 years and older (PNEUMOVAX 23) 11/05/2020    RSV, 60 Years And Older (AREXVY) 09/16/2023    Tdap vaccine, age  7 year and older (BOOSTRIX, ADACEL) 09/30/2021    Zoster vaccine, recombinant, adult (SHINGRIX) 10/15/2020, 09/30/2021    Zoster, live 02/10/2012     Patient's medical history was reviewed and updated either before or during this encounter.  ASSESSMENT / PLAN:  Active Hospital Problems    Mild neurocognitive disorder      Hypokalemia      *Psychosis, unspecified psychosis type (Multi)      Anxiety      Insomnia      Sensorineural hearing loss (SNHL) of both ears      Essential hypertension      Hyperlipidemia      Hyperthyroidism    Patient is being seen for follow-up at [facility].  Anxiety has been stable.   Blood pressure has been fairly controlled with amlodipine.  Will continue to monitor.   Danger to self

## 2025-04-27 NOTE — GROUP NOTE
Group Topic: Leisure Skills   Group Date: 4/27/2025  Start Time: 1100  End Time: 1145  Facilitators: EVIN YadavS   Department: [facility]    Number of Participants: 5   Group Focus: leisure skills  Treatment Modality: Other: Recreation Therapy  Interventions utilized were leisure development  Purpose: other:    Pt. attended session focused on word dice game ''. The game provided an opportunity for the Pt. to attend to task, utilize strategy and social interaction. Pt. was asked to follow word dice rules as well as an adaptation to focus on the now and concentrate on task 10-15 minutes.  Name: Nevaeh Cuba YOB: 1955   MR: [id number]      Facilitator: Recreational Therapist  Level of Participation: did not attend  Progress: Minimal  Comments:  pt problem is delusional thought. Pt refused groups today, pt focused in being no good.   Plan: continue with services

## 2025-04-27 NOTE — GROUP NOTE
Group Topic: Goals   Group Date: 4/26/2025  Start Time: 1955  End Time: 2008  Facilitators: Tanika Quinn   Department: [facility]    Number of Participants: 5   Group Focus: goals  Treatment Modality: Other: PCA  Interventions utilized were reminiscence  Purpose: feelings and communication skills    Name: Nevaeh Cuba YOB: 1955   MR: [id number]      Facilitator: [facility]  Level of Participation: active  Quality of Participation: appropriate/pleasant  Interactions with others: appropriate, gave feedback, and supportive  Mood/Affect: positive  Triggers (if applicable): N/A  Cognition: coherent/clear  Progress: Moderate  Comments: Pt problem is psychosis.   Plan: continue with services

## 2025-04-27 NOTE — NURSING NOTE
"MLP NOTE     Problem:  Anxiety     Behavior:  Pt pacing the unit without wheelchair, touching on doors. Refuses to sit in wheelchair and say \"Im fucked up.\" Refuses morning medication. \"I don't have room for it and you know all the doors locked.\" 1446 Pt stated to this nurse with personal belongings in hand. \"Dr CLOUD is going to see me and say what the hell are you doing in my hospital? Im supposed to be on the opposite end of the door.\" Pt refused PM medication.   1800 pt decided to change into hospital gown after sitting with sitter      Group Participation: Active  Appetite/Meals: Fair       Interventions:  Administer medication as ordered and complete shift assessment.  1625 elopement precaution ordered, pt refused to change into hospital gown. 1654 Sitter initiated. Pt room changed  for safety precautions    Response:  Compliant with care at times, resistant to care. Needs several redirection cues      Plan:  Adhere to treatment plan and monitor Q15 minute safety checks     "

## 2025-04-27 NOTE — NURSING NOTE
"MLP NOTE     Problem:  Depression     Behavior:  Pt is resistant to care with negative responses to everything said during conversations. Pt believes there is no room for her here when she sees other patients getting admitted. She expressed to this RN that she is going to be \"shoved out.\" Pt reports to this RN that she is not able to swallow her medications. She states that the pills are \"too big\" and that water is pretty much the only thing that she can handle swallowing. Pt gave every excuse she could imagine as to why she couldn't take her HS medications. Loose associations noted. Blunted/flat affect noted. Pt fixated on the exit doors.   Group Participation: Does not attend  Appetite/Meals: declined HS snack       Interventions:  1:1 time spent with patient for quite some time. A lot of encouragement offered for patient to take her medications. This RN offered applesauce, pudding, yogurt and fresh water to assist with swallowing her pills. Pt continued to state reasons why she was unable to comply.     Response:  Resistant to care/noncompliant     Plan:  Continue to monitor and assist. Maintain q15 minute rounds for safety.    "

## 2025-04-28 ENCOUNTER — PHARMACY VISIT (OUTPATIENT)
Dept: PHARMACY | Facility: CLINIC | Age: 70
End: 2025-04-28
Payer: COMMERCIAL

## 2025-04-28 LAB
INDURATION: 0 MM
TB SKIN TEST: NEGATIVE

## 2025-04-28 PROCEDURE — 2500000005 HC RX 250 GENERAL PHARMACY W/O HCPCS: Performed by: STUDENT IN AN ORGANIZED HEALTH CARE EDUCATION/TRAINING PROGRAM

## 2025-04-28 PROCEDURE — RXMED WILLOW AMBULATORY MEDICATION CHARGE

## 2025-04-28 PROCEDURE — 2500000001 HC RX 250 WO HCPCS SELF ADMINISTERED DRUGS (ALT 637 FOR MEDICARE OP): Performed by: REGISTERED NURSE

## 2025-04-28 PROCEDURE — 99232 SBSQ HOSP IP/OBS MODERATE 35: CPT | Performed by: REGISTERED NURSE

## 2025-04-28 PROCEDURE — 2500000001 HC RX 250 WO HCPCS SELF ADMINISTERED DRUGS (ALT 637 FOR MEDICARE OP): Performed by: INTERNAL MEDICINE

## 2025-04-28 PROCEDURE — 1140000001 HC PRIVATE PSYCH ROOM DAILY

## 2025-04-28 PROCEDURE — 2500000002 HC RX 250 W HCPCS SELF ADMINISTERED DRUGS (ALT 637 FOR MEDICARE OP, ALT 636 FOR OP/ED): Performed by: REGISTERED NURSE

## 2025-04-28 PROCEDURE — 99233 SBSQ HOSP IP/OBS HIGH 50: CPT | Performed by: INTERNAL MEDICINE

## 2025-04-28 PROCEDURE — 2500000001 HC RX 250 WO HCPCS SELF ADMINISTERED DRUGS (ALT 637 FOR MEDICARE OP): Performed by: STUDENT IN AN ORGANIZED HEALTH CARE EDUCATION/TRAINING PROGRAM

## 2025-04-28 RX ADMIN — AMLODIPINE BESYLATE 5 MG: 5 TABLET ORAL at 08:16

## 2025-04-28 RX ADMIN — OXCARBAZEPINE 300 MG: 300 TABLET, FILM COATED ORAL at 17:50

## 2025-04-28 RX ADMIN — FLUVOXAMINE MALEATE 150 MG: 50 TABLET, COATED ORAL at 21:20

## 2025-04-28 RX ADMIN — METHIMAZOLE 5 MG: 5 TABLET ORAL at 08:15

## 2025-04-28 RX ADMIN — MELATONIN TAB 3 MG 3 MG: 3 TAB at 17:51

## 2025-04-28 RX ADMIN — OLANZAPINE 5 MG: 5 TABLET, FILM COATED ORAL at 21:21

## 2025-04-28 RX ADMIN — Medication 2000 UNITS: at 08:15

## 2025-04-28 RX ADMIN — OXCARBAZEPINE 300 MG: 300 TABLET, FILM COATED ORAL at 08:17

## 2025-04-28 RX ADMIN — ATORVASTATIN CALCIUM 20 MG: 20 TABLET, FILM COATED ORAL at 21:21

## 2025-04-28 ASSESSMENT — PAIN - FUNCTIONAL ASSESSMENT
PAIN_FUNCTIONAL_ASSESSMENT: 0-10

## 2025-04-28 ASSESSMENT — COLUMBIA-SUICIDE SEVERITY RATING SCALE - C-SSRS
4. HAVE YOU HAD THESE THOUGHTS AND HAD SOME INTENTION OF ACTING ON THEM?: NO
1. SINCE LAST CONTACT, HAVE YOU WISHED YOU WERE DEAD OR WISHED YOU COULD GO TO SLEEP AND NOT WAKE UP?: NO
2. HAVE YOU ACTUALLY HAD ANY THOUGHTS OF KILLING YOURSELF?: NO
6. HAVE YOU EVER DONE ANYTHING, STARTED TO DO ANYTHING, OR PREPARED TO DO ANYTHING TO END YOUR LIFE?: CUTTING
1. IN THE PAST MONTH, HAVE YOU WISHED YOU WERE DEAD OR WISHED YOU COULD GO TO SLEEP AND NOT WAKE UP?: YES
6. HAVE YOU EVER DONE ANYTHING, STARTED TO DO ANYTHING, OR PREPARED TO DO ANYTHING TO END YOUR LIFE?: YES
6. HAVE YOU EVER DONE ANYTHING, STARTED TO DO ANYTHING, OR PREPARED TO DO ANYTHING TO END YOUR LIFE?: YES
2. HAVE YOU ACTUALLY HAD ANY THOUGHTS OF KILLING YOURSELF?: YES
6. HAVE YOU EVER DONE ANYTHING, STARTED TO DO ANYTHING, OR PREPARED TO DO ANYTHING TO END YOUR LIFE?: NO
5. HAVE YOU STARTED TO WORK OUT OR WORKED OUT THE DETAILS OF HOW TO KILL YOURSELF? DO YOU INTEND TO CARRY OUT THIS PLAN?: NO

## 2025-04-28 ASSESSMENT — PAIN SCALES - GENERAL
PAINLEVEL_OUTOF10: 0 - NO PAIN

## 2025-04-28 ASSESSMENT — PAIN DESCRIPTION - DESCRIPTORS: DESCRIPTORS: PATIENT UNABLE TO DESCRIBE

## 2025-04-28 ASSESSMENT — ACTIVITIES OF DAILY LIVING (ADL): EFFECT OF PAIN ON DAILY ACTIVITIES: MINIMAL

## 2025-04-28 NOTE — GROUP NOTE
Group Topic: Reminiscence   Group Date: 4/28/2025  Start Time: 1330  End Time: 1430  Facilitators: SUSAN Oreilly   Department: [facility]    Number of Participants: 8   Group Focus: other Let's Talk  Treatment Modality: Other: Recreation Therapy  Interventions utilized were reminiscence and story telling  Purpose: feelings and other: fun, elevate mood, increase socialization    Name: Nevaeh Cuba YOB: 1955   MR: [id number]      Facilitator: Recreational Therapist  Level of Participation: minimal  Quality of Participation: passive and quiet  Interactions with others:  passive  Mood/Affect:  passive  Triggers (if applicable): n/a  Cognition:  passive  Progress: None  Comments: pt problem is delusional thought.  Pt continues to display passive participation in group.   Plan: continue with services

## 2025-04-28 NOTE — GROUP NOTE
Group Topic: Goals   Group Date: 4/27/2025  Start Time: 2002  End Time: 2016  Facilitators: Tanika Quinn   Department: [facility]     Number of Participants: 6   Group Focus: goals  Treatment Modality: Other: PCA  Interventions utilized were reminiscence  Purpose: feelings and communication skills    Name: Nevaeh Cuba YOB: 1955   MR: [id number]      Facilitator: Mental [facility]  Level of Participation: did not attend  Quality of Participation:  did not attend  Interactions with others:  did not attend  Mood/Affect:  did not attend  Triggers (if applicable): N/A  Cognition:  did not attend  Progress: Other  Comments: Pt problem is psychosis. Pt declined the invitation to attend group.  Plan: continue with services

## 2025-04-28 NOTE — PROGRESS NOTES
[facility]: [facility]  PROGRESS NOTE      Nevaeh Cuba is a 69 y.o. female that is being seen  today for follow-up at [facility]..  Subjective   Patient is being seen for follow-up at [facility]. Patient's blood pressure is fairly controlled with amlodipine.  Patient's lab work reviewed.  Will continue to monitor.      ROS  Negative for fever or chills  Negative for sore throat, ear pain, nasal discharge  Negative for cough, shortness of breath or wheezing  Negative for chest pain, palpitations, swelling of legs  Negative for abdominal pain, constipation, diarrhea, blood in the stools  Negative for urinary complaints  Negative for headache, dizziness, weakness or numbness  Negative for joint pain  Positive for depression or anxiety  All other systems reviewed and were negative   Vitals:    04/28/25 0500   BP: (!) 163/113   Pulse: (!) 112   Resp: 18   Temp: 36.5 °C (97.7 °F)   SpO2: 97%      Vitals:    04/09/25 0634   Weight: 65.1 kg (143 lb 8.3 oz)     Body mass index is 23.16 kg/m².  Physical Exam  Constitutional: Patient does not appear to be in any acute distress  Head and Face: NCAT  Eyes: Normal external exam, EOMI  ENT: Normal external inspection of ears and nose. Oropharynx normal.  Cardiovascular: RRR, S1/S2, no murmurs, rubs, or gallops, radial pulses +2, no edema of extremities  Pulmonary: CTAB, no respiratory distress.  Abdomen: +BS, soft, non-tender, nondistended, no guarding or rebound, no masses noted  MSK: No joint swelling, normal movements of all extremities. Range of motion- normal.  Skin- No lesions, contusions, or erythema.  Peripheral puslses palpable bilaterally 2+  Neuro: AAO X3, Cranial nerves 2-12 grossly intact,DTR 2+ in all 4 limbs       LABS   [unfilled]  Lab Results   Component Value Date    GLUCOSE 112 (H) 04/24/2025    CALCIUM 8.8 04/24/2025     04/24/2025    K 3.3 (L) 04/24/2025    CO2 26 04/24/2025     04/24/2025    BUN 15 04/24/2025    CREATININE  0.61 04/24/2025     Lab Results   Component Value Date    ALT 25 04/24/2025    AST 15 04/24/2025    ALKPHOS 66 04/24/2025    BILITOT 0.5 04/24/2025     Lab Results   Component Value Date    WBC 9.9 03/28/2025    HGB 17.5 (H) 03/28/2025    HCT 48.7 (H) 03/28/2025    MCV 88 03/28/2025     03/28/2025     Lab Results   Component Value Date    CHOL 142 03/29/2025    CHOL 224 (H) 04/12/2024    CHOL 194 10/06/2023     Lab Results   Component Value Date    HDL 59.7 03/29/2025    HDL 58.0 04/12/2024    HDL 58.0 10/06/2023     Lab Results   Component Value Date    LDLCALC 67 03/29/2025    LDLCALC 102 04/12/2024    LDLCALC 88 10/06/2023     Lab Results   Component Value Date    TRIG 77 03/29/2025    TRIG 318 (H) 04/12/2024    TRIG 240 (H) 10/06/2023     Lab Results   Component Value Date    HGBA1C 5.5 06/25/2024     Other labs not included in the list above were reviewed either before or during this encounter.    History    Past Medical History:   Diagnosis Date    Benign essential hypertension     Estrogen deficiency 11/15/2023    DEXA 1/24 back nl, hip neck T-2.1 recheck 1/26    History of atrial fibrillation     Hyperactive thyroid Dr.Burtch Rand [place] NSR no AC    History of breast lift 05/2024    History of Meniere's syndrome 11/15/2023    Hx of reduction mammoplasty 05/2024    Hyperthyroidism 09/15/2023    Other specified abnormal findings of blood chemistry     High serum cholesterol sulfate    Personal history of malignant neoplasm, unspecified     History of malignant neoplasm    Personal history of other mental and behavioral disorders     Unspecified osteoarthritis, unspecified site 04/28/2016    Arthritis     Past Surgical History:   Procedure Laterality Date    COSMETIC SURGERY      HYSTERECTOMY  04/28/2016    Hysterectomy    JOINT REPLACEMENT      rt ring finger    JOINT REPLACEMENT Right 05/2022    ring finger    OTHER SURGICAL HISTORY  04/28/2016    Musculoskeletal Procedures Injection Carpal  Tunnel    OTHER SURGICAL HISTORY  04/28/2016    Arthrodesis MCP Joint    OTHER SURGICAL HISTORY  01/11/2021    Ankle surgery    OTHER SURGICAL HISTORY  01/11/2021    Carpal tunnel surgery    OTHER SURGICAL HISTORY  07/06/2022    Nasal septoplasty    OTHER SURGICAL HISTORY  07/2021    heart shock catherization    REDUCTION MAMMAPLASTY  may 7 2024    SHOULDER SURGERY  08/2021    total left shoulder replacement    SINUS SURGERY      TRIGGER FINGER RELEASE Right 01/2024    middle finger     Family History   Problem Relation Name Age of Onset    Stroke Mother      Other (cyst on breast) Mother      Atrial fibrillation Father      Stroke Father      Heart disease Father       Allergies   Allergen Reactions    Latex Itching and Rash    Methotrexate Itching     HEAD BURNING AND ITCHING    Metoprolol Rash    Diltiazem Hcl Itching     Rash face     Latex, Natural Rubber Itching    Pollen Extracts Other     NASAL CONGESTION    Adhesive Tape-Silicones Rash    Folic Acid Rash    Sulfamethoxazole-Trimethoprim Rash     Tinnitus      No current facility-administered medications on file prior to encounter.     Current Outpatient Medications on File Prior to Encounter   Medication Sig Dispense Refill    acetaminophen (Tylenol) 325 mg tablet Take 2 tablets (650 mg) by mouth every 6 hours if needed for mild pain (1 - 3) 20 tablet 0    cholecalciferol (Vitamin D-3) 25 MCG (1000 UT) tablet Take 2 tablets (2,000 Units) by mouth. As directed      docusate sodium (Colace) 100 mg capsule Take 1 capsule (100 mg) by mouth once daily as needed.      fexofenadine HCl (ALLEGRA ORAL) Take 1 tablet by mouth once daily as needed (ALLERGIES).      fLuvoxaMINE (Luvox) 50 mg tablet Take 1 tablet (50 mg) by mouth early in the morning..      hydrOXYzine HCL (Atarax) 25 mg tablet Take 2 tablets (50 mg) by mouth once daily at bedtime for 10 days. 20 tablet 0    ibuprofen 200 mg tablet Take 2 tablets (400 mg) by mouth every 6 hours if needed for mild pain  (1 - 3).      mv-min-FA-vit K-lutein-zeaxant (PreserVision AREDS 2 Plus MV) 200 mcg-15 mcg- 5 mg-1 mg capsule Take 1 capsule by mouth once daily.      OXcarbazepine (Trileptal) 150 mg tablet Take 1 tablet (150 mg) by mouth 2 times a day.      propranolol (Inderal) 10 mg tablet Take 1 tablet (10 mg) by mouth every 8 hours if needed for anxiety.      triamterene-hydrochlorothiazid (Maxzide-25) 37.5-25 mg tablet Take 1 tablet by mouth once daily in the morning. 90 tablet 3    [DISCONTINUED] atorvastatin (Lipitor) 20 mg tablet Take 1 tablet (20 mg) by mouth once daily. 90 tablet 3    [DISCONTINUED] methIMAzole (Tapazole) 5 mg tablet Take 1 tablet (5 mg) by mouth once daily. 90 tablet 3    [DISCONTINUED] OXcarbazepine (Trileptal) 300 mg tablet Take 1 tablet (300 mg) by mouth every 12 hours.       Immunization History   Administered Date(s) Administered    COVID-19, subunit, rS-nanoparticle, adjuvanted, PF, 5 mcg/0.5 mL 09/28/2024    Flu vaccine, quadrivalent, high-dose, preservative free, age 65y+ (FLUZONE) 09/14/2023    Influenza, Seasonal, Quadrivalent, Adjuvanted 09/30/2021, 09/08/2022    Influenza, Unspecified 09/07/2010, 10/17/2011    Influenza, injectable, MDCK, quadrivalent 10/23/2017, 10/30/2018    Influenza, injectable, quadrivalent 09/23/2019, 09/11/2020    Influenza, seasonal, injectable 09/24/2012, 10/17/2013, 10/23/2014, 10/26/2015, 11/17/2016    Moderna COVID-19 vaccine, 12 years and older (50mcg/0.5mL)(Spikevax) 09/29/2023    Pfizer COVID-19 vaccine, bivalent, age 12 years and older (30 mcg/0.3 mL) 09/08/2022    Pfizer Gray Cap SARS-CoV-2 03/31/2022    Pfizer Purple Cap SARS-CoV-2 03/09/2021, 04/06/2021, 10/06/2021    Pneumococcal conjugate vaccine, 13-valent (PREVNAR 13) 07/11/2013    Pneumococcal conjugate vaccine, 20-valent (PREVNAR 20) 09/16/2023    Pneumococcal polysaccharide vaccine, 23-valent, age 2 years and older (PNEUMOVAX 23) 11/05/2020    RSV, 60 Years And Older (AREXVY) 09/16/2023    Tdap  vaccine, age 7 year and older (BOOSTRIX, ADACEL) 09/30/2021    Zoster vaccine, recombinant, adult (SHINGRIX) 10/15/2020, 09/30/2021    Zoster, live 02/10/2012     Patient's medical history was reviewed and updated either before or during this encounter.  ASSESSMENT / PLAN:  Active Hospital Problems    Mild neurocognitive disorder      Hypokalemia      *Psychosis, unspecified psychosis type (Multi)      Anxiety      Insomnia      Sensorineural hearing loss (SNHL) of both ears      Essential hypertension      Hyperlipidemia      Hyperthyroidism    Patient is being seen for follow-up at [facility].  Anxiety has been stable.   Blood pressure has been fairly controlled with amlodipine.  Will continue to monitor.

## 2025-04-28 NOTE — PROGRESS NOTES
"Nevaeh Cuba is a 69 y.o. female on day 31 of admission presenting with Psychosis, unspecified psychosis type (Multi).      Subjective   Case discussed in morning team and chart reviewed. Patient is shwoing signs of being ready to go to SNF. She says she wants lul out of the hospital.  Sitter was discontinued as she has been redirectable when she gets near unit doors and is complying with boundary set by staff.  She is ambulating well without a walker and interacts readily with provider. Sleep was decreased last night- it may be secondary toher excitement about leaving the hospital which we thought might have been today or tomorrow but has been set for Wednesday after family has set up her new apartment.       Objective     Last Recorded Vitals  Blood pressure (!) 163/113, pulse (!) 112, temperature 36.5 °C (97.7 °F), temperature source Temporal, resp. rate 18, height 1.676 m (5' 6\"), weight 65.1 kg (143 lb 8.3 oz), SpO2 97%.    Sleep Log  Estimated \"Sleeping\" Durations   Night Est. Hours   04/14 5.25-6.50   04/15 6.00   04/16 7.50-8.50   04/17 8.50-10.25   04/18 9.75-10.25   04/19 7.50-7.75   04/20 6.75-7.00   04/21 7.75-9.00   04/22 6.75-7.50   04/23 7.50-7.75   04/24 7.75-8.00   04/25 5.75-6.00   04/26 5.75-6.50   04/27 2.50-3.00   04/28 0.00        Review of Systems    Psychiatric ROS - Adult  Anxiety:  ruminative thinking per nursing(improving)  Depression: cont to improve  Delirium: none noted  Psychosis: none elicited this morning  Usha: none noted  Safety Issues: denies SI/HI  Medication side effects: Patient denies any adverse side effects    Physical Exam      Mental Status Exam  General: adequately groomed appears stated age, malodorous  Appearance: hospital attire  Attitude: more engaged  Behavior: controlled, wandering, responds easily t oredirection  Motor Activity: ambulating well independently  Speech: slow rate and low volume but engages readily with provider  Mood: not reported when " asked  Affect:  oddly related, more open  Thought Process: more organized but memory deficits apparent  Thought Content: less ruminative thinking verbally expressed  Thought Perception: denies AH/VH, does not appear internally stimulated  Cognition: alert and oriented x4  Insight: patient cont to ask about meds but does accept them  Judgement: patient has agreed to placement in SNF  Psychiatric Risk Assessment  Violence Risk Assessment: major mental  illness  Acute Risk of Harm to Others is Considered: low   Suicide Risk Assessment: patient reports being more hopeful about future in AL  Protective Factors against Suicide: denies SI/HI, looking forward to going to AL, family support  Acute Risk of Harm to Self is Considered: moderate                  Assessment & Plan  Psychosis, unspecified psychosis type (Multi)    Anxiety    Insomnia    Hypokalemia    Mild neurocognitive disorder    69-year-old female with history of depression and anxiety concern for psychotic features now hospitalized for a month with subjective report that there is minimal improvement.  With a supervised and safe discharge environment, it is likely patient could discharge with current level of functioning.  Patient is due for discharge early next week per primary team.  Medication administration record reports patient adherence with medications despite patient describing the opposite.     Biological -      Continue current medications as follows:  Luvox 150 mg nightly for anxiety and obsessive features with depression  Trileptal 300 mg twice daily for mood stabilization  Zyprexa 5 mg nightly for potential delusional contributions  Melatonin 3 mg nightly for insomnia     Initiate elopement precautions - removal of personal garments, to use hospital garments, move to a room closer to nursing station and farther from exit doors     Medical to please co-manage pertinences      Discussion with patient regarding risk benefits and alternatives and  side effects with opportunity to ask questions and questions answered.  Patient given consent to the plan as noted     2. Psychological -        Patient is encouraged to participate with the therapeutic milieu and program and group therapy        3. Sociological -       Patient encouraged to cooperate with social work staff on issues relevant to discharge planning- will discharge on Wednesday to [facility]     Goals for discharge include:  Psychiatric condition: to lower acute risk, return to baseline level of functioning, work to identify positive coping skills to manage psychiatric symptoms, allow for reconciliation of medications  Physical condition: increase daily physical activity, demonstrate interest in completing daily activity, and complete Activities of Daily Living  Social function: identify protective factors and family supports, increase social interactions on the unit with peers and providing staff, and demonstrate appropriate problem solving skills for engaging after care on discharge        Current malnutriton diganoses:  Malnutrition Diagnosis: Severe malnutrition related to acute disease or injury           I personally spent 25 minutes today providing care for this patient, including preparation, face to face time, documentation and other services such as review of medical records, diagnostic result, patient education, counseling, coordination of care as specified in the encounter.     Parts of this chart have been completed using voice recognition software.  Please excuse any errors of transcription.  Despite the medical decision making time stamp, my medical decision making has taken place during the patient's entire visit.  Thought process and reason for plan has been formulated from the time that I saw the patient until the time of disposition and is not specific to one specific moment during their visit and furthermore the medical decision making encompasses the entire chart and not only that  represented in this note.     Nutrition/Malnutrition:  The diagnosis of malnutrition has significant impact on risk adjustment, mortality and readmission rates, length of stay and hospital reimbursement.  The below is a representation of nutrition status if evaluated.  If blank, there is no associated malnutrition finding to incorporate per the dietician team:  Malnutrition Diagnosis Status: Active  Malnutrition Diagnosis: Severe malnutrition related to acute disease or injury  Related to: decreased ability to consume/tolerate sufficient energy  As Evidenced by: po intake </= 50% estimated needs for >/= 5 days, 16# (8.7%) wt loss over ~2.5 months  I agree with the dietitian's malnutrition diagnosis.    Current malnutriton diganoses:  Malnutrition Diagnosis: Severe malnutrition related to acute disease or injury        I spent 25 minutes in the professional and overall care of this patient.      Elva Murcia, KEHINDE-CNS

## 2025-04-28 NOTE — GROUP NOTE
Group Topic: Goals   Group Date: 4/28/2025  Start Time: 0730  End Time: 0800  Facilitators: Hiram Mckinney   Department: [facility]     Number of Participants: 4   Group Focus: goals  Treatment Modality: Patient-Centered Therapy  Interventions utilized were assignment  Purpose: coping skills    Name: Nevaeh Cuba YOB: 1955   MR: [id number]      Facilitator: [facility]  Level of Participation: moderate  Quality of Participation: appropriate/pleasant  Interactions with others: appropriate  Mood/Affect: appropriate  Triggers (if applicable): n/a  Cognition: coherent/clear  Progress: Moderate  Comments: pt attended group and worked on assignment..   Plan: continue with services

## 2025-04-28 NOTE — PROGRESS NOTES
LUISW spoke with AL who reports that family is having furniture delivered tomorrow by 1pm. It was determined that discharge would be best scheduled for Wednesday and time of 10:30am for discharge was decided upon, as best for hospital and AL. However when talking with pt's sister she states that the rest of pt's furniture is scheduled to be moved in on Wednesday morning with the latest being noon. LSW will reach out to AL and see if they are able to accommodate this time change. LSW provided update on pt's ambulation and current disposition.     MARCELA Corley

## 2025-04-28 NOTE — PROGRESS NOTES
"Nutrition Follow up Note    Nutrition Assessment      Plan for discharge to [facility].     Nutrition History:  Energy Intake: Poor < 50 %  Food and Nutrient History: no changes in po intake. continues to eat very little. po intake averages 23% over the past 10 meals documented.    Anthropometrics:  Ht: 167.6 cm (5' 6\"), Wt: 65.1 kg (143 lb 8.3 oz), BMI: 23.18    Weight Change:  Daily Weight  04/09/25 : 65.1 kg (143 lb 8.3 oz) - pt questions accuracy of this wt but states she does not know her current wt.   03/28/25 : 75.8 kg (167 lb)  03/21/25 : 76 kg (167 lb 8.8 oz)  03/14/25 : 76.2 kg (168 lb)  01/06/25 : 83 kg (183 lb)  10/16/24 : 83 kg (183 lb)  09/20/24 : 83.9 kg (185 lb)  08/04/24 : 83 kg (182 lb 15.7 oz)  08/02/24 : 82.6 kg (182 lb 3.2 oz)  07/24/24 : 83.5 kg (184 lb 1.4 oz)     Weight History / % Weight Change: per wt hx, pt with a 16# (8.1%) wt loss over ~2.5 months (5/24/24-8/2/24) followed by stable wt from Aug 2024-Jan 2025. most recently pt with an additional 16# (8.7%) wt loss over the past ~2.5 months (1/6-3/28). updated wt of 143# from 4/9/25. if accurate, pt with a 24# (14.4%) wt loss over ~1.5 weeks (3/38-4/9) and an overall 40# (21.8%) wt loss over the past ~3 months (1/6-4/9).  Significant Weight Loss: Yes    Nutrition Focused Physical Exam Findings: defer: not appropriate     Nutrition Significant Labs:  Lab Results   Component Value Date    WBC 9.9 03/28/2025    HGB 17.5 (H) 03/28/2025    HCT 48.7 (H) 03/28/2025     03/28/2025    CHOL 142 03/29/2025    TRIG 77 03/29/2025    HDL 59.7 03/29/2025    ALT 25 04/24/2025    AST 15 04/24/2025     04/24/2025    K 3.3 (L) 04/24/2025     04/24/2025    CREATININE 0.61 04/24/2025    BUN 15 04/24/2025    CO2 26 04/24/2025    TSH 3.26 03/07/2025    INR 1.0 10/05/2021    GLUF 134 (H) 03/29/2025    HGBA1C 5.5 06/25/2024     Nutrition Specific Medications:  Scheduled Medications[1]  Continuous Medications[2]    Dietary Orders (From admission, onward) "       Start     Ordered    04/07/25 1250  Oral nutritional supplements  Until discontinued        Question Answer Comment   Deliver with Breakfast    Deliver with Dinner    Select supplement: Ensure Plus High Protein        04/07/25 1250    03/28/25 2141  Adult diet Regular  Diet effective now        Comments: Low fat, Low Calorie   Question:  Diet type  Answer:  Regular    03/28/25 2141                     Estimated Needs:   Estimated Energy Needs  Total Energy Estimated Needs in 24 hours (kCal): 1950 kCal  Energy Estimated Needs per kg Body Weight in 24 hours (kCal/kg): 30 kCal/kg  Method for Estimating Needs: actual wt    Estimated Protein Needs  Total Protein Estimated Needs in 24 Hours (g): 78 g  Protein Estimated Needs per kg Body Weight in 24 Hours (g/kg): 1.2 g/kg  Method for Estimating 24 Hour Protein Needs: actual wt    Estimated Fluid Needs  Method for Estimating 24 Hour Fluid Needs: 1 ml/kcal or per MD        Nutrition Diagnosis   Nutrition Diagnosis:  Malnutrition Diagnosis  Patient has Malnutrition Diagnosis: Yes  Diagnosis Status: Active  Malnutrition Diagnosis: Severe malnutrition related to acute disease or injury  Related to: decreased ability to consume/tolerate sufficient energy  As Evidenced by: po intake </= 50% estimated needs for >/= 5 days, 16# (8.7%) wt loss over ~2.5 months       Nutrition Interventions/Recommendations   Nutrition Interventions and Recommendations:  Nutrition Prescription: Nutrition prescription for oral nutrition    Nutrition Recommendations:  Individualized Nutrition Prescription Provided for : regular diet with ensure plus high protein BID    Nutrition Interventions/Goals:   Food and/or Nutrient Delivery Interventions  Interventions: Meals and snacks, Medical food supplement  Meals and Snacks: General healthful diet  Goal: provide as ordered  Medical Food Supplement: Commercial beverage medical food supplement therapy  Goal: ensure plus high protein BID to provide 350  kcals and 20g protein each  Additional Interventions: suggest monitoring for wt changes on a weekly basis    Education Documentation  No documentation found.           Nutrition Monitoring and Evaluation   Monitoring/Evaluation:   Food/Nutrient Related History Monitoring  Monitoring and Evaluation Plan: Estimated Energy Intake  Estimated Energy Intake: Energy intake greater or equal to 75% of estimated energy needs    Anthropometric Measurements  Monitoring and Evaluation Plan: Body weight  Body Weight: Body weight - Maintain stable weight    Goal Status: Some digression away from goal(s)    Follow Up  Time Spent (min): 20 minutes  Last Date of Nutrition Visit: 04/28/25  Nutrition Follow-Up Needed?: 3-5 days  Follow up Comment: 5/2/25          [1] amLODIPine, 5 mg, oral, Daily  atorvastatin, 20 mg, oral, Daily  cholecalciferol, 2,000 Units, oral, Daily  fLuvoxaMINE, 150 mg, oral, Nightly  melatonin, 3 mg, oral, Daily  methIMAzole, 5 mg, oral, Daily  OLANZapine, 5 mg, oral, Nightly  OXcarbazepine, 300 mg, oral, q12h CAROLINE  polyethylene glycol, 17 g, oral, Daily  tuberculin, 5 Units, intradermal, During hospitalization     [2]

## 2025-04-28 NOTE — NURSING NOTE
TAZ NOTE     Problem:  Pt. Is continuing their journey on working to improve their anxiety & depressive Sx.     Behavior:  Pt. Is cooperative w/ Tx. Plan and has been agreeable to talking w/ staff & peers alike. Pt. Has been able to maintain safety. Pt. Is quite anxious regarding her vision & requests to have her vision checked.  Appetite/Meals: good        Interventions:  Pt. Was offered groups and their scheduled medications.     Response:  Pt. Has been compliant w/ meds, tolerated them well; and did attend the majority of group sessions w/ good results.     Plan:  Pt. will continue to be monitored for changes in mental status & safety on the unit.

## 2025-04-28 NOTE — CARE PLAN
"The patient's goals for the shift include \"Not sure\"    The clinical goals for the shift include safety    Over the shift, the patient did make progress toward the following goals. Barriers to progression include moderate anxiety. Recommendations to address these barriers include small positive encouragements.      Problem: Fall/Injury  Goal: Not fall by end of shift  Outcome: Progressing     "

## 2025-04-28 NOTE — NURSING NOTE
"TAZ NOTE     Problem:  Anxiety     Behavior:  Pt is ambulating in the leija, socializing with staff, laughing. When she is not walking the halls she is observed standing at entrance of her room looking around leija. When this nurse makes eye contact with her she smiles. She is persistently asking what it is she is suppose to be doing. 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 I don't know what I'm doing.\" She also attempted to go behind nurse's station a few times and was redirected.        Interventions:  Pt redirected   Hs meds administered    Response:  Pt takes redirection well but then continues her behaviors.     Plan:  Continue to assess for anxiety    "

## 2025-04-28 NOTE — GROUP NOTE
Group Topic: Self-Care/Wellness   Group Date: 4/28/2025  Start Time: 1000  End Time: 1100  Facilitators: SUSAN Oreilly   Department: [facility]    Number of Participants: 12   Group Focus: other Healthy Living Group  Treatment Modality: Other: Recreation Therapy  Interventions utilized were exploration, group exercise, patient education, problem solving, and support  Purpose: coping skills, maladaptive thinking, feelings, irrational fears, communication skills, insight or knowledge, self-worth, self-care, relapse prevention strategies, and trigger / craving management    Name: Nevaeh Cuba YOB: 1955   MR: [id number]      Facilitator: Recreational Therapist  Level of Participation: minimal  Quality of Participation: passive and quiet  Interactions with others:  passive  Mood/Affect:  passive  Triggers (if applicable): n/a  Cognition:  passive  Progress: Minimal  Comments: pt problem is delusional thought.  Pt joins group late after engaging with other unit staff.  Passive with participation.  Stands at the doorway and only sits at the table with much encouragement.    Plan: continue with services

## 2025-04-28 NOTE — PROGRESS NOTES
LSW contacted [facility] to inquire about the status of pt admitting to facility. TB was completed and is negative with results sent to [facility]. Medications were filled with 30 day supply ready to send with pt upon discharge. Pt has been ambulatory walking without any DME. With this pt has also been exit seeking, which likely is due to her cognition and her desire to discharge. LSW will wait to hear from facility.     Haydee Christensen, MSW LSW

## 2025-04-29 PROCEDURE — 2500000001 HC RX 250 WO HCPCS SELF ADMINISTERED DRUGS (ALT 637 FOR MEDICARE OP): Performed by: INTERNAL MEDICINE

## 2025-04-29 PROCEDURE — 2500000001 HC RX 250 WO HCPCS SELF ADMINISTERED DRUGS (ALT 637 FOR MEDICARE OP): Performed by: STUDENT IN AN ORGANIZED HEALTH CARE EDUCATION/TRAINING PROGRAM

## 2025-04-29 PROCEDURE — 99232 SBSQ HOSP IP/OBS MODERATE 35: CPT | Performed by: REGISTERED NURSE

## 2025-04-29 PROCEDURE — 1140000001 HC PRIVATE PSYCH ROOM DAILY

## 2025-04-29 PROCEDURE — 99233 SBSQ HOSP IP/OBS HIGH 50: CPT | Performed by: INTERNAL MEDICINE

## 2025-04-29 RX ORDER — FLUVOXAMINE MALEATE 50 MG/1
75 TABLET, FILM COATED ORAL 2 TIMES DAILY
Qty: 90 TABLET | Refills: 0 | Status: SHIPPED | OUTPATIENT
Start: 2025-04-29

## 2025-04-29 RX ORDER — FLUVOXAMINE MALEATE 50 MG/1
75 TABLET, FILM COATED ORAL 2 TIMES DAILY
Status: DISCONTINUED | OUTPATIENT
Start: 2025-04-29 | End: 2025-04-30 | Stop reason: HOSPADM

## 2025-04-29 RX ADMIN — Medication 2000 UNITS: at 10:03

## 2025-04-29 RX ADMIN — AMLODIPINE BESYLATE 5 MG: 5 TABLET ORAL at 10:04

## 2025-04-29 RX ADMIN — METHIMAZOLE 5 MG: 5 TABLET ORAL at 10:03

## 2025-04-29 ASSESSMENT — PAIN DESCRIPTION - DESCRIPTORS: DESCRIPTORS: PATIENT UNABLE TO DESCRIBE

## 2025-04-29 ASSESSMENT — PAIN - FUNCTIONAL ASSESSMENT
PAIN_FUNCTIONAL_ASSESSMENT: 0-10
PAIN_FUNCTIONAL_ASSESSMENT: 0-10

## 2025-04-29 ASSESSMENT — PAIN SCALES - GENERAL
PAINLEVEL_OUTOF10: 0 - NO PAIN
PAINLEVEL_OUTOF10: 0 - NO PAIN

## 2025-04-29 ASSESSMENT — ACTIVITIES OF DAILY LIVING (ADL): EFFECT OF PAIN ON DAILY ACTIVITIES: MINIMAL

## 2025-04-29 NOTE — CARE PLAN
The patient's goals for the shift include to have better quality of air    The clinical goals for the shift include no slips and or falls

## 2025-04-29 NOTE — NURSING NOTE
TAZ NOTE     Problem:  Pt. Is continuing their journey on working to improve their anxiety & depressive Sx.     Behavior:  Pt. Is cooperative w/ Tx. Plan and has been agreeable to talking w/ staff. Pt. Has been able to maintain safety. Pt. Has refused all medications today; she's convinced they have led to her eye sight deficit.   Appetite/Meals: good        Interventions:  Pt. Was offered groups and their scheduled medications.     Response:  Pt. Has been compliant w/ meds, tolerated them well; and did attend the majority of group sessions w/ good results.     Plan:  Pt. will continue to be monitored for changes in mental status & safety on the unit.

## 2025-04-29 NOTE — CARE PLAN
The patient's goals for the shift include to have better quality of air    The clinical goals for the shift include no slips and or falls    Over the shift, the patient did make progress toward the following goals. Barriers to progression include failure to consistently follow tx. plan. Recommendations to address these barriers include encouragement, and direction.      Problem: Fall/Injury  Goal: Not fall by end of shift  Outcome: Progressing     Problem: Fall/Injury  Goal: Be free from injury by end of the shift  Outcome: Progressing

## 2025-04-29 NOTE — PROGRESS NOTES
"Nevaeh Cuba is a 69 y.o. female on day 32 of admission presenting with Psychosis, unspecified psychosis type (Multi).      Subjective   Case discussed in morning team and chart reviewed. Patient shows some confusion today and expresses dibelief that she is actually going to be discharged tomorrow. She was balking at taking medications and says she is having some blurring of vision-it is recommended that she have an eye exam after discharge as luvox can have adverse side effects with vision but patient obsessive symptoms have improved on current dose which is a moderate dose for OCD. Will divide dosage to 75mg bid.       Objective     Last Recorded Vitals  Blood pressure 164/80, pulse 97, temperature 36.9 °C (98.4 °F), temperature source Temporal, resp. rate 18, height 1.676 m (5' 6\"), weight 65.1 kg (143 lb 8.3 oz), SpO2 98%.    Sleep Log  Estimated \"Sleeping\" Durations   Night Est. Hours   04/14 5.25-6.50   04/15 6.00   04/16 7.50-8.50   04/17 8.50-10.25   04/18 9.75-10.25   04/19 7.50-7.75   04/20 6.75-7.00   04/21 7.75-9.00   04/22 6.75-7.50   04/23 7.50-7.75   04/24 7.75-8.00   04/25 5.75-6.00   04/26 5.75-6.50   04/27 2.50-3.00   04/28 3.50-4.00        Review of Systems    Psychiatric ROS - Adult  Anxiety:  ruminative thinking per nursing(improving)  Depression: cont to improve  Delirium: none noted but confusion is evident  Psychosis: none elicited this morning  Usha: none noted  Safety Issues: denies SI/HI  Medication side effects: Patient reports blurring of vision- will divide dose of luvox 75mg bid  Physical Exam      Mental Status Exam  General: adequately groomed appears stated age, malodorous  Appearance: hospital attire  Attitude: more engaged  Behavior: controlled, wandering, responds easily to redirection  Motor Activity: ambulating well independently  Speech: slow rate and low volume but engages readily with provider  Mood: concerned that she will not be discharged tomorrow  Affect:  oddly " related, more open  Thought Process: more organized but memory deficits apparent  Thought Content: less ruminative thinking verbally expressed  Thought Perception: denies AH/VH, does not appear internally stimulated  Cognition: alert and oriented x4  Insight: patient cont to ask about meds and stated she is having some blurring of vision  Judgement: patient has agreed to placement in SNF  Psychiatric Risk Assessment  Violence Risk Assessment: major mental  illness  Acute Risk of Harm to Others is Considered: low   Suicide Risk Assessment: patient reports being more hopeful about future in [facility]  Protective Factors against Suicide: denies SI/HI, looking forward to going to AL, family support  Acute Risk of Harm to Self is Considered: low       Relevant Results               Assessment & Plan  Psychosis, unspecified psychosis type (Multi)    Anxiety    Insomnia    Hypokalemia    Mild neurocognitive disorder    69-year-old female with history of depression and anxiety concern for psychotic features now hospitalized for a month with subjective report that there is minimal improvement.  With a supervised and safe discharge environment, it is likely patient could discharge with current level of functioning.  Patient is due for discharge early next week per primary team.  Medication administration record reports patient adherence with medications despite patient describing the opposite.     Biological -      Continue current medications as follows:  Luvox 75 mg bid for anxiety and obsessive features with depression  Trileptal 300 mg twice daily for mood stabilization  Zyprexa 5 mg nightly for potential delusional contributions  Melatonin 3 mg nightly for insomnia     Initiate elopement precautions - removal of personal garments, to use hospital garments, move to a room closer to nursing station and farther from exit doors     Medical to please co-manage pertinences      Discussion with patient regarding risk benefits and  alternatives and side effects with opportunity to ask questions and questions answered.  Patient given consent to the plan as noted     2. Psychological -        Patient is encouraged to participate with the therapeutic milieu and program and group therapy        3. Sociological -       Patient encouraged to cooperate with social work staff on issues relevant to discharge planning- will discharge on Wednesday to [facility]/S[facility]     Goals for discharge include:  Psychiatric condition: to lower acute risk, return to baseline level of functioning, work to identify positive coping skills to manage psychiatric symptoms, allow for reconciliation of medications  Physical condition: increase daily physical activity, demonstrate interest in completing daily activity, and complete Activities of Daily Living  Social function: identify protective factors and family supports, increase social interactions on the unit with peers and providing staff, and demonstrate appropriate problem solving skills for engaging after care on discharge        Current malnutriton diganoses:  Malnutrition Diagnosis: Severe malnutrition related to acute disease or injury          Current malnutriton diganoses:  Malnutrition Diagnosis: Severe malnutrition related to acute disease or injury        I spent 25 minutes in the professional and overall care of this patient.      Elva Murcia, KEHINDE-CNS

## 2025-04-29 NOTE — GROUP NOTE
Group Topic: Decision Making   Group Date: 4/29/2025  Start Time: 1045  End Time: 1130  Facilitators: SUSAN Oreilly   Department: [facility]    Number of Participants: 5   Group Focus: other Stop Over Thinking!  Treatment Modality: Other: Recreation Therapy  Interventions utilized were exploration, group exercise, patient education, problem solving, and support  Purpose: coping skills, maladaptive thinking, feelings, irrational fears, communication skills, insight or knowledge, self-worth, self-care, relapse prevention strategies, and trigger / craving management    Name: Nevaeh Cuba YOB: 1955   MR: [id number]      Facilitator: Recreational Therapist  Level of Participation: did not attend  Quality of Participation:  did not attend  Interactions with others:  did not attend  Mood/Affect:  did not attend  Triggers (if applicable): n/a  Cognition:  did not attend  Progress: None  Comments: pt problem is delusional thought.  Pt refuses to attend group.  Resting in her room.  Handouts left at bedside.  Plan: continue with services

## 2025-04-29 NOTE — NURSING NOTE
"BIRP NOTE     Problem:  Hopelessness and Anxiety     Behavior:  Pt presents anxious at time of assessment. Pt Denies Auditory and Visual hallucination, and Denies suicidal ideation with plan, and voices no homicidal ideation to staff at this time. Pt voices 0/10 pain at this time during assessment. Pt educated on medications and is concerned about medications at this time voicing that can not take medications due to the \"air quality\". Rn provided supported and encouraged pt to take medications. Pt took medications eventually with puddings . Pt compliant with medication at this time. Pt has no other new concerns with nutritional needs at this time. Pt encouraged to attend unit programing throughout shift to and to adopt positive coping skills. Pt also encouraged to wear slip resistance foot wear to reduce and minimize falls while ambulating around the hospital unit. Pt encouraged to be social while on unit with other peers.     Group Participation: Yes minimal  Appetite/Meals: Good        Interventions:  Pt. Was offered groups and their scheduled medications per MAR orders.     Response:  Pt. Has been compliant w/ meds, tolerated them well; and did attend the majority of group sessions w/ good results.     Plan:  Pt. will continue to be monitored Q 15 minutes  for changes in mental status & safety on the unit.   "

## 2025-04-29 NOTE — GROUP NOTE
Group Topic: Medication Education   Group Date: 4/29/2025  Start Time: 0950  End Time: 1050  Facilitators: Jayne Powell PharmD   Department: [facility] [place] [facility]    Number of Participants: 6   Group Focus: daily focus, goals, and other Medication Education  Treatment Modality: Skills Training  Interventions utilized were group exercise, other MOBEXO game, and patient education  Purpose: insight or knowledge and other: Improved medication compliance    Name: Nevaeh Cuba YOB: 1955   MR: [id number]      Facilitator: Pharmacist  Level of Participation: did not attend  Quality of Participation:  N/A- Did not attend  Interactions with others:   N/A- Did not attend  Mood/Affect:   N/A- Did not attend  Triggers (if applicable): n/a  Cognition:   N/A- Did not attend  Progress: None  Comments: Did not attend medication education  Plan: continue with services

## 2025-04-29 NOTE — GROUP NOTE
Group Topic: Team Building   Group Date: 4/28/2025  Start Time: 2015  End Time: 2030  Facilitators: Arturo Dyson   Department: [facility]     Number of Participants: 8   Group Focus: community group and goals  Treatment Modality: Interpersonal Therapy  Interventions utilized were group exercise  Purpose: feelings    Name: Nevaeh Cuba YOB: 1955   MR: [id number]      Facilitator: Mental Health PCNA  Level of Participation: withdrawn  Quality of Participation: withdrawn  Interactions with others: appropriate  Mood/Affect: positive  Triggers (if applicable): n\a  Cognition: coherent/clear  Progress: Moderate  Comments: pt problem is psychosis  Plan: continue with services

## 2025-04-29 NOTE — GROUP NOTE
Group Topic: Reminiscence   Group Date: 4/29/2025  Start Time: 1330  End Time: 1425  Facilitators: SUSAN Oreilly   Department: [facility] REH[facility]IRTUAL    Number of Participants: 8   Group Focus: other ASAP Game  Treatment Modality: Other: Recreation Therapy  Interventions utilized were mental fitness, reminiscence, and story telling  Purpose: other: fun, elevate mood, increase socialization, enhance self esteem    Name: Nevaeh Cuba YOB: 1955   MR: [id number]      Facilitator: Recreational Therapist  Level of Participation: did not attend  Quality of Participation:  did not attend  Interactions with others:  did not attend  Mood/Affect:  did not attend  Triggers (if applicable): n/a  Cognition:  did not attend  Progress: None  Comments: pt problem is delusional thought.  Pt is unable to focus on group at this time.  Is standing in the alcove in front of room 418  and refuses to leave.  Peeks around the corner form time to time.  Unable to state why she is standing there.  No handout to offer.   Plan: continue with services

## 2025-04-29 NOTE — PROGRESS NOTES
LUISW spoke with pt's sister and confirmed that the bed has been delivered to [facility]. The rest of pt's furniture will be delivered tomorrow. LSW confirmed with [facility] that they are able to receive pt tomorrow. Pt's meds have been filled by [facility] pharmacy to be sent upon discharge. LUISW set up transportation via [facility] for tomorrow at 10:30am.     MARCELA Corley

## 2025-04-29 NOTE — NURSING NOTE
Did not sleep throughout shift. Observed sitting on the side of her bed. Refused Trileptal 300 mg in AM. Educated patient about the medication and encouraged to take it,.

## 2025-04-29 NOTE — PROGRESS NOTES
[facility]: [facility]  PROGRESS NOTE      Nevaeh Cuba is a 69 y.o. female that is being seen  today for follow-up at [facility]..  Subjective   Patient is being seen for follow-up at [facility]. Patient's blood pressure is fairly controlled with amlodipine.  Patient's lab work reviewed.  Will continue to monitor.      ROS  Negative for fever or chills  Negative for sore throat, ear pain, nasal discharge  Negative for cough, shortness of breath or wheezing  Negative for chest pain, palpitations, swelling of legs  Negative for abdominal pain, constipation, diarrhea, blood in the stools  Negative for urinary complaints  Negative for headache, dizziness, weakness or numbness  Negative for joint pain  Positive for depression or anxiety  All other systems reviewed and were negative   Vitals:    04/29/25 0536   BP: 164/80   Pulse: 97   Resp: 18   Temp: 36.9 °C (98.4 °F)   SpO2: 98%      Vitals:    04/09/25 0634   Weight: 65.1 kg (143 lb 8.3 oz)     Body mass index is 23.16 kg/m².  Physical Exam  Constitutional: Patient does not appear to be in any acute distress  Head and Face: NCAT  Eyes: Normal external exam, EOMI  ENT: Normal external inspection of ears and nose. Oropharynx normal.  Cardiovascular: RRR, S1/S2, no murmurs, rubs, or gallops, radial pulses +2, no edema of extremities  Pulmonary: CTAB, no respiratory distress.  Abdomen: +BS, soft, non-tender, nondistended, no guarding or rebound, no masses noted  MSK: No joint swelling, normal movements of all extremities. Range of motion- normal.  Skin- No lesions, contusions, or erythema.  Peripheral puslses palpable bilaterally 2+  Neuro: AAO X3, Cranial nerves 2-12 grossly intact,DTR 2+ in all 4 limbs       LABS   [unfilled]  Lab Results   Component Value Date    GLUCOSE 112 (H) 04/24/2025    CALCIUM 8.8 04/24/2025     04/24/2025    K 3.3 (L) 04/24/2025    CO2 26 04/24/2025     04/24/2025    BUN 15 04/24/2025    CREATININE 0.61  04/24/2025     Lab Results   Component Value Date    ALT 25 04/24/2025    AST 15 04/24/2025    ALKPHOS 66 04/24/2025    BILITOT 0.5 04/24/2025     Lab Results   Component Value Date    WBC 9.9 03/28/2025    HGB 17.5 (H) 03/28/2025    HCT 48.7 (H) 03/28/2025    MCV 88 03/28/2025     03/28/2025     Lab Results   Component Value Date    CHOL 142 03/29/2025    CHOL 224 (H) 04/12/2024    CHOL 194 10/06/2023     Lab Results   Component Value Date    HDL 59.7 03/29/2025    HDL 58.0 04/12/2024    HDL 58.0 10/06/2023     Lab Results   Component Value Date    LDLCALC 67 03/29/2025    LDLCALC 102 04/12/2024    LDLCALC 88 10/06/2023     Lab Results   Component Value Date    TRIG 77 03/29/2025    TRIG 318 (H) 04/12/2024    TRIG 240 (H) 10/06/2023     Lab Results   Component Value Date    HGBA1C 5.5 06/25/2024     Other labs not included in the list above were reviewed either before or during this encounter.    History    Past Medical History:   Diagnosis Date    Benign essential hypertension     Estrogen deficiency 11/15/2023    DEXA 1/24 back nl, hip neck T-2.1 recheck 1/26    History of atrial fibrillation     Hyperactive thyroid Dr.Burtch Rand [place]ham NSR no AC    History of breast lift 05/2024    History of Meniere's syndrome 11/15/2023    Hx of reduction mammoplasty 05/2024    Hyperthyroidism 09/15/2023    Other specified abnormal findings of blood chemistry     High serum cholesterol sulfate    Personal history of malignant neoplasm, unspecified     History of malignant neoplasm    Personal history of other mental and behavioral disorders     Unspecified osteoarthritis, unspecified site 04/28/2016    Arthritis     Past Surgical History:   Procedure Laterality Date    COSMETIC SURGERY      HYSTERECTOMY  04/28/2016    Hysterectomy    JOINT REPLACEMENT      rt ring finger    JOINT REPLACEMENT Right 05/2022    ring finger    OTHER SURGICAL HISTORY  04/28/2016    Musculoskeletal Procedures Injection Carpal Tunnel     OTHER SURGICAL HISTORY  04/28/2016    Arthrodesis MCP Joint    OTHER SURGICAL HISTORY  01/11/2021    Ankle surgery    OTHER SURGICAL HISTORY  01/11/2021    Carpal tunnel surgery    OTHER SURGICAL HISTORY  07/06/2022    Nasal septoplasty    OTHER SURGICAL HISTORY  07/2021    heart shock catherization    REDUCTION MAMMAPLASTY  may 7 2024    SHOULDER SURGERY  08/2021    total left shoulder replacement    SINUS SURGERY      TRIGGER FINGER RELEASE Right 01/2024    middle finger     Family History   Problem Relation Name Age of Onset    Stroke Mother      Other (cyst on breast) Mother      Atrial fibrillation Father      Stroke Father      Heart disease Father       Allergies   Allergen Reactions    Latex Itching and Rash    Methotrexate Itching     HEAD BURNING AND ITCHING    Metoprolol Rash    Diltiazem Hcl Itching     Rash face     Latex, Natural Rubber Itching    Pollen Extracts Other     NASAL CONGESTION    Adhesive Tape-Silicones Rash    Folic Acid Rash    Sulfamethoxazole-Trimethoprim Rash     Tinnitus      No current facility-administered medications on file prior to encounter.     Current Outpatient Medications on File Prior to Encounter   Medication Sig Dispense Refill    acetaminophen (Tylenol) 325 mg tablet Take 2 tablets (650 mg) by mouth every 6 hours if needed for mild pain (1 - 3) 20 tablet 0    cholecalciferol (Vitamin D-3) 25 MCG (1000 UT) tablet Take 2 tablets (2,000 Units) by mouth. As directed      docusate sodium (Colace) 100 mg capsule Take 1 capsule (100 mg) by mouth once daily as needed.      fexofenadine HCl (ALLEGRA ORAL) Take 1 tablet by mouth once daily as needed (ALLERGIES).      fLuvoxaMINE (Luvox) 50 mg tablet Take 1 tablet (50 mg) by mouth early in the morning..      hydrOXYzine HCL (Atarax) 25 mg tablet Take 2 tablets (50 mg) by mouth once daily at bedtime for 10 days. 20 tablet 0    ibuprofen 200 mg tablet Take 2 tablets (400 mg) by mouth every 6 hours if needed for mild pain (1 - 3).       mv-min-FA-vit K-lutein-zeaxant (PreserVision AREDS 2 Plus MV) 200 mcg-15 mcg- 5 mg-1 mg capsule Take 1 capsule by mouth once daily.      OXcarbazepine (Trileptal) 150 mg tablet Take 1 tablet (150 mg) by mouth 2 times a day.      propranolol (Inderal) 10 mg tablet Take 1 tablet (10 mg) by mouth every 8 hours if needed for anxiety.      triamterene-hydrochlorothiazid (Maxzide-25) 37.5-25 mg tablet Take 1 tablet by mouth once daily in the morning. 90 tablet 3    [DISCONTINUED] atorvastatin (Lipitor) 20 mg tablet Take 1 tablet (20 mg) by mouth once daily. 90 tablet 3    [DISCONTINUED] methIMAzole (Tapazole) 5 mg tablet Take 1 tablet (5 mg) by mouth once daily. 90 tablet 3    [DISCONTINUED] OXcarbazepine (Trileptal) 300 mg tablet Take 1 tablet (300 mg) by mouth every 12 hours.       Immunization History   Administered Date(s) Administered    COVID-19, subunit, rS-nanoparticle, adjuvanted, PF, 5 mcg/0.5 mL 09/28/2024    Flu vaccine, quadrivalent, high-dose, preservative free, age 65y+ (FLUZONE) 09/14/2023    Influenza, Seasonal, Quadrivalent, Adjuvanted 09/30/2021, 09/08/2022    Influenza, Unspecified 09/07/2010, 10/17/2011    Influenza, injectable, MDCK, quadrivalent 10/23/2017, 10/30/2018    Influenza, injectable, quadrivalent 09/23/2019, 09/11/2020    Influenza, seasonal, injectable 09/24/2012, 10/17/2013, 10/23/2014, 10/26/2015, 11/17/2016    Moderna COVID-19 vaccine, 12 years and older (50mcg/0.5mL)(Spikevax) 09/29/2023    Pfizer COVID-19 vaccine, bivalent, age 12 years and older (30 mcg/0.3 mL) 09/08/2022    Pfizer Gray Cap SARS-CoV-2 03/31/2022    Pfizer Purple Cap SARS-CoV-2 03/09/2021, 04/06/2021, 10/06/2021    Pneumococcal conjugate vaccine, 13-valent (PREVNAR 13) 07/11/2013    Pneumococcal conjugate vaccine, 20-valent (PREVNAR 20) 09/16/2023    Pneumococcal polysaccharide vaccine, 23-valent, age 2 years and older (PNEUMOVAX 23) 11/05/2020    RSV, 60 Years And Older (AREXVY) 09/16/2023    Tdap vaccine, age  7 year and older (BOOSTRIX, ADACEL) 09/30/2021    Zoster vaccine, recombinant, adult (SHINGRIX) 10/15/2020, 09/30/2021    Zoster, live 02/10/2012     Patient's medical history was reviewed and updated either before or during this encounter.  ASSESSMENT / PLAN:  Active Hospital Problems    Mild neurocognitive disorder      Hypokalemia      *Psychosis, unspecified psychosis type (Multi)      Anxiety      Insomnia      Sensorineural hearing loss (SNHL) of both ears      Essential hypertension      Hyperlipidemia      Hyperthyroidism    Patient is being seen for follow-up at [facility].  Anxiety has been stable.   Blood pressure has been fairly controlled with amlodipine.  Will continue to monitor.

## 2025-04-29 NOTE — CARE PLAN
The patient's goals for the shift include to have better quality of air    The clinical goals for the shift include no slips and or falls    Recommendations to address these barriers include educating pt on wearing appropriate footwear when ambulating  in hallways to reduce slips and falls.

## 2025-04-30 VITALS
TEMPERATURE: 98.6 F | DIASTOLIC BLOOD PRESSURE: 73 MMHG | OXYGEN SATURATION: 97 % | HEART RATE: 94 BPM | SYSTOLIC BLOOD PRESSURE: 128 MMHG | WEIGHT: 143.52 LBS | BODY MASS INDEX: 23.07 KG/M2 | HEIGHT: 66 IN | RESPIRATION RATE: 18 BRPM

## 2025-04-30 PROBLEM — E87.6 HYPOKALEMIA: Status: RESOLVED | Noted: 2025-04-10 | Resolved: 2025-04-30

## 2025-04-30 PROCEDURE — 2500000001 HC RX 250 WO HCPCS SELF ADMINISTERED DRUGS (ALT 637 FOR MEDICARE OP): Performed by: STUDENT IN AN ORGANIZED HEALTH CARE EDUCATION/TRAINING PROGRAM

## 2025-04-30 PROCEDURE — 2500000001 HC RX 250 WO HCPCS SELF ADMINISTERED DRUGS (ALT 637 FOR MEDICARE OP): Performed by: INTERNAL MEDICINE

## 2025-04-30 PROCEDURE — 2500000001 HC RX 250 WO HCPCS SELF ADMINISTERED DRUGS (ALT 637 FOR MEDICARE OP): Performed by: REGISTERED NURSE

## 2025-04-30 RX ADMIN — AMLODIPINE BESYLATE 5 MG: 5 TABLET ORAL at 09:01

## 2025-04-30 RX ADMIN — Medication 2000 UNITS: at 09:02

## 2025-04-30 RX ADMIN — FLUVOXAMINE MALEATE 75 MG: 50 TABLET ORAL at 09:01

## 2025-04-30 RX ADMIN — METHIMAZOLE 5 MG: 5 TABLET ORAL at 09:01

## 2025-04-30 ASSESSMENT — PAIN SCALES - GENERAL
PAINLEVEL_OUTOF10: 0 - NO PAIN
PAINLEVEL_OUTOF10: 0 - NO PAIN

## 2025-04-30 ASSESSMENT — PAIN - FUNCTIONAL ASSESSMENT
PAIN_FUNCTIONAL_ASSESSMENT: FLACC (FACE, LEGS, ACTIVITY, CRY, CONSOLABILITY)
PAIN_FUNCTIONAL_ASSESSMENT: 0-10

## 2025-04-30 ASSESSMENT — ACTIVITIES OF DAILY LIVING (ADL): EFFECT OF PAIN ON DAILY ACTIVITIES: MINIMAL

## 2025-04-30 ASSESSMENT — PAIN DESCRIPTION - DESCRIPTORS: DESCRIPTORS: PATIENT UNABLE TO DESCRIBE

## 2025-04-30 NOTE — GROUP NOTE
Group Topic: Goals   Group Date: 4/29/2025  Start Time: 2000  End Time: 2020  Facilitators: Leigh Ann Jeffery   Department: [facility] Geriatric     Number of Participants: 10   Group Focus: check in  Treatment Modality: Other: reflection  Interventions utilized were reminiscence  Purpose: feelings    Name: Nevaeh Cuba YOB: 1955   MR: [id number]      Facilitator: Mental Health PCNA  Level of Participation: minimal  Quality of Participation: distractible  Interactions with others: monopolizing  Mood/Affect: anxious  Triggers (if applicable): none  Cognition: confused  Progress: Minimal  Comments: patient problem unspecified psychosis  Plan: continue with services

## 2025-04-30 NOTE — DISCHARGE SUMMARY
"Admit Date: 3/28/2025  Discharge Date: 04/30/2025    Reason For Admission:   Nevaeh Cuba is a 69 year old F with a past psychiatric history of depression, anxiety, bipolar disorder who presented to [facility] ED on 3/28/25 with complaints of passive SI, insomnia, and feelings of hopelessness.     Per ED social work note 3/28/25:  Pt doesnt answer questions regarding suicide directly. She states \"I can't fix this\" repeatedly and or \"it doesnt work\" when asked about her thoughts about suicide. Pt appears distraught, distracted and disorganized. She stares off or in the corners of the room and doesnt answer all questions appropriately. Pt reports she only sleeps 1-2 hours a night and only eats maybe 1 meal a day. Pt reports seeing images of shadows and cats as well as hearing her cats when they may not be present. Pt states she spent money that was to be used for her mother and husbands SNF care. Pt states her punishment is coming and she is going to burn in hell. Pt believes her mother and  are going to die because she didnt pay the bills and reports she didnt pay bills because she never learned how to pay them. Pt appears confused, distracted, is unable to focus, is despondent and hopeless. Pt states she stopped seeing her psychiatrist because \"its not going to work\". Pt states \"I can't be fixed\".     Discharge Diagnosis  Psychosis, unspecified psychosis type (Multi)  Anxiety  Mild neurocognitive disorder  Insomnia    Test Results Pending At Discharge  Pending Labs       No current pending labs.            Hospital Course   She was cleared medically in the ED and transferred to [facility] on 3/28/25. Upon psychiatry admission, pt denied AVH and SI/HI. She expressed feeling overwhelmed by guilt because she spent large amounts of money. Her affect and presentation are odd and eccentric. She says she slept much better last night after barely sleeping for a long time. She says [employer] cannot help her \"I " "have built up all these symptoms\" \"I cause problems everywhere I go\" \"can't live on my own, can't live with anyone\". Patient desribes obsessive thought process and agrees this is why she is on luvox. She gives permission to talk to Laura Coles- sister- but does not have her number. Her home Luvox 50mg, trileptal 300mg BID were continued and Zyprexa 5mg was started for psychotic symptoms and mood stabilization.    On 4/1, Zyprexa was increased to 10mg. Pt tolerated without side effect. On 4/3 her Luvox was increased to 75mg to help with persistent anxiety and depressive symptoms.  4/4 a SLUMS was performed and scored 19/30. Sister expressed concerns that cognitive changes are more acute, head CT was ordered and was negative for acute process, no areas of encephalomalacia.   4/9 Zyprexa was decreased to 5mg (provider felt 10mg did not improve delusional thought processes).  4/12 Luvox was increased to 100mg nightly.  4/24 Luvox was increased to 150mg nightly for \"OCD\" behaviors  4/29 Luvox scheduled changed to 75mg BID to help mitigate potential intolerance/side effect - she had complaints of blurred vision at time. She was provided with a referral to outpatient optometry regarding.    During this above admission time frame, pts initial participation with milieu was minimal. As medication adjustments continued, her participation incrementally improved. Adequate sleep and PO intake were maintained throughout admission.  Anxiety, depression, disorganized thoughts and ruminations improved throughout admission as medications were adjusted.     Hospitalization was prolonged due to placement. Social work was involved for disposition planning. PASRR was initiated, referrals were sent for [facility] placement. \[facility]\"" was approved for SNF/NH placement however pt level of assistance best suited for assisted living. Many referrals were placed and patient was eventually accepted to [facility]. Pts family had furniture delivered " "and pt was discharged to facility on 4/30/25.     Pertinent Physical Exam At Time of Discharge  Physical Exam  Vitals and nursing note reviewed.   Pulmonary:      Effort: Pulmonary effort is normal.   Neurological:      Mental Status: She is alert.       Mental Status Exam  General: adequately groomed appears stated age  Appearance: hospital attire  Attitude: pleasant, engaged  Behavior: appropriate interactions with staff and peers  Motor Activity: ambulating well independently  Speech: slow rate and low volume but engages readily with provider  Mood: \"I'm happy I am leaving today!\"  Affect:  congruent to mood and topic of discussion  Thought Process: organized  Thought Content: less ruminative thinking verbally expressed  Thought Perception: denies AH/VH, does not appear internally stimulated  Cognition: alert and oriented x4  Insight: fair  Judgement: fair    Psychiatric Risk Assessment  Violence Risk Assessment: major mental illness  Acute Risk of Harm to Others is Considered: low   Suicide Risk Assessment: age > 65 yrs old, , current psychiatric illness, feelings of hopelessness, living alone or lack of social support, suicidal ideation. Patient reports being more hopeful about future in [facility]  Protective Factors against Suicide: denies SI/HI, looking forward to going to [facility], family support  Acute Risk of Harm to Self is Considered: low    Outpatient Appointments/Follow-Ups  Future Appointments   Date Time Provider Department Center   3/13/2026  1:00 PM Sergio Johnston MD [id number] [facility]     [facility]  [address] [place] [address]  [phone]  Go on 4/30/2025  Discharge to Assisted Living and follow up with providers at facility for ongoing mental health and physical health concerns.    Optometry    Schedule an appointment as soon as possible for a visit in 2 week(s)  Schedule appointment to be seen for vision concerns.    Cari Almodovar, APRN-CNP  [address]  [address]  [place] [address]  [phone]    Schedule an appointment as soon as possible for a visit in 2 week(s)  Schedule an appointment to be seen by PCP or follow up with provider at facility.      KEHINDE Avila-CNP, AGPCNP-BC, PMHNP-BC  Psychiatry, [facility]  1* contact: [employer] chat preferred

## 2025-04-30 NOTE — CARE PLAN
The patient's goals for the shift include sleep    The clinical goals for the shift include promote rest    Over the shift, the patient did not make progress toward the following goals. Barriers to progression include mental health illness. Recommendations to address these barriers include adherence to care plan and treatment.      Problem: Risk for Suicide  Goal: Accepts medications as prescribed/needed this shift  Outcome: Progressing     Problem: Risk for Suicide  Goal: Identifies supports this shift  Outcome: Progressing     Problem: Risk for Suicide  Goal: Makes needs known through verbalization or behaviors this shift  Outcome: Progressing

## 2025-04-30 NOTE — NURSING NOTE
"TAZ NOTE     Problem:  delusions     Behavior:  Pt is ambulating independently in the hallway near nurse's station, addressed her needs, cooperative with care but refused night time medication. Pt reasoning her medication refusal as they \"affect the eyesight causing double vision and blurriness\" Pt reported that she suspects Trileptal or Luvox causing the problem with vision. Mistrust to the staff was exhibited, Pt refused all the meds, had hard time choosing a snack, asked about the \"cost\" of the snack and not being able to afford to purchase it. When upcoming discharge was addressed, Pt demonstrated disbelieve and denied a possibility of being discharged next day.      Group Participation: no  Appetite/Meals: hs snack provided       Interventions:  Scheduled medication attempted to be administered, education related to medication side effects implemented    Response:  Pt refused medication, denied need for education     Plan:  Continue monitoring, provide safe environment  "

## 2025-04-30 NOTE — CARE PLAN
The patient's goals for the shift include go out    The clinical goals for the shift include promote rest    Over the shift, the patient did make progress toward the following goals. Barriers to progression include reluctance to partake in some aspects of tx. plan. Recommendations to address these barriers include positive encouragement.      Problem: Fall/Injury  Goal: Not fall by end of shift  Outcome: Progressing

## 2025-05-01 ENCOUNTER — TELEPHONE (OUTPATIENT)
Dept: BEHAVIORAL HEALTH | Facility: HOSPITAL | Age: 70
End: 2025-05-01
Payer: MEDICARE

## 2025-05-01 NOTE — PROGRESS NOTES
[id number] Spoke to Cari Das CNP at [facility] to give report on patient. All questions answered.     Of note, while I was preparing the patients discharge summary yesterday I noted documentation pt complained of vision changes. She was provided a referral for outpatient ophthalmology and her Luvox dosing was split by primary provider (rationale being possible effect from higher dosing of Luvox). However, on chart review I did find that her oxcarbazepine levels were not checked upon admission -- visual disturbances are potential side effect with oxcarb use and more prominent with higher serum levels. I relayed this information to KARLI Das and requested that they check oxcarb levels and monitor.     Per UTD: Diplopia (ER: 10% to 13%; IR: 12% to 30%), nystagmus disorder (ER: 3%; IR: 2% to 20%), visual disturbance (ER: 1% to 3%; IR: 4% to 14%)        KEHINDE Avila-CNP, AGPCNP-BC, PMHNP-BC  Psychiatry, [facility]  1* contact: [employer] preferred

## 2026-03-13 ENCOUNTER — APPOINTMENT (OUTPATIENT)
Dept: ENDOCRINOLOGY | Facility: CLINIC | Age: 71
End: 2026-03-13
Payer: MEDICARE

## (undated) DEVICE — ELECTRODE, ELECTROSURGICAL, BLADE, INSULATED, ENT/IMA, STERILE

## (undated) DEVICE — SLEEVE, VASO PRESS, CALF GARMENT, MEDIUM, GREEN

## (undated) DEVICE — STAPLER, SKIN, DISPOSABLE, 35 COUNT, WIDE, STERILE

## (undated) DEVICE — Device

## (undated) DEVICE — SOLUTION, IRRIGATION, X RX SODIUM CHL 0.9%, 1000ML BTL

## (undated) DEVICE — TEMPLATE, IMPLANT, OSIA OSI200

## (undated) DEVICE — DEPRESSOR, TONGUE, 6 IN, WOOD, STERILE, LF

## (undated) DEVICE — DRAPE, CHEST/BREAST

## (undated) DEVICE — BANDAGE, GAUZE, COTTON, STERILE, BULKEE II, 4.5IN X 4.1YD

## (undated) DEVICE — CORD, CAUTERY, BIPOLAR, STERILE

## (undated) DEVICE — TUBING, ASPIRATION, LIPOSUCTION

## (undated) DEVICE — SUTURE, VICRYL, 0, 36 IN, CT-1, UNDYED

## (undated) DEVICE — NEEDLE, SPINAL, 20 G X 3.5 IN, YELLOW HUB

## (undated) DEVICE — SYRINGE, 10 CC, LUER LOCK

## (undated) DEVICE — DRAIN, WOUND, FLAT, HUBLESS, FULL LENGTH PERFORATION, 10 MM X 20 CM, SILICONE

## (undated) DEVICE — TOPIFOAM

## (undated) DEVICE — DRAPE, INCISE, ANTIMICROBIAL, IOBAN 2, STERI DRAPE, 23 X 33 IN, DISPOSABLE, STERILE

## (undated) DEVICE — STRIP, SKIN CLOSURE, STERI STRIP, REINFORCED, 1 X 5 IN

## (undated) DEVICE — PACK, UNIVERSAL

## (undated) DEVICE — MARKER, SKIN, DUAL TIP, W/RULER

## (undated) DEVICE — DRAPE, TAPE, ORTHO

## (undated) DEVICE — SPONGE, GAUZE, XRAY DECT, 16 PLY, 4 X 4, W/MASTER DMT,STERILE

## (undated) DEVICE — CORD, CAUTERY, BIOPOLAR FORCEP, 12FT

## (undated) DEVICE — MARKER, SURGICAL, SKIN, STANDARD, W/RULER, LF

## (undated) DEVICE — CATHETER TRAY, FOLEY, ALOETOUCH, 16FR, 10ML, W/ DRAINBAG

## (undated) DEVICE — SUTURE, MONOCRYLIC, 5-0, 18 IN, P-3

## (undated) DEVICE — CLIPPER, HAIR, BLADE, ASSEMBLY

## (undated) DEVICE — GLOVE, SURGICAL, PROTEXIS PI , 7.0, PF, LF

## (undated) DEVICE — COVER, MAYO STAND, W/PAD, 23 IN, DISPOSABLE, PLASTIC, LF, STERILE

## (undated) DEVICE — SUTURE, MONOCRYL, 3-0, 27 IN, PS-2, UNDYED

## (undated) DEVICE — DRAPE, SHEET, UTILITY, NON ABSORBENT, 18 X 26 IN, LF

## (undated) DEVICE — SYRINGE, 20 CC, LUER LOCK

## (undated) DEVICE — NEEDLE, HYPODERMIC, NEEDLE PRO, 25G X 1.5, ORANGE

## (undated) DEVICE — BLADE, SURGICAL, POLYMER COATED P10, STERILE, DISP

## (undated) DEVICE — DRILL, WIDENING 4MM W/COUNTERSINK BAHA3

## (undated) DEVICE — GLOVE, SURGICAL, PROTEXIS PI , 8.5, PF, LF

## (undated) DEVICE — SYRINGE, 20 CC, LUER SLIP

## (undated) DEVICE — REMOVAL KIT, SKIN STAPLE, W/SPONGE, 3 X 3 IN, DISPOSABLE, STERILE

## (undated) DEVICE — BLADE, SURGICAL, POLYMER COATED P15, STERILE, DISP

## (undated) DEVICE — GOWN, SURGICAL, SMARTGOWN, XLARGE, STERILE

## (undated) DEVICE — MARKER, SURGICAL, SKIN, REG TIP, W/ RULER & LABELS

## (undated) DEVICE — GLOVE, SURGICAL, SYNTHETIC, DERMAPRENE, 8.5, PF, LF, GREEN

## (undated) DEVICE — DRAPE, TOWEL, STERI DRAPE, 17 X 11 IN, PLASTIC, STERILE

## (undated) DEVICE — SUTURE, PDS II, 0 36 IN, CT-1, VIOLET

## (undated) DEVICE — DRAPE PACK, UNIVERSAL II

## (undated) DEVICE — DRAPE, SURGICAL, LEGGINGS, W/ 6IN CUFF, FABRIC, BLUE

## (undated) DEVICE — 100 CC SILICONE WOUND SUCTION, JACKSON PRATT EVACUATOR

## (undated) DEVICE — TAPE, SILK, DURAPORE, 3 IN X 10 YD, LF

## (undated) DEVICE — NEEDLE, HYPO, 18G X 1 1/2, SAFETY

## (undated) DEVICE — SUTURE, VICRYL, 3-0, 18 IN, UNDYED

## (undated) DEVICE — SPONGE, LAP, XRAY DECT, 18IN X 18IN, W/MASTER DMT, STERILE

## (undated) DEVICE — CATHETER, URETHRAL, ALL PURPOSE, 16FR

## (undated) DEVICE — CLEANER, WIPE, INSTRUMENT, 3.25 X 3.25 IN

## (undated) DEVICE — SUTURE, ETHILON, 3-0, 18 IN, PS1, BLACK

## (undated) DEVICE — PREP TRAY, SKIN, DRY, W/12 SPONGES, W/GLOVES

## (undated) DEVICE — NEEDLE, HYPODERMIC, MONOJECT, 25 G X 1.5 IN, LUER LOCK HUB, RED

## (undated) DEVICE — TUBING, AUTOFUSE, 9IN, STERILE, DISP

## (undated) DEVICE — SUTURE, VICRYL, 2-0, 27 IN, SH, UNDYED

## (undated) DEVICE — NEEDLE, HYPODERMIC, PRO EDGE, HINGED, 27G, 1/2 IN LGTH, REG WALL

## (undated) DEVICE — SYRINGE, 10CC, CONTROL, STERILE

## (undated) DEVICE — GUIDE, DRILL 3MM/4MM BAHA3

## (undated) DEVICE — SUTURE, PROLENE, 3-0, 18 IN, PS2, BLUE

## (undated) DEVICE — SKIN CLOSURE SYS, PREMIERPRO EXOFIN, 1-4CM X 22CM, 1.75G TUBE

## (undated) DEVICE — SUTURE, VICRYL, 3-0, 27IN, RB-1

## (undated) DEVICE — DRAIN, JP CHANNEL, 10 FR, FULL CHANNEL, W/ TROCAR

## (undated) DEVICE — SKIN CLOSURE SYS, PREMIERPRO EXOFIN, 2-4CM X 30CM, 1.75G TUBE